# Patient Record
Sex: FEMALE | Race: WHITE | NOT HISPANIC OR LATINO | URBAN - METROPOLITAN AREA
[De-identification: names, ages, dates, MRNs, and addresses within clinical notes are randomized per-mention and may not be internally consistent; named-entity substitution may affect disease eponyms.]

---

## 2017-04-14 ENCOUNTER — EMERGENCY VISIT (OUTPATIENT)
Dept: URBAN - METROPOLITAN AREA CLINIC 32 | Facility: CLINIC | Age: 62
End: 2017-04-14

## 2017-04-14 DIAGNOSIS — H33.42: ICD-10-CM

## 2017-04-14 DIAGNOSIS — H43.811: ICD-10-CM

## 2017-04-14 DIAGNOSIS — H33.002: ICD-10-CM

## 2017-04-14 PROCEDURE — G8427 DOCREV CUR MEDS BY ELIG CLIN: HCPCS

## 2017-04-14 PROCEDURE — 1036F TOBACCO NON-USER: CPT

## 2017-04-14 PROCEDURE — 99245 OFF/OP CONSLTJ NEW/EST HI 55: CPT | Mod: 57

## 2017-04-14 PROCEDURE — 92134 CPTRZ OPH DX IMG PST SGM RTA: CPT

## 2017-04-14 PROCEDURE — 92225 OPHTHALMOSCOPY (INITIAL): CPT

## 2017-04-14 ASSESSMENT — VISUAL ACUITY
OS_SC: 20/125
OD_SC: 20/25
OS_PH: 20/50-2

## 2017-04-14 ASSESSMENT — TONOMETRY
OD_IOP_MMHG: 12
OS_IOP_MMHG: 14

## 2017-04-15 ENCOUNTER — SURGERY/PROCEDURE (OUTPATIENT)
Dept: URBAN - METROPOLITAN AREA MEDICAL CENTER 2 | Facility: MEDICAL CENTER | Age: 62
End: 2017-04-15

## 2017-04-15 DIAGNOSIS — H33.002: ICD-10-CM

## 2017-04-15 DIAGNOSIS — H33.42: ICD-10-CM

## 2017-04-15 PROCEDURE — 67113 REPAIR RETINAL DETACH CPLX: CPT

## 2017-04-15 PROCEDURE — 67108 REPAIR DETACHED RETINA: CPT

## 2017-04-16 ENCOUNTER — EMERGENCY VISIT (OUTPATIENT)
Dept: URBAN - METROPOLITAN AREA CLINIC 39 | Facility: CLINIC | Age: 62
End: 2017-04-16

## 2017-04-16 DIAGNOSIS — H43.811: ICD-10-CM

## 2017-04-16 DIAGNOSIS — H33.42: ICD-10-CM

## 2017-04-16 PROCEDURE — G8427 DOCREV CUR MEDS BY ELIG CLIN: HCPCS

## 2017-04-16 PROCEDURE — 1036F TOBACCO NON-USER: CPT

## 2017-04-16 PROCEDURE — 99024 POSTOP FOLLOW-UP VISIT: CPT

## 2017-04-16 PROCEDURE — 92226 OPHTHALMOSCOPY (SUB): CPT

## 2017-04-17 ASSESSMENT — VISUAL ACUITY: OD_CC: 20/20

## 2017-04-17 ASSESSMENT — TONOMETRY: OD_IOP_MMHG: 15

## 2017-04-20 ENCOUNTER — POST-OP CHECK (OUTPATIENT)
Dept: URBAN - METROPOLITAN AREA CLINIC 39 | Facility: CLINIC | Age: 62
End: 2017-04-20

## 2017-04-20 DIAGNOSIS — H33.42: ICD-10-CM

## 2017-04-20 PROCEDURE — 99024 POSTOP FOLLOW-UP VISIT: CPT

## 2017-04-20 PROCEDURE — 92226 OPHTHALMOSCOPY (SUB): CPT

## 2017-04-20 ASSESSMENT — VISUAL ACUITY
OS_SC: 20/400
OS_AM: 20/100
OD_SC: 20/25-1

## 2017-04-20 ASSESSMENT — TONOMETRY
OD_IOP_MMHG: 13
OS_IOP_MMHG: 11

## 2017-05-04 ENCOUNTER — POST-OP CHECK (OUTPATIENT)
Dept: URBAN - METROPOLITAN AREA CLINIC 39 | Facility: CLINIC | Age: 62
End: 2017-05-04

## 2017-05-04 DIAGNOSIS — H33.42: ICD-10-CM

## 2017-05-04 PROCEDURE — 92226 OPHTHALMOSCOPY (SUB): CPT

## 2017-05-04 PROCEDURE — 99024 POSTOP FOLLOW-UP VISIT: CPT

## 2017-05-04 ASSESSMENT — TONOMETRY
OD_IOP_MMHG: 15
OS_IOP_MMHG: 14

## 2017-05-04 ASSESSMENT — VISUAL ACUITY
OS_PH: 20/80-1
OD_SC: 20/25
OS_SC: 20/200

## 2017-05-25 ENCOUNTER — POST-OP CHECK (OUTPATIENT)
Dept: URBAN - METROPOLITAN AREA CLINIC 39 | Facility: CLINIC | Age: 62
End: 2017-05-25

## 2017-05-25 DIAGNOSIS — H35.352: ICD-10-CM

## 2017-05-25 DIAGNOSIS — H33.42: ICD-10-CM

## 2017-05-25 PROCEDURE — 92134 CPTRZ OPH DX IMG PST SGM RTA: CPT

## 2017-05-25 PROCEDURE — 92226 OPHTHALMOSCOPY (SUB): CPT

## 2017-05-25 PROCEDURE — 99024 POSTOP FOLLOW-UP VISIT: CPT

## 2017-05-25 ASSESSMENT — VISUAL ACUITY
OD_PH: 20/20-1
OS_PH: 20/100
OD_SC: 20/20-4
OS_SC: 10/200

## 2017-05-25 ASSESSMENT — TONOMETRY
OS_IOP_MMHG: 14
OD_IOP_MMHG: 16

## 2017-06-22 ENCOUNTER — POST-OP CHECK (OUTPATIENT)
Dept: URBAN - METROPOLITAN AREA CLINIC 39 | Facility: CLINIC | Age: 62
End: 2017-06-22

## 2017-06-22 DIAGNOSIS — H35.352: ICD-10-CM

## 2017-06-22 DIAGNOSIS — H33.42: ICD-10-CM

## 2017-06-22 PROCEDURE — 99024 POSTOP FOLLOW-UP VISIT: CPT

## 2017-06-22 PROCEDURE — 92134 CPTRZ OPH DX IMG PST SGM RTA: CPT

## 2017-06-22 PROCEDURE — 92226 OPHTHALMOSCOPY (SUB): CPT

## 2017-06-22 ASSESSMENT — VISUAL ACUITY
OS_SC: 10/200
OS_PH: 20/100-2
OD_SC: 20/20-2

## 2017-06-22 ASSESSMENT — TONOMETRY
OD_IOP_MMHG: 17
OS_IOP_MMHG: 17

## 2017-07-06 ENCOUNTER — POST-OP CHECK (OUTPATIENT)
Dept: URBAN - METROPOLITAN AREA CLINIC 39 | Facility: CLINIC | Age: 62
End: 2017-07-06

## 2017-07-06 DIAGNOSIS — H35.352: ICD-10-CM

## 2017-07-06 DIAGNOSIS — H33.42: ICD-10-CM

## 2017-07-06 DIAGNOSIS — H43.811: ICD-10-CM

## 2017-07-06 PROCEDURE — 92226 OPHTHALMOSCOPY (SUB): CPT

## 2017-07-06 PROCEDURE — 99024 POSTOP FOLLOW-UP VISIT: CPT

## 2017-07-06 PROCEDURE — 92134 CPTRZ OPH DX IMG PST SGM RTA: CPT

## 2017-07-06 ASSESSMENT — VISUAL ACUITY
OS_SC: 10/200
OD_SC: 20/25
OS_PH: 20/100-2

## 2017-07-06 ASSESSMENT — TONOMETRY
OD_IOP_MMHG: 17
OS_IOP_MMHG: 19

## 2017-07-10 ENCOUNTER — POST-OP CHECK (OUTPATIENT)
Dept: URBAN - METROPOLITAN AREA CLINIC 32 | Facility: CLINIC | Age: 62
End: 2017-07-10

## 2017-07-10 DIAGNOSIS — H43.811: ICD-10-CM

## 2017-07-10 DIAGNOSIS — H35.352: ICD-10-CM

## 2017-07-10 DIAGNOSIS — H33.42: ICD-10-CM

## 2017-07-10 PROCEDURE — 99024 POSTOP FOLLOW-UP VISIT: CPT

## 2017-07-10 PROCEDURE — 92134 CPTRZ OPH DX IMG PST SGM RTA: CPT

## 2017-07-10 PROCEDURE — 92226 OPHTHALMOSCOPY (SUB): CPT

## 2017-07-10 ASSESSMENT — VISUAL ACUITY
OD_SC: 20/25
OS_SC: 3/200
OD_PH: 20/20

## 2017-07-10 ASSESSMENT — TONOMETRY
OD_IOP_MMHG: 13
OS_IOP_MMHG: 22

## 2017-07-13 ENCOUNTER — POST-OP CHECK (OUTPATIENT)
Dept: URBAN - METROPOLITAN AREA CLINIC 39 | Facility: CLINIC | Age: 62
End: 2017-07-13

## 2017-07-13 DIAGNOSIS — H33.42: ICD-10-CM

## 2017-07-13 DIAGNOSIS — H35.352: ICD-10-CM

## 2017-07-13 DIAGNOSIS — H10.32: ICD-10-CM

## 2017-07-13 PROCEDURE — 99024 POSTOP FOLLOW-UP VISIT: CPT

## 2017-07-13 PROCEDURE — 92134 CPTRZ OPH DX IMG PST SGM RTA: CPT

## 2017-07-13 PROCEDURE — 92226 OPHTHALMOSCOPY (SUB): CPT

## 2017-07-13 ASSESSMENT — VISUAL ACUITY
OS_SC: 2/200
OD_SC: 20/25
OD_PH: 20/20-2

## 2017-07-13 ASSESSMENT — TONOMETRY
OS_IOP_MMHG: 28
OD_IOP_MMHG: 14

## 2017-07-17 ENCOUNTER — FOLLOW UP (OUTPATIENT)
Dept: URBAN - METROPOLITAN AREA CLINIC 32 | Facility: CLINIC | Age: 62
End: 2017-07-17

## 2017-07-17 DIAGNOSIS — H10.32: ICD-10-CM

## 2017-07-17 DIAGNOSIS — H33.42: ICD-10-CM

## 2017-07-17 PROCEDURE — 92014 COMPRE OPH EXAM EST PT 1/>: CPT

## 2017-07-17 PROCEDURE — 92226 OPHTHALMOSCOPY (SUB): CPT

## 2017-07-17 PROCEDURE — G8427 DOCREV CUR MEDS BY ELIG CLIN: HCPCS

## 2017-07-17 PROCEDURE — 1036F TOBACCO NON-USER: CPT

## 2017-07-17 ASSESSMENT — VISUAL ACUITY
OS_SC: 20/200
OD_PH: 20/20
OD_SC: 20/25

## 2017-07-17 ASSESSMENT — TONOMETRY
OS_IOP_MMHG: 14
OD_IOP_MMHG: 18

## 2017-07-27 ENCOUNTER — POST-OP CHECK (OUTPATIENT)
Dept: URBAN - METROPOLITAN AREA CLINIC 39 | Facility: CLINIC | Age: 62
End: 2017-07-27

## 2017-07-27 DIAGNOSIS — H43.811: ICD-10-CM

## 2017-07-27 DIAGNOSIS — H33.42: ICD-10-CM

## 2017-07-27 DIAGNOSIS — H35.352: ICD-10-CM

## 2017-07-27 PROCEDURE — 99024 POSTOP FOLLOW-UP VISIT: CPT

## 2017-07-27 PROCEDURE — 92226 OPHTHALMOSCOPY (SUB): CPT

## 2017-07-27 PROCEDURE — 92134 CPTRZ OPH DX IMG PST SGM RTA: CPT

## 2017-07-27 ASSESSMENT — VISUAL ACUITY
OS_PH: 20/100-1
OD_SC: 20/25
OS_SC: 20/400

## 2017-07-27 ASSESSMENT — TONOMETRY
OD_IOP_MMHG: 16
OS_IOP_MMHG: 17

## 2017-08-21 ENCOUNTER — FOLLOW UP (OUTPATIENT)
Dept: URBAN - METROPOLITAN AREA CLINIC 32 | Facility: CLINIC | Age: 62
End: 2017-08-21

## 2017-08-21 DIAGNOSIS — H43.811: ICD-10-CM

## 2017-08-21 DIAGNOSIS — H33.42: ICD-10-CM

## 2017-08-21 DIAGNOSIS — H35.352: ICD-10-CM

## 2017-08-21 DIAGNOSIS — H10.32: ICD-10-CM

## 2017-08-21 PROCEDURE — 92134 CPTRZ OPH DX IMG PST SGM RTA: CPT

## 2017-08-21 PROCEDURE — G8427 DOCREV CUR MEDS BY ELIG CLIN: HCPCS

## 2017-08-21 PROCEDURE — 92226 OPHTHALMOSCOPY (SUB): CPT

## 2017-08-21 PROCEDURE — 1036F TOBACCO NON-USER: CPT

## 2017-08-21 PROCEDURE — 92014 COMPRE OPH EXAM EST PT 1/>: CPT

## 2017-08-21 ASSESSMENT — TONOMETRY
OD_IOP_MMHG: 15
OS_IOP_MMHG: 14

## 2017-08-21 ASSESSMENT — VISUAL ACUITY
OD_CC: 20/25
OS_CC: 20/400

## 2017-08-22 ENCOUNTER — SURGERY/PROCEDURE (OUTPATIENT)
Age: 62
End: 2017-08-22

## 2017-08-22 DIAGNOSIS — H33.42: ICD-10-CM

## 2017-08-22 DIAGNOSIS — H35.352: ICD-10-CM

## 2017-08-22 PROCEDURE — 67039 LASER TREATMENT OF RETINA: CPT

## 2017-08-22 PROCEDURE — 67121 REMOVE EYE IMPLANT MATERIAL: CPT

## 2017-08-23 ENCOUNTER — 1 DAY POST-OP (OUTPATIENT)
Dept: URBAN - METROPOLITAN AREA CLINIC 19 | Facility: CLINIC | Age: 62
End: 2017-08-23

## 2017-08-23 DIAGNOSIS — H33.42: ICD-10-CM

## 2017-08-23 PROCEDURE — 92226 OPHTHALMOSCOPY (SUB): CPT

## 2017-08-23 PROCEDURE — 99024 POSTOP FOLLOW-UP VISIT: CPT

## 2017-08-23 ASSESSMENT — TONOMETRY
OD_IOP_MMHG: 14
OS_IOP_MMHG: 8

## 2017-08-23 ASSESSMENT — VISUAL ACUITY: OD_SC: 20/25-1

## 2017-08-30 ENCOUNTER — POST-OP CHECK (OUTPATIENT)
Dept: URBAN - METROPOLITAN AREA CLINIC 39 | Facility: CLINIC | Age: 62
End: 2017-08-30

## 2017-08-30 DIAGNOSIS — H33.42: ICD-10-CM

## 2017-08-30 DIAGNOSIS — H35.352: ICD-10-CM

## 2017-08-30 PROCEDURE — 99024 POSTOP FOLLOW-UP VISIT: CPT

## 2017-08-30 PROCEDURE — 92226 OPHTHALMOSCOPY (SUB): CPT

## 2017-08-30 ASSESSMENT — TONOMETRY
OS_IOP_MMHG: 7
OS_IOP_MMHG: 6
OD_IOP_MMHG: 18

## 2017-08-30 ASSESSMENT — VISUAL ACUITY
OD_SC: 20/20
OS_SC: 20/100

## 2017-09-05 ENCOUNTER — POST-OP CHECK (OUTPATIENT)
Dept: URBAN - METROPOLITAN AREA CLINIC 39 | Facility: CLINIC | Age: 62
End: 2017-09-05

## 2017-09-05 DIAGNOSIS — H33.42: ICD-10-CM

## 2017-09-05 PROCEDURE — 99024 POSTOP FOLLOW-UP VISIT: CPT

## 2017-09-05 PROCEDURE — 92226 OPHTHALMOSCOPY (SUB): CPT

## 2017-09-06 ASSESSMENT — TONOMETRY: OS_IOP_MMHG: 20

## 2017-09-06 ASSESSMENT — VISUAL ACUITY: OS_SC: 20/200

## 2017-09-20 ENCOUNTER — POST-OP CHECK (OUTPATIENT)
Dept: URBAN - METROPOLITAN AREA CLINIC 39 | Facility: CLINIC | Age: 62
End: 2017-09-20

## 2017-09-20 DIAGNOSIS — H35.352: ICD-10-CM

## 2017-09-20 DIAGNOSIS — H43.811: ICD-10-CM

## 2017-09-20 DIAGNOSIS — H33.42: ICD-10-CM

## 2017-09-20 DIAGNOSIS — H10.32: ICD-10-CM

## 2017-09-20 PROCEDURE — 99024 POSTOP FOLLOW-UP VISIT: CPT

## 2017-09-20 PROCEDURE — 92226 OPHTHALMOSCOPY (SUB): CPT

## 2017-09-20 ASSESSMENT — VISUAL ACUITY
OD_SC: 20/25+2
OS_SC: 20/150

## 2017-09-20 ASSESSMENT — TONOMETRY
OD_IOP_MMHG: 16
OS_IOP_MMHG: 13

## 2017-10-04 ENCOUNTER — POST-OP CHECK (OUTPATIENT)
Dept: URBAN - METROPOLITAN AREA CLINIC 39 | Facility: CLINIC | Age: 62
End: 2017-10-04

## 2017-10-04 DIAGNOSIS — H33.42: ICD-10-CM

## 2017-10-04 DIAGNOSIS — H35.372: ICD-10-CM

## 2017-10-04 DIAGNOSIS — H35.352: ICD-10-CM

## 2017-10-04 PROCEDURE — 92134 CPTRZ OPH DX IMG PST SGM RTA: CPT

## 2017-10-04 PROCEDURE — 92226 OPHTHALMOSCOPY (SUB): CPT

## 2017-10-04 PROCEDURE — 99024 POSTOP FOLLOW-UP VISIT: CPT

## 2017-10-04 ASSESSMENT — VISUAL ACUITY
OS_SC: 20/160
OD_PH: 20/20
OD_SC: 20/25

## 2017-10-04 ASSESSMENT — TONOMETRY
OS_IOP_MMHG: 14
OD_IOP_MMHG: 17

## 2017-11-01 ENCOUNTER — POST-OP CHECK (OUTPATIENT)
Dept: URBAN - METROPOLITAN AREA CLINIC 39 | Facility: CLINIC | Age: 62
End: 2017-11-01

## 2017-11-01 DIAGNOSIS — H33.42: ICD-10-CM

## 2017-11-01 DIAGNOSIS — H35.372: ICD-10-CM

## 2017-11-01 DIAGNOSIS — H35.352: ICD-10-CM

## 2017-11-01 PROCEDURE — 99024 POSTOP FOLLOW-UP VISIT: CPT

## 2017-11-01 PROCEDURE — 92226 OPHTHALMOSCOPY (SUB): CPT

## 2017-11-01 PROCEDURE — 92134 CPTRZ OPH DX IMG PST SGM RTA: CPT

## 2017-11-01 ASSESSMENT — VISUAL ACUITY
OD_SC: 20/25-1
OS_SC: 2/200

## 2017-11-01 ASSESSMENT — TONOMETRY
OS_IOP_MMHG: 12
OD_IOP_MMHG: 15

## 2017-12-15 ENCOUNTER — FOLLOW UP (OUTPATIENT)
Dept: URBAN - METROPOLITAN AREA CLINIC 32 | Facility: CLINIC | Age: 62
End: 2017-12-15

## 2017-12-15 DIAGNOSIS — H21.1X2: ICD-10-CM

## 2017-12-15 DIAGNOSIS — H33.42: ICD-10-CM

## 2017-12-15 DIAGNOSIS — H43.811: ICD-10-CM

## 2017-12-15 DIAGNOSIS — H43.12: ICD-10-CM

## 2017-12-15 DIAGNOSIS — H35.372: ICD-10-CM

## 2017-12-15 DIAGNOSIS — H35.352: ICD-10-CM

## 2017-12-15 DIAGNOSIS — E11.9: ICD-10-CM

## 2017-12-15 PROCEDURE — 67028 INJECTION EYE DRUG: CPT

## 2017-12-15 PROCEDURE — 92134 CPTRZ OPH DX IMG PST SGM RTA: CPT

## 2017-12-15 PROCEDURE — A AVASTIN

## 2017-12-15 PROCEDURE — C9257 BEVACIZUMAB INJECTION: HCPCS

## 2017-12-15 PROCEDURE — 1036F TOBACCO NON-USER: CPT

## 2017-12-15 PROCEDURE — 92226 OPHTHALMOSCOPY (SUB): CPT

## 2017-12-15 PROCEDURE — 76512 OPH US DX B-SCAN: CPT

## 2017-12-15 PROCEDURE — 92014 COMPRE OPH EXAM EST PT 1/>: CPT | Mod: 25

## 2017-12-15 PROCEDURE — G8427 DOCREV CUR MEDS BY ELIG CLIN: HCPCS

## 2017-12-15 PROCEDURE — 2022F DILAT RTA XM EVC RTNOPTHY: CPT

## 2017-12-15 ASSESSMENT — VISUAL ACUITY: OD_CC: 20/25

## 2017-12-15 ASSESSMENT — TONOMETRY
OS_IOP_MMHG: 5
OS_IOP_MMHG: 7
OD_IOP_MMHG: 18

## 2017-12-29 ENCOUNTER — FOLLOW UP (OUTPATIENT)
Dept: URBAN - METROPOLITAN AREA CLINIC 32 | Facility: CLINIC | Age: 62
End: 2017-12-29

## 2017-12-29 DIAGNOSIS — H33.42: ICD-10-CM

## 2017-12-29 DIAGNOSIS — H35.352: ICD-10-CM

## 2017-12-29 DIAGNOSIS — H43.12: ICD-10-CM

## 2017-12-29 DIAGNOSIS — H35.372: ICD-10-CM

## 2017-12-29 DIAGNOSIS — H43.811: ICD-10-CM

## 2017-12-29 DIAGNOSIS — E11.9: ICD-10-CM

## 2017-12-29 DIAGNOSIS — H21.1X2: ICD-10-CM

## 2017-12-29 PROCEDURE — 92134 CPTRZ OPH DX IMG PST SGM RTA: CPT

## 2017-12-29 PROCEDURE — G8427 DOCREV CUR MEDS BY ELIG CLIN: HCPCS

## 2017-12-29 PROCEDURE — 92226 OPHTHALMOSCOPY (SUB): CPT

## 2017-12-29 PROCEDURE — 92014 COMPRE OPH EXAM EST PT 1/>: CPT

## 2017-12-29 PROCEDURE — 1036F TOBACCO NON-USER: CPT

## 2017-12-29 ASSESSMENT — VISUAL ACUITY: OD_SC: 20/25

## 2017-12-29 ASSESSMENT — TONOMETRY
OD_IOP_MMHG: 20
OS_IOP_MMHG: 11
OD_IOP_MMHG: 22
OS_IOP_MMHG: 8

## 2018-01-16 ENCOUNTER — SURGERY/PROCEDURE (OUTPATIENT)
Age: 63
End: 2018-01-16

## 2018-01-16 DIAGNOSIS — H35.372: ICD-10-CM

## 2018-01-16 DIAGNOSIS — H43.12: ICD-10-CM

## 2018-01-16 PROCEDURE — 67040 LASER TREATMENT OF RETINA: CPT

## 2018-01-16 PROCEDURE — 67041 VIT FOR MACULAR PUCKER: CPT

## 2018-01-17 ENCOUNTER — 1 DAY POST-OP (OUTPATIENT)
Dept: URBAN - METROPOLITAN AREA CLINIC 39 | Facility: CLINIC | Age: 63
End: 2018-01-17

## 2018-01-17 DIAGNOSIS — H35.372: ICD-10-CM

## 2018-01-17 PROCEDURE — 92226 OPHTHALMOSCOPY (SUB): CPT

## 2018-01-17 PROCEDURE — 99024 POSTOP FOLLOW-UP VISIT: CPT

## 2018-01-17 ASSESSMENT — TONOMETRY: OD_IOP_MMHG: 15

## 2018-01-17 ASSESSMENT — VISUAL ACUITY: OD_SC: 20/25

## 2018-01-24 ENCOUNTER — POST-OP CHECK (OUTPATIENT)
Dept: URBAN - METROPOLITAN AREA CLINIC 19 | Facility: CLINIC | Age: 63
End: 2018-01-24

## 2018-01-24 DIAGNOSIS — H43.811: ICD-10-CM

## 2018-01-24 DIAGNOSIS — H35.372: ICD-10-CM

## 2018-01-24 DIAGNOSIS — H21.1X2: ICD-10-CM

## 2018-01-24 DIAGNOSIS — H43.12: ICD-10-CM

## 2018-01-24 PROCEDURE — 92226 OPHTHALMOSCOPY (SUB): CPT

## 2018-01-24 PROCEDURE — 99024 POSTOP FOLLOW-UP VISIT: CPT

## 2018-01-24 PROCEDURE — 92134 CPTRZ OPH DX IMG PST SGM RTA: CPT

## 2018-01-24 ASSESSMENT — VISUAL ACUITY: OD_SC: 20/25

## 2018-01-24 ASSESSMENT — TONOMETRY: OD_IOP_MMHG: 12

## 2018-02-01 ENCOUNTER — 1 WEEK POST-OP (OUTPATIENT)
Dept: URBAN - METROPOLITAN AREA CLINIC 39 | Facility: CLINIC | Age: 63
End: 2018-02-01

## 2018-02-01 DIAGNOSIS — H33.42: ICD-10-CM

## 2018-02-01 DIAGNOSIS — H43.12: ICD-10-CM

## 2018-02-01 DIAGNOSIS — H35.352: ICD-10-CM

## 2018-02-01 DIAGNOSIS — H44.442: ICD-10-CM

## 2018-02-01 DIAGNOSIS — H21.1X2: ICD-10-CM

## 2018-02-01 DIAGNOSIS — H35.372: ICD-10-CM

## 2018-02-01 PROCEDURE — 99024 POSTOP FOLLOW-UP VISIT: CPT

## 2018-02-01 PROCEDURE — 92134 CPTRZ OPH DX IMG PST SGM RTA: CPT

## 2018-02-01 PROCEDURE — 92226 OPHTHALMOSCOPY (SUB): CPT

## 2018-02-01 ASSESSMENT — TONOMETRY
OS_IOP_MMHG: 6
OD_IOP_MMHG: 16

## 2018-02-01 ASSESSMENT — VISUAL ACUITY: OD_SC: 20/25

## 2018-03-15 ENCOUNTER — POST-OP CHECK (OUTPATIENT)
Dept: URBAN - METROPOLITAN AREA CLINIC 39 | Facility: CLINIC | Age: 63
End: 2018-03-15

## 2018-03-15 DIAGNOSIS — H35.372: ICD-10-CM

## 2018-03-15 DIAGNOSIS — H21.1X2: ICD-10-CM

## 2018-03-15 DIAGNOSIS — H43.811: ICD-10-CM

## 2018-03-15 DIAGNOSIS — H33.42: ICD-10-CM

## 2018-03-15 DIAGNOSIS — H43.12: ICD-10-CM

## 2018-03-15 DIAGNOSIS — H44.442: ICD-10-CM

## 2018-03-15 DIAGNOSIS — E11.9: ICD-10-CM

## 2018-03-15 DIAGNOSIS — H35.352: ICD-10-CM

## 2018-03-15 PROCEDURE — 92134 CPTRZ OPH DX IMG PST SGM RTA: CPT

## 2018-03-15 PROCEDURE — 99024 POSTOP FOLLOW-UP VISIT: CPT

## 2018-03-15 PROCEDURE — 92226 OPHTHALMOSCOPY (SUB): CPT

## 2018-03-15 ASSESSMENT — TONOMETRY
OD_IOP_MMHG: 12
OS_IOP_MMHG: 4

## 2018-03-15 ASSESSMENT — VISUAL ACUITY: OD_SC: 20/25-1

## 2018-04-11 ENCOUNTER — POST-OP CHECK (OUTPATIENT)
Dept: URBAN - METROPOLITAN AREA CLINIC 19 | Facility: CLINIC | Age: 63
End: 2018-04-11

## 2018-04-11 DIAGNOSIS — H35.372: ICD-10-CM

## 2018-04-11 DIAGNOSIS — H35.352: ICD-10-CM

## 2018-04-11 DIAGNOSIS — H33.42: ICD-10-CM

## 2018-04-11 DIAGNOSIS — H43.811: ICD-10-CM

## 2018-04-11 PROCEDURE — 92226 OPHTHALMOSCOPY (SUB): CPT

## 2018-04-11 PROCEDURE — 99024 POSTOP FOLLOW-UP VISIT: CPT

## 2018-04-11 PROCEDURE — 92134 CPTRZ OPH DX IMG PST SGM RTA: CPT

## 2018-04-11 ASSESSMENT — TONOMETRY: OD_IOP_MMHG: 13

## 2018-04-11 ASSESSMENT — VISUAL ACUITY
OS_SC: 1/200
OD_SC: 20/25-2

## 2018-06-07 ENCOUNTER — FOLLOW UP (OUTPATIENT)
Dept: URBAN - METROPOLITAN AREA CLINIC 39 | Facility: CLINIC | Age: 63
End: 2018-06-07

## 2018-06-07 DIAGNOSIS — H33.42: ICD-10-CM

## 2018-06-07 DIAGNOSIS — H35.372: ICD-10-CM

## 2018-06-07 PROCEDURE — 92134 CPTRZ OPH DX IMG PST SGM RTA: CPT

## 2018-06-07 PROCEDURE — G8427 DOCREV CUR MEDS BY ELIG CLIN: HCPCS

## 2018-06-07 PROCEDURE — 92226 OPHTHALMOSCOPY (SUB): CPT

## 2018-06-07 PROCEDURE — 92014 COMPRE OPH EXAM EST PT 1/>: CPT

## 2018-06-07 PROCEDURE — 1036F TOBACCO NON-USER: CPT

## 2018-06-07 ASSESSMENT — VISUAL ACUITY
OD_PH: 20/20-3
OD_SC: 20/25-3
OS_SC: 1/200

## 2018-06-07 ASSESSMENT — TONOMETRY
OS_IOP_MMHG: 14
OD_IOP_MMHG: 17

## 2019-12-26 ENCOUNTER — PROBLEM (OUTPATIENT)
Dept: URBAN - METROPOLITAN AREA CLINIC 96 | Facility: CLINIC | Age: 64
End: 2019-12-26

## 2019-12-26 DIAGNOSIS — H43.811: ICD-10-CM

## 2019-12-26 DIAGNOSIS — H35.372: ICD-10-CM

## 2019-12-26 DIAGNOSIS — H33.42: ICD-10-CM

## 2019-12-26 PROCEDURE — 92226 OPHTHALMOSCOPY (SUB): CPT

## 2019-12-26 PROCEDURE — 92134 CPTRZ OPH DX IMG PST SGM RTA: CPT

## 2019-12-26 PROCEDURE — 92014 COMPRE OPH EXAM EST PT 1/>: CPT

## 2019-12-26 ASSESSMENT — VISUAL ACUITY
OD_SC: 20/20
OS_SC: 1/200

## 2019-12-26 ASSESSMENT — TONOMETRY
OD_IOP_MMHG: 16
OS_IOP_MMHG: 22

## 2020-02-11 ENCOUNTER — FOLLOW UP (OUTPATIENT)
Dept: URBAN - METROPOLITAN AREA CLINIC 39 | Facility: CLINIC | Age: 65
End: 2020-02-11

## 2020-02-11 DIAGNOSIS — H35.372: ICD-10-CM

## 2020-02-11 DIAGNOSIS — H35.352: ICD-10-CM

## 2020-02-11 DIAGNOSIS — H33.42: ICD-10-CM

## 2020-02-11 PROCEDURE — 92201 OPSCPY EXTND RTA DRAW UNI/BI: CPT

## 2020-02-11 PROCEDURE — 92014 COMPRE OPH EXAM EST PT 1/>: CPT

## 2020-02-11 PROCEDURE — 92134 CPTRZ OPH DX IMG PST SGM RTA: CPT

## 2020-02-11 ASSESSMENT — VISUAL ACUITY
OS_SC: 1/200
OD_SC: 20/25

## 2020-02-11 ASSESSMENT — TONOMETRY
OD_IOP_MMHG: 18
OS_IOP_MMHG: 21

## 2020-08-10 ENCOUNTER — FOLLOW UP (OUTPATIENT)
Dept: URBAN - METROPOLITAN AREA CLINIC 19 | Facility: CLINIC | Age: 65
End: 2020-08-10

## 2020-08-10 DIAGNOSIS — H43.811: ICD-10-CM

## 2020-08-10 DIAGNOSIS — H35.372: ICD-10-CM

## 2020-08-10 DIAGNOSIS — H33.42: ICD-10-CM

## 2020-08-10 PROCEDURE — 92134 CPTRZ OPH DX IMG PST SGM RTA: CPT

## 2020-08-10 PROCEDURE — 92014 COMPRE OPH EXAM EST PT 1/>: CPT

## 2020-08-10 PROCEDURE — 92201 OPSCPY EXTND RTA DRAW UNI/BI: CPT

## 2020-08-10 ASSESSMENT — TONOMETRY
OS_IOP_MMHG: 18
OD_IOP_MMHG: 12

## 2020-08-10 ASSESSMENT — VISUAL ACUITY
OS_SC: 1/200
OD_SC: 20/25-1

## 2020-10-26 ENCOUNTER — FOLLOW UP (OUTPATIENT)
Dept: URBAN - METROPOLITAN AREA CLINIC 32 | Facility: CLINIC | Age: 65
End: 2020-10-26

## 2020-10-26 DIAGNOSIS — H33.42: ICD-10-CM

## 2020-10-26 DIAGNOSIS — H35.372: ICD-10-CM

## 2020-10-26 DIAGNOSIS — H43.811: ICD-10-CM

## 2020-10-26 PROCEDURE — 92014 COMPRE OPH EXAM EST PT 1/>: CPT

## 2020-10-26 PROCEDURE — 92201 OPSCPY EXTND RTA DRAW UNI/BI: CPT

## 2020-10-26 PROCEDURE — 92134 CPTRZ OPH DX IMG PST SGM RTA: CPT

## 2020-10-26 ASSESSMENT — VISUAL ACUITY
OS_SC: 1/200
OD_SC: 20/25+2

## 2020-10-26 ASSESSMENT — TONOMETRY
OD_IOP_MMHG: 11
OS_IOP_MMHG: 17

## 2020-10-27 ENCOUNTER — PROBLEM (OUTPATIENT)
Dept: URBAN - METROPOLITAN AREA CLINIC 32 | Facility: CLINIC | Age: 65
End: 2020-10-27

## 2020-10-27 VITALS — HEIGHT: 55 IN

## 2020-10-27 DIAGNOSIS — H33.42: ICD-10-CM

## 2020-10-27 DIAGNOSIS — H35.372: ICD-10-CM

## 2020-10-27 DIAGNOSIS — H43.811: ICD-10-CM

## 2020-10-27 PROCEDURE — 92201 OPSCPY EXTND RTA DRAW UNI/BI: CPT

## 2020-10-27 PROCEDURE — 92014 COMPRE OPH EXAM EST PT 1/>: CPT

## 2020-10-27 PROCEDURE — 92134 CPTRZ OPH DX IMG PST SGM RTA: CPT

## 2020-10-27 PROCEDURE — 76512 OPH US DX B-SCAN: CPT

## 2020-10-27 ASSESSMENT — TONOMETRY
OS_IOP_MMHG: 18
OD_IOP_MMHG: 10

## 2020-10-27 ASSESSMENT — VISUAL ACUITY: OD_SC: 20/25-1

## 2020-11-03 ENCOUNTER — FOLLOW UP (OUTPATIENT)
Dept: URBAN - METROPOLITAN AREA CLINIC 39 | Facility: CLINIC | Age: 65
End: 2020-11-03

## 2020-11-03 DIAGNOSIS — H35.372: ICD-10-CM

## 2020-11-03 DIAGNOSIS — H43.811: ICD-10-CM

## 2020-11-03 DIAGNOSIS — H33.42: ICD-10-CM

## 2020-11-03 PROCEDURE — 92201 OPSCPY EXTND RTA DRAW UNI/BI: CPT

## 2020-11-03 PROCEDURE — 92134 CPTRZ OPH DX IMG PST SGM RTA: CPT

## 2020-11-03 PROCEDURE — 92012 INTRM OPH EXAM EST PATIENT: CPT

## 2020-11-03 ASSESSMENT — VISUAL ACUITY: OD_SC: 20/20

## 2020-11-03 ASSESSMENT — TONOMETRY
OS_IOP_MMHG: 25
OD_IOP_MMHG: 16

## 2022-05-06 ENCOUNTER — FOLLOW UP (OUTPATIENT)
Dept: URBAN - METROPOLITAN AREA CLINIC 32 | Facility: CLINIC | Age: 67
End: 2022-05-06

## 2022-05-06 DIAGNOSIS — E11.9: ICD-10-CM

## 2022-05-06 DIAGNOSIS — H33.42: ICD-10-CM

## 2022-05-06 DIAGNOSIS — H35.372: ICD-10-CM

## 2022-05-06 DIAGNOSIS — H43.811: ICD-10-CM

## 2022-05-06 LAB
BLOOD WORK: NORMAL

## 2022-05-06 PROCEDURE — 92134 CPTRZ OPH DX IMG PST SGM RTA: CPT

## 2022-05-06 PROCEDURE — 92014 COMPRE OPH EXAM EST PT 1/>: CPT

## 2022-05-06 PROCEDURE — 92201 OPSCPY EXTND RTA DRAW UNI/BI: CPT

## 2022-05-06 ASSESSMENT — VISUAL ACUITY
OD_SC: 20/30-1
OD_PH: 20/25

## 2022-05-06 ASSESSMENT — TONOMETRY
OD_IOP_MMHG: 18
OS_IOP_MMHG: 20

## 2022-06-03 ENCOUNTER — FOLLOW UP (OUTPATIENT)
Dept: URBAN - METROPOLITAN AREA CLINIC 32 | Facility: CLINIC | Age: 67
End: 2022-06-03

## 2022-06-03 DIAGNOSIS — H43.811: ICD-10-CM

## 2022-06-03 DIAGNOSIS — H35.372: ICD-10-CM

## 2022-06-03 DIAGNOSIS — M31.6: ICD-10-CM

## 2022-06-03 DIAGNOSIS — H33.42: ICD-10-CM

## 2022-06-03 PROCEDURE — 92201 OPSCPY EXTND RTA DRAW UNI/BI: CPT

## 2022-06-03 PROCEDURE — 92014 COMPRE OPH EXAM EST PT 1/>: CPT

## 2022-06-03 PROCEDURE — 92134 CPTRZ OPH DX IMG PST SGM RTA: CPT

## 2022-06-03 ASSESSMENT — TONOMETRY
OS_IOP_MMHG: 18
OD_IOP_MMHG: 18

## 2022-06-03 ASSESSMENT — VISUAL ACUITY: OD_SC: 20/30

## 2022-09-09 ENCOUNTER — FOLLOW UP (OUTPATIENT)
Dept: URBAN - METROPOLITAN AREA CLINIC 32 | Facility: CLINIC | Age: 67
End: 2022-09-09

## 2022-09-09 DIAGNOSIS — E11.9: ICD-10-CM

## 2022-09-09 DIAGNOSIS — H43.811: ICD-10-CM

## 2022-09-09 DIAGNOSIS — H35.372: ICD-10-CM

## 2022-09-09 DIAGNOSIS — M31.6: ICD-10-CM

## 2022-09-09 DIAGNOSIS — H33.42: ICD-10-CM

## 2022-09-09 PROCEDURE — 92202 OPSCPY EXTND ON/MAC DRAW: CPT

## 2022-09-09 PROCEDURE — 92014 COMPRE OPH EXAM EST PT 1/>: CPT

## 2022-09-09 PROCEDURE — 92134 CPTRZ OPH DX IMG PST SGM RTA: CPT

## 2022-09-09 ASSESSMENT — TONOMETRY
OS_IOP_MMHG: 20
OD_IOP_MMHG: 20

## 2022-09-09 ASSESSMENT — VISUAL ACUITY
OD_PH: 20/30-1
OD_SC: 20/40

## 2023-01-07 LAB — HBA1C MFR BLD HPLC: 6.9 %

## 2023-01-20 ENCOUNTER — FOLLOW UP (OUTPATIENT)
Dept: URBAN - METROPOLITAN AREA CLINIC 32 | Facility: CLINIC | Age: 68
End: 2023-01-20

## 2023-01-20 DIAGNOSIS — H34.12: ICD-10-CM

## 2023-01-20 DIAGNOSIS — H21.1X2: ICD-10-CM

## 2023-01-20 DIAGNOSIS — H35.372: ICD-10-CM

## 2023-01-20 DIAGNOSIS — E11.9: ICD-10-CM

## 2023-01-20 DIAGNOSIS — H33.42: ICD-10-CM

## 2023-01-20 PROCEDURE — 92014 COMPRE OPH EXAM EST PT 1/>: CPT

## 2023-01-20 PROCEDURE — 92235 FLUORESCEIN ANGRPH MLTIFRAME: CPT

## 2023-01-20 PROCEDURE — 92134 CPTRZ OPH DX IMG PST SGM RTA: CPT | Mod: NC

## 2023-01-20 PROCEDURE — 67028 INJECTION EYE DRUG: CPT

## 2023-01-20 PROCEDURE — 92250 FUNDUS PHOTOGRAPHY W/I&R: CPT

## 2023-01-20 PROCEDURE — 92201 OPSCPY EXTND RTA DRAW UNI/BI: CPT

## 2023-01-20 PROCEDURE — 92287 ANT SGM IMG IR FLRSCN ANGRPH: CPT

## 2023-01-20 ASSESSMENT — TONOMETRY
OS_IOP_MMHG: 28
OD_IOP_MMHG: 20

## 2023-01-20 ASSESSMENT — VISUAL ACUITY: OD_SC: 20/25

## 2023-01-25 ENCOUNTER — FOLLOW UP (OUTPATIENT)
Dept: URBAN - METROPOLITAN AREA CLINIC 39 | Facility: CLINIC | Age: 68
End: 2023-01-25

## 2023-01-25 DIAGNOSIS — H34.12: ICD-10-CM

## 2023-01-25 DIAGNOSIS — H35.372: ICD-10-CM

## 2023-01-25 DIAGNOSIS — H33.42: ICD-10-CM

## 2023-01-25 DIAGNOSIS — H21.1X2: ICD-10-CM

## 2023-01-25 PROCEDURE — 92134 CPTRZ OPH DX IMG PST SGM RTA: CPT

## 2023-01-25 PROCEDURE — 92202 OPSCPY EXTND ON/MAC DRAW: CPT

## 2023-01-25 PROCEDURE — 92014 COMPRE OPH EXAM EST PT 1/>: CPT

## 2023-01-25 ASSESSMENT — TONOMETRY
OD_IOP_MMHG: 16
OS_IOP_MMHG: 15

## 2023-01-25 ASSESSMENT — VISUAL ACUITY: OD_SC: 20/20

## 2023-03-21 ENCOUNTER — FOLLOW UP (OUTPATIENT)
Dept: URBAN - METROPOLITAN AREA CLINIC 39 | Facility: CLINIC | Age: 68
End: 2023-03-21

## 2023-03-21 DIAGNOSIS — H35.372: ICD-10-CM

## 2023-03-21 DIAGNOSIS — H34.12: ICD-10-CM

## 2023-03-21 DIAGNOSIS — H33.42: ICD-10-CM

## 2023-03-21 DIAGNOSIS — H21.1X2: ICD-10-CM

## 2023-03-21 PROCEDURE — 92014 COMPRE OPH EXAM EST PT 1/>: CPT | Mod: 25

## 2023-03-21 PROCEDURE — 67028 INJECTION EYE DRUG: CPT

## 2023-03-21 PROCEDURE — 92134 CPTRZ OPH DX IMG PST SGM RTA: CPT

## 2023-03-21 PROCEDURE — 92202 OPSCPY EXTND ON/MAC DRAW: CPT | Mod: NC

## 2023-03-21 ASSESSMENT — TONOMETRY
OD_IOP_MMHG: 20
OS_IOP_MMHG: 28

## 2023-03-21 ASSESSMENT — VISUAL ACUITY: OD_SC: 20/20

## 2023-04-01 ENCOUNTER — PROBLEM (OUTPATIENT)
Dept: URBAN - METROPOLITAN AREA CLINIC 32 | Facility: CLINIC | Age: 68
End: 2023-04-01

## 2023-04-01 DIAGNOSIS — H35.372: ICD-10-CM

## 2023-04-01 DIAGNOSIS — H33.42: ICD-10-CM

## 2023-04-01 DIAGNOSIS — H21.1X2: ICD-10-CM

## 2023-04-01 DIAGNOSIS — H34.12: ICD-10-CM

## 2023-04-01 DIAGNOSIS — E11.9: ICD-10-CM

## 2023-04-01 DIAGNOSIS — H01.026: ICD-10-CM

## 2023-04-01 PROCEDURE — 92014 COMPRE OPH EXAM EST PT 1/>: CPT

## 2023-04-01 PROCEDURE — 92134 CPTRZ OPH DX IMG PST SGM RTA: CPT

## 2023-04-01 PROCEDURE — 92202 OPSCPY EXTND ON/MAC DRAW: CPT

## 2023-04-01 ASSESSMENT — VISUAL ACUITY: OD_SC: 20/25-1

## 2023-04-01 ASSESSMENT — TONOMETRY
OS_IOP_MMHG: 17
OD_IOP_MMHG: 18

## 2023-05-03 ENCOUNTER — APPOINTMENT (EMERGENCY)
Dept: VASCULAR ULTRASOUND | Facility: HOSPITAL | Age: 68
End: 2023-05-03

## 2023-05-03 ENCOUNTER — HOSPITAL ENCOUNTER (EMERGENCY)
Facility: HOSPITAL | Age: 68
Discharge: HOME | End: 2023-05-03
Attending: EMERGENCY MEDICINE

## 2023-05-03 VITALS
BODY MASS INDEX: 34.91 KG/M2 | DIASTOLIC BLOOD PRESSURE: 78 MMHG | HEART RATE: 91 BPM | RESPIRATION RATE: 17 BRPM | OXYGEN SATURATION: 97 % | TEMPERATURE: 98.2 F | WEIGHT: 197 LBS | SYSTOLIC BLOOD PRESSURE: 164 MMHG | HEIGHT: 63 IN

## 2023-05-03 DIAGNOSIS — I80.9 SUPERFICIAL PHLEBITIS: ICD-10-CM

## 2023-05-03 DIAGNOSIS — I82.409 DVT (DEEP VENOUS THROMBOSIS) (HCC): Primary | ICD-10-CM

## 2023-05-03 LAB
ANION GAP SERPL CALCULATED.3IONS-SCNC: 8 MMOL/L (ref 4–13)
APTT PPP: 27 SECONDS (ref 23–37)
BASOPHILS # BLD AUTO: 0.05 THOUSANDS/ÂΜL (ref 0–0.1)
BASOPHILS NFR BLD AUTO: 1 % (ref 0–1)
BUN SERPL-MCNC: 17 MG/DL (ref 5–25)
CALCIUM SERPL-MCNC: 8.9 MG/DL (ref 8.4–10.2)
CHLORIDE SERPL-SCNC: 105 MMOL/L (ref 96–108)
CO2 SERPL-SCNC: 26 MMOL/L (ref 21–32)
CREAT SERPL-MCNC: 0.73 MG/DL (ref 0.6–1.3)
EOSINOPHIL # BLD AUTO: 0.08 THOUSAND/ÂΜL (ref 0–0.61)
EOSINOPHIL NFR BLD AUTO: 1 % (ref 0–6)
ERYTHROCYTE [DISTWIDTH] IN BLOOD BY AUTOMATED COUNT: 13.2 % (ref 11.6–15.1)
GFR SERPL CREATININE-BSD FRML MDRD: 86 ML/MIN/1.73SQ M
GLUCOSE SERPL-MCNC: 128 MG/DL (ref 65–140)
HCT VFR BLD AUTO: 42.5 % (ref 34.8–46.1)
HGB BLD-MCNC: 13.9 G/DL (ref 11.5–15.4)
IMM GRANULOCYTES # BLD AUTO: 0.02 THOUSAND/UL (ref 0–0.2)
IMM GRANULOCYTES NFR BLD AUTO: 0 % (ref 0–2)
INR PPP: 1.38 (ref 0.84–1.19)
LYMPHOCYTES # BLD AUTO: 2.11 THOUSANDS/ÂΜL (ref 0.6–4.47)
LYMPHOCYTES NFR BLD AUTO: 22 % (ref 14–44)
MCH RBC QN AUTO: 28.8 PG (ref 26.8–34.3)
MCHC RBC AUTO-ENTMCNC: 32.7 G/DL (ref 31.4–37.4)
MCV RBC AUTO: 88 FL (ref 82–98)
MONOCYTES # BLD AUTO: 0.95 THOUSAND/ÂΜL (ref 0.17–1.22)
MONOCYTES NFR BLD AUTO: 10 % (ref 4–12)
NEUTROPHILS # BLD AUTO: 6.49 THOUSANDS/ÂΜL (ref 1.85–7.62)
NEUTS SEG NFR BLD AUTO: 66 % (ref 43–75)
NRBC BLD AUTO-RTO: 0 /100 WBCS
PLATELET # BLD AUTO: 247 THOUSANDS/UL (ref 149–390)
PMV BLD AUTO: 11 FL (ref 8.9–12.7)
POTASSIUM SERPL-SCNC: 4 MMOL/L (ref 3.5–5.3)
PROTHROMBIN TIME: 17.2 SECONDS (ref 11.6–14.5)
RBC # BLD AUTO: 4.82 MILLION/UL (ref 3.81–5.12)
SODIUM SERPL-SCNC: 139 MMOL/L (ref 135–147)
WBC # BLD AUTO: 9.7 THOUSAND/UL (ref 4.31–10.16)

## 2023-05-03 NOTE — DISCHARGE INSTRUCTIONS
Continue warm compresses to the area for 20 minutes 3-4 times a day as needed  Continue with your Eliquis

## 2023-05-03 NOTE — ED PROVIDER NOTES
History  Chief Complaint   Patient presents with    Leg Pain     Pt c/o left lower leg pain and redness, dx DVT 6 weeks ago       History provided by:  Patient  Leg Pain  Location:  Leg  Time since incident:  6 weeks  Injury: no    Leg location:  L leg  Pain details:     Quality:  Burning    Radiates to:  Does not radiate    Severity:  Moderate    Onset quality:  Gradual    Duration:  1 day    Timing:  Intermittent    Progression:  Waxing and waning  Chronicity:  New  Dislocation: no    Prior injury to area:  No  Relieved by:  Rest  Worsened by: Activity and bearing weight (palpation)  Ineffective treatments: Eliquis  Associated symptoms: itching and swelling    Associated symptoms: no back pain, no decreased ROM, no fatigue, no fever, no numbness and no tingling    Itching:     Location: left medial calf  Severity:  Mild    Onset quality:  Gradual    Duration:  2 days    Timing:  Intermittent    Progression:  Unchanged  Risk factors: no concern for non-accidental trauma, no frequent fractures, no known bone disorder, no obesity and no recent illness        None       Past Medical History:   Diagnosis Date    Arthritis     Asthma     Blindness of left eye     [facility]ch[facility] disease[facility] )     Diabetes mellitus ([facility] )     Hypertension        Past Surgical History:   Procedure Laterality Date    REPLACEMENT TOTAL KNEE Right        History reviewed  No pertinent family history  I have reviewed and agree with the history as documented  E-Cigarette/Vaping    E-Cigarette Use Never User      E-Cigarette/Vaping Substances     Social History     Tobacco Use    Smoking status: Never    Smokeless tobacco: Never   Vaping Use    Vaping Use: Never used   Substance Use Topics    Alcohol use: Yes     Alcohol/week: 1 0 standard drink     Types: 1 Glasses of wine per week    Drug use: Never       Review of Systems   Constitutional: Positive for activity change   Negative for appetite change, chills, diaphoresis, fatigue and fever  Respiratory: Negative for chest tightness and shortness of breath  Cardiovascular: Positive for leg swelling  Negative for chest pain and palpitations  Musculoskeletal: Negative for back pain  Skin: Positive for color change and itching  All other systems reviewed and are negative  Physical Exam  Physical Exam  Vitals and nursing note reviewed  Constitutional:       General: She is not in acute distress  Appearance: Normal appearance  She is normal weight  She is not ill-appearing, toxic-appearing or diaphoretic  HENT:      Head: Normocephalic and atraumatic  Right Ear: External ear normal       Left Ear: External ear normal    Eyes:      General:         Right eye: No discharge  Left eye: No discharge  Conjunctiva/sclera: Conjunctivae normal    Cardiovascular:      Rate and Rhythm: Normal rate and regular rhythm  Pulmonary:      Effort: Pulmonary effort is normal    Musculoskeletal:         General: Tenderness present  Left lower leg: Edema present  Comments: Inspection of the left lower leg-there are some superficial varicosities  There is small palpable cord over the medial of the left calf  There is no mid calf tenderness present  There is mild venous blush but no warmth palpated  There are palpable pulses over the dorsalis pedis and posterior tibial arteries that are +2 and symmetrical   Capillary refills less than 2 seconds  Skin:     Capillary Refill: Capillary refill takes less than 2 seconds  Neurological:      Mental Status: She is alert and oriented to person, place, and time  Psychiatric:         Mood and Affect: Mood normal          Behavior: Behavior normal          Thought Content:  Thought content normal          Judgment: Judgment normal          Vital Signs  ED Triage Vitals [05/03/23 1226]   Temperature Pulse Respirations Blood Pressure SpO2   98 2 °F (36 8 °C) 91 17 164/78 97 %      Temp Source Heart Rate Source Patient Position - Orthostatic VS BP Location FiO2 (%)   Oral Monitor Sitting Right arm --      Pain Score       No Pain           Vitals:    05/03/23 1226   BP: 164/78   Pulse: 91   Patient Position - Orthostatic VS: Sitting         Visual Acuity      ED Medications  Medications - No data to display    Diagnostic Studies  Results Reviewed     Procedure Component Value Units Date/Time    Basic metabolic panel [[id number]] Collected: 05/03/23 1303    Lab Status: Final result Specimen: Blood from Arm, Right Updated: 05/03/23 1332     Sodium 139 mmol/L      Potassium 4 0 mmol/L      Chloride 105 mmol/L      CO2 26 mmol/L      ANION GAP 8 mmol/L      BUN 17 mg/dL      Creatinine 0 73 mg/dL      Glucose 128 mg/dL      Calcium 8 9 mg/dL      eGFR 86 ml/min/1 73sq m     Narrative:      [place] guidelines for Chronic Kidney Disease (CKD):     Stage 1 with normal or high GFR (GFR > 90 mL/min/1 73 square meters)    Stage 2 Mild CKD (GFR = 60-89 mL/min/1 73 square meters)    Stage 3A Moderate CKD (GFR = 45-59 mL/min/1 73 square meters)    Stage 3B Moderate CKD (GFR = 30-44 mL/min/1 73 square meters)    Stage 4 Severe CKD (GFR = 15-29 mL/min/1 73 square meters)    Stage 5 End Stage CKD (GFR <15 mL/min/1 73 square meters)  Note: GFR calculation is accurate only with a steady state creatinine    Protime-INR [[id number]]  (Abnormal) Collected: 05/03/23 1303    Lab Status: Final result Specimen: Blood from Arm, Right Updated: 05/03/23 1329     Protime 17 2 seconds      INR 1 38    APTT [[id number]]  (Normal) Collected: 05/03/23 1303    Lab Status: Final result Specimen: Blood from Arm, Right Updated: 05/03/23 1329     PTT 27 seconds     CBC and differential [[id number]] Collected: 05/03/23 1303    Lab Status: Final result Specimen: Blood from Arm, Right Updated: 05/03/23 1313     WBC 9 70 Thousand/uL      RBC 4 82 Million/uL      Hemoglobin 13 9 g/dL      Hematocrit 42 5 %      MCV 88 "fL      MCH 28 8 pg      MCHC 32 7 g/dL      RDW 13 2 %      MPV 11 0 fL      Platelets 954 Thousands/uL      nRBC 0 /100 WBCs      Neutrophils Relative 66 %      Immat GRANS % 0 %      Lymphocytes Relative 22 %      Monocytes Relative 10 %      Eosinophils Relative 1 %      Basophils Relative 1 %      Neutrophils Absolute 6 49 Thousands/µL      Immature Grans Absolute 0 02 Thousand/uL      Lymphocytes Absolute 2 11 Thousands/µL      Monocytes Absolute 0 95 Thousand/µL      Eosinophils Absolute 0 08 Thousand/µL      Basophils Absolute 0 05 Thousands/µL                  VAS lower limb venous duplex study, unilateral/limited   Final Result by Jacqueline Calvo MD (05/03 9453)                 Procedures  Procedures         ED Course  ED Course as of 05/03/23 2038   Wed May 03, 2023   1259 US guided vascular access   1434 Subacute DVT left calf  Medical Decision Making  53-year-old female presents to the emergency room with her   They were sent by the urgent care center to rule out worsening of DVT in her left leg  She was diagnosed with a DVT 6 weeks ago  She was started on Eliquis  She complains of an itchy sensation and a burning sensation over the medial aspect of her left calf since yesterday  She went to the [facility] urgent care center across the street  They were concerned that maybe the DVT is propagating and is unresponsive to the Eliquis  Patient denies any fever or chills  She noticed some warmth with palpation last night with some tenderness over the medial calf  She had been applying a warm compresses with some short-term relief  Pain is worse with weightbearing and with palpation  It is relieved with rest   \"They want me to have blood work to rule out failure of Eliquis\"    Physical exam-this 53-year-old female is alert and oriented x3  She is in no acute distress  Her lungs are clear to auscultation    Her heart is regular rate and rhythm " without murmur  Inspection of her left calf there are superficial varicosities present  There is some tenderness palpated over the medial aspect with a palpable cord  There is no raffi cellulitis seen  There is a mild venous blush consistent with vasculitis  Patient has a positive Homans' sign  She does have some posterior calf tenderness  Impression-  DVT left leg  Superficial thrombophlebitis    Plan-  Patient was discharged home  She was instructed to continue with her Eliquis  She was given a referral to the vascular center for management of her Eliquis and further treatment of her DVT in her left leg  As far as her superficial thrombophlebitis is concerned she is unable to take it anti-inflammatory medication secondary to the Eliquis therapy  She will apply warm compresses to the area for 20 minutes 3-4 times a day  She was given reasons to return to the emergency room should her symptoms worsen  DVT (deep venous thrombosis) ([place] [address] ): acute illness or injury  Superficial phlebitis: acute illness or injury  Amount and/or Complexity of Data Reviewed  Labs: ordered  Details: Independently interpreted by me  Radiology: ordered  Decision-making details documented in ED Course       Details: Interpreted by the vascular surgeon, subacute DVT left calf          Disposition  Final diagnoses:   DVT (deep venous thrombosis) ([place] [address] ) - subacute DVT left calf   Superficial phlebitis - left medial calf     Time reflects when diagnosis was documented in both MDM as applicable and the Disposition within this note     Time User Action Codes Description Comment    5/3/2023  2:35 PM [place] Add [I82 409] DVT (deep venous thrombosis) ([place] [address] )     5/3/2023  2:35 PM [facility] [facility] Modify [I82 409] DVT (deep venous thrombosis) ([place] [address] ) subacute DVT left calf    5/3/2023  2:36 PM [facility] Add [I80 9] Superficial phlebitis     5/3/2023  2:36 PM [place] Modify [I80 9] Superficial phlebitis left medial calf      ED Disposition     ED Disposition   Discharge    Condition   Stable    Date/Time   Wed May 3, 2023  2:34 PM    Comment   Daisy Morales discharge to home/self care  Follow-up Information     Follow up With Specialties Details Why Contact Info Additional Information    The [address] [place] [address] Vascular Surgery Schedule an appointment as soon as possible for a visit  for a follow up [phone]  [phone] The [address] [place] [address], [address] [place] [address], [id number] [phone]          There are no discharge medications for this patient  No discharge procedures on file      PDMP Review     None          ED Provider  Electronically Signed by           Shelley Henley PA-C  05/03/23 2039

## 2023-05-16 ENCOUNTER — FOLLOW UP (OUTPATIENT)
Dept: URBAN - METROPOLITAN AREA CLINIC 39 | Facility: CLINIC | Age: 68
End: 2023-05-16

## 2023-05-16 DIAGNOSIS — H33.42: ICD-10-CM

## 2023-05-16 DIAGNOSIS — H34.12: ICD-10-CM

## 2023-05-16 DIAGNOSIS — E11.9: ICD-10-CM

## 2023-05-16 DIAGNOSIS — H21.1X2: ICD-10-CM

## 2023-05-16 DIAGNOSIS — H35.372: ICD-10-CM

## 2023-05-16 DIAGNOSIS — H01.026: ICD-10-CM

## 2023-05-16 PROCEDURE — 67028 INJECTION EYE DRUG: CPT

## 2023-05-16 PROCEDURE — 92202 OPSCPY EXTND ON/MAC DRAW: CPT | Mod: NC

## 2023-05-16 PROCEDURE — 92134 CPTRZ OPH DX IMG PST SGM RTA: CPT

## 2023-05-16 PROCEDURE — 92014 COMPRE OPH EXAM EST PT 1/>: CPT | Mod: 25

## 2023-05-16 ASSESSMENT — VISUAL ACUITY: OD_SC: 20/20

## 2023-05-16 ASSESSMENT — TONOMETRY
OD_IOP_MMHG: 18
OS_IOP_MMHG: 16

## 2023-05-22 RX ORDER — DULAGLUTIDE 1.5 MG/.5ML
0.5 INJECTION, SOLUTION SUBCUTANEOUS
COMMUNITY

## 2023-05-22 RX ORDER — ALBUTEROL SULFATE 90 UG/1
2 AEROSOL, METERED RESPIRATORY (INHALATION) EVERY 6 HOURS PRN
COMMUNITY

## 2023-05-22 RX ORDER — DILTIAZEM HYDROCHLORIDE 180 MG/1
180 CAPSULE, EXTENDED RELEASE ORAL 2 TIMES DAILY
COMMUNITY

## 2023-05-22 RX ORDER — FLUTICASONE PROPIONATE AND SALMETEROL 500; 50 UG/1; UG/1
1 POWDER RESPIRATORY (INHALATION) EVERY 12 HOURS
COMMUNITY

## 2023-05-22 RX ORDER — FAMOTIDINE 20 MG/1
20 TABLET, FILM COATED ORAL
COMMUNITY

## 2023-05-22 RX ORDER — EPINEPHRINE 0.3 MG/.3ML
0.3 INJECTION SUBCUTANEOUS
COMMUNITY

## 2023-05-22 RX ORDER — FUROSEMIDE 20 MG/1
20 TABLET ORAL 2 TIMES DAILY PRN
COMMUNITY

## 2023-05-22 RX ORDER — FLUTICASONE PROPIONATE 50 MCG
1 SPRAY, SUSPENSION (ML) NASAL
COMMUNITY

## 2023-05-22 RX ORDER — LORATADINE 10 MG/1
10 TABLET ORAL DAILY
COMMUNITY

## 2023-05-22 RX ORDER — AZELASTINE 1 MG/ML
1-2 SPRAY, METERED NASAL 2 TIMES DAILY
COMMUNITY

## 2023-05-22 RX ORDER — POTASSIUM CHLORIDE 750 MG/1
20 TABLET, FILM COATED, EXTENDED RELEASE ORAL 2 TIMES DAILY
COMMUNITY

## 2023-05-22 RX ORDER — TIMOLOL 5 MG/ML
SOLUTION/ DROPS OPHTHALMIC
COMMUNITY

## 2023-05-22 RX ORDER — IPRATROPIUM BROMIDE 21 UG/1
2 SPRAY, METERED NASAL EVERY 12 HOURS
COMMUNITY

## 2023-05-22 RX ORDER — ROSUVASTATIN CALCIUM 10 MG/1
10 TABLET, COATED ORAL
COMMUNITY

## 2023-05-22 RX ORDER — OMEPRAZOLE 40 MG/1
40 CAPSULE, DELAYED RELEASE ORAL
COMMUNITY

## 2023-05-24 ENCOUNTER — CONSULT (OUTPATIENT)
Dept: VASCULAR SURGERY | Facility: CLINIC | Age: 68
End: 2023-05-24

## 2023-05-24 VITALS
HEIGHT: 63 IN | DIASTOLIC BLOOD PRESSURE: 76 MMHG | HEART RATE: 72 BPM | WEIGHT: 202 LBS | BODY MASS INDEX: 35.79 KG/M2 | SYSTOLIC BLOOD PRESSURE: 138 MMHG

## 2023-05-24 DIAGNOSIS — I82.452 ACUTE DEEP VEIN THROMBOSIS (DVT) OF LEFT PERONEAL VEIN (HCC): Primary | ICD-10-CM

## 2023-05-24 DIAGNOSIS — E66.01 MORBID OBESITY WITH BMI OF 40.0-44.9, ADULT (HCC): ICD-10-CM

## 2023-05-24 DIAGNOSIS — E11.65 TYPE 2 DIABETES MELLITUS WITH HYPERGLYCEMIA, WITHOUT LONG-TERM CURRENT USE OF INSULIN (HCC): ICD-10-CM

## 2023-05-24 PROBLEM — I82.451 ACUTE DEEP VEIN THROMBOSIS (DVT) OF RIGHT PERONEAL VEIN (HCC): Status: ACTIVE | Noted: 2023-05-24

## 2023-05-24 NOTE — TELEPHONE ENCOUNTER
Pt called  Had OV with provider today  She was advised to call back with the name of her mail order pharmacy so a 90 day script of Eliquis can be sent  Pt is requesting to have a script for Eliquis 5 mg, take 1 tab BID sent to [address] [place] [address]

## 2023-05-24 NOTE — PATIENT INSTRUCTIONS
Left calf DVT  -Gives history of chronic mild to moderate lower extremity edema  -No injury or travel; no history of malignancy; no rheumatologic disorder    Plan:  -Single, isolated calf DVT  -Currently on apixaban 5 mg twice daily which we will continue for 3 to 6 months  -Recommend medical compression stockings 20 to 30 mmHg below the knee  -Check follow-up left lower extremity venous duplex (2 months) as she can continues to have pain, swelling and redness (without heat) to the extremity  -Follow-up in 2 months      Chronic leg swelling          [facility]    RE:  DVT, venous insufficiency, lymphedema

## 2023-05-24 NOTE — PROGRESS NOTES
-:    Acute deep vein thrombosis (DVT) of left peroneal vein (HCC)  -     Compression Stocking  -     VAS lower limb venous duplex study, unilateral/limited; Future  -acute, occlusive DVT in the LEFT peroneal vein 3/2023  -Sudden redness to the calf with increased edema 3/2023  -Continued leg pain, swelling and erythema    -Gives history of chronic mild to moderate B lower extremity edema  -No injury or travel; no history of DVT, malignancy or rheumatologic disorder      -LLE Exam: 1-2+ LE edema  Superficially the skin at the posterior calf is firm  Flat erythema, but no heat  2+ DP pulses  Calf non-tender      -R LE venous duplex 5/3/23 R subacute DVT in 1/2 peroneal veins in the calf  Triphasic  -LE venous duplex 3/30/23 (report only [facility]): Occlusive thrombus in the L peroneal vein which does not extent into the popliteal vein and thrombus in the lesser saphenous vein in the calf  A/P Left calf DVT 3/30/2023  Patient has history of some lower extremity edema and mild varicose veins  This is a first DVT without obvious etiology for DVT  She denies any recent extended trauma or injury  She has no personal history of DVT, malignancy or rheumatologic disorders  She does have diabetes which she states is well controlled  A1c not available to me  She is currently on apixaban 5 mg twice daily  No concerns on anticoagulant  We did detailed discussion regarding pathophysiology and treatment of calf DVT  Recommend 3 to 6 months of anticoagulation  She is not wearing compression  We discussed the benefits of medical compression stockings in the setting of DVT particularly with likely underlying venous disease    Since she continues to have pain, swelling and redness to the leg, we will follow-up with a venous duplex and reevaluate her in 3 months     -Single, isolated calf DVT  -Currently on apixaban 5 mg twice daily which we will continue for 3 to 6 months  -Recommend medical compression stockings 20 to 30 mmHg below the knee  -Warm compresses to the legs  -Check follow-up left lower extremity venous duplex (2 months) as she can continues to have pain, swelling and redness (without heat) to the extremity  -Consider work up for venous insufficiency in the future  -If she were to develop recurrence of DVT, hematology and lifelong a/c would be needed  -She is advised to follow-up with primary care regarding updated cancer screenings  -Follow-up in 2 months       Additional diagnoses:   Type 2 diabetes mellitus with hyperglycemia, without long-term current use of insulin ([facility] )    Morbid obesity with BMI of 40 0-44 9, adult ([facility] )    Subjective:      Patient ID: Bobby Valentin is a 74 y o  female  New patient, referred by ED for further treatment of LLE DVT on apixaban 5 BID  States that per PCP, she is not to wear compression right now  HPI   Ms Bobby Valentin 05BT F R TKR, COPD, hypertension, DM developed left calf DVT 3/30/2023  Patient reports that she has chronic swelling in both legs particularly on the left side but developed redness to the calf for about 2 weeks for which she went to [facility] for evaluation  She did venous duplex study performed which showed acute, occlusive DVT in 1 of 2 peroneal veins  She was placed on full anticoagulation with apixaban 10 mg twice daily for 3 weeks and transitioned to apixaban 5 mg twice daily  She has had no problems on anticoagulation  She has been moving and carrying boxes and standing on her feet for prolonged periods of time  However, she denies any injury or extended travel  She is no prior history of DVT  No history of malignancy or rheumatologic disorders  She has no large, bulky varicose veins  She is tolerating apixaban without extensive bruising or obvious bleeding  She tells me she is overdue for colonoscopy, but otherwise her cancer screenings are up-to-date    She is advised to follow-up with primary care regarding "colonoscopy  Due to continued, ongoing left calf pain, intermittent redness and swelling, she did have a follow-up venous duplex performed at [facility] on 5/3/2023 which redemonstrates DVT in 1 of 2 peroneal veins  The DVT is now subacute and reaching the chronic phase  There is no evidence of superficial thrombophlebitis  She is a retired   She does give history of chronic leg swelling with small venous varicosities  No c/i to blood thinner    The following portions of the patient's history were reviewed and updated as appropriate: allergies, current medications, past family history, past medical history, past social history, past surgical history and problem list     Review of Systems   Constitutional: Negative  HENT: Negative  Eyes: Negative  Respiratory: Negative  Cardiovascular: Positive for leg swelling  Gastrointestinal: Negative  Endocrine: Negative  Genitourinary: Negative  Musculoskeletal:        Leg pain   Skin: Negative  Allergic/Immunologic: Negative  Neurological: Negative  Hematological: Negative  Psychiatric/Behavioral: Negative  Objective:  /76 (BP Location: Right arm, Patient Position: Sitting)   Pulse 72   Ht 5' 3\" (1 6 m)   Wt 91 6 kg (202 lb) Comment: verbal, per patient  BMI 35 78 kg/m²        Physical Exam  Vitals and nursing note reviewed  Constitutional:       Appearance: She is well-developed  She is obese  HENT:      Head: Normocephalic and atraumatic  Eyes:      Pupils: Pupils are equal, round, and reactive to light  Cardiovascular:      Rate and Rhythm: Normal rate and regular rhythm  Pulses:           Carotid pulses are 2+ on the right side and 2+ on the left side  Radial pulses are 2+ on the right side and 2+ on the left side  Dorsalis pedis pulses are 2+ on the right side and 2+ on the left side  Heart sounds: Normal heart sounds, S1 normal and S2 normal  No murmur heard       " No friction rub  No gallop  Pulmonary:      Effort: Pulmonary effort is normal  No accessory muscle usage or respiratory distress  Breath sounds: Normal breath sounds  No wheezing or rales  Abdominal:      General: Bowel sounds are normal  There is no distension  Palpations: Abdomen is soft  Tenderness: There is no abdominal tenderness  Musculoskeletal:         General: No deformity  Normal range of motion  Right lower le+ Edema present  Left lower le+ Pitting Edema present  Skin:     General: Skin is warm and dry  Findings: No lesion or rash  Nails: There is no clubbing  Neurological:      Mental Status: She is alert and oriented to person, place, and time  Comments: Grossly normal    Psychiatric:         Behavior: Behavior is cooperative  VAS LE venous duplex 5/3/23  [facility] REPORT  CLINICAL:  Indications:  Patient presents with new left lower extremity pain and redness x 1 days,  patient is currently on Eliquis for DVT recently discovered in left calf at  outside facility approximately 4-5 weeks ago  Operative History:  Patient denies cardiovascular surgeries  Risk Factors  The patient has history of HTN, Diabetes (NIDDM (Diet)) and DVT  FINDINGS:     Left      Impression            Peroneal  Subacute occlusive             CONCLUSION:  Impression:     RIGHT LOWER LIMB LIMITED:  Evaluation shows no evidence of thrombus in the common femoral vein  Doppler evaluation shows a normal response to augmentation maneuvers  LEFT LOWER LIMB:  Evidence of subacute occlusive deep vein thrombosis noted in one of the  peroneal veins in the calf  No evidence of superficial thrombophlebitis noted  No evidence of valvular incompetence noted in the deep veins  Popliteal, posterior tibial and anterior tibial arterial Doppler waveform's are  triphasic       Tech note:  A portion of the study was performed by current DMS student under "direct  supervision from a registered senior vascular technologist with approval from  patient  LE venous duplex 3/30/23 (report only [facility])    Findings:     Right leg: The right common femoral vein, femoral vein, popliteal vein are   patent, with normal grayscale and color Doppler images  These vessels are   normally compressible  Visualized portions of the right calf veins are also   patent  No evidence of DVT in the right leg  Left leg: The left common femoral vein, femoral vein, popliteal vein are patent,   with normal grayscale and color Doppler images  These vessels are normally   compressible  Occlusive thrombus within a peroneal vein in the left calf  Thrombus does not extend proximally to involve the popliteal vein  The   visualized portions of the posterior tibial veins in the left calf are patent  Occlusive thrombus is also noted within the lesser saphenous vein  IMPRESSION:   Impression:     1  Occlusive thrombus within the peroneal vein in the left calf  Thrombus is not   is approximately 2 bone the popliteal vein  Additional thrombus in the lesser   saphenous vein of the left calf is also noted  2  No evidence of DVT in the right leg  I have reviewed and made appropriate changes to the review of systems input by the medical assistant  Vitals:    05/24/23 0946   BP: 138/76   BP Location: Right arm   Patient Position: Sitting   Pulse: 72   Weight: 91 6 kg (202 lb)   Height: 5' 3\" (1 6 m)       Patient Active Problem List   Diagnosis   • Acute deep vein thrombosis (DVT) of right peroneal vein (HCC)       Past Surgical History:   Procedure Laterality Date   • REPLACEMENT TOTAL KNEE Right        History reviewed  No pertinent family history      Social History     Socioeconomic History   • Marital status: /Civil Union     Spouse name: Not on file   • Number of children: Not on file   • Years of education: Not on file   • Highest education level: Not on file " Occupational History   • Not on file   Tobacco Use   • Smoking status: Never   • Smokeless tobacco: Never   Vaping Use   • Vaping Use: Never used   Substance and Sexual Activity   • Alcohol use:  Yes     Alcohol/week: 1 0 standard drink of alcohol     Types: 1 Glasses of wine per week   • Drug use: Never   • Sexual activity: Not on file   Other Topics Concern   • Not on file   Social History Narrative   • Not on file     Social Determinants of Health     Financial Resource Strain: Not on file   Food Insecurity: Not on file   Transportation Needs: Not on file   Physical Activity: Not on file   Stress: Not on file   Social Connections: Not on file   Intimate Partner Violence: Not on file   Housing Stability: Not on file       Allergies   Allergen Reactions   • Penicillins Seizures   • Sulfa Antibiotics Rash         Current Outpatient Medications:   •  albuterol (PROVENTIL HFA,VENTOLIN HFA) 90 mcg/act inhaler, Inhale 2 puffs every 6 (six) hours as needed, Disp: , Rfl:   •  apixaban (ELIQUIS) 5 mg, Take 10 mg by mouth 2 (two) times a day, Disp: , Rfl:   •  azelastine (ASTELIN) 0 1 % nasal spray, 1-2 sprays into each nostril 2 (two) times a day, Disp: , Rfl:   •  bimatoprost (LUMIGAN) 0 01 % ophthalmic drops, , Disp: , Rfl:   •  diltiazem (TIAZAC) 180 MG 24 hr capsule, Take 180 mg by mouth 2 (two) times a day, Disp: , Rfl:   •  dulaglutide (Trulicity) 1 5 OB/6 6FF injection, Inject 0 5 mL under the skin, Disp: , Rfl:   •  EPINEPHrine (EPIPEN) 0 3 mg/0 3 mL SOAJ, Inject 0 3 mg into a muscle, Disp: , Rfl:   •  famotidine (PEPCID) 20 mg tablet, Take 20 mg by mouth, Disp: , Rfl:   •  fluticasone (FLONASE) 50 mcg/act nasal spray, 1 spray into each nostril, Disp: , Rfl:   •  Fluticasone-Salmeterol (Advair) 500-50 mcg/dose inhaler, Inhale 1 puff every 12 (twelve) hours, Disp: , Rfl:   •  furosemide (LASIX) 20 mg tablet, Take 20 mg by mouth 2 (two) times a day as needed, Disp: , Rfl:   •  ipratropium (ATROVENT) 0 03 % nasal spray, 2 sprays into each nostril every 12 (twelve) hours, Disp: , Rfl:   •  loratadine (CLARITIN) 10 mg tablet, Take 10 mg by mouth daily, Disp: , Rfl:   •  metFORMIN (GLUCOPHAGE) 500 mg tablet, Take 500 mg by mouth, Disp: , Rfl:   •  omeprazole (PriLOSEC) 40 MG capsule, Take 40 mg by mouth, Disp: , Rfl:   •  potassium chloride (Klor-Con) 10 mEq tablet, Take 20 mEq by mouth 2 (two) times a day, Disp: , Rfl:   •  rosuvastatin (CRESTOR) 10 MG tablet, Take 10 mg by mouth, Disp: , Rfl:   •  Timolol Maleate PF (Timolol Maleate Ocudose) 0 5 % SOLN, , Disp: , Rfl:

## 2023-05-31 ENCOUNTER — OFFICE VISIT (OUTPATIENT)
Dept: INTERNAL MEDICINE CLINIC | Facility: CLINIC | Age: 68
End: 2023-05-31

## 2023-05-31 VITALS
TEMPERATURE: 98.2 F | SYSTOLIC BLOOD PRESSURE: 110 MMHG | OXYGEN SATURATION: 99 % | WEIGHT: 202.5 LBS | BODY MASS INDEX: 35.88 KG/M2 | DIASTOLIC BLOOD PRESSURE: 76 MMHG | HEIGHT: 63 IN | HEART RATE: 74 BPM

## 2023-05-31 DIAGNOSIS — H35.9: ICD-10-CM

## 2023-05-31 DIAGNOSIS — I10 PRIMARY HYPERTENSION: ICD-10-CM

## 2023-05-31 DIAGNOSIS — J30.89 SEASONAL ALLERGIC RHINITIS DUE TO FUNGAL SPORES: ICD-10-CM

## 2023-05-31 DIAGNOSIS — I82.451 ACUTE DEEP VEIN THROMBOSIS (DVT) OF RIGHT PERONEAL VEIN (HCC): Primary | ICD-10-CM

## 2023-05-31 DIAGNOSIS — J45.40 MODERATE PERSISTENT ASTHMA WITHOUT COMPLICATION: ICD-10-CM

## 2023-05-31 DIAGNOSIS — I87.2 EDEMA OF BOTH LOWER EXTREMITIES DUE TO PERIPHERAL VENOUS INSUFFICIENCY: ICD-10-CM

## 2023-05-31 DIAGNOSIS — G47.33 OSA (OBSTRUCTIVE SLEEP APNEA): ICD-10-CM

## 2023-05-31 DIAGNOSIS — K21.00 GASTROESOPHAGEAL REFLUX DISEASE WITH ESOPHAGITIS WITHOUT HEMORRHAGE: ICD-10-CM

## 2023-05-31 DIAGNOSIS — E78.5 DYSLIPIDEMIA: ICD-10-CM

## 2023-05-31 DIAGNOSIS — E11.65 TYPE 2 DIABETES MELLITUS WITH HYPERGLYCEMIA, WITHOUT LONG-TERM CURRENT USE OF INSULIN (HCC): ICD-10-CM

## 2023-05-31 DIAGNOSIS — R23.3 EASY BRUISING: ICD-10-CM

## 2023-05-31 DIAGNOSIS — M17.0 PRIMARY OSTEOARTHRITIS OF BOTH KNEES: ICD-10-CM

## 2023-05-31 PROBLEM — Z90.711 HISTORY OF PARTIAL HYSTERECTOMY: Status: ACTIVE | Noted: 2023-05-31

## 2023-05-31 PROBLEM — H33.22 DETACHED RETINA, LEFT: Status: ACTIVE | Noted: 2023-05-31

## 2023-05-31 PROBLEM — Z96.651 HISTORY OF RIGHT KNEE JOINT REPLACEMENT: Status: ACTIVE | Noted: 2023-05-31

## 2023-05-31 PROBLEM — S02.31XA FRACTURE OF RIGHT ORBITAL FLOOR (HCC): Status: RESOLVED | Noted: 2022-10-31 | Resolved: 2023-05-31

## 2023-05-31 PROBLEM — Z98.890 HISTORY OF BLEPHAROPLASTY: Status: RESOLVED | Noted: 2023-05-31 | Resolved: 2023-05-31

## 2023-05-31 PROBLEM — M19.90 OSTEOARTHRITIS: Status: ACTIVE | Noted: 2023-05-31

## 2023-05-31 PROBLEM — E66.812 CLASS 2 SEVERE OBESITY WITH SERIOUS COMORBIDITY IN ADULT (HCC): Status: ACTIVE | Noted: 2023-05-24

## 2023-05-31 PROBLEM — K21.9 GERD (GASTROESOPHAGEAL REFLUX DISEASE): Status: ACTIVE | Noted: 2023-05-31

## 2023-05-31 PROBLEM — S02.31XA FRACTURE OF RIGHT ORBITAL FLOOR (HCC): Status: ACTIVE | Noted: 2022-10-31

## 2023-05-31 PROBLEM — Z96.651 HISTORY OF RIGHT KNEE JOINT REPLACEMENT: Status: RESOLVED | Noted: 2023-05-31 | Resolved: 2023-05-31

## 2023-05-31 PROBLEM — J30.2 SEASONAL ALLERGIC RHINITIS DUE TO FUNGAL SPORES: Status: ACTIVE | Noted: 2023-05-31

## 2023-05-31 PROBLEM — Z98.890 HISTORY OF BLEPHAROPLASTY: Status: ACTIVE | Noted: 2023-05-31

## 2023-05-31 PROBLEM — J45.20 MILD INTERMITTENT ASTHMA WITHOUT COMPLICATION: Status: ACTIVE | Noted: 2023-03-30

## 2023-05-31 PROBLEM — Z90.711 HISTORY OF PARTIAL HYSTERECTOMY: Status: RESOLVED | Noted: 2023-05-31 | Resolved: 2023-05-31

## 2023-05-31 PROBLEM — E66.2 OBESITY HYPOVENTILATION SYNDROME (HCC): Status: ACTIVE | Noted: 2023-03-30

## 2023-05-31 NOTE — ASSESSMENT & PLAN NOTE
Longstanding history of PPI and famotidine use with occasional flareups  Reports last EGD was several years ago (not in Epic) but cannot recall findings       Plan:  · Continue current regimen - goal to eventually wean off   · Will need to provide GI referral for possible scope

## 2023-05-31 NOTE — ASSESSMENT & PLAN NOTE
Longstanding hx of retinal disorder with elevated pressure and vision loss   Follows up with Retina specialist in [place] currently with plan to transition care to [place] recommended by current specialist      Plan:  · Continue f/u with Retina specialist as scheduled  · Continue current treatment regimen

## 2023-05-31 NOTE — ASSESSMENT & PLAN NOTE
Nonadherence to nightly CPAP use and reports needing new CPAP machine  Denies daytime fatigue       Plan:  · Encouraged CPAP use daily at bedtime  · Will need to provide referral to Sleep Medicine for sleep study and new CPAP machine

## 2023-05-31 NOTE — ASSESSMENT & PLAN NOTE
Most recent HbA1C in 1/2023 6 9       Plan:  · Follow-up HbA1C in 1 week  · Continue metformin and Trulicity  · Encouraged to continue healthy lifestyle modifications

## 2023-05-31 NOTE — PROGRESS NOTES
[facility] INTERNAL MEDICINE   VISIT NOTE - Ita Seen, 1955, 74 y o , female   DATE: 5/31/2023 MRN: [id number]        Assessment and Plan:     1  Acute deep vein thrombosis (DVT) of right peroneal vein (HCC)  Assessment & Plan:  · Provoked/Unprovoked: New, unprovoked subacute occlusive DVT in left calf peroneal vein  · Risk factors: Chronic LE edema due to venous insufficiency with L>R and left knee OA (more weight-bearing on right given s/p prior right total knee arthroplasty)  · Anticoagulation Rx: Started on Eliquis 5 mg twice daily  · Exam: LLE warm to touch with mild erythema, tenderness, and swelling > RLE although improved per patient account  Plan:  · Continue Eliquis anticoagulation therapy  · Hematology/Oncology referral for further evaluation  · F/u with Vascular Surgery and repeat LE venous doppler as scheduled     Orders:  -     CBC and differential  -     Ambulatory Referral to Hematology / Oncology; Future    2  Edema of both lower extremities due to peripheral venous insufficiency  Assessment & Plan:  Chronic bilateral lower extremity edema secondary to venous insufficiency with L > R, may have attributed to DVT in left leg  Conservative management per Vascular surgery note  Plan:  · Elevate leg and compression stockings  · Continue Lasix 20 mg twice daily as needed      3  Easy bruising  Assessment & Plan:  Noted to have b/l bruising/ecchymosis/petechiae over forearms from minor trauma  Per patient, easy bruising has been occurring in recent years prior to initiation of Eliquis  Plan:  · Hematology/Oncology referral for further evaluation     Orders:  -     Ambulatory Referral to Hematology / Oncology; Future    4  Primary osteoarthritis of both knees  Assessment & Plan:  S/p right knee replacement and follows up with orthopedic surgery in [place] for left knee OA steroid injections   Plan to eventually transition care to PA and possible left knee replacement in the future  Plan:  · Will provide Ortho referral when ready      5  Primary hypertension  Assessment & Plan:  Well-maintained with office-based measurement 110/76  Plan:  · Continue diltiazem 180 mg daily       6  Dyslipidemia  Assessment & Plan:  Most recent lipid panel from previous PCP in 01/2023 with normal values and LDL 94 controlled at goal     Plan:  · F/u lipid panel in 1 weeks  · Continue rosuvastatin 10 mg daily  · Consider carotid artery doppler given FHx of occlusion in father    Orders:  -     Lipid panel    7  Type 2 diabetes mellitus with hyperglycemia, without long-term current use of insulin ([facility] [address] )  Assessment & Plan:  Most recent HbA1C in 1/2023 6 9  Plan:  · Follow-up HbA1C in 1 week  · Continue metformin and Trulicity  · Encouraged to continue healthy lifestyle modifications    Orders:  -     Comprehensive metabolic panel  -     Hemoglobin A1C  -     Albumin / creatinine urine ratio    8  Left retinal disorder  Assessment & Plan:  Longstanding hx of retinal disorder with elevated pressure and vision loss  Follows up with Retina specialist in [place] currently with plan to transition care to PA recommended by current specialist      Plan:  · Continue f/u with Retina specialist as scheduled  · Continue current treatment regimen      9  Seasonal allergic rhinitis due to fungal spores  Assessment & Plan:  Frequent flare ups with mostly postnasal drip and rhinorrhea, controlled with Claritin and Flonase/Astelin nasal spray  Plan:  · Continue current treatment regimen PRN       10  Moderate persistent asthma without complication  Assessment & Plan:  Well-controlled without needing rescue inhaler and nebs for many months no prior hospitalizations for acute exacerbations  Admits to not using Advair maintenance inhaler daily as directed  Plan:  Encouraged adherence to daily Advair inhaler use  Continue albuterol inhaler and nebs PRN         11   DEN (obstructive sleep apnea)  Assessment & Plan:  Nonadherence to nightly CPAP use and reports needing new CPAP machine  Denies daytime fatigue  Plan:  · Encouraged CPAP use daily at bedtime  · Will need to provide referral to Sleep Medicine for sleep study and new CPAP machine     Orders:  -     CBC and differential    12  Gastroesophageal reflux disease with esophagitis without hemorrhage  Assessment & Plan:  Longstanding history of PPI and famotidine use with occasional flareups  Reports last EGD was several years ago (not in Epic) but cannot recall findings  Plan:  · Continue current regimen - goal to eventually wean off   · Will need to provide GI referral for possible scope    Orders:  -     CBC and differential         History of Present Illness:     Paavn Villar is a 74 y o  female who presents to the office today to establish care  Patient recently moved to [place] from [place] with all prior care and providers located in [place] and [place]  She has a known PMH of chronic venous insufficiency with b/l LE swelling/edema (L>R) and b/l knee OA s/p right knee arthroplasty  She developed acute pain, warmth, and increased swelling in her left lower leg and was found to have a subacute occlusive left peroneal vein DVT on 5/3 in [facility]  She was evaluated by Vascular surgery and currently on Eliquis anticoagulation therapy  Her symptoms has since improved  She denies prior normal episodes but noted easy bruising with minor trauma in recent years  She reports recent weight loss of about 30 lb on a dieting plan and still tries to keep physically active despite pain in left knee due to OA  She follows up with a retinal specialist in [facility] for her left retina disorder and Orthopedic surgery in [place] for management of her OA  She is planning to eventually transition her care to PA   She reports a family history significant for carotid artery occlusion and stroke in her father and is concerned that she may have occlusion/stenosis as well  Her chronic medical conditions including hypertension, hyperlipidemia, T2DM, and asthma appear to be well-maintained on current treatment regimen based on most recent labs obtained in 2023 by her previous PCP in [place]  Pedrito Yuan is feeling well otherwise and has no acute concerns at this time  She is scheduled to return in 1 week for preventive care visit and f/u lab results  Review of Systems:     Review of Systems   Constitutional: Negative for chills, fatigue, fever and unexpected weight change  HENT: Positive for postnasal drip and rhinorrhea  Negative for congestion, sore throat and trouble swallowing  Eyes: Positive for visual disturbance (left eye, chronic)  Respiratory: Negative for cough, shortness of breath and wheezing  Cardiovascular: Negative for chest pain and leg swelling  Gastrointestinal: Positive for constipation (occasional)  Negative for abdominal pain, diarrhea, nausea and vomiting  Endocrine: Negative for polyuria  Genitourinary: Negative for decreased urine volume, difficulty urinating, dysuria and hematuria  Musculoskeletal: Positive for myalgias (left knee)  Negative for arthralgias  Skin: Positive for color change (left lower leg)  Negative for rash and wound  Allergic/Immunologic: Positive for environmental allergies  Neurological: Negative for dizziness, syncope, weakness, light-headedness and headaches  Hematological: Bruises/bleeds easily  Psychiatric/Behavioral: Negative for confusion and dysphoric mood         Past Medical History:     Past Medical History:   Diagnosis Date   • Allergic    • Arthritis    • Asthma    • Blindness of left eye    • [facility]ch[facility] disease[facility] )    • Diabetes mellitus ([facility] )    • GERD (gastroesophageal reflux disease)    • Hypertension    • Obesity    • Visual impairment         Past Surgical History:     Past Surgical History:   Procedure Laterality Date   •  SECTION  10/16/1990    Fibroids   • EYE SURGERY      MULTIPLE   • HYSTERECTOMY  2000   • REPLACEMENT TOTAL KNEE Right         Social History:   She reports that she has never smoked  She has never used smokeless tobacco  She reports current alcohol use of about 1 0 standard drink of alcohol per week  She reports that she does not use drugs       Family History:     Family History   Problem Relation Age of Onset   • Dementia Mother    • Arthritis Mother         Late in life   • Hypertension Father         HBP   • Heart disease Father         HBP   • Diabetes Father         managed through diet   • Hearing loss Father         Industrial/construction work with no hearing protection   • Glaucoma Paternal Grandmother         Current Medications:     Current Outpatient Medications   Medication Sig Dispense Refill   • albuterol (PROVENTIL HFA,VENTOLIN HFA) 90 mcg/act inhaler Inhale 2 puffs every 6 (six) hours as needed     • apixaban (ELIQUIS) 5 mg Take 1 tablet (5 mg total) by mouth 2 (two) times a day 180 tablet 0   • azelastine (ASTELIN) 0 1 % nasal spray 1-2 sprays into each nostril 2 (two) times a day     • bimatoprost (LUMIGAN) 0 01 % ophthalmic drops      • diltiazem (TIAZAC) 180 MG 24 hr capsule Take 180 mg by mouth 2 (two) times a day     • dulaglutide (Trulicity) 1 5 YK/5 9WR injection Inject 0 5 mL under the skin     • EPINEPHrine (EPIPEN) 0 3 mg/0 3 mL SOAJ Inject 0 3 mg into a muscle     • famotidine (PEPCID) 20 mg tablet Take 20 mg by mouth     • fluticasone (FLONASE) 50 mcg/act nasal spray 1 spray into each nostril     • Fluticasone-Salmeterol (Advair) 500-50 mcg/dose inhaler Inhale 1 puff every 12 (twelve) hours     • furosemide (LASIX) 20 mg tablet Take 20 mg by mouth 2 (two) times a day as needed     • ipratropium (ATROVENT) 0 03 % nasal spray 2 sprays into each nostril every 12 (twelve) hours     • loratadine (CLARITIN) 10 mg tablet Take 10 mg by mouth daily     • metFORMIN (GLUCOPHAGE) 500 mg tablet Take 500 mg by mouth     • omeprazole "(PriLOSEC) 40 MG capsule Take 40 mg by mouth     • potassium chloride (Klor-Con) 10 mEq tablet Take 20 mEq by mouth 2 (two) times a day     • rosuvastatin (CRESTOR) 10 MG tablet Take 10 mg by mouth     • Timolol Maleate PF (Timolol Maleate Ocudose) 0 5 % SOLN        No current facility-administered medications for this visit  Allergies: Allergies   Allergen Reactions   • Penicillins Seizures   • Sulfa Antibiotics Rash       Objective:   /76   Pulse 74   Temp 98 2 °F (36 8 °C) (Tympanic)   Ht 5' 3\" (1 6 m)   Wt 91 9 kg (202 lb 8 oz)   SpO2 99%   BMI 35 87 kg/m²   Body mass index is 35 87 kg/m²  Physical Exam  Vitals reviewed  Constitutional:       General: She is not in acute distress  Appearance: She is obese  She is not ill-appearing or toxic-appearing  HENT:      Head: Normocephalic and atraumatic  Nose: Nose normal       Mouth/Throat:      Mouth: Mucous membranes are moist       Pharynx: Oropharynx is clear  Eyes:      Extraocular Movements: Extraocular movements intact  Pupils: Pupils are equal, round, and reactive to light  Cardiovascular:      Rate and Rhythm: Normal rate and regular rhythm  Pulses: Normal pulses  Heart sounds: Heart sounds are distant  No murmur heard  No gallop  Pulmonary:      Effort: Pulmonary effort is normal  No respiratory distress  Breath sounds: Decreased breath sounds present  No wheezing, rhonchi or rales  Abdominal:      General: Bowel sounds are normal  There is no distension  Palpations: Abdomen is soft  Tenderness: There is no abdominal tenderness  Musculoskeletal:         General: Normal range of motion  Cervical back: Normal range of motion and neck supple  Right lower le+ Edema present  Left lower leg: Swelling present  2+ Edema present  Skin:     General: Skin is warm and dry        Findings: Bruising (b/l forearms), erythema (left lower leg) and petechiae (b/l forearms) " present  No lesion, rash or wound  Neurological:      General: No focal deficit present  Mental Status: She is alert and oriented to person, place, and time  Mental status is at baseline  Psychiatric:         Mood and Affect: Mood normal          Behavior: Behavior normal  Behavior is cooperative  Additional Data:     Laboratory Results: I have personally reviewed all pertinent laboratory/tests results  Radiology/Other Diagnostic Testing Results: I have personally reviewed pertinent reports  Patient was seen by Fabricio Irene MD today

## 2023-05-31 NOTE — ASSESSMENT & PLAN NOTE
Most recent lipid panel from previous PCP in 01/2023 with normal values and LDL 94 controlled at goal     Plan:  · F/u lipid panel in 1 weeks  · Continue rosuvastatin 10 mg daily  · Consider carotid artery doppler given FHx of occlusion in father

## 2023-05-31 NOTE — ASSESSMENT & PLAN NOTE
Noted to have b/l bruising/ecchymosis/petechiae over forearms from minor trauma  Per patient, easy bruising has been occurring in recent years prior to initiation of Eliquis       Plan:  · Hematology/Oncology referral for further evaluation

## 2023-05-31 NOTE — ASSESSMENT & PLAN NOTE
S/p right knee replacement and follows up with orthopedic surgery in [place] for left knee OA steroid injections  Plan to eventually transition care to PA and possible left knee replacement in the future       Plan:  · Will provide Ortho referral when ready

## 2023-05-31 NOTE — ASSESSMENT & PLAN NOTE
· Provoked/Unprovoked: New, unprovoked subacute occlusive DVT in left calf peroneal vein  · Risk factors: Chronic LE edema due to venous insufficiency with L>R and left knee OA (more weight-bearing on right given s/p prior right total knee arthroplasty)  · Anticoagulation Rx: Started on Eliquis 5 mg twice daily  · Exam: LLE warm to touch with mild erythema, tenderness, and swelling > RLE although improved per patient account       Plan:  · Continue Eliquis anticoagulation therapy  · Hematology/Oncology referral for further evaluation  · F/u with Vascular Surgery and repeat LE venous doppler as scheduled

## 2023-05-31 NOTE — ASSESSMENT & PLAN NOTE
Well-controlled without needing rescue inhaler and nebs for many months no prior hospitalizations for acute exacerbations  Admits to not using Advair maintenance inhaler daily as directed       Plan:  Encouraged adherence to daily Advair inhaler use  Continue albuterol inhaler and nebs PRN

## 2023-05-31 NOTE — ASSESSMENT & PLAN NOTE
Chronic bilateral lower extremity edema secondary to venous insufficiency with L > R, may have attributed to DVT in left leg  Conservative management per Vascular surgery note      Plan:  · Elevate leg and compression stockings  · Continue Lasix 20 mg twice daily as needed

## 2023-05-31 NOTE — ASSESSMENT & PLAN NOTE
Frequent flare ups with mostly postnasal drip and rhinorrhea, controlled with Claritin and Flonase/Astelin nasal spray       Plan:  · Continue current treatment regimen PRN

## 2023-06-01 ENCOUNTER — TELEPHONE (OUTPATIENT)
Dept: HEMATOLOGY ONCOLOGY | Facility: CLINIC | Age: 68
End: 2023-06-01

## 2023-06-01 NOTE — TELEPHONE ENCOUNTER
I called Bonita Gu in response to a referral that was received for patient to establish care with Hematology  Outreach was made to schedule a consultation     A consultation was scheduled for patient during this call   Patient is scheduled on 06/06/23 at 11AM with Jamari Blackburn at the [facility]

## 2023-06-05 ENCOUNTER — TELEPHONE (OUTPATIENT)
Dept: HEMATOLOGY ONCOLOGY | Facility: CLINIC | Age: 68
End: 2023-06-05

## 2023-06-05 NOTE — TELEPHONE ENCOUNTER
I contacted the patients in regards to rescheduling her cancelled consult appointment with Hematology  A voicemail was left with the [employer] number to call back to r/s the appointment

## 2023-06-08 ENCOUNTER — TELEPHONE (OUTPATIENT)
Dept: HEMATOLOGY ONCOLOGY | Facility: CLINIC | Age: 68
End: 2023-06-08

## 2023-06-08 ENCOUNTER — RA CDI HCC (OUTPATIENT)
Dept: OTHER | Facility: HOSPITAL | Age: 68
End: 2023-06-08

## 2023-06-08 NOTE — PROGRESS NOTES
E66 01   [facility]  coding opportunities          Chart Reviewed number of suggestions sent to Provider: 1     Patients Insurance     Medicare Insurance: [employer]

## 2023-06-08 NOTE — TELEPHONE ENCOUNTER
Appointment Change  Cancel, Reschedule, Change to Virtual      Who are you speaking with? Patient   If it is not the patient, are they listed on an active communication consent form? N/A   Which provider is the appointment scheduled with? SCARLETT Bui   When is the appointment scheduled? Please list date and time  06/06/2023 @11AM    At which location is the appointment scheduled to take place? [facility]   Was the appointment rescheduled or changed from an in person visit to a virtual visit? If so, please list the details of the change  Yes, 07/26/2023 @2PM    What is the reason for the appointment change? scheduling conflict    Was [facility] transport scheduled for this visit? No   Does [facility] transport need to be scheduled for the new visit (if applicable) No   Does the patient need an infusion appointment rescheduled? No   Does the patient have an infusion appointment scheduled? If so, when? No   Is the patient undergoing chemotherapy? No   Was the no-show policy reviewed for appointments being changed with less then 24 hours of notice?  No

## 2023-06-16 ENCOUNTER — APPOINTMENT (OUTPATIENT)
Dept: LAB | Facility: CLINIC | Age: 68
End: 2023-06-16
Payer: MEDICARE

## 2023-06-16 LAB
ALBUMIN SERPL BCP-MCNC: 4.3 G/DL (ref 3.5–5)
ALP SERPL-CCNC: 105 U/L (ref 34–104)
ALT SERPL W P-5'-P-CCNC: 11 U/L (ref 7–52)
ANION GAP SERPL CALCULATED.3IONS-SCNC: 8 MMOL/L (ref 4–13)
AST SERPL W P-5'-P-CCNC: 16 U/L (ref 13–39)
BASOPHILS # BLD AUTO: 0.05 THOUSANDS/ÂΜL (ref 0–0.1)
BASOPHILS NFR BLD AUTO: 1 % (ref 0–1)
BILIRUB SERPL-MCNC: 0.85 MG/DL (ref 0.2–1)
BUN SERPL-MCNC: 11 MG/DL (ref 5–25)
CALCIUM SERPL-MCNC: 9.1 MG/DL (ref 8.4–10.2)
CHLORIDE SERPL-SCNC: 103 MMOL/L (ref 96–108)
CHOLEST SERPL-MCNC: 140 MG/DL
CO2 SERPL-SCNC: 30 MMOL/L (ref 21–32)
CREAT SERPL-MCNC: 0.68 MG/DL (ref 0.6–1.3)
CREAT UR-MCNC: 24.1 MG/DL
EOSINOPHIL # BLD AUTO: 0.11 THOUSAND/ÂΜL (ref 0–0.61)
EOSINOPHIL NFR BLD AUTO: 1 % (ref 0–6)
ERYTHROCYTE [DISTWIDTH] IN BLOOD BY AUTOMATED COUNT: 12 % (ref 11.6–15.1)
GFR SERPL CREATININE-BSD FRML MDRD: 92 ML/MIN/1.73SQ M
GLUCOSE P FAST SERPL-MCNC: 103 MG/DL (ref 65–99)
HCT VFR BLD AUTO: 44.6 % (ref 34.8–46.1)
HDLC SERPL-MCNC: 42 MG/DL
HGB BLD-MCNC: 14.1 G/DL (ref 11.5–15.4)
IMM GRANULOCYTES # BLD AUTO: 0.02 THOUSAND/UL (ref 0–0.2)
IMM GRANULOCYTES NFR BLD AUTO: 0 % (ref 0–2)
LDLC SERPL CALC-MCNC: 69 MG/DL (ref 0–100)
LYMPHOCYTES # BLD AUTO: 1.89 THOUSANDS/ÂΜL (ref 0.6–4.47)
LYMPHOCYTES NFR BLD AUTO: 23 % (ref 14–44)
MCH RBC QN AUTO: 28.1 PG (ref 26.8–34.3)
MCHC RBC AUTO-ENTMCNC: 31.6 G/DL (ref 31.4–37.4)
MCV RBC AUTO: 89 FL (ref 82–98)
MICROALBUMIN UR-MCNC: <5 MG/L (ref 0–20)
MICROALBUMIN/CREAT 24H UR: <21 MG/G CREATININE (ref 0–30)
MONOCYTES # BLD AUTO: 0.72 THOUSAND/ÂΜL (ref 0.17–1.22)
MONOCYTES NFR BLD AUTO: 9 % (ref 4–12)
NEUTROPHILS # BLD AUTO: 5.57 THOUSANDS/ÂΜL (ref 1.85–7.62)
NEUTS SEG NFR BLD AUTO: 66 % (ref 43–75)
NONHDLC SERPL-MCNC: 98 MG/DL
NRBC BLD AUTO-RTO: 0 /100 WBCS
PLATELET # BLD AUTO: 288 THOUSANDS/UL (ref 149–390)
PMV BLD AUTO: 11.2 FL (ref 8.9–12.7)
POTASSIUM SERPL-SCNC: 3.6 MMOL/L (ref 3.5–5.3)
PROT SERPL-MCNC: 7.2 G/DL (ref 6.4–8.4)
RBC # BLD AUTO: 5.02 MILLION/UL (ref 3.81–5.12)
SODIUM SERPL-SCNC: 141 MMOL/L (ref 135–147)
TRIGL SERPL-MCNC: 145 MG/DL
WBC # BLD AUTO: 8.36 THOUSAND/UL (ref 4.31–10.16)

## 2023-06-17 LAB
EST. AVERAGE GLUCOSE BLD GHB EST-MCNC: 134 MG/DL
HBA1C MFR BLD: 6.3 %

## 2023-06-19 ENCOUNTER — OFFICE VISIT (OUTPATIENT)
Dept: INTERNAL MEDICINE CLINIC | Facility: CLINIC | Age: 68
End: 2023-06-19
Payer: MEDICARE

## 2023-06-19 VITALS
SYSTOLIC BLOOD PRESSURE: 138 MMHG | WEIGHT: 203 LBS | BODY MASS INDEX: 35.97 KG/M2 | OXYGEN SATURATION: 97 % | DIASTOLIC BLOOD PRESSURE: 86 MMHG | TEMPERATURE: 97.5 F | HEART RATE: 83 BPM | HEIGHT: 63 IN

## 2023-06-19 DIAGNOSIS — S46.819A STRAIN OF TRAPEZIUS MUSCLE, UNSPECIFIED LATERALITY, INITIAL ENCOUNTER: ICD-10-CM

## 2023-06-19 DIAGNOSIS — Z12.31 ENCOUNTER FOR SCREENING MAMMOGRAM FOR MALIGNANT NEOPLASM OF BREAST: ICD-10-CM

## 2023-06-19 DIAGNOSIS — E11.65 TYPE 2 DIABETES MELLITUS WITH HYPERGLYCEMIA, WITHOUT LONG-TERM CURRENT USE OF INSULIN (HCC): Primary | ICD-10-CM

## 2023-06-19 DIAGNOSIS — E78.5 DYSLIPIDEMIA: ICD-10-CM

## 2023-06-19 DIAGNOSIS — Z12.11 SCREENING FOR COLON CANCER: ICD-10-CM

## 2023-06-19 DIAGNOSIS — J45.40 MODERATE PERSISTENT ASTHMA WITHOUT COMPLICATION: ICD-10-CM

## 2023-06-19 DIAGNOSIS — M17.0 PRIMARY OSTEOARTHRITIS OF BOTH KNEES: ICD-10-CM

## 2023-06-19 PROCEDURE — 99213 OFFICE O/P EST LOW 20 MIN: CPT | Performed by: INTERNAL MEDICINE

## 2023-06-19 RX ORDER — FLUTICASONE PROPIONATE AND SALMETEROL 500; 50 UG/1; UG/1
1 POWDER RESPIRATORY (INHALATION) EVERY 12 HOURS
Qty: 180 BLISTER | Refills: 3 | Status: SHIPPED | OUTPATIENT
Start: 2023-06-19

## 2023-06-19 NOTE — PROGRESS NOTES
INTERNAL MEDICINE OFFICE VISIT       NAME: Carol Herrera  AGE: 74 y o  SEX: female       : 1955        MRN: [id number]    DATE: 2023  TIME: 10:13 AM    Assessment and Plan   1  Type 2 diabetes mellitus with hyperglycemia, without long-term current use of insulin (HCC)  -     Hemoglobin A1C  -     Basic metabolic panel  -     metFORMIN (GLUCOPHAGE) 500 mg tablet; 1 tablet each morning and 2 tablets each evening    2  Dyslipidemia  -     Lipid panel    3  Encounter for screening mammogram for malignant neoplasm of breast    4  Screening for colon cancer    5  Moderate persistent asthma without complication  -     Fluticasone-Salmeterol (Advair) 500-50 mcg/dose inhaler; Inhale 1 puff every 12 (twelve) hours    6  Primary osteoarthritis of both knees  -     Ambulatory Referral to Orthopedic Surgery; Future    7  Strain of trapezius muscle, unspecified laterality, initial encounter         Next visit will be Medicare wellness in 3 months  Lab work ordered today will be performed just prior to next visit  Mammography is up-to-date  EGD and colonoscopy will be due for surveillance of GERD and colorectal screening  We will need to wait until completion of anticoagulant therapy  We will discuss further next visit  Type 2 diabetes is well controlled on current medication  Lifestyle is improved  Recheck labs prior to next visit and then review  Review lipids at time of next visit as well continue rosuvastatin 10 mg daily  Asthma is well controlled with good compliance with Advair which was renewed today  Use of rescue inhaler is rare  Upper respiratory allergies with postnasal drainage are reasonably controlled with antihistamines and fluticasone  Status post right knee replacement, left knee is also arthritic and George Paiz wishes to consider knee replacement  She will transfer care from her orthopedic doctor [place] to Alee Wilkerson  Consult was ordered    For gait disturbance, George Paiz continues to use a cane at all times  She did fall last fall because of poor depth perception and unstable gait from osteoarthritis and had a right orbital fracture and fractured right ribs with complete recovery  Recent stress due to selling on their home in [place] and moving to this area has caused her to have trapezius distribution myofascial pain  We discussed Tylenol and heat with the option of physical therapy if needed  Chief Complaint     Chief Complaint   Patient presents with   • Follow-up       History of Present Illness   Anthony Wren is a 74y o -year-old female who returns today with her  for a follow-up visit  This is a 1 week follow-up  Geoffrey Mohs feels well overall except for waking up with stiffness in the trapezius distribution  She states she holds her stress in her neck  There was no injury  We reviewed lab work of June 17 with stable hemoglobin A1c of 6 3, fasting glucose of 103 with GFR of 92 with normal lecture lites, normal CBC, negative urine microalbumin and optimal lipids including total cholesterol 140 and LDL 69  She reports that diabetic control is improved markedly in the last year  Trulicity and metformin are well-tolerated  Statin therapy is well-tolerated  Given no recent flares of asthma with good compliance of medication  Chronic gait instability continues to be an issue with history of syncope and fall last September when living in [place] with fracture right orbit and right ribs  During reports functional vision and only 1 eye and poor depth perception  She also has instability of gait because of advanced arthritis in the left knee and is status post right knee replacement  She is using a cane at all times and is very careful when ambulating  We discussed preventative care and see notes above  We will perform diabetic foot exam next visit      Active follow-up continues status post DVT with vascular follow-up on May 3 with upcoming hematology follow-up regarding duration of anticoagulant therapy  This visit is scheduled for July 26  Compliance with Eliquis is excellent  Review of Systems   Review of Systems as above    Active Problem List     Patient Active Problem List   Diagnosis   • Acute deep vein thrombosis (DVT) of right peroneal vein (HCC)   • Type 2 diabetes mellitus with hyperglycemia, without long-term current use of insulin (HCC)   • Class 2 severe obesity with serious comorbidity in adult [facility])   • Left retinal disorder   • Dyslipidemia   • Primary hypertension   • GERD (gastroesophageal reflux disease)   • Moderate persistent asthma without complication   • Obesity hypoventilation syndrome (HCC)   • DEN (obstructive sleep apnea)   • Osteoarthritis   • Seasonal allergic rhinitis due to fungal spores   • Edema of both lower extremities due to peripheral venous insufficiency   • Easy bruising       The following portions of the patient's history were reviewed and updated as appropriate: allergies, current medications, past family history, past medical history, past social history, past surgical history, and problem list     Objective     Vitals:    06/19/23 0931   BP: 138/86   Pulse: 83   Temp: 97 5 °F (36 4 °C)   SpO2: 97%     Wt Readings from Last 3 Encounters:   06/19/23 92 1 kg (203 lb)   05/31/23 91 9 kg (202 lb 8 oz)   05/24/23 91 6 kg (202 lb)       Physical Exam     Vital signs stable, alert and oriented no distress in good spirits  Pulse regular  Lungs clear and cardiac exam is normal   No JVD is noted  Peripheral circulation is intact with trace edema of lower legs, more so on left than right      Pertinent Laboratory/Diagnostic Studies:  I have reviewed pertinent labs:  CBC:   Lab Results   Component Value Date    WBC 8 36 06/16/2023    RBC 5 02 06/16/2023    HGB 14 1 06/16/2023    HCT 44 6 06/16/2023    MCV 89 06/16/2023     06/16/2023    MCH 28 1 06/16/2023    MCHC 31 6 06/16/2023    RDW 12 0 06/16/2023 MPV 11 2 06/16/2023    NEUTROABS 5 57 06/16/2023     CMP:   Lab Results   Component Value Date    SODIUM 141 06/16/2023    K 3 6 06/16/2023     06/16/2023    CO2 30 06/16/2023    AGAP 8 06/16/2023    BUN 11 06/16/2023    CREATININE 0 68 06/16/2023    GLUC 128 05/03/2023    GLUF 103 (H) 06/16/2023    CALCIUM 9 1 06/16/2023    AST 16 06/16/2023    ALT 11 06/16/2023    ALKPHOS 105 (H) 06/16/2023    TP 7 2 06/16/2023    ALB 4 3 06/16/2023    TBILI 0 85 06/16/2023    EGFR 92 06/16/2023     Lipid Profile:   Lab Results   Component Value Date    CHOLESTEROL 140 06/16/2023    HDL 42 (L) 06/16/2023    TRIG 145 06/16/2023    LDLCALC 69 06/16/2023    NONHDLC 98 06/16/2023     Hemoglobin A1C/EST AVG Glucose   Lab Results   Component Value Date    HGBA1C 6 3 (H) 06/16/2023     06/16/2023         Orders Placed This Encounter   Procedures   • Hemoglobin A1C   • Basic metabolic panel   • Lipid panel   • Ambulatory Referral to Orthopedic Surgery       ALLERGIES:  Allergies   Allergen Reactions   • Penicillins Seizures   • Sulfa Antibiotics Rash       Current Medications     Current Outpatient Medications   Medication Sig Dispense Refill   • albuterol (PROVENTIL HFA,VENTOLIN HFA) 90 mcg/act inhaler Inhale 2 puffs every 6 (six) hours as needed for wheezing 8 5 g 2   • apixaban (ELIQUIS) 5 mg Take 1 tablet (5 mg total) by mouth 2 (two) times a day 180 tablet 0   • azelastine (ASTELIN) 0 1 % nasal spray 1-2 sprays into each nostril 2 (two) times a day     • bimatoprost (LUMIGAN) 0 01 % ophthalmic drops      • diltiazem (TIAZAC) 180 MG 24 hr capsule Take 180 mg by mouth 2 (two) times a day     • dulaglutide (Trulicity) 1 5 OF/3 5KB injection Inject 0 5 mL under the skin     • EPINEPHrine (EPIPEN) 0 3 mg/0 3 mL SOAJ Inject 0 3 mg into a muscle     • famotidine (PEPCID) 20 mg tablet Take 20 mg by mouth     • fluticasone (FLONASE) 50 mcg/act nasal spray 1 spray into each nostril     • Fluticasone-Salmeterol (Advair) 500-50 mcg/dose inhaler Inhale 1 puff every 12 (twelve) hours 180 blister 3   • furosemide (LASIX) 20 mg tablet Take 20 mg by mouth 2 (two) times a day as needed     • ipratropium (ATROVENT) 0 03 % nasal spray 2 sprays into each nostril every 12 (twelve) hours     • loratadine (CLARITIN) 10 mg tablet Take 10 mg by mouth daily     • metFORMIN (GLUCOPHAGE) 500 mg tablet 1 tablet each morning and 2 tablets each evening 270 tablet 3   • omeprazole (PriLOSEC) 40 MG capsule Take 40 mg by mouth     • potassium chloride (Klor-Con) 10 mEq tablet Take 20 mEq by mouth 2 (two) times a day     • rosuvastatin (CRESTOR) 10 MG tablet Take 10 mg by mouth     • Timolol Maleate PF (Timolol Maleate Ocudose) 0 5 % SOLN        No current facility-administered medications for this visit           Moy Witt MD

## 2023-06-29 ENCOUNTER — PROBLEM (OUTPATIENT)
Dept: URBAN - METROPOLITAN AREA CLINIC 32 | Facility: CLINIC | Age: 68
End: 2023-06-29

## 2023-06-29 DIAGNOSIS — E11.9: ICD-10-CM

## 2023-06-29 DIAGNOSIS — H01.026: ICD-10-CM

## 2023-06-29 DIAGNOSIS — H34.12: ICD-10-CM

## 2023-06-29 DIAGNOSIS — H35.372: ICD-10-CM

## 2023-06-29 DIAGNOSIS — H33.42: ICD-10-CM

## 2023-06-29 DIAGNOSIS — H04.122: ICD-10-CM

## 2023-06-29 DIAGNOSIS — H21.1X2: ICD-10-CM

## 2023-06-29 PROCEDURE — 99213 OFFICE O/P EST LOW 20 MIN: CPT

## 2023-06-29 PROCEDURE — 92134 CPTRZ OPH DX IMG PST SGM RTA: CPT

## 2023-06-29 ASSESSMENT — VISUAL ACUITY: OD_SC: 20/25

## 2023-06-29 ASSESSMENT — TONOMETRY
OS_IOP_MMHG: 14
OS_IOP_MMHG: 13
OD_IOP_MMHG: 16

## 2023-07-12 ENCOUNTER — TELEPHONE (OUTPATIENT)
Dept: VASCULAR SURGERY | Facility: CLINIC | Age: 68
End: 2023-07-12

## 2023-07-12 ENCOUNTER — HOSPITAL ENCOUNTER (OUTPATIENT)
Dept: RADIOLOGY | Facility: HOSPITAL | Age: 68
Discharge: HOME | End: 2023-07-12
Payer: MEDICARE

## 2023-07-12 DIAGNOSIS — I82.452 ACUTE DEEP VEIN THROMBOSIS (DVT) OF LEFT PERONEAL VEIN (HCC): ICD-10-CM

## 2023-07-12 DIAGNOSIS — I82.452 ACUTE DEEP VEIN THROMBOSIS (DVT) OF LEFT PERONEAL VEIN (HCC): Primary | ICD-10-CM

## 2023-07-12 PROCEDURE — 93971 EXTREMITY STUDY: CPT

## 2023-07-12 PROCEDURE — 93971 EXTREMITY STUDY: CPT | Performed by: SURGERY

## 2023-07-12 NOTE — TELEPHONE ENCOUNTER
Pt takes Eliquis for acute occlusive DVT in the left peroneal vein 3/2023. Scheduled for LEV 7/25 and OV with provider on 8/7. Pt was calling because she has been having pain in both legs, but the left leg is worse. When she gets up in the AM, she can barely walk. Has been having a lot of cramping in the top of her left calf. Left calf appears red. Pain is worse now than when she was first diagnosed with a DVT. Has not missed any doses of Eliquis. Pt does wear compression stockings, elevates her legs, and applies warm compresses to the area where she has discomfort. Denies swelling of legs. Routed to triage to advise.

## 2023-07-12 NOTE — TELEPHONE ENCOUNTER
This can be reviewed at next visit but should be ok. I would recommend wearing compression stockings for both the drive and flight. They should making frequent stops while driving every 3-4 hours to walk around.

## 2023-07-12 NOTE — TELEPHONE ENCOUNTER
Reviewed. Lets recheck LEV now to assess for clot progression. She should continue conservative management.

## 2023-07-12 NOTE — TELEPHONE ENCOUNTER
Pt notified and verbalized understanding. She requested to ask provider if it would be ok to drive to [place] in August (a few days after she sees Nicole in the office), which would be a 9-10 hr drive. Also, her daughter is having a baby in October and she wants to fly to [place] to see her so she wants to make sure that is ok as well. Please advise.

## 2023-07-12 NOTE — TELEPHONE ENCOUNTER
Reviewed. Final report pending. Per tech there is no change. Patient has evidence of chronic DVT in one peroneal vein. She should continue conservative therapy and follow up as scheduled.

## 2023-07-26 ENCOUNTER — CONSULT (OUTPATIENT)
Dept: HEMATOLOGY ONCOLOGY | Facility: CLINIC | Age: 68
End: 2023-07-26
Payer: MEDICARE

## 2023-07-26 VITALS
DIASTOLIC BLOOD PRESSURE: 80 MMHG | RESPIRATION RATE: 16 BRPM | HEART RATE: 72 BPM | BODY MASS INDEX: 35.97 KG/M2 | HEIGHT: 63 IN | WEIGHT: 203 LBS | OXYGEN SATURATION: 100 % | SYSTOLIC BLOOD PRESSURE: 112 MMHG | TEMPERATURE: 97.6 F

## 2023-07-26 DIAGNOSIS — I87.2 EDEMA OF BOTH LOWER EXTREMITIES DUE TO PERIPHERAL VENOUS INSUFFICIENCY: Primary | ICD-10-CM

## 2023-07-26 DIAGNOSIS — I82.452 ACUTE DEEP VEIN THROMBOSIS (DVT) OF LEFT PERONEAL VEIN (HCC): ICD-10-CM

## 2023-07-26 DIAGNOSIS — R23.3 EASY BRUISING: ICD-10-CM

## 2023-07-26 PROCEDURE — 99204 OFFICE O/P NEW MOD 45 MIN: CPT

## 2023-07-26 NOTE — PROGRESS NOTES
[facility] [address] [address] [place] [id number]  Hematology Ambulatory Consult  Eliodoro Felty, 1955, [id number]  7/26/2023    Assessment/Plan:  1. Acute deep vein thrombosis (DVT) of left peroneal vein (HCC)  2. Edema of both lower extremities due to peripheral venous insufficiency  Ms. Jane Magallon is a 29-year-old female seen in consultation for anticoagulation recommendations. She was diagnosed with acute occlusive DVT within the peroneal vein of the left caths and additional thrombus in the lesser saphenous vein in the left calf. It appears that this event was unprovoked. She does have venous insufficiency which had been irritated while packing and moving on her feet all day which could have been a contributing factor to this event. She has been tolerating Eliquis 5 mg twice daily without significant bleeding or bruising. She has no prior history or family history of VTE. She has had multiple surgeries in the past without complications of VTE. Repeat imaging demonstrated chronic occlusive DVT in one of the peroneal veins of the left calf last month. She is scheduled for a trip to [place] by car in August.  Therefore she will continue on anticoagulation until she returns back from this trip. In September she will stop anticoagulation on a Monday and go for thrombosis panel on Friday. After having the thrombosis panel drawn she will resume Eliquis that evening until her follow-up with me. At her follow-up we will review the thrombosis panel and if she has a thrombophilia diagnosis. At that time we will discuss if lifelong anticoagulation is recommended versus 81 mg of aspirin daily. She knows to call the office with any new or worsening swelling/pain in her leg for evaluation. She also knows to go to the emergency room with any sudden onset chest pain, shortness of breath, or chest tightness.     - Ambulatory Referral to Hematology / Oncology  - Thrombosis Panel; Future      The patient is scheduled for follow-up in approximately 2 months. Patient voiced agreement and understanding to the above. Patient knows to call the Hematology/Oncology office with any questions and concerns regarding the above. Barrier(s) to care: None. The patient is able to self care.    -------------------------------------------------------------------------------------------------------    Chief Complaint   Patient presents with   • Consult       [facility]  [facility]  [address] [place] [address]    History of present illness:  Irene Mahan is a 70-year-old female with possible history of GERD, type 2 diabetes, asthma, obstructive sleep apnea, hypertension, osteoarthritis, dyslipidemia, and obesity. She is seen in consultation for anticoagulation recommendations. She recently retired and her and her  are moving to a new home. This home is 2 hours away and has been driving in the car multiple times per day. She has also had increased swelling of her legs due to venous insufficiency and being on her feet packing boxes during the day. She was evaluated by her PCP in [place] on 3/22/2023 and venous duplex demonstrated left superficial thrombosis in the small saphenous vein with no DVT. She then was evaluated in [facility] on 3/30/2023 with venous duplex which found occlusive thrombus within the peroneal vein of the left calf with additional thrombus in the lesser saphenous vein of the left calf as well. She was started on Eliquis 10 mg twice daily x7 days followed by 5 mg twice daily. She is tolerating anticoagulation well. She did have an arm laceration from a box that took a while to stop bleeding most recently. He was evaluated by vascular surgery on 5/24/2023 who recommended compression stockings and repeat venous duplex.   Venous duplex demonstrated chronic occlusive DVT in one of the peroneal veins of the left calf.  She is scheduled to follow-up with them on 2023. She has no personal or family history of VTE. She has had prior surgeries including hysterectomy, , knee surgery, knee replacement and multiple eye surgeries which did not have complications of VTE. She is up-to-date on mammogram.  She is due for colonoscopy in 2019. She is a non-smoker. She has no history of miscarriages. She has no history of MI or CVA. She has no known autoimmune conditions. Her father and paternal grandfather both had prostate//testicular cancer. She is scheduled to travel to [place] by car in August and then [place] in October. Review of Systems   Constitutional: Negative for activity change, appetite change, fatigue, fever and unexpected weight change. HENT: Negative for trouble swallowing and voice change. Eyes: Negative for photophobia and visual disturbance. Respiratory: Negative for cough, chest tightness and shortness of breath. Cardiovascular: Negative for chest pain, palpitations and leg swelling. Gastrointestinal: Negative for abdominal distention, anal bleeding, blood in stool, constipation, diarrhea, nausea and vomiting. Endocrine: Negative for cold intolerance and heat intolerance. Genitourinary: Negative for dysuria, hematuria and urgency. Musculoskeletal: Negative for arthralgias, back pain, gait problem and joint swelling. Skin: Negative for pallor. Neurological: Negative for dizziness, light-headedness and headaches. Hematological: Negative for adenopathy. Bruises/bleeds easily. Psychiatric/Behavioral: Negative for confusion and sleep disturbance.        Patient Active Problem List   Diagnosis   • Acute deep vein thrombosis (DVT) of right peroneal vein (HCC)   • Type 2 diabetes mellitus with hyperglycemia, without long-term current use of insulin (HCC)   • Class 2 severe obesity with serious comorbidity in adult [facility])   • Left retinal disorder   • Dyslipidemia   • Primary hypertension   • GERD (gastroesophageal reflux disease)   • Moderate persistent asthma without complication   • Obesity hypoventilation syndrome (HCC)   • DEN (obstructive sleep apnea)   • Osteoarthritis   • Seasonal allergic rhinitis due to fungal spores   • Edema of both lower extremities due to peripheral venous insufficiency   • Easy bruising       Past Medical History:   Diagnosis Date   • Allergic    • Arthritis    • Asthma    • Blindness of left eye    • COPD (chronic obstructive pulmonary disease) ([facility])    • Diabetes mellitus ([facility])    • GERD (gastroesophageal reflux disease)    • Hypertension    • Obesity    • Visual impairment        Past Surgical History:   Procedure Laterality Date   •  SECTION  10/16/1990    Fibroids   • EYE SURGERY      MULTIPLE   • HYSTERECTOMY     • REPLACEMENT TOTAL KNEE Right        Family History   Problem Relation Age of Onset   • Dementia Mother    • Arthritis Mother         Late in life   • Hypertension Father         HBP   • Heart disease Father         HBP   • Diabetes Father         managed through diet   • Hearing loss Father         Industrial/construction work with no hearing protection   • Glaucoma Paternal Grandmother        Social History     Socioeconomic History   • Marital status: /Civil Union     Spouse name: None   • Number of children: None   • Years of education: None   • Highest education level: None   Occupational History   • None   Tobacco Use   • Smoking status: Never   • Smokeless tobacco: Never   Vaping Use   • Vaping Use: Never used   Substance and Sexual Activity   • Alcohol use:  Yes     Alcohol/week: 1.0 standard drink of alcohol     Types: 1 Standard drinks or equivalent per week     Comment: on occasion, 1 drink with low sugar content   • Drug use: Never   • Sexual activity: None   Other Topics Concern   • None   Social History Narrative   • None     Social Determinants of Health     Financial Resource Strain: Not on file   Food Insecurity: Not on file   Transportation Needs: Not on file   Physical Activity: Not on file   Stress: Not on file   Social Connections: Not on file   Intimate Partner Violence: Not on file   Housing Stability: Not on file         Current Outpatient Medications:   •  albuterol (PROVENTIL HFA,VENTOLIN HFA) 90 mcg/act inhaler, Inhale 2 puffs every 6 (six) hours as needed for wheezing, Disp: 8.5 g, Rfl: 2  •  apixaban (ELIQUIS) 5 mg, Take 1 tablet (5 mg total) by mouth 2 (two) times a day, Disp: 180 tablet, Rfl: 0  •  azelastine (ASTELIN) 0.1 % nasal spray, 1-2 sprays into each nostril 2 (two) times a day, Disp: , Rfl:   •  bimatoprost (LUMIGAN) 0.01 % ophthalmic drops, , Disp: , Rfl:   •  diltiazem (TIAZAC) 180 MG 24 hr capsule, Take 180 mg by mouth 2 (two) times a day, Disp: , Rfl:   •  dulaglutide (Trulicity) 1.5 SB/3.3HZ injection, Inject 0.5 mL under the skin, Disp: , Rfl:   •  EPINEPHrine (EPIPEN) 0.3 mg/0.3 mL SOAJ, Inject 0.3 mg into a muscle, Disp: , Rfl:   •  famotidine (PEPCID) 20 mg tablet, Take 20 mg by mouth, Disp: , Rfl:   •  fluticasone (FLONASE) 50 mcg/act nasal spray, 1 spray into each nostril, Disp: , Rfl:   •  Fluticasone-Salmeterol (Advair) 500-50 mcg/dose inhaler, Inhale 1 puff every 12 (twelve) hours, Disp: 180 blister, Rfl: 3  •  furosemide (LASIX) 20 mg tablet, Take 20 mg by mouth 2 (two) times a day as needed, Disp: , Rfl:   •  ipratropium (ATROVENT) 0.03 % nasal spray, 2 sprays into each nostril every 12 (twelve) hours, Disp: , Rfl:   •  loratadine (CLARITIN) 10 mg tablet, Take 10 mg by mouth daily, Disp: , Rfl:   •  metFORMIN (GLUCOPHAGE) 500 mg tablet, 1 tablet each morning and 2 tablets each evening, Disp: 270 tablet, Rfl: 3  •  omeprazole (PriLOSEC) 40 MG capsule, Take 40 mg by mouth, Disp: , Rfl:   •  potassium chloride (Klor-Con) 10 mEq tablet, Take 20 mEq by mouth 2 (two) times a day, Disp: , Rfl:   •  rosuvastatin (CRESTOR) 10 MG tablet, Take 10 mg by mouth, Disp: , Rfl:   •  Timolol Maleate PF (Timolol Maleate Ocudose) 0.5 % SOLN, , Disp: , Rfl:     Allergies   Allergen Reactions   • Penicillins Seizures   • Sulfa Antibiotics Rash       Objective:  /80 (BP Location: Right arm, Patient Position: Sitting, Cuff Size: Large)   Pulse 72   Temp 97.6 °F (36.4 °C) (Temporal)   Resp 16   Ht 5' 3" (1.6 m)   Wt 92.1 kg (203 lb)   SpO2 100%   BMI 35.96 kg/m²   Physical Exam  Constitutional:       General: She is not in acute distress. Appearance: Normal appearance. She is not ill-appearing. HENT:      Head: Normocephalic and atraumatic. Eyes:      Extraocular Movements: Extraocular movements intact. Conjunctiva/sclera: Conjunctivae normal.   Cardiovascular:      Rate and Rhythm: Normal rate. Pulses: Normal pulses. Pulmonary:      Effort: Pulmonary effort is normal. No respiratory distress. Abdominal:      General: Abdomen is flat. Bowel sounds are normal. There is no distension. Palpations: Abdomen is soft. Tenderness: There is no abdominal tenderness. Musculoskeletal:         General: No tenderness. Normal range of motion. Cervical back: Normal range of motion. No tenderness. Lymphadenopathy:      Cervical: No cervical adenopathy. Skin:     General: Skin is warm and dry. Capillary Refill: Capillary refill takes less than 2 seconds. Coloration: Skin is not jaundiced or pale. Neurological:      General: No focal deficit present. Mental Status: She is alert and oriented to person, place, and time. Mental status is at baseline. Motor: No weakness. Gait: Gait normal.   Psychiatric:         Mood and Affect: Mood normal.         Behavior: Behavior normal.         Thought Content: Thought content normal.         Judgment: Judgment normal.         Result Review  Labs:   No visits with results within 1 Month(s) from this visit.    Latest known visit with results is:   Office Visit on 05/31/2023   Component Date Value Ref Range Status • WBC 06/16/2023 8.36  4.31 - 10.16 Thousand/uL Final   • RBC 06/16/2023 5.02  3.81 - 5.12 Million/uL Final   • Hemoglobin 06/16/2023 14.1  11.5 - 15.4 g/dL Final   • Hematocrit 06/16/2023 44.6  34.8 - 46.1 % Final   • MCV 06/16/2023 89  82 - 98 fL Final   • MCH 06/16/2023 28.1  26.8 - 34.3 pg Final   • MCHC 06/16/2023 31.6  31.4 - 37.4 g/dL Final   • RDW 06/16/2023 12.0  11.6 - 15.1 % Final   • MPV 06/16/2023 11.2  8.9 - 12.7 fL Final   • Platelets 46/48/4928 288  149 - 390 Thousands/uL Final   • nRBC 06/16/2023 0  /100 WBCs Final   • Neutrophils Relative 06/16/2023 66  43 - 75 % Final   • Immat GRANS % 06/16/2023 0  0 - 2 % Final   • Lymphocytes Relative 06/16/2023 23  14 - 44 % Final   • Monocytes Relative 06/16/2023 9  4 - 12 % Final   • Eosinophils Relative 06/16/2023 1  0 - 6 % Final   • Basophils Relative 06/16/2023 1  0 - 1 % Final   • Neutrophils Absolute 06/16/2023 5.57  1.85 - 7.62 Thousands/µL Final   • Immature Grans Absolute 06/16/2023 0.02  0.00 - 0.20 Thousand/uL Final   • Lymphocytes Absolute 06/16/2023 1.89  0.60 - 4.47 Thousands/µL Final   • Monocytes Absolute 06/16/2023 0.72  0.17 - 1.22 Thousand/µL Final   • Eosinophils Absolute 06/16/2023 0.11  0.00 - 0.61 Thousand/µL Final   • Basophils Absolute 06/16/2023 0.05  0.00 - 0.10 Thousands/µL Final   • Sodium 06/16/2023 141  135 - 147 mmol/L Final   • Potassium 06/16/2023 3.6  3.5 - 5.3 mmol/L Final   • Chloride 06/16/2023 103  96 - 108 mmol/L Final   • CO2 06/16/2023 30  21 - 32 mmol/L Final   • ANION GAP 06/16/2023 8  4 - 13 mmol/L Final   • BUN 06/16/2023 11  5 - 25 mg/dL Final   • Creatinine 06/16/2023 0.68  0.60 - 1.30 mg/dL Final    Standardized to IDMS reference method   • Glucose, Fasting 06/16/2023 103 (H)  65 - 99 mg/dL Final   • Calcium 06/16/2023 9.1  8.4 - 10.2 mg/dL Final   • AST 06/16/2023 16  13 - 39 U/L Final   • ALT 06/16/2023 11  7 - 52 U/L Final    Specimen collection should occur prior to Sulfasalazine administration due to the potential for falsely depressed results. • Alkaline Phosphatase 06/16/2023 105 (H)  34 - 104 U/L Final   • Total Protein 06/16/2023 7.2  6.4 - 8.4 g/dL Final   • Albumin 06/16/2023 4.3  3.5 - 5.0 g/dL Final   • Total Bilirubin 06/16/2023 0.85  0.20 - 1.00 mg/dL Final    Use of this assay is not recommended for patients undergoing treatment with eltrombopag due to the potential for falsely elevated results. N-acetyl-p-benzoquinone imine (metabolite of Acetaminophen) will generate erroneously low results in samples for patients that have taken an overdose of Acetaminophen. • eGFR 06/16/2023 92  ml/min/1.73sq m Final   • Cholesterol 06/16/2023 140  See Comment mg/dL Final    Cholesterol:         Pediatric <18 Years        Desirable          <170 mg/dL      Borderline High    170-199 mg/dL      High               >=200 mg/dL        Adult >=18 Years            Desirable         <200 mg/dL      Borderline High   200-239 mg/dL      High              >239 mg/dL     • Triglycerides 06/16/2023 145  See Comment mg/dL Final    Triglyceride:     0-9Y            <75mg/dL     10Y-17Y         <90 mg/dL       >=18Y     Normal          <150 mg/dL     Borderline High 150-199 mg/dL     High            200-499 mg/dL        Very High       >499 mg/dL    Specimen collection should occur prior to Metamizole administration due to the potential for falsely depressed results. • HDL, Direct 06/16/2023 42 (L)  >=50 mg/dL Final   • LDL Calculated 06/16/2023 69  0 - 100 mg/dL Final    LDL Cholesterol:     Optimal           <100 mg/dl     Near Optimal      100-129 mg/dl     Above Optimal       Borderline High 130-159 mg/dl       High            160-189 mg/dl       Very High       >189 mg/dl         This screening LDL is a calculated result. It does not have the accuracy of the Direct Measured LDL in the monitoring of patients with hyperlipidemia and/or statin therapy.    Direct Measure LDL (RML251) must be ordered separately in these patients. • Non-HDL-Chol (CHOL-HDL) 06/16/2023 98  mg/dl Final   • Hemoglobin A1C 06/16/2023 6.3 (H)  Normal 3.8-5.6%; PreDiabetic 5.7-6.4%; Diabetic >=6.5%; Glycemic control for adults with diabetes <7.0% % Final   • EAG 06/16/2023 134  mg/dl Final   • Creatinine, Ur 06/16/2023 24.1  mg/dL Final   • Albumin,U,Random 06/16/2023 <5.0  0.0 - 20.0 mg/L Final   • Albumin Creat Ratio 06/16/2023 <21  0 - 30 mg/g creatinine Final       Imaging:    No relevant imaging to review      Please note: This report has been generated by a voice recognition software system. Therefore there may be syntax, spelling, and/or grammatical errors. Please call if you have any questions.

## 2023-07-26 NOTE — PATIENT INSTRUCTIONS
Blood work 2 weeks prior to follow up.    Hold eliquis Monday morning, labs on Friday, resume eliquis that evening

## 2023-08-04 NOTE — PROGRESS NOTES
Assessment/Plan:    Chronic deep vein thrombosis (DVT) of right peroneal vein (HCC)  Edema of both lower extremities due to peripheral venous insufficiency  -     VAS reflux lower limb venous duplex study with reflux assesment, unilateral; Future  -     Compression Stocking    -acute, occlusive DVT in the LEFT peroneal vein 3/2023 - no obvious etiology  -Sudden redness to the calf with increased edema 3/2023  -No injury or travel; no history of DVT, malignancy or rheumatologic disorder  -Gives history of chronic mild to moderate B lower extremity edema    8/7/23:   -Compliant with compression stockings, but still has continued leg pain, swelling and some erythema  -Leg redness seems to worsen as the day goes on    -LLE  Exam:  -1+ LE edema with mild, flat erythema, no heat; no open wounds or chronic stasis chagnes  -calf non-tender. 2+ DP pulse. Imaging:  -R LE venous du 7/12/23: Chronic occlusive DVT 1/2 peroneal veins in the calf.     -R LE venous du 5/3/23: R subacute DVT in 1/2 peroneal veins in the calf. Triphasic. -LE venous du 3/30/23: (report only [facility]): Occlusive thrombus in the L peroneal vein which does not extent into the popliteal vein and thrombus in the lesser saphenous vein in the calf. A/P  -Overall stable 4 months after unprovoked isolated calf DVT (no prior DVT's)  -? She was moving at the time of the DVT so possible injury that she didn't appreciate at the time  -L calf DVT is now in the chronic phasic, but she still has some leg pain and edema.  -Continue with apixaban 5 BID  -Seen by hematology who is doing a thrombosis panel  -Will defer duration of a/c to hematology; apixaban 2.5 BID v aspirin 8  -Given continued pain and edema, favor apixaban a bit longer  -If she were to develop recurrence of DVT, lifelong a/c would be needed  -Continue with compression stockings 20-30 mmHg  -Check reflux study given chronic leg edema  -Patient education regarding DVT was provided  -Follow up in about 2 months         Additional diagnoses:     Class 2 severe obesity with serious comorbidity and body mass index (BMI) of 35.0 to 35.9 in adult, unspecified obesity type ([address])    Type 2 diabetes mellitus with hyperglycemia, without long-term current use of insulin (HCC)    Subjective:      Patient ID: Jonathan Lucio is a 79 y.o. female. Patient presents today to review LEV done 7/12/23. Acute, occlusive DVT of LLE discovered 3/2023. Taking Eliquis. Wearing compression. HPI   Ms. Jonathan Lucio 24BN F R TKR, COPD, hypertension, DM developed left calf DVT 3/30/2023. Patient reports that she has chronic swelling in both legs particularly on the left side but developed redness to the calf for about 2 weeks for which she went to [facility] for evaluation. She did venous duplex study performed which showed acute, occlusive DVT in 1 of 2 peroneal veins. She was placed on full anticoagulation with apixaban 10 mg twice daily for 3 weeks and transitioned to apixaban 5 mg twice daily. She has had no problems on anticoagulation. She has been moving and carrying boxes and standing on her feet for prolonged periods of time. However, she denies any injury or extended travel. She is no prior history of DVT. No history of malignancy or rheumatologic disorders. She has no large, bulky varicose veins. She is tolerating apixaban without extensive bruising or obvious bleeding. She tells me she is overdue for colonoscopy, but otherwise her cancer screenings are up-to-date. She is advised to follow-up with primary care regarding colonoscopy. Due to continued, ongoing left calf pain, intermittent redness and swelling, she did have a follow-up [facility] duplex [facility] which redemonstrates DVT in 1 of 2 peroneal veins. The DVT is now subacute and reaching the chronic phase. There is no evidence of superficial thrombophlebitis. She is a retired .   She does give history of chronic leg swelling with small venous varicosities. No c/i to blood thinner    8/7/23: Patient returns to vascular surgery for follow-up of left calf DVT. Since she was last seen, she had an episode of increased leg pain and swelling and underwent repeat DVT lower extremity venous duplex 7/12/2023 which was negative for acute DVT which showed chronic, occlusive DVT in one of the peroneal veins. Findings consistent with recent acute, occlusive left calf DVT. In general, she is still having left calf pain, cramping, edema and some erythema, but less so. She is wearing 20 to 30 mm medical compression stockings as recommended, but perhaps they are not fitted correctly. She remains on apixaban 5 twice a day. She was seen by hematology who recommended hypercoagulable workup. No chest pain or shortness of breath. She has had no serious problems tolerating blood thinner. She did develop a scratch after her dog jumped on her left arm a couple of days ago. There is a small wound with surrounding swelling. She can wash with soap and water and apply bacitracin. If it does not heal right away, she is asked to see her primary care doctor. She would like an elective colonoscopy which she can probably do next month.    -Still having L calf pain, cramping edema      The following portions of the patient's history were reviewed and updated as appropriate: allergies, current medications, past family history, past medical history, past social history, past surgical history and problem list.    Review of Systems   Constitutional: Negative. HENT: Negative. Eyes: Negative. Respiratory: Positive for shortness of breath. Cardiovascular: Positive for leg swelling. Gastrointestinal: Negative. Endocrine: Negative. Genitourinary: Negative. Musculoskeletal: Positive for gait problem. Skin: Negative. Allergic/Immunologic: Negative. Neurological: Positive for weakness. Hematological: Negative. Psychiatric/Behavioral: Negative. Objective:      /66 (BP Location: Right arm, Patient Position: Sitting)   Pulse 64   Ht 5' 3" (1.6 m)   Wt 92.5 kg (204 lb)   BMI 36.14 kg/m²     R 39 cm, L 41.5 cm    Mild to 1+ LE (calf) R LE edema. Flat erythema     Physical Exam  Vitals and nursing note reviewed. Constitutional:       Appearance: She is well-developed. HENT:      Head: Normocephalic and atraumatic. Eyes:      Pupils: Pupils are equal, round, and reactive to light. Neck:      Vascular: No JVD. Cardiovascular:      Rate and Rhythm: Normal rate and regular rhythm. Pulses:           Radial pulses are 2+ on the right side and 2+ on the left side. Dorsalis pedis pulses are 2+ on the right side and 2+ on the left side. Heart sounds: Normal heart sounds, S1 normal and S2 normal. No murmur heard. No friction rub. No gallop. Pulmonary:      Effort: Pulmonary effort is normal. No accessory muscle usage or respiratory distress. Breath sounds: Normal breath sounds. No wheezing or rales. Abdominal:      General: Bowel sounds are normal. There is no distension. Palpations: Abdomen is soft. Tenderness: There is no abdominal tenderness. Musculoskeletal:         General: No deformity. Normal range of motion. Left lower le+ Edema present. Skin:     General: Skin is warm and dry. Capillary Refill: Capillary refill takes less than 2 seconds. Findings: No lesion or rash. Nails: There is no clubbing. Neurological:      Mental Status: She is alert and oriented to person, place, and time. Comments: Grossly normal    Psychiatric:         Behavior: Behavior is cooperative. [facility] 23  CLINICAL:  Indications:  Patient presents with left lower extremity calf cramping/pain for a few days. Operative History:  Patient denies cardiovascular surgeries  Risk Factors:   The patient has history of HTN and Diabetes (NIDDM (Diet)). FINDINGS:     Left      Impression              Peroneal  Thrombosed (Chronic)             CONCLUSION:  Impression:  RIGHT LOWER LIMB LIMITED:  Evaluation shows no evidence of thrombus in the common femoral vein. Doppler evaluation shows a normal response to augmentation maneuvers. LEFT LOWER LIMB:  Chronic-occlusive deep vein thrombosis noted in one of the peroneal veins in  the calf. No evidence of superficial thrombophlebitis noted. No evidence of valvular incompetence noted in the deep veins. Popliteal, posterior tibial and anterior tibial arterial Doppler waveform's are  triphasic. I have reviewed and made appropriate changes to the review of systems input by the medical assistant.     Vitals:    23 1347   BP: 110/66   BP Location: Right arm   Patient Position: Sitting   Pulse: 64   Weight: 92.5 kg (204 lb)   Height: 5' 3" (1.6 m)       Patient Active Problem List   Diagnosis   • Chronic deep vein thrombosis (DVT) of right peroneal vein (HCC)   • Type 2 diabetes mellitus with hyperglycemia, without long-term current use of insulin (HCC)   • Class 2 severe obesity with serious comorbidity in adult [facility])   • Left retinal disorder   • Dyslipidemia   • Primary hypertension   • GERD (gastroesophageal reflux disease)   • Moderate persistent asthma without complication   • Obesity hypoventilation syndrome (HCC)   • DEN (obstructive sleep apnea)   • Osteoarthritis   • Seasonal allergic rhinitis due to fungal spores   • Edema of both lower extremities due to peripheral venous insufficiency   • Easy bruising       Past Surgical History:   Procedure Laterality Date   •  SECTION  10/16/1990    Fibroids   • EYE SURGERY      MULTIPLE   • HYSTERECTOMY     • REPLACEMENT TOTAL KNEE Right        Family History   Problem Relation Age of Onset   • Dementia Mother    • Arthritis Mother         Late in life   • Hypertension Father         HBP   • Heart disease Father         HBP   • Diabetes Father         managed through diet   • Hearing loss Father         Industrial/construction work with no hearing protection   • Glaucoma Paternal Grandmother        Social History     Socioeconomic History   • Marital status: /Civil Union     Spouse name: Not on file   • Number of children: Not on file   • Years of education: Not on file   • Highest education level: Not on file   Occupational History   • Not on file   Tobacco Use   • Smoking status: Never   • Smokeless tobacco: Never   Vaping Use   • Vaping Use: Never used   Substance and Sexual Activity   • Alcohol use:  Yes     Alcohol/week: 1.0 standard drink of alcohol     Types: 1 Standard drinks or equivalent per week     Comment: on occasion, 1 drink with low sugar content   • Drug use: Never   • Sexual activity: Not on file   Other Topics Concern   • Not on file   Social History Narrative   • Not on file     Social Determinants of Health     Financial Resource Strain: Not on file   Food Insecurity: Not on file   Transportation Needs: Not on file   Physical Activity: Not on file   Stress: Not on file   Social Connections: Not on file   Intimate Partner Violence: Not on file   Housing Stability: Not on file       Allergies   Allergen Reactions   • Penicillins Seizures   • Sulfa Antibiotics Rash         Current Outpatient Medications:   •  albuterol (PROVENTIL HFA,VENTOLIN HFA) 90 mcg/act inhaler, Inhale 2 puffs every 6 (six) hours as needed for wheezing, Disp: 8.5 g, Rfl: 2  •  apixaban (ELIQUIS) 5 mg, Take 1 tablet (5 mg total) by mouth 2 (two) times a day, Disp: 180 tablet, Rfl: 0  •  azelastine (ASTELIN) 0.1 % nasal spray, 1-2 sprays into each nostril 2 (two) times a day, Disp: , Rfl:   •  bimatoprost (LUMIGAN) 0.01 % ophthalmic drops, , Disp: , Rfl:   •  diltiazem (TIAZAC) 180 MG 24 hr capsule, Take 180 mg by mouth 2 (two) times a day, Disp: , Rfl:   •  dulaglutide (Trulicity) 1.5 QI/3.3GW injection, Inject 0.5 mL under the skin, Disp: , Rfl:   •  EPINEPHrine (EPIPEN) 0.3 mg/0.3 mL SOAJ, Inject 0.3 mg into a muscle, Disp: , Rfl:   •  famotidine (PEPCID) 20 mg tablet, Take 20 mg by mouth, Disp: , Rfl:   •  fluticasone (FLONASE) 50 mcg/act nasal spray, 1 spray into each nostril, Disp: , Rfl:   •  Fluticasone-Salmeterol (Advair) 500-50 mcg/dose inhaler, Inhale 1 puff every 12 (twelve) hours, Disp: 180 blister, Rfl: 3  •  furosemide (LASIX) 20 mg tablet, Take 20 mg by mouth 2 (two) times a day as needed, Disp: , Rfl:   •  ipratropium (ATROVENT) 0.03 % nasal spray, 2 sprays into each nostril every 12 (twelve) hours, Disp: , Rfl:   •  loratadine (CLARITIN) 10 mg tablet, Take 10 mg by mouth daily, Disp: , Rfl:   •  metFORMIN (GLUCOPHAGE) 500 mg tablet, 1 tablet each morning and 2 tablets each evening, Disp: 270 tablet, Rfl: 3  •  omeprazole (PriLOSEC) 40 MG capsule, Take 40 mg by mouth, Disp: , Rfl:   •  potassium chloride (Klor-Con) 10 mEq tablet, Take 20 mEq by mouth 2 (two) times a day, Disp: , Rfl:   •  rosuvastatin (CRESTOR) 10 MG tablet, Take 10 mg by mouth, Disp: , Rfl:   •  Timolol Maleate PF (Timolol Maleate Ocudose) 0.5 % SOLN, , Disp: , Rfl:

## 2023-08-07 ENCOUNTER — OFFICE VISIT (OUTPATIENT)
Dept: VASCULAR SURGERY | Facility: CLINIC | Age: 68
End: 2023-08-07
Payer: MEDICARE

## 2023-08-07 VITALS
BODY MASS INDEX: 36.14 KG/M2 | WEIGHT: 204 LBS | HEART RATE: 64 BPM | DIASTOLIC BLOOD PRESSURE: 66 MMHG | HEIGHT: 63 IN | SYSTOLIC BLOOD PRESSURE: 110 MMHG

## 2023-08-07 DIAGNOSIS — E66.01 CLASS 2 SEVERE OBESITY WITH SERIOUS COMORBIDITY AND BODY MASS INDEX (BMI) OF 35.0 TO 35.9 IN ADULT, UNSPECIFIED OBESITY TYPE (HCC): ICD-10-CM

## 2023-08-07 DIAGNOSIS — I87.2 EDEMA OF BOTH LOWER EXTREMITIES DUE TO PERIPHERAL VENOUS INSUFFICIENCY: ICD-10-CM

## 2023-08-07 DIAGNOSIS — E11.65 TYPE 2 DIABETES MELLITUS WITH HYPERGLYCEMIA, WITHOUT LONG-TERM CURRENT USE OF INSULIN (HCC): ICD-10-CM

## 2023-08-07 DIAGNOSIS — I82.551 CHRONIC DEEP VEIN THROMBOSIS (DVT) OF RIGHT PERONEAL VEIN (HCC): Primary | ICD-10-CM

## 2023-08-07 PROCEDURE — 99213 OFFICE O/P EST LOW 20 MIN: CPT | Performed by: PHYSICIAN ASSISTANT

## 2023-08-07 NOTE — PATIENT INSTRUCTIONS
L LE calf DVT    -4 months post L calf DVT  -Continue with apixaban 5 BID  -Will finish her current apixaban 5 BID and then transition to apixaban 2.5 BID  -Continue with compression stockings 20-30 mmHg  -Warm compresses to the legs as needed  -Follow up in about 2 months after reflux study      -Gayland Acre

## 2023-08-15 ENCOUNTER — FOLLOW UP (OUTPATIENT)
Dept: URBAN - METROPOLITAN AREA CLINIC 39 | Facility: CLINIC | Age: 68
End: 2023-08-15

## 2023-08-15 DIAGNOSIS — H34.12: ICD-10-CM

## 2023-08-15 DIAGNOSIS — H04.123: ICD-10-CM

## 2023-08-15 DIAGNOSIS — H01.026: ICD-10-CM

## 2023-08-15 DIAGNOSIS — E11.9: ICD-10-CM

## 2023-08-15 DIAGNOSIS — H35.372: ICD-10-CM

## 2023-08-15 DIAGNOSIS — H33.42: ICD-10-CM

## 2023-08-15 DIAGNOSIS — H21.1X2: ICD-10-CM

## 2023-08-15 PROCEDURE — 92201 OPSCPY EXTND RTA DRAW UNI/BI: CPT | Mod: 59

## 2023-08-15 PROCEDURE — 67028 INJECTION EYE DRUG: CPT

## 2023-08-15 PROCEDURE — 92014 COMPRE OPH EXAM EST PT 1/>: CPT | Mod: 25

## 2023-08-15 PROCEDURE — 92134 CPTRZ OPH DX IMG PST SGM RTA: CPT

## 2023-08-15 ASSESSMENT — TONOMETRY
OD_IOP_MMHG: 17
OS_IOP_MMHG: 13

## 2023-08-15 ASSESSMENT — VISUAL ACUITY: OD_SC: 20/25+2

## 2023-08-18 ENCOUNTER — TELEPHONE (OUTPATIENT)
Dept: HEMATOLOGY ONCOLOGY | Facility: CLINIC | Age: 68
End: 2023-08-18

## 2023-08-18 NOTE — TELEPHONE ENCOUNTER
Spoke with patient to make her aware that she does not need to hold the eliquis for dental work.  I advised that if they would need to do anything beyond a basic cleaning, to contact our office first

## 2023-08-18 NOTE — TELEPHONE ENCOUNTER
Patient Call    Who are you speaking with? Patient    If it is not the patient, are they listed on an active communication consent form? Yes   What is the reason for this call? Patient wants to know about her dental cleaning an her having to take her blood thinner. Wants to know if she should stop taken the meds or what    Does this require a call back? Yes   If a call back is required, please list best call back number [phone]   If a call back is required, advise that a message will be forwarded to their care team and someone will return their call as soon as possible. Did you relay this information to the patient?  Yes

## 2023-08-25 ENCOUNTER — PATIENT MESSAGE (OUTPATIENT)
Dept: VASCULAR SURGERY | Facility: CLINIC | Age: 68
End: 2023-08-25

## 2023-08-25 ENCOUNTER — TELEPHONE (OUTPATIENT)
Dept: HEMATOLOGY ONCOLOGY | Facility: CLINIC | Age: 68
End: 2023-08-25

## 2023-08-25 NOTE — TELEPHONE ENCOUNTER
Patient Call    Who are you speaking with? self   If it is not the patient, are they listed on an active communication consent form? self   What is the reason for this call? Med needs to be halted for imaging possibly   Does this require a call back? yes   If a call back is required, please list best call back number [phone]   If a call back is required, advise that a message will be forwarded to their care team and someone will return their call as soon as possible. Did you relay this information to the patient?  yes

## 2023-08-25 NOTE — TELEPHONE ENCOUNTER
Per Kristina Willingham:   Vascular responded to her and says she does not need to stop Eliquis prior to her imaging. She does need to hold Eliquis on a Monday and have thrombosis panel drawn on a Friday with resuming Eliquis that evening prior to her follow-up with me the end of September. This should be completed after this study is done with vascular surgery. It also does not look like prior Auth was obtained yet. Please reach out to their team to see what the hold-up is on that.      RG

## 2023-08-25 NOTE — PATIENT COMMUNICATION
Called pt. All pt's questions were answered regarding the reflux study. She is aware to stay on the anticoag for this study.

## 2023-08-28 ENCOUNTER — HOSPITAL ENCOUNTER (OUTPATIENT)
Dept: VASCULAR ULTRASOUND | Facility: HOSPITAL | Age: 68
Discharge: HOME | End: 2023-08-28
Payer: MEDICARE

## 2023-08-28 DIAGNOSIS — I82.551 CHRONIC DEEP VEIN THROMBOSIS (DVT) OF RIGHT PERONEAL VEIN (HCC): ICD-10-CM

## 2023-08-28 DIAGNOSIS — I87.2 EDEMA OF BOTH LOWER EXTREMITIES DUE TO PERIPHERAL VENOUS INSUFFICIENCY: ICD-10-CM

## 2023-08-28 PROCEDURE — 93971 EXTREMITY STUDY: CPT

## 2023-08-28 PROCEDURE — 93970 EXTREMITY STUDY: CPT | Performed by: SURGERY

## 2023-08-31 ENCOUNTER — TELEPHONE (OUTPATIENT)
Dept: INTERNAL MEDICINE CLINIC | Facility: CLINIC | Age: 68
End: 2023-08-31

## 2023-08-31 NOTE — TELEPHONE ENCOUNTER
Pt called requesting a fill of a medication that is not on her current medication list. Informed pt that provider is currently not available and can address medication once they return. Pt inquired about another provider filling the medication. Informed pt she will have to be seen by other provider first before medication can be filled due to lack of information about medication in pt's chart. Pt became agitated an felt as if no one could assist her. Attempted to explain to pt that office is willing to assist her and her options of getting medication. Hubertus through explanation, pt stated "screw this" and hung up the phone.

## 2023-09-05 ENCOUNTER — HOSPITAL ENCOUNTER (OUTPATIENT)
Dept: RADIOLOGY | Facility: HOSPITAL | Age: 68
Discharge: HOME | End: 2023-09-05
Attending: ORTHOPAEDIC SURGERY
Payer: MEDICARE

## 2023-09-05 ENCOUNTER — OFFICE VISIT (OUTPATIENT)
Dept: OBGYN CLINIC | Facility: CLINIC | Age: 68
End: 2023-09-05
Payer: MEDICARE

## 2023-09-05 ENCOUNTER — TELEPHONE (OUTPATIENT)
Dept: GASTROENTEROLOGY | Facility: CLINIC | Age: 68
End: 2023-09-05

## 2023-09-05 ENCOUNTER — OFFICE VISIT (OUTPATIENT)
Dept: GASTROENTEROLOGY | Facility: CLINIC | Age: 68
End: 2023-09-05
Payer: MEDICARE

## 2023-09-05 VITALS
BODY MASS INDEX: 36.86 KG/M2 | HEIGHT: 63 IN | TEMPERATURE: 98.6 F | SYSTOLIC BLOOD PRESSURE: 134 MMHG | WEIGHT: 208 LBS | DIASTOLIC BLOOD PRESSURE: 72 MMHG

## 2023-09-05 VITALS
HEART RATE: 63 BPM | HEIGHT: 63 IN | BODY MASS INDEX: 36.86 KG/M2 | DIASTOLIC BLOOD PRESSURE: 77 MMHG | SYSTOLIC BLOOD PRESSURE: 141 MMHG | WEIGHT: 208 LBS

## 2023-09-05 DIAGNOSIS — M25.562 LEFT KNEE PAIN, UNSPECIFIED CHRONICITY: ICD-10-CM

## 2023-09-05 DIAGNOSIS — K21.9 GASTROESOPHAGEAL REFLUX DISEASE WITHOUT ESOPHAGITIS: Primary | ICD-10-CM

## 2023-09-05 DIAGNOSIS — E66.01 CLASS 2 SEVERE OBESITY WITH SERIOUS COMORBIDITY AND BODY MASS INDEX (BMI) OF 35.0 TO 35.9 IN ADULT, UNSPECIFIED OBESITY TYPE (HCC): ICD-10-CM

## 2023-09-05 DIAGNOSIS — M17.0 PRIMARY OSTEOARTHRITIS OF BOTH KNEES: ICD-10-CM

## 2023-09-05 DIAGNOSIS — Z86.010 HISTORY OF COLON POLYPS: ICD-10-CM

## 2023-09-05 DIAGNOSIS — M17.12 PRIMARY OSTEOARTHRITIS OF LEFT KNEE: Primary | ICD-10-CM

## 2023-09-05 DIAGNOSIS — Z86.010 HISTORY OF COLON POLYPS: Primary | ICD-10-CM

## 2023-09-05 PROBLEM — Z86.0100 HISTORY OF COLON POLYPS: Status: ACTIVE | Noted: 2023-09-05

## 2023-09-05 PROCEDURE — 99204 OFFICE O/P NEW MOD 45 MIN: CPT | Performed by: INTERNAL MEDICINE

## 2023-09-05 PROCEDURE — 73562 X-RAY EXAM OF KNEE 3: CPT

## 2023-09-05 PROCEDURE — 99204 OFFICE O/P NEW MOD 45 MIN: CPT | Performed by: ORTHOPAEDIC SURGERY

## 2023-09-05 RX ORDER — TIMOLOL MALEATE 5 MG/ML
SOLUTION/ DROPS OPHTHALMIC
COMMUNITY
Start: 2023-08-15 | End: 2023-09-05

## 2023-09-05 NOTE — TELEPHONE ENCOUNTER
Our mutual patient is scheduled for procedure: COLONOSCOPY/EGD    On: 11.21.23     With: Dr. Nicolette Lao is taking the following blood thinner:   ELIQUIS     Can this be stopped ___2___ days prior to the procedure?       Physician Approving clearance: ________________________

## 2023-09-05 NOTE — PROGRESS NOTES
[facility] - Outpatient Consultation  [facility] 79 y.o. female MRN: [id number]  Encounter: [id number]          ASSESSMENT AND PLAN:      1. Gastroesophageal reflux disease without esophagitis  2. History of colon polyps  3. Class 2 severe obesity with serious comorbidity and body mass index (BMI) of 35.0 to 35.9 in adult, unspecified obesity type [facility])    She has longstanding reflux symptoms. Previously on PPI therapy but recently switched to Pepcid 20 mg twice a day. She has not noticed any significant worsening of her symptoms after switching to Pepcid. She can continue Pepcid 20 mg twice a day. I reviewed diet and lifestyle precautions. This includes limiting coffee, soda, tomatoes, citrus, fatty and spicy foods. I recommend waiting 3 hours after dinner to lie down. I recommend eating small frequent meals as well as sleeping with head of bed elevated at night. Because of family history of Campo's esophagus, I will schedule her for EGD to assess for Campo's esophagus, esophagitis and hiatal hernia  Recommend high-fiber diet. Drink plenty of water. Fiber supplement-Benefiber  Given her personal history of colon polyps she is due for surveillance colonoscopy as well. EGD and colonoscopy will be scheduled symptoms in November 2023. She needs to stop Eliquis for 2 days prior to her procedure. She is going to reach out to health  through [employer] for weight loss    Follow-up in 3 months              ______________________________________________________________________    HPI: 72-year-old female with type 2 diabetes, hyperlipidemia, moderate persistent asthma and morbid obesity here for evaluation of reflux symptoms and colon cancer screening. She has history of longstanding GERD. She was previously on omeprazole. Her omeprazole was stopped 2 months ago and now on Pepcid 20 mg twice a day. No significant worsening of her symptoms.   Symptoms include intermittent regurgitation and postnasal drip. she takes Tums for breakthrough symptoms. She has no nausea, vomiting or dysphagia. She reports alternating diarrhea and constipation but mostly constipation. She had EGD and colonoscopy before 2019 for surveillance colonoscopy    She had personal history of colon polyps. She has DVT for which she is taking eliquis for 5 months -she follows with hematology      Her mother had Campo's esophagus            REVIEW OF SYSTEMS:    CONSTITUTIONAL: Denies any fever, chills, rigors, and weight loss. HEENT: No earache or tinnitus. Denies hearing loss or visual disturbances. CARDIOVASCULAR: No chest pain or palpitations. RESPIRATORY: Denies any cough, hemoptysis, shortness of breath or dyspnea on exertion. GASTROINTESTINAL: As noted in the History of Present Illness. GENITOURINARY: No problems with urination. Denies any hematuria or dysuria. NEUROLOGIC: No dizziness or vertigo, denies headaches. MUSCULOSKELETAL: Denies any muscle or joint pain. SKIN: Denies skin rashes or itching. ENDOCRINE: Denies excessive thirst. Denies intolerance to heat or cold. PSYCHOSOCIAL: Denies depression or anxiety. Denies any recent memory loss.        Historical Information   Past Medical History:   Diagnosis Date   • Allergic    • Arthritis    • Asthma    • Blindness of left eye    • COPD (chronic obstructive pulmonary disease) ([address])    • Diabetes mellitus (HCC)    • GERD (gastroesophageal reflux disease)    • Hypertension    • Obesity    • Visual impairment      Past Surgical History:   Procedure Laterality Date   •  SECTION  10/16/1990    Fibroids   • COLONOSCOPY     • EYE SURGERY      MULTIPLE   • HYSTERECTOMY     • REPLACEMENT TOTAL KNEE Right      Social History   Social History     Substance and Sexual Activity   Alcohol Use Yes   • Alcohol/week: 1.0 standard drink of alcohol   • Types: 1 Standard drinks or equivalent per week    Comment: on occasion, 1 drink with low sugar content     Social History     Substance and Sexual Activity   Drug Use Never     Social History     Tobacco Use   Smoking Status Never   Smokeless Tobacco Never     Family History   Problem Relation Age of Onset   • Dementia Mother    • Arthritis Mother         Late in life   • Hypertension Father         HBP   • Heart disease Father         HBP   • Diabetes Father         managed through diet   • Hearing loss Father         Industrial/construction work with no hearing protection   • Glaucoma Paternal Grandmother        Meds/Allergies       Current Outpatient Medications:   •  albuterol (PROVENTIL HFA,VENTOLIN HFA) 90 mcg/act inhaler  •  apixaban (ELIQUIS) 5 mg  •  azelastine (ASTELIN) 0.1 % nasal spray  •  bimatoprost (LUMIGAN) 0.01 % ophthalmic drops  •  diltiazem (TIAZAC) 180 MG 24 hr capsule  •  dulaglutide (Trulicity) 1.5 ZH/6.9ER injection  •  EPINEPHrine (EPIPEN) 0.3 mg/0.3 mL SOAJ  •  famotidine (PEPCID) 20 mg tablet  •  fluticasone (FLONASE) 50 mcg/act nasal spray  •  Fluticasone-Salmeterol (Advair) 500-50 mcg/dose inhaler  •  furosemide (LASIX) 20 mg tablet  •  ipratropium (ATROVENT) 0.03 % nasal spray  •  loratadine (CLARITIN) 10 mg tablet  •  metFORMIN (GLUCOPHAGE) 500 mg tablet  •  omeprazole (PriLOSEC) 40 MG capsule  •  potassium chloride (Klor-Con) 10 mEq tablet  •  rosuvastatin (CRESTOR) 10 MG tablet  •  Timolol Maleate PF (Timolol Maleate Ocudose) 0.5 % SOLN    Allergies   Allergen Reactions   • Penicillins Seizures   • Sulfa Antibiotics Rash           Objective     Blood pressure 134/72, temperature 98.6 °F (37 °C), temperature source Tympanic, height 5' 3" (1.6 m), weight 94.3 kg (208 lb). Body mass index is 36.85 kg/m².         PHYSICAL EXAM:      General Appearance:   Alert, cooperative, no distress   HEENT:   Normocephalic, atraumatic, anicteric.     Neck:  Supple, symmetrical, trachea midline   Lungs:   Clear to auscultation bilaterally; no rales, rhonchi or wheezing; respirations unlabored    Heart[de-identified]   Regular rate and rhythm; no murmur, rub, or gallop. Abdomen:   Soft, non-tender, non-distended; normal bowel sounds; no masses, no organomegaly    Genitalia:   Deferred    Rectal:   Deferred    Extremities:  No cyanosis, clubbing or edema    Pulses:  2+ and symmetric    Skin:  No jaundice, rashes, or lesions    Lymph nodes:  No palpable cervical lymphadenopathy        Lab Results:   No visits with results within 1 Day(s) from this visit.    Latest known visit with results is:   Office Visit on 05/31/2023   Component Date Value   • WBC 06/16/2023 8.36    • RBC 06/16/2023 5.02    • Hemoglobin 06/16/2023 14.1    • Hematocrit 06/16/2023 44.6    • MCV 06/16/2023 89    • MCH 06/16/2023 28.1    • MCHC 06/16/2023 31.6    • RDW 06/16/2023 12.0    • MPV 06/16/2023 11.2    • Platelets 64/05/2657 288    • nRBC 06/16/2023 0    • Neutrophils Relative 06/16/2023 66    • Immat GRANS % 06/16/2023 0    • Lymphocytes Relative 06/16/2023 23    • Monocytes Relative 06/16/2023 9    • Eosinophils Relative 06/16/2023 1    • Basophils Relative 06/16/2023 1    • Neutrophils Absolute 06/16/2023 5.57    • Immature Grans Absolute 06/16/2023 0.02    • Lymphocytes Absolute 06/16/2023 1.89    • Monocytes Absolute 06/16/2023 0.72    • Eosinophils Absolute 06/16/2023 0.11    • Basophils Absolute 06/16/2023 0.05    • Sodium 06/16/2023 141    • Potassium 06/16/2023 3.6    • Chloride 06/16/2023 103    • CO2 06/16/2023 30    • ANION GAP 06/16/2023 8    • BUN 06/16/2023 11    • Creatinine 06/16/2023 0.68    • Glucose, Fasting 06/16/2023 103 (H)    • Calcium 06/16/2023 9.1    • AST 06/16/2023 16    • ALT 06/16/2023 11    • Alkaline Phosphatase 06/16/2023 105 (H)    • Total Protein 06/16/2023 7.2    • Albumin 06/16/2023 4.3    • Total Bilirubin 06/16/2023 0.85    • eGFR 06/16/2023 92    • Cholesterol 06/16/2023 140    • Triglycerides 06/16/2023 145    • HDL, Direct 06/16/2023 42 (L)    • LDL Calculated 06/16/2023 69 • Non-HDL-Chol (CHOL-HDL) 06/16/2023 98    • Hemoglobin A1C 06/16/2023 6.3 (H)    • EAG 06/16/2023 134    • Creatinine, Ur 06/16/2023 24.1    • Albumin,U,Random 06/16/2023 <5.0    • Albumin Creat Ratio 06/16/2023 <21          Radiology Results:   VAS Lower extremity venous insufficiency duplex, Single leg w/ measurements    Result Date: 8/28/2023  Narrative:  [facility] REPORT [facility]INICAL: Indications: Patient presents with history of bilateral lower extremity edema left > right with recent DVT. Patient reports pain, edema and erythema. Patient has been wearing therapeutic compression stockings x 4 months. Operative History: No prior cardiovascular surgeries Risk Factors The patient has history of Obesity, HTN, Diabetes, DVT and COPD. FINDINGS:  Unilateral              AP (mm)  Right AA GSV Mid Thigh      3.9   Right                  AP (mm)  Left AASV - Mid Thigh      2.8   Left                   Thrombus  AP (mm)  Reflux  Valve Closure Time  GSV Inguinal                         7.4  Reflux                2.70  GSV Prox Thigh                       4.1  Reflux                3.00  CFV                                       Reflux                2.90  GSV Mid Thigh                        2.9  Reflux                2.70  GSV Dist Thigh                       3.1  Reflux                3. 10  FV Prox                                   Reflux                2.90  GSV Knee                             2.9  Reflux                3.70  GSV Prox Calf                        2.7  Reflux                3.60  PFV                                       Reflux                2.20  GSV Mid Calf                         2.0  Reflux                2.60  GSV Dist Calf                        2.1  Reflux                2.00  GSV Ankle                            1.9  Reflux                2.00  SSV Knee                             4.6  Reflux                1.90  Peroneal               Chronic SSV Mid Calf                         2.0  Reflux                3.20  SSV Ankle                            0.8  Reflux                1.60     CONCLUSION:  Impression:  LEFT LIMB: Deep venous incompetence is noted in the common femoral, proximal femoral and deep femoral veins. The great saphenous vein is incompetent. The great saphenous vein remains within the saphenous compartment in the thigh. The small saphenous vein is incompetent and communicates with the popliteal vein. There is no evidence of incompetent perforators in the thigh or calf. There is no evidence of deep vein thrombosis in the CFV, the proximal PFV, the femoral vein and the popliteal vein. Study performed with patient in steep Reverse Trendelenburg. Technically limited/ difficult secondary to patients pain tolerance. SIGNATURE: Electronically Signed by: Brisa Bacon MD [facility] on 2023-08-28 10:42:13 AM    Answers for HPI/ROS submitted by the patient on 9/4/2023  Chronicity: chronic  Onset: more than 1 year ago  Onset quality: undetermined  Frequency: daily  Episode duration: 2 Hours  Progression since onset: waxing and waning  Pain - numeric: 3/10  arthralgias: Yes  belching: Yes  constipation: Yes  diarrhea: Yes  frequency: Yes  nausea:  Yes

## 2023-09-05 NOTE — PATIENT INSTRUCTIONS
Scheduled date of EGD/colonoscopy (as of today):11.21.23  Physician performing EGD/colonoscopy:DR GUERRERO  Location of EGD/colonoscopy:[facility]  Desired bowel prep reviewed with patient:2 DAY GOLYTELY  Instructions reviewed with patient by:MEGA  Clearances:   MAKSIM

## 2023-09-05 NOTE — PROGRESS NOTES
Assessment:  1. Primary osteoarthritis of left knee        2. Left knee pain, unspecified chronicity  XR knee 3 vw left non injury          Plan:    • Patient has chronic left knee pain due to osteoarthritis   • Weightbearing as tolerated   • Will be ordering Left knee Euflexxa 3 series injections   • Physical therapy was ordered for left knee, range of motion exercises, VMO strengthening, hamstring and quadricep stretching and strengthening exercises, use modalities seen fit  • Advised patient to discuss with her hematologist if she is okay to start physical therapy due to having a left lower extremity DVT  • We will contact patient when Euflexxa injections have been authorized        The above stated was discussed in layman's terms and the patient expressed understanding. All questions were answered to the patient's satisfaction. Subjective:   Bharat Cortez is a 79 y.o. female who presents today evaluation for left knee pain. She was diagnosed left  lower extremity DVT in March 2023 and is on Eliquis 5 mg. Patient has history of left knee steroid injections steroid injections and viscosupplementation injections. She was previously treating by physician in [address] [place] [address] at her last Euflexxa injection was on July 2022. She states she is having pain in the medial aspect of the left knee she states that sharp pain that comes and goes. She does feel catching and unstable her knee. Pain is worse with walking, sitting standing, and stairs. She has been icing elevating daily. Patient is been taking Tylenol for pain relief. Patient has gone to physical therapy in the past for gait training due to having a fall and October 2022. Patient is a diabetic and states steroid injections increase her blood glucose level up to 300 for 3 days and then returns back to normal level. She has history of a right total knee arthroplasty and RAEANN.       Review of systems negative unless otherwise specified in HPI    Past Medical History:   Diagnosis Date   • Allergic    • Arthritis    • Asthma    • Blindness of left eye    • COPD (chronic obstructive pulmonary disease) ([address])    • Diabetes mellitus ([address])    • GERD (gastroesophageal reflux disease)    • Hypertension    • Obesity    • Visual impairment        Past Surgical History:   Procedure Laterality Date   •  SECTION  10/16/1990    Fibroids   • EYE SURGERY      MULTIPLE   • HYSTERECTOMY     • REPLACEMENT TOTAL KNEE Right        Family History   Problem Relation Age of Onset   • Dementia Mother    • Arthritis Mother         Late in life   • Hypertension Father         HBP   • Heart disease Father         HBP   • Diabetes Father         managed through diet   • Hearing loss Father         Industrial/construction work with no hearing protection   • Glaucoma Paternal Grandmother        Social History     Occupational History   • Not on file   Tobacco Use   • Smoking status: Never   • Smokeless tobacco: Never   Vaping Use   • Vaping Use: Never used   Substance and Sexual Activity   • Alcohol use:  Yes     Alcohol/week: 1.0 standard drink of alcohol     Types: 1 Standard drinks or equivalent per week     Comment: on occasion, 1 drink with low sugar content   • Drug use: Never   • Sexual activity: Not on file         Current Outpatient Medications:   •  albuterol (PROVENTIL HFA,VENTOLIN HFA) 90 mcg/act inhaler, Inhale 2 puffs every 6 (six) hours as needed for wheezing, Disp: 8.5 g, Rfl: 2  •  apixaban (ELIQUIS) 5 mg, Take 1 tablet (5 mg total) by mouth 2 (two) times a day, Disp: 180 tablet, Rfl: 0  •  azelastine (ASTELIN) 0.1 % nasal spray, 1-2 sprays into each nostril 2 (two) times a day, Disp: , Rfl:   •  bimatoprost (LUMIGAN) 0.01 % ophthalmic drops, , Disp: , Rfl:   •  diltiazem (TIAZAC) 180 MG 24 hr capsule, Take 180 mg by mouth 2 (two) times a day, Disp: , Rfl:   •  dulaglutide (Trulicity) 1.5 ME/0.3PB injection, Inject 0.5 mL under the skin, Disp: , Rfl:   • EPINEPHrine (EPIPEN) 0.3 mg/0.3 mL SOAJ, Inject 0.3 mg into a muscle, Disp: , Rfl:   •  famotidine (PEPCID) 20 mg tablet, Take 20 mg by mouth, Disp: , Rfl:   •  fluticasone (FLONASE) 50 mcg/act nasal spray, 1 spray into each nostril, Disp: , Rfl:   •  Fluticasone-Salmeterol (Advair) 500-50 mcg/dose inhaler, Inhale 1 puff every 12 (twelve) hours, Disp: 180 blister, Rfl: 3  •  furosemide (LASIX) 20 mg tablet, Take 20 mg by mouth 2 (two) times a day as needed, Disp: , Rfl:   •  ipratropium (ATROVENT) 0.03 % nasal spray, 2 sprays into each nostril every 12 (twelve) hours, Disp: , Rfl:   •  loratadine (CLARITIN) 10 mg tablet, Take 10 mg by mouth daily, Disp: , Rfl:   •  metFORMIN (GLUCOPHAGE) 500 mg tablet, 1 tablet each morning and 2 tablets each evening, Disp: 270 tablet, Rfl: 3  •  omeprazole (PriLOSEC) 40 MG capsule, Take 40 mg by mouth, Disp: , Rfl:   •  potassium chloride (Klor-Con) 10 mEq tablet, Take 20 mEq by mouth 2 (two) times a day, Disp: , Rfl:   •  rosuvastatin (CRESTOR) 10 MG tablet, Take 10 mg by mouth, Disp: , Rfl:   •  Timolol Maleate PF (Timolol Maleate Ocudose) 0.5 % SOLN, , Disp: , Rfl:     Allergies   Allergen Reactions   • Penicillins Seizures   • Sulfa Antibiotics Rash            Vitals:    09/05/23 0836   BP: 141/77   Pulse: 63       Objective:            Physical Exam  Physical Exam:      General Appearance:    Alert, cooperative, no distress, appears stated age   Head:    Normocephalic, without obvious abnormality, atraumatic   Eyes:    conjunctiva/corneas clear, both eyes         Nose:   Nares normal, septum midline, no drainage    Throat:   Lips normal; teeth and gums normal   Neck:    symmetrical, trachea midline, ;     thyroid:  no enlargement/   Back:     Symmetric, no curvature, ROM normal   Lungs:   No audible wheezing or labored breathing   Chest Wall:    No tenderness or deformity    Heart:    Regular rate and rhythm                         Pulses:   2+ and symmetric all extremities Skin:   Skin color, texture, turgor normal, no rashes or lesions   Neurologic:   normal strength, sensation and reflexes     throughout                       Ortho Exam  Left knee  Skin intact  No erythema   Full extension   Pain over anterior deep passive flexion   Stable to varus and valgus stress  TTP medial and lateral joint lines   Negative Patella grind  Neurovascularly Intact Distally     Diagnostics, reviewed and taken today if performed as documented: The attending physician has personally reviewed the pertinent films in PACS and interpretation is as follows: x-ray left knee demonstrates tricompartmental joint space narrowing, worse in the medial compartment and patellofemoral joint with osteophyte formation       Procedures, if performed today:    Procedures    None performed      Scribe Attestation    I,:  Kristyn Mccormack am acting as a scribe while in the presence of the attending physician.:       I,:  Re Crow MD personally performed the services described in this documentation    as scribed in my presence.:             Portions of the record may have been created with voice recognition software. Occasional wrong word or "sound a like" substitutions may have occurred due to the inherent limitations of voice recognition software. Read the chart carefully and recognize, using context, where substitutions have occurred.

## 2023-09-08 NOTE — TELEPHONE ENCOUNTER
Dot Fernández PA-C  [facility] 1 hour ago (7:59 AM)     RD  Ok, to hold anticoagulation for colonoscopy in November. She still needs hypercoagulable work up and hematology follow up.  -RD

## 2023-09-11 DIAGNOSIS — I82.452 ACUTE DEEP VEIN THROMBOSIS (DVT) OF LEFT PERONEAL VEIN (HCC): ICD-10-CM

## 2023-09-12 DIAGNOSIS — E11.65 TYPE 2 DIABETES MELLITUS WITH HYPERGLYCEMIA, WITHOUT LONG-TERM CURRENT USE OF INSULIN (HCC): Primary | ICD-10-CM

## 2023-09-12 DIAGNOSIS — I82.452 ACUTE DEEP VEIN THROMBOSIS (DVT) OF LEFT PERONEAL VEIN (HCC): Primary | ICD-10-CM

## 2023-09-19 ENCOUNTER — OFFICE VISIT (OUTPATIENT)
Dept: INTERNAL MEDICINE CLINIC | Facility: CLINIC | Age: 68
End: 2023-09-19
Payer: MEDICARE

## 2023-09-19 VITALS
OXYGEN SATURATION: 97 % | BODY MASS INDEX: 36.5 KG/M2 | WEIGHT: 206 LBS | HEART RATE: 78 BPM | TEMPERATURE: 98.2 F | HEIGHT: 63 IN | DIASTOLIC BLOOD PRESSURE: 76 MMHG | SYSTOLIC BLOOD PRESSURE: 118 MMHG

## 2023-09-19 DIAGNOSIS — J30.2 SEASONAL ALLERGIC RHINITIS DUE TO FUNGAL SPORES: ICD-10-CM

## 2023-09-19 DIAGNOSIS — I10 PRIMARY HYPERTENSION: ICD-10-CM

## 2023-09-19 DIAGNOSIS — G47.33 OSA (OBSTRUCTIVE SLEEP APNEA): ICD-10-CM

## 2023-09-19 DIAGNOSIS — E11.65 TYPE 2 DIABETES MELLITUS WITH HYPERGLYCEMIA, WITHOUT LONG-TERM CURRENT USE OF INSULIN (HCC): ICD-10-CM

## 2023-09-19 DIAGNOSIS — E66.2 OBESITY HYPOVENTILATION SYNDROME (HCC): ICD-10-CM

## 2023-09-19 DIAGNOSIS — Z13.820 SCREENING FOR OSTEOPOROSIS: ICD-10-CM

## 2023-09-19 DIAGNOSIS — M80.08XA AGE-RELATED OSTEOPOROSIS WITH CURRENT PATHOLOGICAL FRACTURE, VERTEBRA(E), INITIAL ENCOUNTER FOR FRACTURE (HCC): ICD-10-CM

## 2023-09-19 DIAGNOSIS — E78.5 DYSLIPIDEMIA: ICD-10-CM

## 2023-09-19 DIAGNOSIS — Z00.00 MEDICARE ANNUAL WELLNESS VISIT, SUBSEQUENT: Primary | ICD-10-CM

## 2023-09-19 DIAGNOSIS — I82.551 CHRONIC DEEP VEIN THROMBOSIS (DVT) OF RIGHT PERONEAL VEIN (HCC): ICD-10-CM

## 2023-09-19 PROBLEM — R23.3 EASY BRUISING: Status: RESOLVED | Noted: 2023-05-31 | Resolved: 2023-09-19

## 2023-09-19 PROCEDURE — G0439 PPPS, SUBSEQ VISIT: HCPCS | Performed by: INTERNAL MEDICINE

## 2023-09-19 RX ORDER — IPRATROPIUM BROMIDE 21 UG/1
2 SPRAY, METERED NASAL EVERY 12 HOURS
Qty: 30 ML | Refills: 2 | Status: SHIPPED | OUTPATIENT
Start: 2023-09-19

## 2023-09-19 RX ORDER — AZELASTINE 1 MG/ML
2 SPRAY, METERED NASAL 2 TIMES DAILY
Qty: 1 ML | Refills: 2 | Status: SHIPPED | OUTPATIENT
Start: 2023-09-19

## 2023-09-19 NOTE — ASSESSMENT & PLAN NOTE
· Remains on Eliquis  · Follows with hematology oncology-has pending thrombosis panel, however, has not collected given that Medicaid does not cover and has a follow-up with hematology oncology to discuss options  · Continues to wear compression stockings, except when sleeping

## 2023-09-19 NOTE — ASSESSMENT & PLAN NOTE
Lab Results   Component Value Date    HGBA1C 6.3 (H) 06/16/2023   · Review of prior A1c's, down trended 6.9-6.3 most recently. Pending repeat  · On metformin and dulaglutide-denies any side effects  · Recognizes his diet requires improvement.   Limited physical activity given left knee pain    · Repeat A1c  · Continue metformin and Trulicity  · Counseled on diet modifications  · Follow-up in 3 months

## 2023-09-19 NOTE — ASSESSMENT & PLAN NOTE
· Lifestyle modifications discussed with the patient-diet and exercise as tolerated  · On metformin for diabetes management

## 2023-09-19 NOTE — ASSESSMENT & PLAN NOTE
· And with rosuvastatin  · Prior lipid panel appropriate  · Repeat lipid panel  · Follow-up in 3 months

## 2023-09-19 NOTE — PROGRESS NOTES
Assessment and Plan:     Problem List Items Addressed This Visit        Endocrine    Type 2 diabetes mellitus with hyperglycemia, without long-term current use of insulin ([address])       Lab Results   Component Value Date    HGBA1C 6.3 (H) 06/16/2023   Review of prior A1c's, down trended 6.9-6.3 most recently. Pending repeat  On metformin and dulaglutide-denies any side effects  Recognizes his diet requires improvement. Limited physical activity given left knee pain    Repeat A1c  Continue metformin and Trulicity  Counseled on diet modifications  Follow-up in 3 months            Respiratory    DEN (obstructive sleep apnea)     Uses CPAP.          Seasonal allergic rhinitis due to fungal spores     Reports recent acute allergic flare  Uses azelastine and ipratropium nasal sprays    Nasal sprays refilled         Relevant Medications    azelastine (ASTELIN) 0.1 % nasal spray    ipratropium (ATROVENT) 0.03 % nasal spray       Cardiovascular and Mediastinum    Chronic deep vein thrombosis (DVT) of right peroneal vein (HCC)     Remains on Eliquis  Follows with hematology oncology-has pending thrombosis panel, however, has not collected given that Medicaid does not cover and has a follow-up with hematology oncology to discuss options  Continues to wear compression stockings, except when sleeping         Primary hypertension     Blood Pressure: 118/76, controlled  Continue home regimen of diltiazem 180 mg twice daily            Other    Dyslipidemia     And with rosuvastatin  Prior lipid panel appropriate  Repeat lipid panel  Follow-up in 3 months         Obesity hypoventilation syndrome (HCC)     Lifestyle modifications discussed with the patient-diet and exercise as tolerated  On metformin for diabetes management        Other Visit Diagnoses     Medicare annual wellness visit, subsequent    -  Primary    Relevant Orders    DXA bone density spine hip and pelvis    Screening for osteoporosis        Relevant Orders    DXA bone density spine hip and pelvis    Age-related osteoporosis with current pathological fracture, vertebra(e), initial encounter for fracture ([address])        Relevant Orders    DXA bone density spine hip and pelvis        BMI Counseling: Body mass index is 36.49 kg/m². The BMI is above normal. Nutrition recommendations include decreasing portion sizes, encouraging healthy choices of fruits and vegetables, consuming healthier snacks, limiting drinks that contain sugar and moderation in carbohydrate intake. Exercise recommendations include moderate physical activity 150 minutes/week and exercising 3-5 times per week. Rationale for BMI follow-up plan is due to patient being overweight or obese. Preventive health issues were discussed with patient, and age appropriate screening tests were ordered as noted in patient's After Visit Summary. Personalized health advice and appropriate referrals for health education or preventive services given if needed, as noted in patient's After Visit Summary. History of Present Illness:     Patient presents for a Medicare Wellness Visit    Alessandro Goodwin is  79 y.o. female with history of type II DM, HTN, HLD, DEN, OHS, and seasonal allergies, that arrives to clinic for Medicare annual wellness visit. Patient reports no acute symptoms. Patient's diet is reported as poor with feet and sugary snacks and high carb meals. Drinks diet soda, coffee, and water. Patient's exercise/physical activity is very limited. Currently only walking inside the house. Does hope to be more active outside after physical therapy sessions. Patient's alcholc intake is 2 drinks per month, no tobacco use, and no make use of recreational drugs. Patient is up-to-date on her mammogram done at the beginning of 2023 and was noted to be as per patient negative, and is due for DEXA scan. Reports eye exams and dental care up-to-date. Has an upcoming EGD and colonoscopy for evaluation of GERD.   And follows with hematology/oncology for history of unprovoked DVT and management of anticoagulation Eliquis. Patient Care Team:  Sarah Crespo MD as PCP - General (Internal Medicine)     Review of Systems:     Review of Systems   Constitutional: Negative for chills, diaphoresis, fatigue and fever. HENT: Positive for postnasal drip. Negative for ear pain and sore throat. Eyes: Negative for pain and visual disturbance. Respiratory: Negative for cough, chest tightness and shortness of breath. Cardiovascular: Negative for chest pain, palpitations and leg swelling. Gastrointestinal: Negative for abdominal pain, diarrhea, nausea and vomiting. Reflux   Genitourinary: Negative for difficulty urinating, dysuria, frequency and hematuria. Musculoskeletal: Positive for gait problem. Negative for arthralgias and back pain. Skin: Negative for color change and rash. Occasional erythema at B/L legs   Neurological: Negative for seizures, syncope, light-headedness and headaches. Psychiatric/Behavioral: Negative for sleep disturbance. The patient is not nervous/anxious. All other systems reviewed and are negative.        Problem List:     Patient Active Problem List   Diagnosis   • Chronic deep vein thrombosis (DVT) of right peroneal vein (HCC)   • Type 2 diabetes mellitus with hyperglycemia, without long-term current use of insulin (HCC)   • Class 2 severe obesity with serious comorbidity in [facility])   • Left retinal disorder   • Dyslipidemia   • Primary hypertension   • GERD (gastroesophageal reflux disease)   • Moderate persistent asthma without complication   • Obesity hypoventilation syndrome (HCC)   • DEN (obstructive sleep apnea)   • Osteoarthritis   • Seasonal allergic rhinitis due to fungal spores   • Edema of both lower extremities due to peripheral venous insufficiency   • History of colon polyps   • FH: total knee replacement      Past Medical and Surgical History:     Past Medical History: Diagnosis Date   • Allergic    • Arthritis    • Asthma    • Blindness of left eye    • COPD (chronic obstructive pulmonary disease) (HCC)    • Diabetes mellitus (HCC)    • GERD (gastroesophageal reflux disease)    • Hypertension    • Obesity    • Visual impairment      Past Surgical History:   Procedure Laterality Date   •  SECTION  10/16/1990    Fibroids   • COLONOSCOPY     • EYE SURGERY      MULTIPLE   • HYSTERECTOMY     • REPLACEMENT TOTAL KNEE Right       Family History:     Family History   Problem Relation Age of Onset   • Dementia Mother    • Arthritis Mother         Late in life   • Hypertension Father         HBP   • Heart disease Father         HBP   • Diabetes Father         managed through diet   • Hearing loss Father         Industrial/construction work with no hearing protection   • Glaucoma Paternal Grandmother       Social History:     Social History     Socioeconomic History   • Marital status: /Civil Union     Spouse name: None   • Number of children: None   • Years of education: None   • Highest education level: None   Occupational History   • None   Tobacco Use   • Smoking status: Never   • Smokeless tobacco: Never   Vaping Use   • Vaping Use: Never used   Substance and Sexual Activity   • Alcohol use: Yes     Alcohol/week: 1.0 standard drink of alcohol     Types: 1 Standard drinks or equivalent per week     Comment: on occasion, 1 drink with low sugar content   • Drug use: Never   • Sexual activity: None   Other Topics Concern   • None   Social History Narrative   • None     Social Determinants of Health     Financial Resource Strain: Low Risk  (2023)    Overall Financial Resource Strain (CARDIA)    • Difficulty of Paying Living Expenses: Not hard at all   Food Insecurity: Not on file   Transportation Needs: No Transportation Needs (2023)    PRAPARE - Transportation    • Lack of Transportation (Medical): No    • Lack of Transportation (Non-Medical):  No   Physical Activity: Not on file   Stress: Not on file   Social Connections: Not on file   Intimate Partner Violence: Not on file   Housing Stability: Not on file      Medications and Allergies:     Current Outpatient Medications   Medication Sig Dispense Refill   • albuterol (PROVENTIL HFA,VENTOLIN HFA) 90 mcg/act inhaler Inhale 2 puffs every 6 (six) hours as needed for wheezing 8.5 g 2   • apixaban (Eliquis) 2.5 mg Take 1 tablet (2.5 mg total) by mouth 2 (two) times a day 60 tablet 2   • azelastine (ASTELIN) 0.1 % nasal spray 2 sprays into each nostril 2 (two) times a day Use in each nostril as directed 1 mL 2   • bimatoprost (LUMIGAN) 0.01 % ophthalmic drops      • diltiazem (TIAZAC) 180 MG 24 hr capsule Take 180 mg by mouth 2 (two) times a day     • dulaglutide (Trulicity) 3 DB/7.1QS injection Inject 0.5 mL (3 mg total) under the skin every 7 days 6 mL 1   • EPINEPHrine (EPIPEN) 0.3 mg/0.3 mL SOAJ Inject 0.3 mg into a muscle     • famotidine (PEPCID) 20 mg tablet Take 20 mg by mouth     • fluticasone (FLONASE) 50 mcg/act nasal spray 1 spray into each nostril     • Fluticasone-Salmeterol (Advair) 500-50 mcg/dose inhaler Inhale 1 puff every 12 (twelve) hours 180 blister 3   • furosemide (LASIX) 20 mg tablet Take 20 mg by mouth 2 (two) times a day as needed     • ipratropium (ATROVENT) 0.03 % nasal spray 2 sprays into each nostril every 12 (twelve) hours 30 mL 2   • loratadine (CLARITIN) 10 mg tablet Take 10 mg by mouth daily     • metFORMIN (GLUCOPHAGE) 500 mg tablet 1 tablet each morning and 2 tablets each evening 270 tablet 3   • omeprazole (PriLOSEC) 40 MG capsule Take 40 mg by mouth     • polyethylene glycol (GOLYTELY) 4000 mL solution Take as directed by the office for colonoscopy.  4000 mL 0   • potassium chloride (Klor-Con) 10 mEq tablet Take 20 mEq by mouth 2 (two) times a day     • rosuvastatin (CRESTOR) 10 MG tablet Take 10 mg by mouth     • Timolol Maleate PF (Timolol Maleate Ocudose) 0.5 % SOLN        No current facility-administered medications for this visit. Allergies   Allergen Reactions   • Penicillins Seizures   • Sulfa Antibiotics Rash      Immunizations:     Immunization History   Administered Date(s) Administered   • COVID-19 PFIZER VACCINE 0.3 ML IM 03/12/2021, 03/12/2021, 04/02/2021, 04/02/2021   • Tdap 07/05/2023      Health Maintenance:         Topic Date Due   • Hepatitis C Screening  Never done   • Breast Cancer Screening: Mammogram  Never done   • Colorectal Cancer Screening  Never done         Topic Date Due   • Pneumococcal Vaccine: 65+ Years (1 - PCV) Never done   • COVID-19 Vaccine (5 - Pfizer series) 05/28/2021   • Influenza Vaccine (1) 09/01/2023      Medicare Screening Tests and Risk Assessments:     Ervin Roblero is here for her Subsequent Wellness visit. Health Risk Assessment:   Patient rates overall health as good. Patient feels that their physical health rating is slightly better. Patient is satisfied with their life. Eyesight was rated as same. Hearing was rated as same. Patient feels that their emotional and mental health rating is slightly better. Patients states they are sometimes angry. Patient states they are often unusually tired/fatigued. Pain experienced in the last 7 days has been some. Patient's pain rating has been 3/10. Patient states that she has experienced weight loss or gain in last 6 months. Fall Risk Screening: In the past year, patient has experienced: history of falling in past year      Urinary Incontinence Screening:   Patient has leaked urine accidently in the last six months. Home Safety:  Patient has trouble with stairs inside or outside of their home. Patient has working smoke alarms and has working carbon monoxide detector. Home safety hazards include: none. Nutrition:   Current diet is Unhealthy. should be more controlled, especially regarding diabetes and HBP    Medications:   Patient is not currently taking any over-the-counter supplements.  Patient is able to manage medications. Activities of Daily Living (ADLs)/Instrumental Activities of Daily Living (IADLs):   Walk and transfer into and out of bed and chair?: Yes  Dress and groom yourself?: Yes    Bathe or shower yourself?: Yes    Feed yourself? Yes  Do your laundry/housekeeping?: Yes  Manage your money, pay your bills and track your expenses?: Yes  Make your own meals?: Yes    Do your own shopping?: Yes    ADL comments: With knees and pain in left leg from DVT, when I wake up it is hard to walk. I walk with cane. Eventually I can walk better, but not 100%. Trouble with stairs/steps. D[employer] Medical Equipment Supp[employer]  cane    Previous Hospitalizations:   Any hospitalizations or ED visits within the last 12 months?: No      Hospitalization Comments: Not sure - went to ED for scan of Left leg DVT but not hospitalized. Went to Urgent care for 2 falls I had in Sept/Oct. 2022    Advance Care Planning:   Living will: Yes    Durable POA for healthcare: Yes    Advanced directive: Yes      PREVENTIVE SCREENINGS      Cardiovascular Screening:    General: Screening Current      Diabetes Screening:     General: Screening Not Indicated and History Diabetes      Cervical Cancer Screening:    General: Screening Not Indicated      Osteoporosis Screening:    General: Screening Not Indicated and History Osteoporosis      Lung Cancer Screening:     General: Screening Not Indicated    Screening, Brief Intervention, and Referral to Treatment (SBIRT)    Screening  Typical number of drinks in a day: 0  Typical number of drinks in a week: 2  Interpretation: Low risk drinking behavior.     AUDIT-C Screenin) How often did you have a drink containing alcohol in the past year? monthly or less  2) How many drinks did you have on a typical day when you were drinking in the past year? 0  3) How often did you have 6 or more drinks on one occasion in the past year? never    AUDIT-C Score: 1  Interpretation: Score 0-2 (female): Negative screen for alcohol misuse    Single Item Drug Screening:  How often have you used an illegal drug (including marijuana) or a prescription medication for non-medical reasons in the past year? never    Single Item Drug Screen Score: 0  Interpretation: Negative screen for possible drug use disorder    No results found. Physical Exam:     /76   Pulse 78   Temp 98.2 °F (36.8 °C)   Ht 5' 3" (1.6 m)   Wt 93.4 kg (206 lb)   SpO2 97%   BMI 36.49 kg/m²     Physical Exam  Vitals reviewed. Constitutional:       Appearance: Normal appearance. She is obese. She is not ill-appearing or diaphoretic. HENT:      Head: Normocephalic and atraumatic. Mouth/Throat:      Mouth: Mucous membranes are moist.      Pharynx: Oropharynx is clear. Eyes:      General: No scleral icterus. Conjunctiva/sclera: Conjunctivae normal.      Comments: R eye - reacts to light. L eye - no reaction, chronic   Cardiovascular:      Rate and Rhythm: Normal rate and regular rhythm. Pulses: Normal pulses. Heart sounds: Normal heart sounds. No murmur heard. No gallop. Pulmonary:      Effort: Pulmonary effort is normal. No respiratory distress. Breath sounds: Normal breath sounds. No wheezing or rales. Abdominal:      General: Bowel sounds are normal. There is no distension. Palpations: Abdomen is soft. There is no mass. Tenderness: There is no abdominal tenderness. Musculoskeletal:         General: Normal range of motion. Cervical back: Normal range of motion and neck supple. Right lower leg: No edema. Left lower leg: No edema. Comments: Uses cane   Skin:     General: Skin is warm and dry. Capillary Refill: Capillary refill takes less than 2 seconds. Coloration: Skin is not jaundiced. Findings: Bruising present. Neurological:      General: No focal deficit present. Mental Status: She is alert and oriented to person, place, and time.  Mental status is at baseline. Sensory: No sensory deficit. Motor: Weakness (L leg - flexion, extention) present.    Psychiatric:         Mood and Affect: Mood normal.         Behavior: Behavior normal.          Glen Meyer MD

## 2023-09-19 NOTE — PROGRESS NOTES
Assessment and Plan:    1. Medicare annual wellness visit, subsequent  -     DXA bone density spine hip and pelvis; Future; Expected date: 09/19/2023    2. Type 2 diabetes mellitus with hyperglycemia, without long-term current use of insulin [facility])  Assessment & Plan:    Lab Results   Component Value Date    HGBA1C 6.3 (H) 06/16/2023   Review of prior A1c's, down trended 6.9-6.3 most recently. Pending repeat  On metformin and dulaglutide-denies any side effects  Recognizes his diet requires improvement. Limited physical activity given left knee pain    Repeat A1c  Continue metformin and Trulicity  Counseled on diet modifications  Follow-up in 3 months      3. Primary hypertension  Assessment & Plan:  Blood Pressure: 118/76, controlled  Continue home regimen of diltiazem 180 mg twice daily      4. Dyslipidemia  Assessment & Plan:  And with rosuvastatin  Prior lipid panel appropriate  Repeat lipid panel  Follow-up in 3 months      5. DEN (obstructive sleep apnea)  Assessment & Plan:  Uses CPAP. 6. Obesity hypoventilation syndrome (HCC)  Assessment & Plan:  Lifestyle modifications discussed with the patient-diet and exercise as tolerated  On metformin for diabetes management      7. Seasonal allergic rhinitis due to fungal spores  Assessment & Plan:  Reports recent acute allergic flare  Uses azelastine and ipratropium nasal sprays    Nasal sprays refilled    Orders:  -     azelastine (ASTELIN) 0.1 % nasal spray; 2 sprays into each nostril 2 (two) times a day Use in each nostril as directed  -     ipratropium (ATROVENT) 0.03 % nasal spray; 2 sprays into each nostril every 12 (twelve) hours    8. Screening for osteoporosis  -     DXA bone density spine hip and pelvis; Future; Expected date: 09/19/2023    9. Age-related osteoporosis with current pathological fracture, vertebra(e), initial encounter for fracture ([address])  -     DXA bone density spine hip and pelvis; Future; Expected date: 09/19/2023    10.  Chronic deep vein thrombosis (DVT) of right peroneal vein (HCC)  Assessment & Plan:  Remains on Eliquis  Follows with hematology oncology-has pending thrombosis panel, however, has not collected given that Medicaid does not cover and has a follow-up with hematology oncology to discuss options  Continues to wear compression stockings, except when sleeping           Nutrition Assessment and Intervention:     Reviewed food recall journal      Physical Activity Assessment and Intervention:    Activity journal reviewed      Tobacco and Toxic Substance Assessment and Intervention:     Alcohol and drug use screening performed      Therapeutic Lifestyle Change Visit:     One-on-one comprehensive counseling, coaching, and health behavior change visit completed         HPI:     Jamil Campbell is  79 y.o. female with history of type II DM, HTN, HLD, DEN, OHS, and seasonal allergies, that arrives to clinic for Medicare annual wellness visit. Patient reports no acute symptoms. Patient's diet is reported as poor with feet and sugary snacks and high carb meals. Drinks diet soda, coffee, and water. Patient's exercise/physical activity is very limited. Currently only walking inside the house. Does hope to be more active outside after physical therapy sessions. Patient's alcholc intake is 2 drinks per month, no tobacco use, and no make use of recreational drugs. Patient is up-to-date on her mammogram done at the beginning of 2023 and was noted to be as per patient negative, and is due for DEXA scan. Reports eye exams and dental care up-to-date. Has an upcoming EGD and colonoscopy for evaluation of GERD. And follows with hematology/oncology for history of unprovoked DVT and management of anticoagulation Eliquis. Patient has no other complaints at this time. Subjective:    Review of Systems   Constitutional: Negative for chills and fever. HENT: Negative for ear pain and sore throat.     Eyes: Negative for pain and visual disturbance. Respiratory: Negative for cough and shortness of breath. Cardiovascular: Negative for chest pain and palpitations. Gastrointestinal: Negative for abdominal pain and vomiting. Genitourinary: Negative for dysuria and hematuria. Musculoskeletal: Negative for arthralgias and back pain. Skin: Negative for color change and rash. Neurological: Negative for seizures and syncope. All other systems reviewed and are negative.        Patient Active Problem List   Diagnosis   • Chronic deep vein thrombosis (DVT) of right peroneal vein ([facility])   • Type 2 diabetes mellitus with hyperglycemia, without long-term current use of insulin ([facility])   • Class 2 severe obesity with serious comorbidity in adult [facility])   • Left retinal disorder   • Dyslipidemia   • Primary hypertension   • GERD (gastroesophageal reflux disease)   • Moderate persistent asthma without complication   • Obesity hypoventilation syndrome ([facility])   • DEN (obstructive sleep apnea)   • Osteoarthritis   • Seasonal allergic rhinitis due to fungal spores   • Edema of both lower extremities due to peripheral venous insufficiency   • History of colon polyps   • FH: total knee replacement        Current Outpatient Medications   Medication Sig Dispense Refill   • albuterol (PROVENTIL HFA,VENTOLIN HFA) 90 mcg/act inhaler Inhale 2 puffs every 6 (six) hours as needed for wheezing 8.5 g 2   • apixaban (Eliquis) 2.5 mg Take 1 tablet (2.5 mg total) by mouth 2 (two) times a day 60 tablet 2   • azelastine (ASTELIN) 0.1 % nasal spray 2 sprays into each nostril 2 (two) times a day Use in each nostril as directed 1 mL 2   • bimatoprost (LUMIGAN) 0.01 % ophthalmic drops      • diltiazem (TIAZAC) 180 MG 24 hr capsule Take 180 mg by mouth 2 (two) times a day     • dulaglutide (Trulicity) 3 JH/6.8OF injection Inject 0.5 mL (3 mg total) under the skin every 7 days 6 mL 1   • EPINEPHrine (EPIPEN) 0.3 mg/0.3 mL SOAJ Inject 0.3 mg into a muscle     • famotidine (PEPCID) 20 mg tablet Take 20 mg by mouth     • fluticasone (FLONASE) 50 mcg/act nasal spray 1 spray into each nostril     • Fluticasone-Salmeterol (Advair) 500-50 mcg/dose inhaler Inhale 1 puff every 12 (twelve) hours 180 blister 3   • furosemide (LASIX) 20 mg tablet Take 20 mg by mouth 2 (two) times a day as needed     • ipratropium (ATROVENT) 0.03 % nasal spray 2 sprays into each nostril every 12 (twelve) hours 30 mL 2   • loratadine (CLARITIN) 10 mg tablet Take 10 mg by mouth daily     • metFORMIN (GLUCOPHAGE) 500 mg tablet 1 tablet each morning and 2 tablets each evening 270 tablet 3   • omeprazole (PriLOSEC) 40 MG capsule Take 40 mg by mouth     • polyethylene glycol (GOLYTELY) 4000 mL solution Take as directed by the office for colonoscopy. 4000 mL 0   • potassium chloride (Klor-Con) 10 mEq tablet Take 20 mEq by mouth 2 (two) times a day     • rosuvastatin (CRESTOR) 10 MG tablet Take 10 mg by mouth     • Timolol Maleate PF (Timolol Maleate Ocudose) 0.5 % SOLN        No current facility-administered medications for this visit. Allergies   Allergen Reactions   • Penicillins Seizures   • Sulfa Antibiotics Rash        The following portions of the patient's history were reviewed and updated as appropriate: allergies, current medications, past family history, past medical history, past social history, past surgical history and problem list.             Objective:    /76   Pulse 78   Temp 98.2 °F (36.8 °C)   Ht 5' 3" (1.6 m)   Wt 93.4 kg (206 lb)   SpO2 97%   BMI 36.49 kg/m²      Body mass index is 36.49 kg/m². Physical Exam  Vitals and nursing note reviewed. Constitutional:       Appearance: Normal appearance. She is obese. She is not ill-appearing or diaphoretic. HENT:      Head: Normocephalic and atraumatic. Mouth/Throat:      Mouth: Mucous membranes are moist.      Pharynx: Oropharynx is clear. Eyes:      General: No scleral icterus.      Conjunctiva/sclera: Conjunctivae normal. Cardiovascular:      Rate and Rhythm: Normal rate and regular rhythm. Pulses: Normal pulses. Heart sounds: Normal heart sounds. No murmur heard. No gallop. Pulmonary:      Effort: Pulmonary effort is normal. No respiratory distress. Breath sounds: Normal breath sounds. No wheezing or rales. Abdominal:      General: Bowel sounds are normal. There is no distension. Palpations: Abdomen is soft. There is no mass. Tenderness: There is no abdominal tenderness. Musculoskeletal:         General: Normal range of motion. Cervical back: Normal range of motion and neck supple. Right lower leg: No edema. Left lower leg: No edema. Skin:     General: Skin is warm and dry. Capillary Refill: Capillary refill takes less than 2 seconds. Coloration: Skin is not jaundiced. Findings: Bruising present. Neurological:      General: No focal deficit present. Mental Status: She is alert and oriented to person, place, and time. Mental status is at baseline. Sensory: No sensory deficit.    Psychiatric:         Mood and Affect: Mood normal.         Behavior: Behavior normal.

## 2023-09-19 NOTE — ASSESSMENT & PLAN NOTE
· Reports recent acute allergic flare  · Uses azelastine and ipratropium nasal sprays    · Nasal sprays refilled

## 2023-09-20 ENCOUNTER — APPOINTMENT (OUTPATIENT)
Dept: LAB | Facility: CLINIC | Age: 68
End: 2023-09-20
Payer: MEDICARE

## 2023-09-20 DIAGNOSIS — N30.01 ACUTE CYSTITIS WITH HEMATURIA: Primary | ICD-10-CM

## 2023-09-20 LAB
BILIRUB UR QL STRIP: NEGATIVE
CLARITY UR: CLEAR
COLOR UR: YELLOW
GLUCOSE UR STRIP-MCNC: NEGATIVE MG/DL
HGB UR QL STRIP.AUTO: NEGATIVE
KETONES UR STRIP-MCNC: NEGATIVE MG/DL
LEUKOCYTE ESTERASE UR QL STRIP: NEGATIVE
NITRITE UR QL STRIP: NEGATIVE
PH UR STRIP.AUTO: 5.5 [PH]
PROT UR STRIP-MCNC: NEGATIVE MG/DL
SP GR UR STRIP.AUTO: 1.02 (ref 1–1.03)
UROBILINOGEN UR STRIP-ACNC: <2 MG/DL

## 2023-09-20 PROCEDURE — 81003 URINALYSIS AUTO W/O SCOPE: CPT | Performed by: INTERNAL MEDICINE

## 2023-09-20 RX ORDER — NITROFURANTOIN 25; 75 MG/1; MG/1
100 CAPSULE ORAL 2 TIMES DAILY
Qty: 10 CAPSULE | Refills: 0 | Status: SHIPPED | OUTPATIENT
Start: 2023-09-20 | End: 2023-09-25

## 2023-09-26 ENCOUNTER — TELEPHONE (OUTPATIENT)
Dept: HEMATOLOGY ONCOLOGY | Facility: CLINIC | Age: 68
End: 2023-09-26

## 2023-09-26 DIAGNOSIS — I82.452 ACUTE DEEP VEIN THROMBOSIS (DVT) OF LEFT PERONEAL VEIN (HCC): Primary | ICD-10-CM

## 2023-09-26 NOTE — TELEPHONE ENCOUNTER
Called to speak with patient regarding her appointment for today 9/26 with Susan Worthington. Patient is aware that she did not have labs collected prior to appointment (thrombosis panel) we spoke yesterday regarding that matter. Called to ask patient if she had anything to speak with Shaan Rodriguez about, if not we would be cancelling her appointment. Patient mentioned that she wanted to know if redness in her leg (where DVT was found) was normal especially since switching to the 2.5 mg dose of elliqus instead of the 5 mg that she was taking originally. Mentioned to patient that I would discuss with Susan Worthington and that I would call her back with a reply. Called the molecular testing prior authorization team about the out of pocket cost for the thrombosis panel as patient requested and confirmed the 306.36 price. I was also able to print out the note on the referral with the same pricing mentioned. Also the person I spoke with from the prior authorization team mentioned that the 306.36 is a standard price for the out of pocket cost on the thrombosis panel.

## 2023-09-26 NOTE — TELEPHONE ENCOUNTER
Called and LVM for patient stating that I was able to confirm the 306.36$ with the prior authorization team for the thrombosis panel. Apparently that is the standard out of pocket cost for the thrombosis panel. Mentioned I also was able to print/find documentation as proof of this being the cost and that I could e-mail it to patient if she'd like however I did upload it in her chart. Also mentioned that per the conversation I had with Kelly Gordon she wants patient to have a STAT doppler due to patient mentioning her redness at her DVT site. Mentioned that Deepak Escamilla had placed the order and she should be on the look out for a phone call from central scheduling to have that doppler scheduled. Also mentioned that central scheduling can schedule for patient to have her thrombosis panel collected at any [facility] lab. Mentioned if patient has any questions and or concerns to please feel free to reach back out to the office as I did provide patient with a lot of information at one time.

## 2023-09-26 NOTE — TELEPHONE ENCOUNTER
Patient Call    Who are you speaking with? self   If it is not the patient, are they listed on an active communication consent form? self   What is the reason for this call? Thrombosis panel not done yet, still come today? Price? Does this require a call back? yes   If a call back is required, please list best call back number [phone]   If a call back is required, advise that a message will be forwarded to their care team and someone will return their call as soon as possible. Did you relay this information to the patient?  yes

## 2023-09-27 ENCOUNTER — APPOINTMENT (OUTPATIENT)
Dept: LAB | Facility: HOSPITAL | Age: 68
End: 2023-09-27
Payer: MEDICARE

## 2023-09-27 ENCOUNTER — HOSPITAL ENCOUNTER (OUTPATIENT)
Dept: VASCULAR ULTRASOUND | Facility: HOSPITAL | Age: 68
Discharge: HOME | End: 2023-09-27
Payer: MEDICARE

## 2023-09-27 DIAGNOSIS — I82.452 ACUTE DEEP VEIN THROMBOSIS (DVT) OF LEFT PERONEAL VEIN (HCC): ICD-10-CM

## 2023-09-27 PROCEDURE — 93971 EXTREMITY STUDY: CPT

## 2023-09-27 PROCEDURE — 93971 EXTREMITY STUDY: CPT | Performed by: SURGERY

## 2023-09-28 ENCOUNTER — APPOINTMENT (OUTPATIENT)
Dept: LAB | Facility: CLINIC | Age: 68
End: 2023-09-28

## 2023-09-28 ENCOUNTER — TELEPHONE (OUTPATIENT)
Dept: HEMATOLOGY ONCOLOGY | Facility: CLINIC | Age: 68
End: 2023-09-28

## 2023-09-28 DIAGNOSIS — I82.452 ACUTE DEEP VEIN THROMBOSIS (DVT) OF LEFT PERONEAL VEIN (HCC): ICD-10-CM

## 2023-09-28 NOTE — TELEPHONE ENCOUNTER
Patient Call    Who are you speaking with? Self at lab   If it is not the patient, are they listed on an active communication consent form? yes   What is the reason for this call? Calling about price estimate for genetics test   Does this require a call back? no   If a call back is required, please list best call back number Will call pre-encounter since they quoted a price previously   If a call back is required, advise that a message will be forwarded to their care team and someone will return their call as soon as possible. Did you relay this information to the patient?  yes

## 2023-10-02 ENCOUNTER — OFFICE VISIT (OUTPATIENT)
Dept: VASCULAR SURGERY | Facility: CLINIC | Age: 68
End: 2023-10-02
Payer: MEDICARE

## 2023-10-02 VITALS
HEIGHT: 63 IN | WEIGHT: 208.6 LBS | DIASTOLIC BLOOD PRESSURE: 60 MMHG | OXYGEN SATURATION: 97 % | HEART RATE: 65 BPM | SYSTOLIC BLOOD PRESSURE: 118 MMHG | BODY MASS INDEX: 36.96 KG/M2

## 2023-10-02 DIAGNOSIS — I87.2 EDEMA OF BOTH LOWER EXTREMITIES DUE TO PERIPHERAL VENOUS INSUFFICIENCY: ICD-10-CM

## 2023-10-02 DIAGNOSIS — I82.551 CHRONIC DEEP VEIN THROMBOSIS (DVT) OF RIGHT PERONEAL VEIN (HCC): Primary | ICD-10-CM

## 2023-10-02 PROCEDURE — 99213 OFFICE O/P EST LOW 20 MIN: CPT | Performed by: PHYSICIAN ASSISTANT

## 2023-10-02 NOTE — PROGRESS NOTES
Assessment/Plan:    Chronic deep vein thrombosis (DVT) of right peroneal vein (HCC)  Venous insufficiency  -     Compression Stocking    -Acute, occlusive DVT in the LEFT peroneal vein 3/2023 - no obvious etiology  -Chronic mild to moderate B LE edema    -10/2: Continued L LE edema, but much better controlled    Imaging  -LEVDR R LE 8/28/23: Deep venous incompetence in the CFV, CFV and deep femoral veins. GSV reflux throughout the thigh at 3.1 seconds and calf 3.6 seconds    -R LE venous duplex 9/27/2023: Chronic DVT 1/2 peroneal veins   -R LE venous du 7/12/23: Chronic occlusive DVT 1/2 peroneal veins in the calf.   -R LE venous du 5/3/23: R subacute DVT in 1/2 peroneal veins in the calf. Triphasic. -LE venous du 3/30/23: (report only [facility]): Occlusive thrombus in the L peroneal vein which does not extent into the popliteal vein and thrombus in the lesser saphenous vein in the calf. -Mild flat redness; mild to 1+ LE edema. No evidence of infection. No significant chronic changes. Calves nontender. Plan:  -Overall stable 6 months after (?unprovoked) calf DVT  -No prior DVTs; at time of DVT, she was moving, ?perhaps she sustained an injury that she was not aware of  -Seen by hematology with plan for thrombosis panel which has not yet been drawn (insurance issues)  -Completed course of full anticoagulation and now on DVT prophylaxis with apixaban 2.5 twice daily  -? Apixaban 2.5 twice daily versus aspirin 81  -Recommendation regarding long-term anticoagulation pending thrombosis panel  -We discussed the importance of compression stockings to help with chronic venous insufficiency and history of DVT  -Follow-up in 3 months or sooner if needed        Venous insufficiency  -We reviewed her reflux study which does show deep and superficial incompetence  -Although she has significant GSV reflux, the vein diameter may not quite meet criteria for surgery  -Continue with standard medical compression 20 to 30 mmHg  -If she continues to have edema, she may benefit from additional therapy ? Lymphedema pumps +/-surgical intervention  -Compression, elevation, regular exercise, skin care (moisturizer) and monitoring  -Could consider lymphedema pumps to help with circulation (measured in the office today)  -Patient education regarding venous insufficiency was provided. -It was planned for her to see vascular surgeon today to further review reflux study with her but she asked to see me instead      Subjective:      Patient ID: Edwin Rodriguez is a 79 y.o. female. Patient presents for review of LEVDR done 8/28/2023. She c/o swelling of BLE, L>R. She wears compression and states it helps with symptoms. She is taking Eliquis and Rosuvastatin. HPI   Ms. Edwin Rodriguez 39TD F R TKR, COPD, hypertension, DM developed left calf DVT 3/30/2023. Patient reports that she has chronic swelling in both legs particularly on the left side but developed redness to the calf for about 2 weeks for which she went to [facility] for evaluation. Venous duplex study performed which showed acute, occlusive DVT in 1 of 2 peroneal veins. She was placed on full anticoagulation with apixaban. She has been moving and carrying boxes and standing on her feet for prolonged periods of time. However, she denies any injury or extended travel. She is no prior history of DVT. No history of malignancy or rheumatologic disorders. She has no large, bulky varicose veins. She is tolerating apixaban without extensive bruising or obvious bleeding. After DVT, she developed increased leg/calf pain, cramping edema and redness. 20 to 30 mm medical compression stockings were recommended. 10/2/2023: Patient returns to vascular surgery for follow-up after left calf DVT in March 2023. Patient reports that she is wearing compression stockings. Overall, acute/chronic leg swelling has been stable. She is quite satisfied with the appearance of the leg.   However, there is some flat erythema and concern that she may be at risk for future infection. She is advised to closely monitor her condition and strictly adhere to compressive therapy. In the past, she reports that she took furosemide which did help with leg swelling, but in the absence of cardiac etiology, would recommend we avoid diuretics in favor of compressive therapy for treatment of venous insufficiency. She completed a course of full anticoagulation with apixaban 5 twice daily. Currently on apixaban 2.5 twice daily for DVT prophylaxis. She has had some bruising and scratches on anticoagulation. No major bleeding. If she has not yet obtained hypercoagulable panel requested by hematology. In general, she is trying to increase her activity. She has baseline problems with balance and walking so she is going to start physical therapy tomorrow. I have asked her to talk to them also about her leg edema. She is working on improving her diabetic diet adherence. She is planning to travel. She will be flying to [place] next week for her stepdaughter who is having a baby. We discussed travel precautions. She should wear compression stockings and continue her anticoagulation without interruption. The following portions of the patient's history were reviewed and updated as appropriate: allergies, current medications, past family history, past medical history, past social history, past surgical history and problem list.    Review of Systems   Constitutional: Negative. HENT: Negative. Eyes: Negative. Respiratory: Negative. Cardiovascular: Negative. Gastrointestinal: Negative. Endocrine: Negative. Genitourinary: Negative. Musculoskeletal: Positive for arthralgias. Skin: Negative. Allergic/Immunologic: Positive for environmental allergies. Neurological: Negative. Hematological: Negative. Psychiatric/Behavioral: Negative.           Objective:  /60 (BP Location: Left arm, Patient Position: Sitting, Cuff Size: Standard)   Pulse 65   Ht 5' 3" (1.6 m)   Wt 94.6 kg (208 lb 9.6 oz)   SpO2 97%   BMI 36.95 kg/m²     Mild B LE edema L>R     Physical Exam  Vitals and nursing note reviewed. Constitutional:       Appearance: She is well-developed. She is obese. HENT:      Head: Normocephalic and atraumatic. Eyes:      Pupils: Pupils are equal, round, and reactive to light. Neck:      Vascular: No JVD. Cardiovascular:      Rate and Rhythm: Normal rate and regular rhythm. Pulses:           Radial pulses are 2+ on the right side and 2+ on the left side. Dorsalis pedis pulses are 2+ on the right side and 2+ on the left side. Heart sounds: Normal heart sounds, S1 normal and S2 normal. No murmur heard. No friction rub. No gallop. Pulmonary:      Effort: Pulmonary effort is normal. No accessory muscle usage or respiratory distress. Breath sounds: Normal breath sounds. No wheezing or rales. Abdominal:      General: Bowel sounds are normal. There is no distension. Palpations: Abdomen is soft. Tenderness: There is no abdominal tenderness. Musculoskeletal:         General: No deformity. Normal range of motion. Right lower leg: Edema present. Left lower le+ Edema present. Skin:     General: Skin is warm and dry. Findings: No lesion or rash. Nails: There is no clubbing. Neurological:      Mental Status: She is alert and oriented to person, place, and time. Comments: Grossly normal    Psychiatric:         Behavior: [facility]  CLINICAL:  Indications:  Patient presents with history of bilateral lower extremity edema left > right  with recent DVT. Patient reports pain, edema and erythema. Patient has been  wearing therapeutic compression stockings x 4 months.   Operative History:  No prior cardiovascular surgeries  Risk Factors  The patient has history of Obesity, HTN, Diabetes, DVT and COPD. FINDINGS:     Unilateral              AP (mm)    Right AA GSV Mid Thigh      3.9       Right                  AP (mm)    Left AASV - Mid Thigh      2.8       Left                   Thrombus  AP (mm)  Reflux  Valve Closure Time    GSV Inguinal                         7.4  Reflux                2.70    GSV Prox Thigh                       4.1  Reflux                3.00    CFV                                       Reflux                2.90    GSV Mid Thigh                        2.9  Reflux                2.70    GSV Dist Thigh                       3.1  Reflux                3. 10    FV Prox                                   Reflux                2.90    GSV Knee                             2.9  Reflux                3.70    GSV Prox Calf                        2.7  Reflux                3.60    PFV                                       Reflux                2.20    GSV Mid Calf                         2.0  Reflux                2.60    GSV Dist Calf                        2.1  Reflux                2.00    GSV Ankle                            1.9  Reflux                2.00    SSV Knee                             4.6  Reflux                1.90    Peroneal               Chronic                                          SSV Mid Calf                         2.0  Reflux                3.20    SSV Ankle                            0.8  Reflux                1.60             CONCLUSION:     Impression:     LEFT LIMB:  Deep venous incompetence is noted in the common femoral, proximal femoral and  deep femoral veins. The great saphenous vein is incompetent. The great saphenous vein remains within the saphenous compartment in the thigh. The small saphenous vein is incompetent and communicates with the popliteal  vein. There is no evidence of incompetent perforators in the thigh or calf.   There is no evidence of deep vein thrombosis in the CFV, the proximal PFV, the  femoral vein and the popliteal vein. I have reviewed and made appropriate changes to the review of systems input by the medical assistant.     Vitals:    10/02/23 1332   BP: 118/60   BP Location: Left arm   Patient Position: Sitting   Cuff Size: Standard   Pulse: 65   SpO2: 97%   Weight: 94.6 kg (208 lb 9.6 oz)   Height: 5' 3" (1.6 m)       Patient Active Problem List   Diagnosis   • Chronic deep vein thrombosis (DVT) of right peroneal vein (HCC)   • Type 2 diabetes mellitus with hyperglycemia, without long-term current use of insulin (HCC)   • Class 2 severe obesity with serious comorbidity in adult [facility])   • Left retinal disorder   • Dyslipidemia   • Primary hypertension   • GERD (gastroesophageal reflux disease)   • Moderate persistent asthma without complication   • Obesity hypoventilation syndrome (HCC)   • DEN (obstructive sleep apnea)   • Osteoarthritis   • Seasonal allergic rhinitis due to fungal spores   • Edema of both lower extremities due to peripheral venous insufficiency   • History of colon polyps   • FH: total knee replacement       Past Surgical History:   Procedure Laterality Date   •  SECTION  10/16/1990    Fibroids   • COLONOSCOPY     • EYE SURGERY      MULTIPLE   • HYSTERECTOMY     • REPLACEMENT TOTAL KNEE Right        Family History   Problem Relation Age of Onset   • Dementia Mother    • Arthritis Mother         Late in life   • Hypertension Father         HBP   • Heart disease Father         HBP   • Diabetes Father         managed through diet   • Hearing loss Father         Industrial/construction work with no hearing protection   • Glaucoma Paternal Grandmother        Social History     Socioeconomic History   • Marital status: /Civil Union     Spouse name: Not on file   • Number of children: Not on file   • Years of education: Not on file   • Highest education level: Not on file   Occupational History   • Not on file   Tobacco Use   • Smoking status: Never   • Smokeless tobacco: Never   Vaping Use   • Vaping Use: Never used   Substance and Sexual Activity   • Alcohol use: Yes     Alcohol/week: 1.0 standard drink of alcohol     Types: 1 Standard drinks or equivalent per week     Comment: on occasion, 1 drink with low sugar content   • Drug use: Never   • Sexual activity: Not on file   Other Topics Concern   • Not on file   Social History Narrative   • Not on file     Social Determinants of Health     Financial Resource Strain: Low Risk  (9/17/2023)    Overall Financial Resource Strain (CARDIA)    • Difficulty of Paying Living Expenses: Not hard at all   Food Insecurity: Not on file   Transportation Needs: No Transportation Needs (9/17/2023)    PRAPARE - Transportation    • Lack of Transportation (Medical): No    • Lack of Transportation (Non-Medical):  No   Physical Activity: Not on file   Stress: Not on file   Social Connections: Not on file   Intimate Partner Violence: Not on file   Housing Stability: Not on file       Allergies   Allergen Reactions   • Penicillins Seizures   • Sulfa Antibiotics Rash         Current Outpatient Medications:   •  albuterol (PROVENTIL HFA,VENTOLIN HFA) 90 mcg/act inhaler, Inhale 2 puffs every 6 (six) hours as needed for wheezing, Disp: 8.5 g, Rfl: 2  •  apixaban (Eliquis) 2.5 mg, Take 1 tablet (2.5 mg total) by mouth 2 (two) times a day, Disp: 60 tablet, Rfl: 2  •  azelastine (ASTELIN) 0.1 % nasal spray, 2 sprays into each nostril 2 (two) times a day Use in each nostril as directed, Disp: 1 mL, Rfl: 2  •  bimatoprost (LUMIGAN) 0.01 % ophthalmic drops, , Disp: , Rfl:   •  diltiazem (TIAZAC) 180 MG 24 hr capsule, Take 180 mg by mouth 2 (two) times a day, Disp: , Rfl:   •  dulaglutide (Trulicity) 3 QE/9.0HT injection, Inject 0.5 mL (3 mg total) under the skin every 7 days, Disp: 6 mL, Rfl: 1  •  EPINEPHrine (EPIPEN) 0.3 mg/0.3 mL SOAJ, Inject 0.3 mg into a muscle, Disp: , Rfl:   •  famotidine (PEPCID) 20 mg tablet, Take 20 mg by mouth, Disp: , Rfl:   • fluticasone (FLONASE) 50 mcg/act nasal spray, 1 spray into each nostril, Disp: , Rfl:   •  Fluticasone-Salmeterol (Advair) 500-50 mcg/dose inhaler, Inhale 1 puff every 12 (twelve) hours, Disp: 180 blister, Rfl: 3  •  furosemide (LASIX) 20 mg tablet, Take 20 mg by mouth 2 (two) times a day as needed, Disp: , Rfl:   •  ipratropium (ATROVENT) 0.03 % nasal spray, 2 sprays into each nostril every 12 (twelve) hours, Disp: 30 mL, Rfl: 2  •  loratadine (CLARITIN) 10 mg tablet, Take 10 mg by mouth daily, Disp: , Rfl:   •  metFORMIN (GLUCOPHAGE) 500 mg tablet, 1 tablet each morning and 2 tablets each evening, Disp: 270 tablet, Rfl: 3  •  omeprazole (PriLOSEC) 40 MG capsule, Take 40 mg by mouth, Disp: , Rfl:   •  polyethylene glycol (GOLYTELY) 4000 mL solution, Take as directed by the office for colonoscopy., Disp: 4000 mL, Rfl: 0  •  potassium chloride (Klor-Con) 10 mEq tablet, Take 20 mEq by mouth 2 (two) times a day, Disp: , Rfl:   •  rosuvastatin (CRESTOR) 10 MG tablet, Take 10 mg by mouth, Disp: , Rfl:   •  Timolol Maleate PF (Timolol Maleate Ocudose) 0.5 % SOLN, , Disp: , Rfl:

## 2023-10-02 NOTE — PATIENT INSTRUCTIONS
Venous insufficiency  -Standard compression  -Talk to physical therapy  -Compression, elevation, regular exercise, skin care (moisturizer) and monitoring  -Follow up in 2-3 months

## 2023-10-02 NOTE — PROGRESS NOTES
Leg measurements for documentation.     Initial Measurements      Body Part Right Left   Ankle / Forearm 20.5 cm 21.5 cm   Calf / Elbow  41 cm 41.5 cm   Knee / Bicep  38 cm 42 cm   Mid-Thigh / Axilla  51 cm 50 cm

## 2023-10-03 ENCOUNTER — EVALUATION (OUTPATIENT)
Dept: PHYSICAL THERAPY | Facility: CLINIC | Age: 68
End: 2023-10-03
Payer: MEDICARE

## 2023-10-03 DIAGNOSIS — M25.562 LEFT KNEE PAIN, UNSPECIFIED CHRONICITY: ICD-10-CM

## 2023-10-03 DIAGNOSIS — M17.12 PRIMARY OSTEOARTHRITIS OF LEFT KNEE: Primary | ICD-10-CM

## 2023-10-03 DIAGNOSIS — R26.2 DIFFICULTY WALKING: ICD-10-CM

## 2023-10-03 PROCEDURE — 97110 THERAPEUTIC EXERCISES: CPT

## 2023-10-03 PROCEDURE — 97161 PT EVAL LOW COMPLEX 20 MIN: CPT

## 2023-10-03 NOTE — PROGRESS NOTES
PT Evaluation    Today's date: 10/3/2023   Patient name: Shadia Oneill  : 1955  MRN: [id number]  Referring provider: Carmelo Sparks MD  Dx:   Encounter Diagnosis     ICD-10-CM    1. Primary osteoarthritis of left knee  M17.12 Ambulatory Referral to Physical Therapy      2. Left knee pain, unspecified chronicity  M25.562 Ambulatory Referral to Physical Therapy      3. Difficulty walking  R26.2               Subjective Evaluation     History of Present Illness    Patient presents with c/o L knee pain. Pt reported that her sx started years ago. Pt reported having imaging showing L knee OA. Pt reported that her sx increase with walking and going up/down the steps. Pt also reported that her sx decrease with injections. Pt has a PMH of L retina surgeries, L knee arthroscopic surgery, and a R TKA. Pt also mention that she had a fall in , which led her to fx 2 ribs and her R orbital bone. Pt also mentioned that she was dx'd with a DVT on 2023. Pt has been taking medication and was cleared to perform skilled PT. Occupation: retired     Pain  At best pain ratin/10  At worst pain ratin/10  Location: L knee    Social Support  Lives with her  in a 1 story with 1 FF with railings and a walk-in shower with seat. Patient Goals  Patient goals for therapy: decrease pain with ADLs    STGs  1. Decrease pain by 20% in 2-4 weeks to improve standing tolerance. 2. Improve L knee ROM by 10 degrees in 2-4 weeks. 3. (I) with HEP within 2 weeks in order to improve PT outcomes. LTGs  1. Decrease pain by 60% in 6-8 weeks. 2. Improve walking tolerance to >30 minutes in 6-8 weeks to perform community ambulation. 3. Increase L knee AROM WNL and LLE MMT 5/5 within 6-8 weeks. 4. Improve stairs tolerance to 1 flight  in 8 weeks. 5. Improve FOTO to greater than or equal to 48.        Objective Measurements:    Lumbar ROM R L Strength knee R L   Flex    Flex 4+ 4   Ext   Ext 4+ 4-   SB Rot                Hip A/PROM        Flex  /  / Flex 4- 4-   ER at 90   ER     IR at 90   IR     Abd   Abd     Ext   Ext             Knee A/PROM   Ankle     Flex 115 deg 106 deg DF     Ext - 4 deg -4 deg PF       TUG: NT  Romberg Balance: NT  Tandem Balance: NT  SLS: NT    BP: 130/76, SPO2: 99%, and HR: 68bpm  Assessment:    Melani  is a pleasant 79 y.o. female who is attending skilled PT for L knee AROM. Pt presented with probable L knee OA. Pt demonstrated deficits in L knee ROM and LLE strength. Pt scored a 31 on FOTO. Pt ambulated with a SPC, decreased step size, and antalgic gait pattern. Pt was educated and demonstrated understanding of HEP, POC, and benefit of skilled PT. MHP was utilized post-treatment. Pt would benefit from further skilled PT in order to decrease pain, address deficits (ROM/MMT), improve gait mechanics, help pt achieve goals, and progress program as tolerated. Thank you for the referral!    Plan  Patient would benefit from:Skilled physical therapy  Planned therapy interventions: manual therapy, neuromuscular re-education, stretching, strengthening, therapeutic activities, therapeutic exercise, patient education, home exercise program, modalities to decrease pain, and activity modification.     Frequency: 2x week  Duration in weeks: 8  Treatment plan discussed with: patient       Goals: Patient's goal is to decrease pain with ADLs    Precautions: Previous LLE Blood Clot, L eye blindness  Dx: L Knee OA    Daily Treatment Diary     Manuals 10/3/2023         L Knee PROM         L gastroc/HS str         L patella mobs                  Ther Ex         Quad Set 5"x10        Gastroc Str 15"x4        SLR 10x        Heel Slide 5"x10                                            Pt Edu HEP/POC        Neuro Re-ed                                                      Ther Activity                                    Gait Training                           Modalities

## 2023-10-04 ENCOUNTER — PROBLEM (OUTPATIENT)
Dept: URBAN - METROPOLITAN AREA CLINIC 32 | Facility: CLINIC | Age: 68
End: 2023-10-04

## 2023-10-04 DIAGNOSIS — H21.1X2: ICD-10-CM

## 2023-10-04 DIAGNOSIS — H43.811: ICD-10-CM

## 2023-10-04 DIAGNOSIS — H35.372: ICD-10-CM

## 2023-10-04 DIAGNOSIS — H04.123: ICD-10-CM

## 2023-10-04 DIAGNOSIS — H33.42: ICD-10-CM

## 2023-10-04 DIAGNOSIS — H34.12: ICD-10-CM

## 2023-10-04 DIAGNOSIS — H01.026: ICD-10-CM

## 2023-10-04 PROCEDURE — 92134 CPTRZ OPH DX IMG PST SGM RTA: CPT

## 2023-10-04 PROCEDURE — 92201 OPSCPY EXTND RTA DRAW UNI/BI: CPT

## 2023-10-04 PROCEDURE — 92014 COMPRE OPH EXAM EST PT 1/>: CPT

## 2023-10-04 ASSESSMENT — TONOMETRY
OS_IOP_MMHG: 18
OD_IOP_MMHG: 21

## 2023-10-04 ASSESSMENT — VISUAL ACUITY: OD_SC: 20/25

## 2023-10-05 ENCOUNTER — OFFICE VISIT (OUTPATIENT)
Dept: PHYSICAL THERAPY | Facility: CLINIC | Age: 68
End: 2023-10-05
Payer: MEDICARE

## 2023-10-05 DIAGNOSIS — R26.2 DIFFICULTY WALKING: ICD-10-CM

## 2023-10-05 DIAGNOSIS — M25.562 LEFT KNEE PAIN, UNSPECIFIED CHRONICITY: Primary | ICD-10-CM

## 2023-10-05 DIAGNOSIS — M17.12 PRIMARY OSTEOARTHRITIS OF LEFT KNEE: ICD-10-CM

## 2023-10-05 PROCEDURE — 97110 THERAPEUTIC EXERCISES: CPT

## 2023-10-05 NOTE — PROGRESS NOTES
Daily Note     Today's date: 10/5/2023  Patient name: Rashmi Valente  : 1955  MRN: [id number]  Referring provider: Meredith Olguin MD  Dx:   Encounter Diagnosis     ICD-10-CM    1. Left knee pain, unspecified chronicity  M25.562       2. Primary osteoarthritis of left knee  M17.12       3. Difficulty walking  R26.2           Start Time: 1400  Stop Time: 1425  Total time in clinic (min): 25 minutes    Subjective: Pt reports that she was sore and fatigued following the IE. Prefers not to lay supine today, was accommodated. Objective: See treatment diary below      Assessment: Tolerated treatment well. Proceeded with standing interventions with no increase in symptoms. Patient demonstrated fatigue post treatment      Plan: Continue per plan of care.         Goals: Patient's goal is to decrease pain with ADLs    Precautions: Previous LLE Blood Clot, L eye blindness  Dx: L Knee OA    Daily Treatment Diary     Manuals 10/3/2023 1005        L Knee PROM  Seated LNK       L gastroc/HS str  Seated LNK       L patella mobs                  Ther Ex         Quad Set 5"x10        Gastroc Str 15"x4 20"x4 wedge        SLR 10x        Heel Slide 5"x10                 Standing hip abd/ext  2x10       Standing knee flexion   2x10                 Pt Edu HEP/POC        Neuro Re-ed                                                      Ther Activity         minisquat  2x10                          Gait Training                           Modalities

## 2023-10-09 ENCOUNTER — APPOINTMENT (OUTPATIENT)
Dept: LAB | Facility: CLINIC | Age: 68
End: 2023-10-09
Payer: MEDICARE

## 2023-10-09 ENCOUNTER — PATIENT MESSAGE (OUTPATIENT)
Dept: VASCULAR SURGERY | Facility: CLINIC | Age: 68
End: 2023-10-09

## 2023-10-09 LAB — DEPRECATED AT III PPP: 109 % OF NORMAL (ref 92–136)

## 2023-10-09 PROCEDURE — 85306 CLOT INHIBIT PROT S FREE: CPT

## 2023-10-09 PROCEDURE — 85303 CLOT INHIBIT PROT C ACTIVITY: CPT

## 2023-10-09 PROCEDURE — 85732 THROMBOPLASTIN TIME PARTIAL: CPT

## 2023-10-09 PROCEDURE — 85305 CLOT INHIBIT PROT S TOTAL: CPT

## 2023-10-09 PROCEDURE — 86146 BETA-2 GLYCOPROTEIN ANTIBODY: CPT

## 2023-10-09 PROCEDURE — 85705 THROMBOPLASTIN INHIBITION: CPT

## 2023-10-09 PROCEDURE — 85300 ANTITHROMBIN III ACTIVITY: CPT

## 2023-10-09 PROCEDURE — 85613 RUSSELL VIPER VENOM DILUTED: CPT

## 2023-10-09 PROCEDURE — 85670 THROMBIN TIME PLASMA: CPT

## 2023-10-09 PROCEDURE — 36415 COLL VENOUS BLD VENIPUNCTURE: CPT

## 2023-10-09 PROCEDURE — 86147 CARDIOLIPIN ANTIBODY EA IG: CPT

## 2023-10-09 NOTE — PATIENT COMMUNICATION
Spoke w/ pt. She obtained new compression stockings from a different company but has not tried them on yet. Pt states that this company thought she was improperly measured at the other company. Advised her that if she is properly measured she should not have any issues. Advised to try the new pair of stockings tomorrow to make sure they feel ok. Pt verbalized understanding.

## 2023-10-10 ENCOUNTER — OFFICE VISIT (OUTPATIENT)
Dept: PHYSICAL THERAPY | Facility: CLINIC | Age: 68
End: 2023-10-10
Payer: MEDICARE

## 2023-10-10 DIAGNOSIS — M17.12 PRIMARY OSTEOARTHRITIS OF LEFT KNEE: ICD-10-CM

## 2023-10-10 DIAGNOSIS — M25.562 LEFT KNEE PAIN, UNSPECIFIED CHRONICITY: Primary | ICD-10-CM

## 2023-10-10 DIAGNOSIS — R26.2 DIFFICULTY WALKING: ICD-10-CM

## 2023-10-10 LAB
B2 GLYCOPROT1 IGA SERPL IA-ACNC: 1
B2 GLYCOPROT1 IGG SERPL IA-ACNC: <0.8
B2 GLYCOPROT1 IGM SERPL IA-ACNC: <2.4
PROT S ACT/NOR PPP: 107 % (ref 68–108)

## 2023-10-10 PROCEDURE — 97530 THERAPEUTIC ACTIVITIES: CPT

## 2023-10-10 PROCEDURE — 97110 THERAPEUTIC EXERCISES: CPT

## 2023-10-10 NOTE — PROGRESS NOTES
Daily Note     Today's date: 10/10/2023  Patient name: Melany Lau  : 1955  MRN: [id number]  Referring provider: Ghazala Kendall MD  Dx:   Encounter Diagnosis     ICD-10-CM    1. Left knee pain, unspecified chronicity  M25.562       2. Primary osteoarthritis of left knee  M17.12       3. Difficulty walking  R26.2                      Subjective: Pt reports that she feels L knee weakness to start the session. Felt good following last visit. Objective: See treatment diary below      Assessment: Tolerated treatment well. Progressed standing interventions with no complaints. Patient demonstrated fatigue post treatment      Plan: Continue per plan of care.         Goals: Patient's goal is to decrease pain with ADLs    Precautions: Previous LLE Blood Clot, L eye blindness  Dx: L Knee OA    Daily Treatment Diary     Manuals 10/3/2023 10/05 10/10       L Knee PROM  Seated LNK LNK seated      L gastroc/HS str  Seated LNK LNK seated      L patella mobs                  Ther Ex         Quad Set 5"x10        Gastroc Str 15"x4 20"x4 wedge  20"x4 wedge       SLR 10x        Heel Slide 5"x10                 Standing hip abd/ext  2x10 2x10       Standing knee flexion   2x10  2x10                Pt Edu HEP/POC        Neuro Re-ed                                                      Ther Activity         minisquat  2x10  2x10      Step up    4" 15x               Gait Training                           Modalities

## 2023-10-11 LAB
APTT SCREEN TO CONFIRM RATIO: 1.1 RATIO (ref 0–1.34)
CONFIRM APTT/NORMAL: 31.1 SEC (ref 0–47.6)
LA PPP-IMP: NORMAL
PROT S ACT/NOR PPP: 100 % (ref 61–136)
PROT S PPP-ACNC: 86 % (ref 60–150)
SCREEN APTT: 33.8 SEC (ref 0–43.5)
SCREEN DRVVT: 39.8 SEC (ref 0–47)
THROMBIN TIME: 17.1 SEC (ref 0–23)

## 2023-10-12 DIAGNOSIS — J30.2 SEASONAL ALLERGIC RHINITIS DUE TO FUNGAL SPORES: ICD-10-CM

## 2023-10-12 LAB — PROT C AG ACT/NOR PPP IA: 142 % OF NORMAL (ref 60–150)

## 2023-10-12 RX ORDER — IPRATROPIUM BROMIDE 21 UG/1
2 SPRAY, METERED NASAL EVERY 12 HOURS
Qty: 30 ML | Refills: 1 | Status: SHIPPED | OUTPATIENT
Start: 2023-10-12

## 2023-10-13 LAB
CARDIOLIPIN IGA SER IA-ACNC: 1.4
CARDIOLIPIN IGG SER IA-ACNC: 1.3
CARDIOLIPIN IGM SER IA-ACNC: 1.7

## 2023-10-24 ENCOUNTER — OFFICE VISIT (OUTPATIENT)
Dept: PHYSICAL THERAPY | Facility: CLINIC | Age: 68
End: 2023-10-24
Payer: MEDICARE

## 2023-10-24 DIAGNOSIS — M25.562 LEFT KNEE PAIN, UNSPECIFIED CHRONICITY: Primary | ICD-10-CM

## 2023-10-24 DIAGNOSIS — M17.12 PRIMARY OSTEOARTHRITIS OF LEFT KNEE: ICD-10-CM

## 2023-10-24 DIAGNOSIS — R26.2 DIFFICULTY WALKING: ICD-10-CM

## 2023-10-24 PROCEDURE — 97110 THERAPEUTIC EXERCISES: CPT

## 2023-10-24 PROCEDURE — 97140 MANUAL THERAPY 1/> REGIONS: CPT

## 2023-10-24 NOTE — PROGRESS NOTES
Daily Note     Today's date: 10/24/2023  Patient name: Jamil Campbell  : 1955  MRN: [id number]  Referring provider: Jignesh Coulter MD  Dx:   Encounter Diagnosis     ICD-10-CM    1. Left knee pain, unspecified chronicity  M25.562       2. Primary osteoarthritis of left knee  M17.12       3. Difficulty walking  R26.2           Start Time: 1100  Stop Time: 1140  Total time in clinic (min): 40 minutes    Subjective: Pt reported increased soreness with prolonged walking. Pt also reported that she is getting injections in her knees on Friday. Objective: See treatment diary below      Assessment: Tolerated treatment well. Program focused on standing exercises due to pt's report of increased LBP with laying down. Manual therapy focused on increasing knee PROM (flexion with distraction) and increasing HS/gastroc flexibility. HR were added in order to increase LE strength. Ice was utilized post-treatment. Patient would benefit from continued PT      Plan: Continue per plan of care.       Goals: Patient's goal is to decrease pain with ADLs    Precautions: Previous LLE Blood Clot, L eye blindness  Dx: L Knee OA    Daily Treatment Diary     Manuals 10/3/2023 10/05 10/10 10/24      L Knee PROM  Seated LNK LNK seated MS     L gastroc/HS str  Seated LNK LNK seated MS     L patella mobs                  Ther Ex         Quad Set 5"x10        Gastroc Str 15"x4 20"x4 wedge  20"x4 wedge  30"x3     SLR 10x        Heel Slide 5"x10                 Standing hip abd/ext  2x10 2x10  2x10 each     Standing knee flexion   2x10  2x10  10x2     Standing HS curl    10x2     Pt Edu HEP/POC        Neuro Re-ed                                                      Ther Activity         minisquat  2x10  2x10 10x2     Step up    4" 15x L1, 20x     HR on step    L1, 10x     Gait Training                           Modalities         Ice    10'

## 2023-10-26 ENCOUNTER — OFFICE VISIT (OUTPATIENT)
Dept: PHYSICAL THERAPY | Facility: CLINIC | Age: 68
End: 2023-10-26
Payer: MEDICARE

## 2023-10-26 DIAGNOSIS — M25.562 LEFT KNEE PAIN, UNSPECIFIED CHRONICITY: Primary | ICD-10-CM

## 2023-10-26 DIAGNOSIS — M17.12 PRIMARY OSTEOARTHRITIS OF LEFT KNEE: ICD-10-CM

## 2023-10-26 DIAGNOSIS — R26.2 DIFFICULTY WALKING: ICD-10-CM

## 2023-10-26 PROCEDURE — 97530 THERAPEUTIC ACTIVITIES: CPT

## 2023-10-26 PROCEDURE — 97110 THERAPEUTIC EXERCISES: CPT

## 2023-10-26 PROCEDURE — 97140 MANUAL THERAPY 1/> REGIONS: CPT

## 2023-10-26 NOTE — PROGRESS NOTES
Daily Note     Today's date: 10/26/2023  Patient name: Gregory Munroe  : 1955  MRN: [id number]  Referring provider: Marilyn Marinelli MD  Dx:   Encounter Diagnosis     ICD-10-CM    1. Left knee pain, unspecified chronicity  M25.562       2. Primary osteoarthritis of left knee  M17.12       3. Difficulty walking  R26.2           Start Time: 1200  Stop Time: 1250  Total time in clinic (min): 50 minutes    Subjective: Pt reported that her knee pain was 2-3/10 and also noted that her knee felt a little unstable today. Objective: See treatment diary below      Assessment: Tolerated treatment well. Program started with the Jorge Alberto Yoo. Manual therapy focused on increasing knee PROM (flexion with distraction) and increasing gastroc/HS flexibility. Program focused on improving standing tolerance and increasing LE strength to improve knee stability. MHP was utilized. Pt would benefit from continued PT    Billing reflects increased one-on-one time. Plan: Continue per plan of care. Goals: Patient's goal is to decrease pain with ADLs    Precautions: Previous LLE Blood Clot, L eye blindness  Dx: L Knee OA    Daily Treatment Diary     Manuals 10/3/2023 10/05 10/10 10/24 10/26     L Knee PROM  Seated LNK LNK seated MS MS    L gastroc/HS str  Seated LNK LNK seated MS MS    L patella mobs                  Ther Ex         Quad Set 5"x10        Gastroc Str 15"x4 20"x4 wedge  20"x4 wedge  30"x3 30"x3    SLR 10x        Heel Slide 5"x10                 Standing hip abd/ext  2x10 2x10  2x10 each 10x2 each    Standing knee flexion   2x10  2x10  10x2 10x2    Standing HS curl         R.  Bike (rocking)     5'    Pt Edu HEP/POC        Neuro Re-ed                                                      Ther Activity         minisquat  2x10  2x10 10x2 10x2    Step up    4" 15x L1, 20x L1, 20x    HR on step    L1, 10x L1, 10x2    Gait Training                           Modalities         Ice    10' 10' MHP I sent a 30 day script for the Cymbalta with 5 refills to cover her until her OV as scheduled in September

## 2023-10-27 ENCOUNTER — PROCEDURE VISIT (OUTPATIENT)
Dept: OBGYN CLINIC | Facility: CLINIC | Age: 68
End: 2023-10-27
Payer: MEDICARE

## 2023-10-27 VITALS
WEIGHT: 210 LBS | SYSTOLIC BLOOD PRESSURE: 125 MMHG | HEART RATE: 70 BPM | HEIGHT: 63 IN | DIASTOLIC BLOOD PRESSURE: 74 MMHG | BODY MASS INDEX: 37.21 KG/M2

## 2023-10-27 DIAGNOSIS — M17.12 PRIMARY OSTEOARTHRITIS OF LEFT KNEE: Primary | ICD-10-CM

## 2023-10-27 DIAGNOSIS — M25.562 LEFT KNEE PAIN, UNSPECIFIED CHRONICITY: ICD-10-CM

## 2023-10-27 PROCEDURE — 20610 DRAIN/INJ JOINT/BURSA W/O US: CPT | Performed by: ORTHOPAEDIC SURGERY

## 2023-10-27 RX ORDER — HYALURONATE SODIUM 10 MG/ML
20 SYRINGE (ML) INTRAARTICULAR
Status: COMPLETED | OUTPATIENT
Start: 2023-10-27 | End: 2023-10-27

## 2023-10-27 RX ADMIN — Medication 20 MG: at 09:15

## 2023-10-27 NOTE — PROGRESS NOTES
Assessment:  1. Primary osteoarthritis of left knee        2. Left knee pain, unspecified chronicity            Plan:    Patient has left knee pain due to osteoarthritis  Weight-bear as tolerated  Patient received left knee Euflexxa 1 out of 3 series injections in the office today  She is follow-up in 1 week for Euflexxa No. 2 injection         The above stated was discussed in layman's terms and the patient expressed understanding. All questions were answered to the patient's satisfaction. Subjective:   Alessandro Goodwin is a 79 y.o. female who presents today follow up for left knee pain due to osteoarthritis. Patient is here today for left knee Euflexxa #1 injection. She states she is having generalized pain. She does feel her knee is unstable. She just started physical therapy. She has been dx with L LE DVT and is on eliquis. Her pain level is a 5/10.       Review of systems negative unless otherwise specified in HPI    Past Medical History:   Diagnosis Date    Allergic     Arthritis     Asthma     Blindness of left eye     COPD (chronic obstructive pulmonary disease) (HCC)     Diabetes mellitus (HCC)     GERD (gastroesophageal reflux disease)     Hypertension     Obesity     Visual impairment        Past Surgical History:   Procedure Laterality Date     SECTION  10/16/1990    Fibroids    COLONOSCOPY      EYE SURGERY      MULTIPLE    HYSTERECTOMY  2000    REPLACEMENT TOTAL KNEE Right        Family History   Problem Relation Age of Onset    Dementia Mother     Arthritis Mother         Late in life    Hypertension Father         HBP    Heart disease Father         HBP    Diabetes Father         managed through diet    Hearing loss Father         Industrial/construction work with no hearing protection    Glaucoma Paternal Grandmother        Social History     Occupational History    Not on file   Tobacco Use    Smoking status: Never    Smokeless tobacco: Never   Vaping Use    Vaping Use: Never used Substance and Sexual Activity    Alcohol use: Yes     Alcohol/week: 1.0 standard drink of alcohol     Types: 1 Standard drinks or equivalent per week     Comment: on occasion, 1 drink with low sugar content    Drug use: Never    Sexual activity: Not on file         Current Outpatient Medications:     albuterol (PROVENTIL HFA,VENTOLIN HFA) 90 mcg/act inhaler, Inhale 2 puffs every 6 (six) hours as needed for wheezing, Disp: 8.5 g, Rfl: 2    apixaban (Eliquis) 2.5 mg, Take 1 tablet (2.5 mg total) by mouth 2 (two) times a day, Disp: 60 tablet, Rfl: 2    azelastine (ASTELIN) 0.1 % nasal spray, 2 sprays into each nostril 2 (two) times a day Use in each nostril as directed, Disp: 1 mL, Rfl: 2    bimatoprost (LUMIGAN) 0.01 % ophthalmic drops, , Disp: , Rfl:     diltiazem (TIAZAC) 180 MG 24 hr capsule, Take 180 mg by mouth 2 (two) times a day, Disp: , Rfl:     dulaglutide (Trulicity) 3 NT/3.1TG injection, Inject 0.5 mL (3 mg total) under the skin every 7 days, Disp: 6 mL, Rfl: 1    EPINEPHrine (EPIPEN) 0.3 mg/0.3 mL SOAJ, Inject 0.3 mg into a muscle, Disp: , Rfl:     famotidine (PEPCID) 20 mg tablet, Take 20 mg by mouth, Disp: , Rfl:     fluticasone (FLONASE) 50 mcg/act nasal spray, 1 spray into each nostril, Disp: , Rfl:     Fluticasone-Salmeterol (Advair) 500-50 mcg/dose inhaler, Inhale 1 puff every 12 (twelve) hours, Disp: 180 blister, Rfl: 3    furosemide (LASIX) 20 mg tablet, Take 20 mg by mouth 2 (two) times a day as needed, Disp: , Rfl:     ipratropium (ATROVENT) 0.03 % nasal spray, SPRAY 2 SPRAYS INTO EACH NOSTRIL EVERY 12 HOURS., Disp: 30 mL, Rfl: 1    loratadine (CLARITIN) 10 mg tablet, Take 10 mg by mouth daily, Disp: , Rfl:     metFORMIN (GLUCOPHAGE) 500 mg tablet, 1 tablet each morning and 2 tablets each evening, Disp: 270 tablet, Rfl: 3    omeprazole (PriLOSEC) 40 MG capsule, Take 40 mg by mouth, Disp: , Rfl:     polyethylene glycol (GOLYTELY) 4000 mL solution, Take as directed by the office for colonoscopy. , Disp: 4000 mL, Rfl: 0    potassium chloride (Klor-Con) 10 mEq tablet, Take 20 mEq by mouth 2 (two) times a day, Disp: , Rfl:     rosuvastatin (CRESTOR) 10 MG tablet, Take 10 mg by mouth, Disp: , Rfl:     Timolol Maleate PF (Timolol Maleate Ocudose) 0.5 % SOLN, , Disp: , Rfl:     Allergies   Allergen Reactions    Penicillins Seizures    Sulfa Antibiotics Rash            Vitals:    10/27/23 0926   BP: 125/74   Pulse: 70       Objective:            Physical Exam  Physical Exam:      General Appearance:    Alert, cooperative, no distress, appears stated age   Head:    Normocephalic, without obvious abnormality, atraumatic   Eyes:    conjunctiva/corneas clear, both eyes         Nose:   Nares normal, septum midline, no drainage    Throat:   Lips normal; teeth and gums normal   Neck:    symmetrical, trachea midline, ;     thyroid:  no enlargement/   Back:     Symmetric, no curvature, ROM normal   Lungs:   No audible wheezing or labored breathing   Chest Wall:    No tenderness or deformity    Heart:    Regular rate and rhythm                         Pulses:   2+ and symmetric all extremities   Skin:   Skin color, texture, turgor normal, no rashes or lesions   Neurologic:   normal strength, sensation and reflexes     throughout                       Ortho Exam  Left knee  Skin intact  No erythema  Full extension  Pain over the anterior deep passive flexion  Stable to varus and valgus stress  Tenderness to palpation over the medial lateral joint lines  Negative Patella  grind test  Neurovascularly Intact Distally     Diagnostics, reviewed and taken today if performed as documented:    None performed    Procedures, if performed today:    Large joint arthrocentesis: L knee  Universal Protocol:  Consent: Verbal consent obtained.   Risks and benefits: risks, benefits and alternatives were discussed  Consent given by: patient  Time out: Immediately prior to procedure a "time out" was called to verify the correct patient, procedure, equipment, support staff and site/side marked as required. Timeout called at: 10/27/2023 9:41 AM.  Patient understanding: patient states understanding of the procedure being performed  Site marked: the operative site was marked  Supporting Documentation  Indications: pain   Procedure Details  Location: knee - L knee  Preparation: Patient was prepped and draped in the usual sterile fashion  Needle size: 22 G  Approach: lateral  Medications administered: 20 mg Sodium Hyaluronate (Viscosup) 20 MG/2ML    Patient tolerance: patient tolerated the procedure well with no immediate complications  Dressing:  Sterile dressing applied          Scribe Attestation      I,:  Jazlyn Rodriguez am acting as a scribe while in the presence of the attending physician.:       I,:  Mehul Sunshine MD personally performed the services described in this documentation    as scribed in my presence.:               Portions of the record may have been created with voice recognition software. Occasional wrong word or "sound a like" substitutions may have occurred due to the inherent limitations of voice recognition software. Read the chart carefully and recognize, using context, where substitutions have occurred.

## 2023-10-31 ENCOUNTER — OFFICE VISIT (OUTPATIENT)
Dept: PHYSICAL THERAPY | Facility: CLINIC | Age: 68
End: 2023-10-31
Payer: MEDICARE

## 2023-10-31 DIAGNOSIS — M17.12 PRIMARY OSTEOARTHRITIS OF LEFT KNEE: ICD-10-CM

## 2023-10-31 DIAGNOSIS — M25.562 LEFT KNEE PAIN, UNSPECIFIED CHRONICITY: Primary | ICD-10-CM

## 2023-10-31 PROCEDURE — 97110 THERAPEUTIC EXERCISES: CPT

## 2023-10-31 PROCEDURE — 97112 NEUROMUSCULAR REEDUCATION: CPT

## 2023-10-31 NOTE — PROGRESS NOTES
Daily Note     Today's date: 10/31/2023  Patient name: Mary Woo  : 1955  MRN: [id number]  Referring provider: Jose Vega MD  Dx: No diagnosis found. Subjective: Patient states that she is feeling well to begin treatment. Objective: See treatment diary below      Assessment: Tolerated treatment well. Patient exhibited good technique with therapeutic exercises      Plan: Continue per plan of care. Goals: Patient's goal is to decrease pain with ADLs    Precautions: Previous LLE Blood Clot, L eye blindness  Dx: L Knee OA    Daily Treatment Diary     Manuals 10/3/2023 10/05 10/10 10/24 10/26 10/31    L Knee PROM  Seated LNK LNK seated MS MS Does not wish to lie supine   L gastroc/HS str  Seated LNK LNK seated MS MS Wedge/stool. Does not wish to lie supine    L patella mobs                  Ther Ex         Quad Set 5"x10     5" x 10    Gastroc Str 15"x4 20"x4 wedge  20"x4 wedge  30"x3 30"x3 30" x 3   SLR 10x     Np-    Heel Slide 5"x10     seated            Standing hip abd/ext  2x10 2x10  2x10 each 10x2 each 2 x 1    Standing knee flexion   2x10  2x10  10x2 10x2 2 x 1   Standing HS curl         R.  Bike (rocking)     5' 5   Pt Edu HEP/POC        Neuro Re-ed                                                      Ther Activity         minisquat  2x10  2x10 10x2 10x2 10x2   Step up    4" 15x L1, 20x L1, 20x L1 20x   HR on step    L1, 10x L1, 10x2 L1 x 20    Gait Training                           Modalities         Ice    10' 10' MHP declined

## 2023-11-02 ENCOUNTER — OFFICE VISIT (OUTPATIENT)
Dept: PHYSICAL THERAPY | Facility: CLINIC | Age: 68
End: 2023-11-02
Payer: MEDICARE

## 2023-11-02 DIAGNOSIS — M25.562 LEFT KNEE PAIN, UNSPECIFIED CHRONICITY: Primary | ICD-10-CM

## 2023-11-02 DIAGNOSIS — M17.12 PRIMARY OSTEOARTHRITIS OF LEFT KNEE: ICD-10-CM

## 2023-11-02 DIAGNOSIS — R26.2 DIFFICULTY WALKING: ICD-10-CM

## 2023-11-02 PROCEDURE — 97112 NEUROMUSCULAR REEDUCATION: CPT

## 2023-11-02 PROCEDURE — 97110 THERAPEUTIC EXERCISES: CPT

## 2023-11-02 NOTE — PROGRESS NOTES
Daily Note     Today's date: 2023  Patient name: Lucille Pugh  : 1955  MRN: [id number]  Referring provider: Hoang Rowland MD  Dx:   Encounter Diagnosis     ICD-10-CM    1. Left knee pain, unspecified chronicity  M25.562       2. Primary osteoarthritis of left knee  M17.12       3. Difficulty walking  R26.2           Start Time: 1130  Stop Time: 1205  Total time in clinic (min): 35 minutes    Subjective: Pt reported that her knee pain was about 2-3/10. Objective: See treatment diary below      Assessment: Tolerated treatment well. Pt's program started with the Jane Kamar. Manual therapy focused on increasing knee ROM (flexion with distraction) and increasing gastroc/HS flexibility. Progressions were held due to pt tolerance. Patient would benefit from continued PT      Plan: Continue per plan of care. Goals: Patient's goal is to decrease pain with ADLs    Precautions: Previous LLE Blood Clot, L eye blindness  Dx: L Knee OA    Daily Treatment Diary     Manuals 11/2  10/10 10/24 10/26 10/31    L Knee PROM MS  LNK seated MS MS Does not wish to lie supine   L gastroc/HS str MS  LNK seated MS MS Wedge/stool. Does not wish to lie supine    L patella mobs                  Ther Ex         Quad Set      5" x 10    Gastroc Str 30"x3  20"x4 wedge  30"x3 30"x3 30" x 3   SLR      Np-    Heel Slide      seated            Standing hip abd/ext 10x2 each  2x10  2x10 each 10x2 each 2 x 1    Standing knee flexion  10x2  2x10  10x2 10x2 2 x 1   Leg press         R.  Bike (rocking) '       Pt Edu         Neuro Re-ed                                                      Ther Activity         minisquat 10x2  2x10 10x2 10x2 10x2   Step up  L1, 20x  4" 15x L1, 20x L1, 20x L1 20x   HR on step L1, 20   L1, 10x L1, 10x2 L1 x 20    Gait Training                           Modalities         Ice    10' 10' MHP declined

## 2023-11-03 ENCOUNTER — PROCEDURE VISIT (OUTPATIENT)
Dept: OBGYN CLINIC | Facility: CLINIC | Age: 68
End: 2023-11-03
Payer: MEDICARE

## 2023-11-03 VITALS — WEIGHT: 210 LBS | HEIGHT: 63 IN | BODY MASS INDEX: 37.21 KG/M2

## 2023-11-03 DIAGNOSIS — M17.12 PRIMARY OSTEOARTHRITIS OF LEFT KNEE: Primary | ICD-10-CM

## 2023-11-03 PROCEDURE — 20610 DRAIN/INJ JOINT/BURSA W/O US: CPT | Performed by: ORTHOPAEDIC SURGERY

## 2023-11-03 RX ORDER — HYALURONATE SODIUM 10 MG/ML
20 SYRINGE (ML) INTRAARTICULAR
Status: COMPLETED | OUTPATIENT
Start: 2023-11-03 | End: 2023-11-03

## 2023-11-03 RX ADMIN — Medication 20 MG: at 09:15

## 2023-11-03 NOTE — PROGRESS NOTES
Orthopedics          [facility] 79 y.o. female MRN: [id number]      Chief Complaint:   left knee pain    HPI:   79 y. o.female complaining of left knee pain. Patient presents office today regarding left knee pain and a second series of 3 Euflexxa injections states the pain is anywhere from 2-5 out of 10.   She did have some mild pain relief following her last injection for 1 to 2 days however the pain since returned pain is localized left knee aching nature worse weightbearing mildly relieved with rest.                Review Of Systems:   Skin: Normal  Neuro: See HPI  Musculoskeletal: See HPI  All other systems reviewed and are negative    Past Medical History:   Past Medical History:   Diagnosis Date    Allergic     Arthritis     Asthma     Blindness of left eye     COPD (chronic obstructive pulmonary disease) ([address])     Diabetes mellitus ([address])     GERD (gastroesophageal reflux disease)     Hypertension     Obesity     Visual impairment        Past Surgical History:   Past Surgical History:   Procedure Laterality Date     SECTION  10/16/1990    Fibroids    COLONOSCOPY      EYE SURGERY      MULTIPLE    HYSTERECTOMY  2000    REPLACEMENT TOTAL KNEE Right        Family History:  Family history reviewed and non-contributory  Family History   Problem Relation Age of Onset    Dementia Mother     Arthritis Mother         Late in life    Hypertension Father         HBP    Heart disease Father         HBP    Diabetes Father         managed through diet    Hearing loss Father         Industrial/construction work with no hearing protection    Glaucoma Paternal Grandmother          Social History:  Social History     Socioeconomic History    Marital status: /Civil Union     Spouse name: None    Number of children: None    Years of education: None    Highest education level: None   Occupational History    None   Tobacco Use    Smoking status: Never    Smokeless tobacco: Never   Vaping Use    Vaping Use: Never used   Substance and Sexual Activity    Alcohol use: Yes     Alcohol/week: 1.0 standard drink of alcohol     Types: 1 Standard drinks or equivalent per week     Comment: on occasion, 1 drink with low sugar content    Drug use: Never    Sexual activity: None   Other Topics Concern    None   Social History Narrative    None     Social Determinants of Health     Financial Resource Strain: Low Risk  (9/17/2023)    Overall Financial Resource Strain (CARDIA)     Difficulty of Paying Living Expenses: Not hard at all   Food Insecurity: Not on file   Transportation Needs: No Transportation Needs (9/17/2023)    PRAPARE - Transportation     Lack of Transportation (Medical): No     Lack of Transportation (Non-Medical): No   Physical Activity: Not on file   Stress: Not on file   Social Connections: Not on file   Intimate Partner Violence: Not on file   Housing Stability: Not on file       Allergies:    Allergies   Allergen Reactions    Penicillins Seizures    Sulfa Antibiotics Rash       Labs:  0   Lab Value Date/Time    HCT 44.6 06/16/2023 1103    HCT 42.5 05/03/2023 1303    HGB 14.1 06/16/2023 1103    HGB 13.9 05/03/2023 1303    INR 1.38 (H) 05/03/2023 1303    WBC 8.36 06/16/2023 1103    WBC 9.70 05/03/2023 1303       Meds:    Current Outpatient Medications:     albuterol (PROVENTIL HFA,VENTOLIN HFA) 90 mcg/act inhaler, Inhale 2 puffs every 6 (six) hours as needed for wheezing, Disp: 8.5 g, Rfl: 2    apixaban (Eliquis) 2.5 mg, Take 1 tablet (2.5 mg total) by mouth 2 (two) times a day, Disp: 60 tablet, Rfl: 2    azelastine (ASTELIN) 0.1 % nasal spray, 2 sprays into each nostril 2 (two) times a day Use in each nostril as directed, Disp: 1 mL, Rfl: 2    bimatoprost (LUMIGAN) 0.01 % ophthalmic drops, , Disp: , Rfl:     diltiazem (TIAZAC) 180 MG 24 hr capsule, Take 180 mg by mouth 2 (two) times a day, Disp: , Rfl:     dulaglutide (Trulicity) 3 SH/4.4BL injection, Inject 0.5 mL (3 mg total) under the skin every 7 days, Disp: 6 mL, Rfl: 1    EPINEPHrine (EPIPEN) 0.3 mg/0.3 mL SOAJ, Inject 0.3 mg into a muscle, Disp: , Rfl:     famotidine (PEPCID) 20 mg tablet, Take 20 mg by mouth, Disp: , Rfl:     fluticasone (FLONASE) 50 mcg/act nasal spray, 1 spray into each nostril, Disp: , Rfl:     Fluticasone-Salmeterol (Advair) 500-50 mcg/dose inhaler, Inhale 1 puff every 12 (twelve) hours, Disp: 180 blister, Rfl: 3    furosemide (LASIX) 20 mg tablet, Take 20 mg by mouth 2 (two) times a day as needed, Disp: , Rfl:     ipratropium (ATROVENT) 0.03 % nasal spray, SPRAY 2 SPRAYS INTO EACH NOSTRIL EVERY 12 HOURS., Disp: 30 mL, Rfl: 1    loratadine (CLARITIN) 10 mg tablet, Take 10 mg by mouth daily, Disp: , Rfl:     metFORMIN (GLUCOPHAGE) 500 mg tablet, 1 tablet each morning and 2 tablets each evening, Disp: 270 tablet, Rfl: 3    omeprazole (PriLOSEC) 40 MG capsule, Take 40 mg by mouth, Disp: , Rfl:     polyethylene glycol (GOLYTELY) 4000 mL solution, Take as directed by the office for colonoscopy., Disp: 4000 mL, Rfl: 0    potassium chloride (Klor-Con) 10 mEq tablet, Take 20 mEq by mouth 2 (two) times a day, Disp: , Rfl:     rosuvastatin (CRESTOR) 10 MG tablet, Take 10 mg by mouth, Disp: , Rfl:     Timolol Maleate PF (Timolol Maleate Ocudose) 0.5 % SOLN, , Disp: , Rfl:     Body mass index is 37.2 kg/m².   Wt Readings from Last 3 Encounters:   11/03/23 95.3 kg (210 lb)   10/27/23 95.3 kg (210 lb)   10/02/23 94.6 kg (208 lb 9.6 oz)     Physical Exam:   Vitals:       General Appearance:    Alert, cooperative, no distress, appears stated age   Head:    Normocephalic, without obvious abnormality, atraumatic   Eyes:    conjunctiva/corneas clear, both eyes        Nose:   Nares normal, septum midline, no drainage    Throat:   Lips normal; teeth and gums normal   Neck:    symmetrical, trachea midline, ;     thyroid:  no enlargement/   Back:     Symmetric, no curvature, ROM normal   Lungs:   No audible wheezing or labored breathing   Chest Wall:    No tenderness or deformity    Heart:    Regular rate and rhythm               Pulses:   2+ and symmetric all extremities   Skin:   Skin color, texture, turgor normal, no rashes or lesions   Neurologic:   normal strength, sensation and reflexes     throughout       Musculoskeletal: left lower extremity  On examination of the left knee there is no effusion, no erythema. Range of motion to full active extension and flexion to greater than 120°. Pain on palpation medial and lateral joint lines. There is crepitus with range of motion, no warmth to palpation, bony enlargement noted. No pain on palpation pes anserine bursa region or distal iliotibial band. Stable to varus and valgus stress without pain or gapping. Negative anterior and posterior drawer testing. Sensation intact distal pulses present. Large joint arthrocentesis: L knee  Universal Protocol:  Consent: Verbal consent obtained. Consent given by: patient  Patient understanding: patient states understanding of the procedure being performed  Site marked: the operative site was marked  Patient identity confirmed: verbally with patient  Supporting Documentation  Indications: pain   Procedure Details  Location: knee - L knee  Needle size: 22 G  Approach: superior  Medications administered: 20 mg Sodium Hyaluronate (Viscosup) 20 MG/2ML    Patient tolerance: patient tolerated the procedure well with no immediate complications         _*_*_*_*_*_*_*_*_*_*_*_*_*_*_*_*_*_*_*_*_*_*_*_*_*_*_*_*_*_*_*_*_*_*_*_*_*_*_*_*_*    Assessment:  79 y. o.female with left knee pain due to arthritis    Plan:   Weight bearing as tolerated  left lower extremity  Left knee intra-articular Euflexxa 2 and a series of 3 injections administered as noted above  Advised no significant activity or increased ambulation over the next 24-48 hours and warm compresses to the knee no greater 20 minutes 3-4 times 24 hours following the injection.   Patient advised should they develop any increasing pain, redness, drainage, numbness, tingling or swelling surrounding the injection sight, they are to contact our office or present to the emergency department.   Follow up in 1 week's time for final 3 in the series of 3 left knee intra-articular Euflexxa injection      Mack Cardenas PA-C                    .

## 2023-11-07 ENCOUNTER — EVALUATION (OUTPATIENT)
Dept: PHYSICAL THERAPY | Facility: CLINIC | Age: 68
End: 2023-11-07
Payer: MEDICARE

## 2023-11-07 DIAGNOSIS — M25.562 LEFT KNEE PAIN, UNSPECIFIED CHRONICITY: Primary | ICD-10-CM

## 2023-11-07 DIAGNOSIS — M17.12 PRIMARY OSTEOARTHRITIS OF LEFT KNEE: ICD-10-CM

## 2023-11-07 DIAGNOSIS — R26.2 DIFFICULTY WALKING: ICD-10-CM

## 2023-11-07 PROCEDURE — 97110 THERAPEUTIC EXERCISES: CPT

## 2023-11-07 PROCEDURE — 97530 THERAPEUTIC ACTIVITIES: CPT

## 2023-11-07 PROCEDURE — 97140 MANUAL THERAPY 1/> REGIONS: CPT

## 2023-11-07 NOTE — PROGRESS NOTES
Re-Evaluation     Today's date: 2023  Patient name: Alyson Pardo  : 1955  MRN: [id number]  Referring provider: Lona Enriquez MD  Dx:   Encounter Diagnosis     ICD-10-CM    1. Left knee pain, unspecified chronicity  M25.562       2. Primary osteoarthritis of left knee  M17.12       3. Difficulty walking  R26.2           Start Time: 1205  Stop Time: 4227  Total time in clinic (min): 47 minutes    Subjective Evaluation     History of Present Illness  : Pt reported that her pain was 3-4/10. Pt mentioned that her sx range between 2-8/10 and also reported a subjective improvement percentage of 50%. Pt has her second injection last Friday and her last injection this coming Friday. IE: Patient presents with c/o L knee pain. Pt reported that her sx started years ago. Pt reported having imaging showing L knee OA. Pt reported that her sx increase with walking and going up/down the steps. Pt also reported that her sx decrease with injections. Pt has a PMH of L retina surgeries, L knee arthroscopic surgery, and a R TKA. Pt also mention that she had a fall in , which led her to fx 2 ribs and her R orbital bone. Pt also mentioned that she was dx'd with a DVT on 2023. Pt has been taking medication and was cleared to perform skilled PT. Occupation: retired     Pain  At best pain ratin/10  At worst pain ratin/10  Location: L knee    Social Support  Lives with her  in a 1 story with 1 FF with railings and a walk-in shower with seat. Patient Goals  Patient goals for therapy: decrease pain with ADLs    STGs  1. Decrease pain by 20% in 2-4 weeks to improve standing tolerance.-Not Met  2. Improve L knee ROM by 10 degrees in 2-4 weeks. -Met  3. (I) with HEP within 2 weeks in order to improve PT outcomes. -Met     LTGs  1. Decrease pain by 60% in 6-8 weeks.-Not Met  2. Improve walking tolerance to >30 minutes in 6-8 weeks to perform community ambulation.  (Partially Met, 10-15 min)  3. Increase L knee AROM WNL and LLE MMT 5/5 within 6-8 weeks. -Not Met  4. Improve stairs tolerance to 1 flight  in 8 weeks. -Not Met  5. Improve FOTO to greater than or equal to 48. -Met      Objective Measurements:    Lumbar ROM R L Strength knee R L   Flex    Flex 4+ 4+   Ext   Ext 4+ 4   SB        Rot                Hip A/PROM        Flex  /  / Flex 4 4-   ER at 90   ER     IR at 90   IR     Abd   Abd     Ext   Ext             Knee A/PROM   Ankle     Flex 115 deg 110 deg DF     Ext - 4 deg -4 deg PF       TU sec  Romberg Balance: NT  Tandem Balance: NT  SLS: NT    Assessment:    Cara Bateman is a pleasant 79 y.o. female who has attended 8 visits of skilled PT for L knee AROM. Pt demonstrated fair progress toward goals and reported a subjective improvement percentage of 50%. Pt demonstrated improvements in L knee ROM and LE strength, but still demonstrates deficits in these categories. Pt scored a 46 on FOTO, demonstrated subjective improvement. Pt was able to perform her full program and was challenged with all exercises. Manual therapy focused on increasing knee PROM and increasing gastroc/HS flexibility. MHP was utilized post-treatment. Pt would benefit from further skilled PT in order to decrease pain, address deficits (ROM/MMT), improve gait mechanics, help pt achieve goals, and progress program as tolerated. Thank you for the referral!    Plan  Patient would benefit from:Skilled physical therapy  Planned therapy interventions: manual therapy, neuromuscular re-education, stretching, strengthening, therapeutic activities, therapeutic exercise, patient education, home exercise program, modalities to decrease pain, and activity modification.     Frequency: 2x week  Duration in weeks: 8  Treatment plan discussed with: patient       Goals: Patient's goal is to decrease pain with ADLs    Precautions: Previous LLE Blood Clot, L eye blindness  Dx: L Knee OA    Daily Treatment Diary     Manuals 11/2 11/7 10/10 10/24 10/26 10/31    L Knee PROM MS MS LNK seated MS MS Does not wish to lie supine   L gastroc/HS str MS MS LNK seated MS MS Wedge/stool. Does not wish to lie supine    L patella mobs                  Ther Ex         Quad Set      5" x 10    Gastroc Str 30"x3 30"x3 20"x4 wedge  30"x3 30"x3 30" x 3   SLR      Np-    Heel Slide      seated            Standing hip abd/ext 10x2 each 20 x each 2x10  2x10 each 10x2 each 2 x 1    Standing knee flexion  10x2 10x2 2x10  10x2 10x2 2 x 1   Leg press         R.  Bike (rocking) 5' 5'   5' 5   Pt Edu  POC       Neuro Re-ed                                                      Ther Activity         minisquat 10x2 10x2 2x10 10x2 10x2 10x2   Step up  L1, 20x L2, 15x 4" 15x L1, 20x L1, 20x L1 20x   HR on step L1, 20 L1, 10x2  L1, 10x L1, 10x2 L1 x 20    Gait Training                           Modalities         Ice  10'  10' 10' [facility] declined

## 2023-11-09 ENCOUNTER — APPOINTMENT (OUTPATIENT)
Dept: PHYSICAL THERAPY | Facility: CLINIC | Age: 68
End: 2023-11-09
Payer: MEDICARE

## 2023-11-09 ENCOUNTER — PROBLEM (OUTPATIENT)
Dept: URBAN - METROPOLITAN AREA CLINIC 32 | Facility: CLINIC | Age: 68
End: 2023-11-09

## 2023-11-09 DIAGNOSIS — H01.026: ICD-10-CM

## 2023-11-09 DIAGNOSIS — H34.12: ICD-10-CM

## 2023-11-09 DIAGNOSIS — H33.42: ICD-10-CM

## 2023-11-09 DIAGNOSIS — H21.1X2: ICD-10-CM

## 2023-11-09 DIAGNOSIS — H35.372: ICD-10-CM

## 2023-11-09 DIAGNOSIS — H43.811: ICD-10-CM

## 2023-11-09 DIAGNOSIS — H04.123: ICD-10-CM

## 2023-11-09 PROCEDURE — 67028 INJECTION EYE DRUG: CPT

## 2023-11-09 PROCEDURE — 92014 COMPRE OPH EXAM EST PT 1/>: CPT | Mod: 25

## 2023-11-09 PROCEDURE — 92202 OPSCPY EXTND ON/MAC DRAW: CPT | Mod: NC

## 2023-11-09 PROCEDURE — 92134 CPTRZ OPH DX IMG PST SGM RTA: CPT

## 2023-11-09 ASSESSMENT — VISUAL ACUITY: OD_SC: 20/25-1

## 2023-11-09 ASSESSMENT — TONOMETRY
OD_IOP_MMHG: 21
OD_IOP_MMHG: 22
OS_IOP_MMHG: 14

## 2023-11-10 ENCOUNTER — PROCEDURE VISIT (OUTPATIENT)
Dept: OBGYN CLINIC | Facility: CLINIC | Age: 68
End: 2023-11-10
Payer: MEDICARE

## 2023-11-10 VITALS
BODY MASS INDEX: 37.21 KG/M2 | HEIGHT: 63 IN | SYSTOLIC BLOOD PRESSURE: 134 MMHG | DIASTOLIC BLOOD PRESSURE: 77 MMHG | WEIGHT: 210 LBS | HEART RATE: 85 BPM

## 2023-11-10 DIAGNOSIS — M17.12 PRIMARY OSTEOARTHRITIS OF LEFT KNEE: Primary | ICD-10-CM

## 2023-11-10 PROCEDURE — 20610 DRAIN/INJ JOINT/BURSA W/O US: CPT | Performed by: ORTHOPAEDIC SURGERY

## 2023-11-10 RX ORDER — HYALURONATE SODIUM 10 MG/ML
20 SYRINGE (ML) INTRAARTICULAR
Status: COMPLETED | OUTPATIENT
Start: 2023-11-10 | End: 2023-11-10

## 2023-11-10 RX ADMIN — Medication 20 MG: at 08:15

## 2023-11-10 NOTE — PROGRESS NOTES
Orthopedics          Cara Bateman 79 y.o. female MRN: [id number]      Chief Complaint:   left knee pain    HPI:   79 y. o.female complaining of left knee pain. She presents to the office today regarding left knee pain. States the pain is aching nature worse with mildly relieved with rest.  She has no changes in her left knee. States the pain is anywhere from a 2-5 out of 10 pain is localized to the left knee aching nature worse with. Review Of Systems:   Skin: Normal  Neuro: See HPI  Musculoskeletal: See HPI  All other systems reviewed and are negative    Past Medical History:   Past Medical History:   Diagnosis Date    Allergic     Arthritis     Asthma     Blindness of left eye     COPD (chronic obstructive pulmonary disease) (HCC)     Diabetes mellitus (HCC)     GERD (gastroesophageal reflux disease)     Hypertension     Obesity     Visual impairment        Past Surgical History:   Past Surgical History:   Procedure Laterality Date     SECTION  10/16/1990    Fibroids    COLONOSCOPY      EYE SURGERY      MULTIPLE    HYSTERECTOMY  2000    REPLACEMENT TOTAL KNEE Right        Family History:  Family history reviewed and non-contributory  Family History   Problem Relation Age of Onset    Dementia Mother     Arthritis Mother         Late in life    Hypertension Father         HBP    Heart disease Father         HBP    Diabetes Father         managed through diet    Hearing loss Father         Industrial/construction work with no hearing protection    Glaucoma Paternal Grandmother          Social History:  Social History     Socioeconomic History    Marital status: /Civil Union     Spouse name: None    Number of children: None    Years of education: None    Highest education level: None   Occupational History    None   Tobacco Use    Smoking status: Never    Smokeless tobacco: Never   Vaping Use    Vaping Use: Never used   Substance and Sexual Activity    Alcohol use:  Yes Alcohol/week: 1.0 standard drink of alcohol     Types: 1 Standard drinks or equivalent per week     Comment: on occasion, 1 drink with low sugar content    Drug use: Never    Sexual activity: None   Other Topics Concern    None   Social History Narrative    None     Social Determinants of Health     Financial Resource Strain: Low Risk  (9/17/2023)    Overall Financial Resource Strain (CARDIA)     Difficulty of Paying Living Expenses: Not hard at all   Food Insecurity: Not on file   Transportation Needs: No Transportation Needs (9/17/2023)    PRAPARE - Transportation     Lack of Transportation (Medical): No     Lack of Transportation (Non-Medical): No   Physical Activity: Not on file   Stress: Not on file   Social Connections: Not on file   Intimate Partner Violence: Not on file   Housing Stability: Not on file       Allergies:    Allergies   Allergen Reactions    Penicillins Seizures    Sulfa Antibiotics Rash       Labs:  0   Lab Value Date/Time    HCT 44.6 06/16/2023 1103    HCT 42.5 05/03/2023 1303    HGB 14.1 06/16/2023 1103    HGB 13.9 05/03/2023 1303    INR 1.38 (H) 05/03/2023 1303    WBC 8.36 06/16/2023 1103    WBC 9.70 05/03/2023 1303       Meds:    Current Outpatient Medications:     albuterol (PROVENTIL HFA,VENTOLIN HFA) 90 mcg/act inhaler, Inhale 2 puffs every 6 (six) hours as needed for wheezing, Disp: 8.5 g, Rfl: 2    apixaban (Eliquis) 2.5 mg, Take 1 tablet (2.5 mg total) by mouth 2 (two) times a day, Disp: 60 tablet, Rfl: 2    azelastine (ASTELIN) 0.1 % nasal spray, 2 sprays into each nostril 2 (two) times a day Use in each nostril as directed, Disp: 1 mL, Rfl: 2    bimatoprost (LUMIGAN) 0.01 % ophthalmic drops, , Disp: , Rfl:     diltiazem (TIAZAC) 180 MG 24 hr capsule, Take 180 mg by mouth 2 (two) times a day, Disp: , Rfl:     dulaglutide (Trulicity) 3 JV/4.4ZG injection, Inject 0.5 mL (3 mg total) under the skin every 7 days, Disp: 6 mL, Rfl: 1    EPINEPHrine (EPIPEN) 0.3 mg/0.3 mL SOAJ, Inject 0.3 mg into a muscle, Disp: , Rfl:     famotidine (PEPCID) 20 mg tablet, Take 20 mg by mouth, Disp: , Rfl:     fluticasone (FLONASE) 50 mcg/act nasal spray, 1 spray into each nostril, Disp: , Rfl:     Fluticasone-Salmeterol (Advair) 500-50 mcg/dose inhaler, Inhale 1 puff every 12 (twelve) hours, Disp: 180 blister, Rfl: 3    furosemide (LASIX) 20 mg tablet, Take 20 mg by mouth 2 (two) times a day as needed, Disp: , Rfl:     ipratropium (ATROVENT) 0.03 % nasal spray, SPRAY 2 SPRAYS INTO EACH NOSTRIL EVERY 12 HOURS., Disp: 30 mL, Rfl: 1    loratadine (CLARITIN) 10 mg tablet, Take 10 mg by mouth daily, Disp: , Rfl:     metFORMIN (GLUCOPHAGE) 500 mg tablet, 1 tablet each morning and 2 tablets each evening, Disp: 270 tablet, Rfl: 3    omeprazole (PriLOSEC) 40 MG capsule, Take 40 mg by mouth, Disp: , Rfl:     polyethylene glycol (GOLYTELY) 4000 mL solution, Take as directed by the office for colonoscopy., Disp: 4000 mL, Rfl: 0    potassium chloride (Klor-Con) 10 mEq tablet, Take 20 mEq by mouth 2 (two) times a day, Disp: , Rfl:     rosuvastatin (CRESTOR) 10 MG tablet, Take 10 mg by mouth, Disp: , Rfl:     Timolol Maleate PF (Timolol Maleate Ocudose) 0.5 % SOLN, , Disp: , Rfl:     Body mass index is 37.2 kg/m².   Wt Readings from Last 3 Encounters:   11/10/23 95.3 kg (210 lb)   11/03/23 95.3 kg (210 lb)   10/27/23 95.3 kg (210 lb)     Physical Exam:   Vitals:    11/10/23 0806   BP: 134/77   Pulse: 85       General Appearance:    Alert, cooperative, no distress, appears stated age   Head:    Normocephalic, without obvious abnormality, atraumatic   Eyes:    conjunctiva/corneas clear, both eyes        Nose:   Nares normal, septum midline, no drainage    Throat:   Lips normal; teeth and gums normal   Neck:    symmetrical, trachea midline, ;     thyroid:  no enlargement/   Back:     Symmetric, no curvature, ROM normal   Lungs:   No audible wheezing or labored breathing   Chest Wall:    No tenderness or deformity    Heart:    Regular rate and rhythm               Pulses:   2+ and symmetric all extremities   Skin:   Skin color, texture, turgor normal, no rashes or lesions   Neurologic:   normal strength, sensation and reflexes     throughout       Musculoskeletal: left lower extremity  On examination of the left knee there is no effusion, no erythema. Range of motion to full active extension and flexion to greater than 120°. Pain on palpation medial and lateral joint lines. There is crepitus with range of motion, no warmth to palpation, bony enlargement noted. No pain on palpation pes anserine bursa region or distal iliotibial band. Stable to varus and valgus stress without pain or gapping. Negative anterior and posterior drawer testing. Sensation intact distal pulses present. Large joint arthrocentesis: L knee  Universal Protocol:  Consent: Verbal consent obtained. Consent given by: patient  Patient understanding: patient states understanding of the procedure being performed  Site marked: the operative site was marked  Patient identity confirmed: verbally with patient  Supporting Documentation  Indications: pain   Procedure Details  Location: knee - L knee  Needle size: 22 G  Approach: superior  Medications administered: 20 mg Sodium Hyaluronate (Viscosup) 20 MG/2ML    Patient tolerance: patient tolerated the procedure well with no immediate complications         _*_*_*_*_*_*_*_*_*_*_*_*_*_*_*_*_*_*_*_*_*_*_*_*_*_*_*_*_*_*_*_*_*_*_*_*_*_*_*_*_*    Assessment:  79 y. o.female with left knee pain due to arthritis    Plan:   Weight bearing as tolerated  left lower extremity  Left knee 3 and a series of 3 Euflexxa injection administered as noted above  Advised no significant activity or increased ambulation over the next 24-48 hours and warm compresses to the knee no greater 20 minutes 3-4 times 24 hours following the injection.   Patient advised should they develop any increasing pain, redness, drainage, numbness, tingling or swelling surrounding the injection sight, they are to contact our office or present to the emergency department.   Follow up in 3 months or sooner if needed      Rashida Guerrero PA-C

## 2023-11-14 ENCOUNTER — TELEPHONE (OUTPATIENT)
Age: 68
End: 2023-11-14

## 2023-11-14 ENCOUNTER — OFFICE VISIT (OUTPATIENT)
Dept: PHYSICAL THERAPY | Facility: CLINIC | Age: 68
End: 2023-11-14
Payer: MEDICARE

## 2023-11-14 DIAGNOSIS — M17.12 PRIMARY OSTEOARTHRITIS OF LEFT KNEE: ICD-10-CM

## 2023-11-14 DIAGNOSIS — M25.562 LEFT KNEE PAIN, UNSPECIFIED CHRONICITY: Primary | ICD-10-CM

## 2023-11-14 DIAGNOSIS — R26.2 DIFFICULTY WALKING: ICD-10-CM

## 2023-11-14 PROCEDURE — 97530 THERAPEUTIC ACTIVITIES: CPT

## 2023-11-14 PROCEDURE — 97110 THERAPEUTIC EXERCISES: CPT

## 2023-11-14 NOTE — PROGRESS NOTES
Daily Note     Today's date: 2023  Patient name: Amado Medeiros  : 1955  MRN: [id number]  Referring provider: Javon Contreras MD  Dx:   Encounter Diagnosis     ICD-10-CM    1. Left knee pain, unspecified chronicity  M25.562       2. Primary osteoarthritis of left knee  M17.12       3. Difficulty walking  R26.2           Start Time: 1200  Stop Time: 8131  Total time in clinic (min): 43 minutes    Subjective: Pt reported that she was feeling tired today and also reported that her knee pain was 2/10 today. .       Objective: See treatment diary below      Assessment: Tolerated treatment well. Pt's program started with the [place] Level. L2 was attempted with the step ups but unable to be performed due to pt being scared of increased Wbing and due to decreased leg strength today. Manual therapy focused on increasing gastroc/HS flexibility and increasing knee PROM. MHP was utilized post-treatment. Patient would benefit from continued PT      Plan: Continue per plan of care. Goals: Patient's goal is to decrease pain with ADLs    Precautions: Previous LLE Blood Clot, L eye blindness  Dx: L Knee OA    Daily Treatment Diary   Goals: Patient's goal is to decrease pain with ADLs    Precautions: Previous LLE Blood Clot, L eye blindness  Dx: L Knee OA    Daily Treatment Diary     Manuals 11/2 11/7 11/14  10/26 10/31    L Knee PROM MS MS MS  MS Does not wish to lie supine   L gastroc/HS str MS MS MS  MS Wedge/stool. Does not wish to lie supine    L patella mobs                  Ther Ex         Quad Set      5" x 10    Gastroc Str 30"x3 30"x3 30"x3  30"x3 30" x 3   SLR      Np-    Heel Slide      seated            Standing hip abd/ext 10x2 each 20 x each 20x each  10x2 each 2 x 1    Standing knee flexion  10x2 10x2 10x2  10x2 2 x 1   Leg press         R.  Bike (rocking) 5' 5' 5'  5' 5   Pt Edu  POC       Neuro Re-ed                                                      Ther Activity         minisquat 10x2 10x2 10x2 10x2 10x2   Step up  L1, 20x L2, 15x L1, 15x  L1, 20x L1 20x   HR on step L1, 20 L1, 10x2 L1, 10x2  L1, 10x2 L1 x 20    Gait Training                           Modalities         Ice  10' MHP 10'  10' [facility] declined

## 2023-11-14 NOTE — TELEPHONE ENCOUNTER
Pt rescheduled colonoscopy and EGD due to traveling and unable to do prep. Rescheduled on 3/19/24 with Dr Wanda Ohara in [facility]. Pt added to wait list incase something comes up sooner.

## 2023-11-16 ENCOUNTER — APPOINTMENT (OUTPATIENT)
Dept: PHYSICAL THERAPY | Facility: CLINIC | Age: 68
End: 2023-11-16
Payer: MEDICARE

## 2023-11-21 ENCOUNTER — OFFICE VISIT (OUTPATIENT)
Dept: PHYSICAL THERAPY | Facility: CLINIC | Age: 68
End: 2023-11-21
Payer: MEDICARE

## 2023-11-21 DIAGNOSIS — M25.562 LEFT KNEE PAIN, UNSPECIFIED CHRONICITY: Primary | ICD-10-CM

## 2023-11-21 DIAGNOSIS — R26.2 DIFFICULTY WALKING: ICD-10-CM

## 2023-11-21 DIAGNOSIS — M17.12 PRIMARY OSTEOARTHRITIS OF LEFT KNEE: ICD-10-CM

## 2023-11-21 PROCEDURE — 97110 THERAPEUTIC EXERCISES: CPT

## 2023-11-21 PROCEDURE — 97530 THERAPEUTIC ACTIVITIES: CPT

## 2023-11-21 NOTE — PROGRESS NOTES
Daily Note     Today's date: 2023  Patient name: Chang Epstein  : 1955  MRN: [id number]  Referring provider: Joe Marcelino MD  Dx:   Encounter Diagnosis     ICD-10-CM    1. Left knee pain, unspecified chronicity  M25.562       2. Primary osteoarthritis of left knee  M17.12       3. Difficulty walking  R26.2           Start Time: 1130  Stop Time: 3460  Total time in clinic (min): 44 minutes    Subjective: Pt reported that she her  accidentally tripped her last week causing pt to fall. Pt reported no major injuries just soreness. Pt did not hit her head. Objective: See treatment diary below      Assessment: Tolerated treatment well. Pt's program started with the Lexi Agustin. Manual therapy focused on increasing knee PROM and increasing gastroc/HS flexibility. Progressions were held due to pt's increased soreness. MHP was utilized. Patient would benefit from continued PT      Plan: Continue per plan of care. Goals: Patient's goal is to decrease pain with ADLs    Precautions: Previous LLE Blood Clot, L eye blindness  Dx: L Knee OA    Daily Treatment Diary   Goals: Patient's goal is to decrease pain with ADLs    Precautions: Previous LLE Blood Clot, L eye blindness  Dx: L Knee OA    Daily Treatment Diary     Manuals 11/2 11/7 11/14 11/21 10/26 10/31    L Knee PROM MS MS MS MS MS Does not wish to lie supine   L gastroc/HS str MS MS MS MS MS Wedge/stool. Does not wish to lie supine    L patella mobs                  Ther Ex         Quad Set      5" x 10    Gastroc Str 30"x3 30"x3 30"x3 30"x3 30"x3 30" x 3   SLR      Np-    Heel Slide      seated            Standing hip abd/ext 10x2 each 20 x each 20x each 20x each 10x2 each 2 x 1    Standing knee flexion  10x2 10x2 10x2 10x2 10x2 2 x 1   Leg press         R.  Bike (rocking) 5' 5' 5' 5' 5' 5   Pt Edu  POC       Neuro Re-ed                                                      Ther Activity         minisquat 10x2 10x2 10x2 10x2 10x2 10x2 Step up  L1, 20x L2, 15x L1, 15x 15x, L1 L1, 20x L1 20x   HR on step L1, 20 L1, 10x2 L1, 10x2 20x no step L1, 10x2 L1 x 20    Gait Training                           Modalities         Ice  10' MHP 10' MHP 10' 10' MHP declined

## 2023-11-24 ENCOUNTER — OFFICE VISIT (OUTPATIENT)
Dept: PHYSICAL THERAPY | Facility: CLINIC | Age: 68
End: 2023-11-24
Payer: MEDICARE

## 2023-11-24 DIAGNOSIS — R26.2 DIFFICULTY WALKING: ICD-10-CM

## 2023-11-24 DIAGNOSIS — M25.562 LEFT KNEE PAIN, UNSPECIFIED CHRONICITY: Primary | ICD-10-CM

## 2023-11-24 DIAGNOSIS — M17.12 PRIMARY OSTEOARTHRITIS OF LEFT KNEE: ICD-10-CM

## 2023-11-24 PROCEDURE — 97110 THERAPEUTIC EXERCISES: CPT

## 2023-11-24 NOTE — PROGRESS NOTES
Daily Note     Today's date: 2023  Patient name: Mirian Turk  : 1955  MRN: [id number]  Referring provider: Grace Currie MD  Dx:   Encounter Diagnosis     ICD-10-CM    1. Left knee pain, unspecified chronicity  M25.562       2. Primary osteoarthritis of left knee  M17.12       3. Difficulty walking  R26.2           Start Time: 1100  Stop Time: 1150  Total time in clinic (min): 50 minutes    Subjective: Pt reported increased knee pain and soreness from increased activity. Objective: See treatment diary below      Assessment: Tolerated treatment well. Pt's program started with the [place]. Manual therapy focused on increasing knee PROM and increasing L gastroc/HS flexibility. Pt reported VC/TC and encouragement in order to perform step ups at an increased height. MHP was utilized. Patient would benefit from continued PT    Billing reflects decreased one-on-one time. Plan: Continue per plan of care. Goals: Patient's goal is to decrease pain with ADLs    Precautions: Previous LLE Blood Clot, L eye blindness  Dx: L Knee OA    Daily Treatment Diary   Goals: Patient's goal is to decrease pain with ADLs    Precautions: Previous LLE Blood Clot, L eye blindness  Dx: L Knee OA    Daily Treatment Diary     Manuals      L Knee PROM MS MS MS MS MS    L gastroc/HS str MS MS MS MS MS    L patella mobs                  Ther Ex         Quad Set         Gastroc Str 30"x3 30"x3 30"x3 30"x3 30"x3    SLR         Heel Slide                  Standing hip abd/ext 10x2 each 20 x each 20x each 20x each 20x each    Standing knee flexion  10x2 10x2 10x2 10x2 10x2    Leg press         R.  Bike (rocking) 5' 5' 5' 5' 5'    Pt Edu  POC       Neuro Re-ed         TKE with TB     Grn 5"x10                                        Ther Activity         minisquat 10x2 10x2 10x2 10x2 10x2    Step up  L1, 20x L2, 15x L1, 15x 15x, L1 10x, L2 with cane    HR on step L1, 20 L1, 10x2 L1, 10x2 20x no step 20x no step    Gait Training                           Modalities         Ice  10' MHP 10' MHP 10' MHP 10'

## 2023-11-26 ENCOUNTER — APPOINTMENT (EMERGENCY)
Dept: VASCULAR ULTRASOUND | Facility: HOSPITAL | Age: 68
End: 2023-11-26
Payer: MEDICARE

## 2023-11-26 ENCOUNTER — HOSPITAL ENCOUNTER (EMERGENCY)
Facility: HOSPITAL | Age: 68
Discharge: HOME | End: 2023-11-26
Attending: EMERGENCY MEDICINE
Payer: MEDICARE

## 2023-11-26 VITALS
HEART RATE: 75 BPM | TEMPERATURE: 97.8 F | DIASTOLIC BLOOD PRESSURE: 72 MMHG | RESPIRATION RATE: 18 BRPM | OXYGEN SATURATION: 98 % | SYSTOLIC BLOOD PRESSURE: 166 MMHG

## 2023-11-26 DIAGNOSIS — M79.605 LEFT LEG PAIN: Primary | ICD-10-CM

## 2023-11-26 PROCEDURE — 99284 EMERGENCY DEPT VISIT MOD MDM: CPT

## 2023-11-26 PROCEDURE — 99284 EMERGENCY DEPT VISIT MOD MDM: CPT | Performed by: EMERGENCY MEDICINE

## 2023-11-26 PROCEDURE — 93971 EXTREMITY STUDY: CPT

## 2023-11-26 NOTE — ED PROVIDER NOTES
History  Chief Complaint   Patient presents with    Leg Pain     Pt reports left lower DVT in leg chronic since march, had a cramp in leg yesterday overnight, ambulatory in triage       Leg Pain  Associated symptoms: no fever      45-year-old female presenting to emergency department due to left calf pain. Cramping in the leg yesterday. Concerned she might have a blood clot. Has chronic clot and on blood thinner. No chest pain. Breathing normally. No lightness or dizziness. No trauma. Prior to Admission Medications   Prescriptions Last Dose Informant Patient Reported? Taking?    EPINEPHrine (EPIPEN) 0.3 mg/0.3 mL SOAJ  Self Yes No   Sig: Inject 0.3 mg into a muscle   Fluticasone-Salmeterol (Advair) 500-50 mcg/dose inhaler  Self No No   Sig: Inhale 1 puff every 12 (twelve) hours   Timolol Maleate PF (Timolol Maleate Ocudose) 0.5 % SOLN  Self Yes No   albuterol (PROVENTIL HFA,VENTOLIN HFA) 90 mcg/act inhaler  Self No No   Sig: Inhale 2 puffs every 6 (six) hours as needed for wheezing   apixaban (Eliquis) 2.5 mg  Self No No   Sig: Take 1 tablet (2.5 mg total) by mouth 2 (two) times a day   azelastine (ASTELIN) 0.1 % nasal spray  Self No No   Si sprays into each nostril 2 (two) times a day Use in each nostril as directed   bimatoprost (LUMIGAN) 0.01 % ophthalmic drops  Self Yes No   diltiazem (TIAZAC) 180 MG 24 hr capsule  Self Yes No   Sig: Take 180 mg by mouth 2 (two) times a day   dulaglutide (Trulicity) 3 QJ/5.7WV injection  Self No No   Sig: Inject 0.5 mL (3 mg total) under the skin every 7 days   famotidine (PEPCID) 20 mg tablet  Self Yes No   Sig: Take 20 mg by mouth   fluticasone (FLONASE) 50 mcg/act nasal spray  Self Yes No   Si spray into each nostril   furosemide (LASIX) 20 mg tablet  Self Yes No   Sig: Take 20 mg by mouth 2 (two) times a day as needed   ipratropium (ATROVENT) 0.03 % nasal spray   No No   Sig: SPRAY 2 SPRAYS INTO EACH NOSTRIL EVERY 12 HOURS.   loratadine (CLARITIN) 10 mg tablet  Self Yes No   Sig: Take 10 mg by mouth daily   metFORMIN (GLUCOPHAGE) 500 mg tablet  Self No No   Si tablet each morning and 2 tablets each evening   omeprazole (PriLOSEC) 40 MG capsule  Self Yes No   Sig: Take 40 mg by mouth   polyethylene glycol (GOLYTELY) 4000 mL solution  Self No No   Sig: Take as directed by the office for colonoscopy. potassium chloride (Klor-Con) 10 mEq tablet  Self Yes No   Sig: Take 20 mEq by mouth 2 (two) times a day   rosuvastatin (CRESTOR) 10 MG tablet  Self Yes No   Sig: Take 10 mg by mouth      Facility-Administered Medications: None       Past Medical History:   Diagnosis Date    Allergic     Arthritis     Asthma     Blindness of left eye     COPD (chronic obstructive pulmonary disease) (HCC)     Diabetes mellitus (HCC)     GERD (gastroesophageal reflux disease)     Hypertension     Obesity     Visual impairment        Past Surgical History:   Procedure Laterality Date     SECTION  10/16/1990    Fibroids    COLONOSCOPY      EYE SURGERY      MULTIPLE    HYSTERECTOMY      REPLACEMENT TOTAL KNEE Right        Family History   Problem Relation Age of Onset    Dementia Mother     Arthritis Mother         Late in life    Hypertension Father         HBP    Heart disease Father         HBP    Diabetes Father         managed through diet    Hearing loss Father         Industrial/construction work with no hearing protection    Glaucoma Paternal Grandmother      I have reviewed and agree with the history as documented. E-Cigarette/Vaping    E-Cigarette Use Never User      E-Cigarette/Vaping Substances    Nicotine No     THC No     CBD No     Flavoring No     Other No     Unknown No      Social History     Tobacco Use    Smoking status: Never    Smokeless tobacco: Never   Vaping Use    Vaping Use: Never used   Substance Use Topics    Alcohol use:  Yes     Alcohol/week: 1.0 standard drink of alcohol     Types: 1 Standard drinks or equivalent per week     Comment: on occasion, 1 drink with low sugar content    Drug use: Never       Review of Systems   Constitutional:  Negative for chills, diaphoresis and fever. HENT:  Negative for congestion and sore throat. Respiratory:  Negative for cough, shortness of breath, wheezing and stridor. Cardiovascular:  Negative for chest pain, palpitations and leg swelling. Gastrointestinal:  Negative for abdominal pain, blood in stool, diarrhea, nausea and vomiting. Genitourinary:  Negative for dysuria, frequency and urgency. Musculoskeletal:  Negative for neck stiffness. Skin:  Negative for pallor and rash. Neurological:  Negative for dizziness, syncope, weakness, light-headedness and headaches. All other systems reviewed and are negative. Physical Exam  Physical Exam  Vitals reviewed. Constitutional:       Appearance: Normal appearance. She is well-developed. HENT:      Head: Normocephalic and atraumatic. Eyes:      Extraocular Movements: Extraocular movements intact. Pupils: Pupils are equal, round, and reactive to light. Cardiovascular:      Rate and Rhythm: Normal rate and regular rhythm. Heart sounds: Normal heart sounds. Pulmonary:      Effort: Pulmonary effort is normal. No respiratory distress. Breath sounds: Normal breath sounds. Musculoskeletal:         General: No swelling or tenderness. Normal range of motion. Cervical back: Normal range of motion and neck supple. Skin:     General: Skin is warm and dry. Capillary Refill: Capillary refill takes less than 2 seconds. Neurological:      General: No focal deficit present. Mental Status: She is alert and oriented to person, place, and time.          Vital Signs  ED Triage Vitals   Temperature Pulse Respirations Blood Pressure SpO2   11/26/23 1116 11/26/23 1114 11/26/23 1114 11/26/23 1114 11/26/23 1114   97.8 °F (36.6 °C) 75 18 166/72 98 %      Temp Source Heart Rate Source Patient Position - Orthostatic VS BP Location FiO2 (%)   11/26/23 1116 11/26/23 1114 11/26/23 1114 11/26/23 1114 --   Oral Monitor Sitting Right arm       Pain Score       11/26/23 1114       2           Vitals:    11/26/23 1114   BP: 166/72   Pulse: 75   Patient Position - Orthostatic VS: Sitting         Visual Acuity      ED Medications  Medications - No data to display    Diagnostic Studies  Results Reviewed       None                   VAS lower limb venous duplex study, unilateral/limited    (Results Pending)              Procedures  Procedures         ED Course  ED Course as of 11/26/23 1738   Sun Nov 26, 2023   1243 negative for acute DVT/SVT in the left LE. Chronic thrombus noted in 1 peroneal v consistent w history. Medical Decision Making  28-year-old with leg pain, likely musculoskeletal in nature/cramps, will get ultrasound to eval for DVT. Disposition  Final diagnoses:   Left leg pain     Time reflects when diagnosis was documented in both MDM as applicable and the Disposition within this note       Time User Action Codes Description Comment    11/26/2023 12:41 PM Celio Rose Add [C89.496] Left leg pain           ED Disposition       ED Disposition   Discharge    Condition   Stable    Date/Time   Sun Nov 26, 2023 12:41 PM    Comment   Kate Farris discharge to home/self care.                    Follow-up Information       Follow up With Specialties Details Why Contact Info Additional Information    Nery Barcenas MD Internal Medicine Schedule an appointment as soon as possible for a visit   [address] [place] [address] [phone]       [address] [place] [address] Emergency Department Emergency Medicine  As needed, If symptoms worsen [address] [facility], [place], [place], [address]            Discharge Medication List as of 11/26/2023 12:41 PM CONTINUE these medications which have NOT CHANGED    Details   albuterol (PROVENTIL HFA,VENTOLIN HFA) 90 mcg/act inhaler Inhale 2 puffs every 6 (six) hours as needed for wheezing, Starting Thu 6/8/2023, Normal      apixaban (Eliquis) 2.5 mg Take 1 tablet (2.5 mg total) by mouth 2 (two) times a day, Starting Tue 9/12/2023, Normal      azelastine (ASTELIN) 0.1 % nasal spray 2 sprays into each nostril 2 (two) times a day Use in each nostril as directed, Starting Tue 9/19/2023, Normal      bimatoprost (LUMIGAN) 0.01 % ophthalmic drops Historical Med      diltiazem (TIAZAC) 180 MG 24 hr capsule Take 180 mg by mouth 2 (two) times a day, Historical Med      dulaglutide (Trulicity) 3 BALAJI/4.0QE injection Inject 0.5 mL (3 mg total) under the skin every 7 days, Starting Tue 9/12/2023, Normal      EPINEPHrine (EPIPEN) 0.3 mg/0.3 mL SOAJ Inject 0.3 mg into a muscle, Historical Med      famotidine (PEPCID) 20 mg tablet Take 20 mg by mouth, Historical Med      fluticasone (FLONASE) 50 mcg/act nasal spray 1 spray into each nostril, Historical Med      Fluticasone-Salmeterol (Advair) 500-50 mcg/dose inhaler Inhale 1 puff every 12 (twelve) hours, Starting Mon 6/19/2023, Normal      furosemide (LASIX) 20 mg tablet Take 20 mg by mouth 2 (two) times a day as needed, Historical Med      ipratropium (ATROVENT) 0.03 % nasal spray SPRAY 2 SPRAYS INTO EACH NOSTRIL EVERY 12 HOURS., Starting Thu 10/12/2023, Normal      loratadine (CLARITIN) 10 mg tablet Take 10 mg by mouth daily, Historical Med      metFORMIN (GLUCOPHAGE) 500 mg tablet 1 tablet each morning and 2 tablets each evening, Normal      omeprazole (PriLOSEC) 40 MG capsule Take 40 mg by mouth, Historical Med      polyethylene glycol (GOLYTELY) 4000 mL solution Take as directed by the office for colonoscopy., Normal      potassium chloride (Klor-Con) 10 mEq tablet Take 20 mEq by mouth 2 (two) times a day, Historical Med      rosuvastatin (CRESTOR) 10 MG tablet Take 10 mg by mouth, Historical Med      Timolol Maleate PF (Timolol Maleate Ocudose) 0.5 % SOLN Historical Med             No discharge procedures on file.     PDMP Review       None            ED Provider  Electronically Signed by             Kandy Morales MD  11/26/23 7684

## 2023-11-27 PROCEDURE — 93971 EXTREMITY STUDY: CPT | Performed by: SURGERY

## 2023-11-28 ENCOUNTER — OFFICE VISIT (OUTPATIENT)
Dept: PHYSICAL THERAPY | Facility: CLINIC | Age: 68
End: 2023-11-28
Payer: MEDICARE

## 2023-11-28 DIAGNOSIS — R26.2 DIFFICULTY WALKING: ICD-10-CM

## 2023-11-28 DIAGNOSIS — M17.12 PRIMARY OSTEOARTHRITIS OF LEFT KNEE: ICD-10-CM

## 2023-11-28 DIAGNOSIS — M25.562 LEFT KNEE PAIN, UNSPECIFIED CHRONICITY: Primary | ICD-10-CM

## 2023-11-28 PROCEDURE — 97110 THERAPEUTIC EXERCISES: CPT

## 2023-11-28 PROCEDURE — 97140 MANUAL THERAPY 1/> REGIONS: CPT

## 2023-11-28 NOTE — PROGRESS NOTES
Daily Note     Today's date: 2023  Patient name: Mable Mcintosh  : 1955  MRN: [id number]  Referring provider: Saima Reich MD  Dx:   Encounter Diagnosis     ICD-10-CM    1. Left knee pain, unspecified chronicity  M25.562       2. Primary osteoarthritis of left knee  M17.12       3. Difficulty walking  R26.2           Start Time: 1100  Stop Time: 1142  Total time in clinic (min): 42 minutes    Subjective: Pt reported that she went to the ER over the weekend for a potential blood clot but everything was negative. Pt said that she calf was still sore. Objective: See treatment diary below      Assessment: Tolerated treatment well. Pt's program started with the [facility]. Pt demonstrated decreased difficulty with step ups. Manual therapy focused on increasing knee PROM and increasing gastroc/HS flexibility. MHP was utilized post-treatment. Patient would benefit from continued PT      Plan: Continue per plan of care. Goals: Patient's goal is to decrease pain with ADLs    Precautions: Previous LLE Blood Clot, L eye blindness  Dx: L Knee OA    Daily Treatment Diary   Goals: Patient's goal is to decrease pain with ADLs    Precautions: Previous LLE Blood Clot, L eye blindness  Dx: L Knee OA    Daily Treatment Diary     Manuals     L Knee PROM MS MS MS MS MS MS   L gastroc/HS str MS MS MS MS MS MS   L patella mobs                  Ther Ex         Quad Set         Gastroc Str 30"x3 30"x3 30"x3 30"x3 30"x3 30"x3   SLR         Heel Slide                  Standing hip abd/ext 10x2 each 20 x each 20x each 20x each 20x each 20x each   Standing knee flexion  10x2 10x2 10x2 10x2 10x2 10x2   Leg press         R.  Bike (rocking) 5' 5' 5' 5' 5' 5'   Pt Edu  POC       Neuro Re-ed         TKE with TB     Grn 5"x10 Grn, 5"x10                                       Ther Activity         minisquat 10x2 10x2 10x2 10x2 10x2 10x2   Step up  L1, 20x L2, 15x L1, 15x 15x, L1 10x, L2 with cane 10x2, L2 with cane   HR on step L1, 20 L1, 10x2 L1, 10x2 20x no step 20x no step 20x no step   Gait Training                           Modalities         Ice  10' MHP 10' MHP 10' MHP 10' MHP 10'

## 2023-11-30 ENCOUNTER — OFFICE VISIT (OUTPATIENT)
Dept: PHYSICAL THERAPY | Facility: CLINIC | Age: 68
End: 2023-11-30
Payer: MEDICARE

## 2023-11-30 DIAGNOSIS — R26.2 DIFFICULTY WALKING: ICD-10-CM

## 2023-11-30 DIAGNOSIS — M17.12 PRIMARY OSTEOARTHRITIS OF LEFT KNEE: ICD-10-CM

## 2023-11-30 DIAGNOSIS — M25.562 LEFT KNEE PAIN, UNSPECIFIED CHRONICITY: Primary | ICD-10-CM

## 2023-11-30 PROCEDURE — 97140 MANUAL THERAPY 1/> REGIONS: CPT

## 2023-11-30 PROCEDURE — 97110 THERAPEUTIC EXERCISES: CPT

## 2023-11-30 NOTE — PROGRESS NOTES
Daily Note     Today's date: 2023  Patient name: Edwin Rodriguez  : 1955  MRN: [id number]  Referring provider: Oriana Hutchinson MD  Dx:   Encounter Diagnosis     ICD-10-CM    1. Left knee pain, unspecified chronicity  M25.562       2. Primary osteoarthritis of left knee  M17.12       3. Difficulty walking  R26.2           Start Time: 1100  Stop Time: 1140  Total time in clinic (min): 40 minutes    Subjective: Pt reported that she has not fallen but has been having difficulty with her balance. Pt also noted that her knee pain was 2/10. Objective: See treatment diary below      Assessment: Tolerated treatment well. Pt's program started with the [facility] [place]. Pt tolerated increased reps with TKE well. MHP was utilized post-treatment. Patient would benefit from continued PT      Plan: Continue per plan of care. Goals: Patient's goal is to decrease pain with ADLs    Precautions: Previous LLE Blood Clot, L eye blindness  Dx: L Knee OA    Daily Treatment Diary   Goals: Patient's goal is to decrease pain with ADLs    Precautions: Previous LLE Blood Clot, L eye blindness  Dx: L Knee OA    Daily Treatment Diary     Manuals     L Knee PROM MS  MS MS MS MS   L gastroc/HS str MS  MS MS MS MS   L patella mobs                  Ther Ex         Quad Set         Gastroc Str 30"x3  30"x3 30"x3 30"x3 30"x3   SLR         Heel Slide                  Standing hip abd/ext 20x each  20x each 20x each 20x each 20x each   Standing knee flexion  10x2  10x2 10x2 10x2 10x2   Leg press         R.  Bike (rocking) 5'  5' 5' 5' 5'   Pt Edu         Neuro Re-ed         TKE with TB Grn, 5"x20    Grn 5"x10 Grn, 5"x10                                       Ther Activity         minisquat 10x2  10x2 10x2 10x2 10x2   Step up  L2, 10x2   With cane  L1, 15x 15x, L1 10x, L2 with cane 10x2, L2 with cane   HR on step 20x  L1, 10x2 20x no step 20x no step 20x no step   Ball squats         Gait Training Modalities         Ice 10x2  MHP 10' MHP 10' MHP 10' MHP 10'

## 2023-12-05 ENCOUNTER — OFFICE VISIT (OUTPATIENT)
Dept: PHYSICAL THERAPY | Facility: CLINIC | Age: 68
End: 2023-12-05
Payer: MEDICARE

## 2023-12-05 DIAGNOSIS — M25.562 LEFT KNEE PAIN, UNSPECIFIED CHRONICITY: Primary | ICD-10-CM

## 2023-12-05 DIAGNOSIS — R26.2 DIFFICULTY WALKING: ICD-10-CM

## 2023-12-05 DIAGNOSIS — M17.12 PRIMARY OSTEOARTHRITIS OF LEFT KNEE: ICD-10-CM

## 2023-12-05 PROCEDURE — 97110 THERAPEUTIC EXERCISES: CPT

## 2023-12-05 PROCEDURE — 97530 THERAPEUTIC ACTIVITIES: CPT

## 2023-12-05 NOTE — PROGRESS NOTES
Daily Note     Today's date: 2023  Patient name: Amado Medeiros  : 1955  MRN: [id number]  Referring provider: Javon Contreras MD  Dx:   Encounter Diagnosis     ICD-10-CM    1. Left knee pain, unspecified chronicity  M25.562       2. Primary osteoarthritis of left knee  M17.12       3. Difficulty walking  R26.2           Start Time: 1405  Stop Time: 0616  Total time in clinic (min): 37 minutes    Subjective: Pt reported that her knee pain was 2/10 today. Objective: See treatment diary below      Assessment: Tolerated treatment well. Pt's program started with the [place]. HS stretch was added due to pt's c/o increased HS pain/tightness. Pt required supervision assist during standing exercises. MHP was utilized. Patient would benefit from continued PT      Plan: Continue per plan of care. Goals: Patient's goal is to decrease pain with ADLs    Precautions: Previous LLE Blood Clot, L eye blindness  Dx: L Knee OA    Daily Treatment Diary   Goals: Patient's goal is to decrease pain with ADLs    Precautions: Previous LLE Blood Clot, L eye blindness  Dx: L Knee OA    Daily Treatment Diary     Manuals     L Knee PROM MS NT MS MS MS MS   L gastroc/HS str MS NT MS MS MS MS   L patella mobs                  Ther Ex         Quad Set         Gastroc Str 30"x3 30"x3 30"x3 30"x3 30"x3 30"x3   SLR         Heel Slide         Seated HS str  20"x3       Standing hip abd/ext 20x each 20x each 20x each 20x each 20x each 20x each   Standing knee flexion  10x2 10x2 10x2 10x2 10x2 10x2   Leg press         R.  Bike (rocking) 5' 5' 5' 5' 5' 5'   Pt Edu         Neuro Re-ed         TKE with TB Grn, 5"x20 Grn, 5"x10   Grn 5"x10 Grn, 5"x10                                       Ther Activity         minisquat 10x2 10x2 10x2 10x2 10x2 10x2   Step up  L2, 10x2   With cane L2 10x2 L1, 15x 15x, L1 10x, L2 with cane 10x2, L2 with cane   HR on step 20x L1, 10x2 L1, 10x2 20x no step 20x no step 20x no step   Ball squats         Gait Training                           Modalities         Ice 10x2 10' [place] 10' MHP 10' MHP 10' MHP 10'

## 2023-12-07 ENCOUNTER — APPOINTMENT (OUTPATIENT)
Dept: PHYSICAL THERAPY | Facility: CLINIC | Age: 68
End: 2023-12-07
Payer: MEDICARE

## 2023-12-07 DIAGNOSIS — I82.452 ACUTE DEEP VEIN THROMBOSIS (DVT) OF LEFT PERONEAL VEIN (HCC): ICD-10-CM

## 2023-12-08 ENCOUNTER — EVALUATION (OUTPATIENT)
Dept: PHYSICAL THERAPY | Facility: CLINIC | Age: 68
End: 2023-12-08
Payer: MEDICARE

## 2023-12-08 DIAGNOSIS — M25.562 LEFT KNEE PAIN, UNSPECIFIED CHRONICITY: Primary | ICD-10-CM

## 2023-12-08 DIAGNOSIS — R26.2 DIFFICULTY WALKING: ICD-10-CM

## 2023-12-08 DIAGNOSIS — M17.12 PRIMARY OSTEOARTHRITIS OF LEFT KNEE: ICD-10-CM

## 2023-12-08 PROCEDURE — 97110 THERAPEUTIC EXERCISES: CPT

## 2023-12-08 NOTE — PROGRESS NOTES
Re-Evaluation     Today's date: 2023  Patient name: Edwin Rodriguez  : 1955  MRN: [id number]  Referring provider: Oriana Hutchinson MD  Dx:   Encounter Diagnosis     ICD-10-CM    1. Left knee pain, unspecified chronicity  M25.562       2. Primary osteoarthritis of left knee  M17.12       3. Difficulty walking  R26.2           Start Time: 1030  Stop Time: 1110  Total time in clinic (min): 40 minutes    History of Present Illness  : Pt reported that her pain was 2-3/10. Pt mentioned that her sx range between 2-7/10 and also reported a subjective improvement percentage of 60%. IE: Patient presents with c/o L knee pain. Pt reported that her sx started years ago. Pt reported having imaging showing L knee OA. Pt reported that her sx increase with walking and going up/down the steps. Pt also reported that her sx decrease with injections. Pt has a PMH of L retina surgeries, L knee arthroscopic surgery, and a R TKA. Pt also mention that she had a fall in , which led her to fx 2 ribs and her R orbital bone. Pt also mentioned that she was dx'd with a DVT on 2023. Pt has been taking medication and was cleared to perform skilled PT. Occupation: retired     Pain  At best pain ratin/10  At worst pain ratin/10  Location: L knee    Social Support  Lives with her  in a 1 story with 1 FF with railings and a walk-in shower with seat. Patient Goals  Patient goals for therapy: decrease pain with ADLs    STGs  1. Decrease pain by 20% in 2-4 weeks to improve standing tolerance. -Met  2. Improve L knee ROM by 10 degrees in 2-4 weeks. -Met  3. (I) with HEP within 2 weeks in order to improve PT outcomes. -Met     LTGs  1. Decrease pain by 60% in 6-8 weeks.-Partially Met  2. Improve walking tolerance to >30 minutes in 6-8 weeks to perform community ambulation. (Partially Met, 20 min)  3. Increase L knee AROM WNL and LLE MMT 5/5 within 6-8 weeks. -Not Met  4.  Improve stairs tolerance to 1 flight  in 8 weeks. -Not Met  5. Improve FOTO to greater than or equal to 48. -Met      Objective Measurements:    Lumbar ROM R L Strength knee R L   Flex    Flex 4+ 4+   Ext   Ext 4+ 4   SB        Rot                Hip A/PROM        Flex  /  / Flex 4+ 4   ER at 90   ER     IR at 90   IR     Abd   Abd     Ext   Ext             Knee A/PROM   Ankle     Flex 115 deg 114 deg DF     Ext - 4 deg -2 deg PF       TU sec  Romberg Balance: NT  Tandem Balance: NT  SLS: NT    Assessment:    Kate Farris is a pleasant 79 y.o. female who has attended 15 visits of skilled PT for L knee AROM. Pt demonstrated fair progress toward goals and reported a subjective improvement percentage of 60%. Pt demonstrated improvements in L knee ROM and LE strength, but still demonstrates deficits in these categories. Pt scored a 48 on FOTO. Pt's program was modified due to re-evaluation being performed. MHP was utilized post-treatment. Pt would benefit from further skilled PT in order to decrease pain, address deficits (ROM/MMT), improve gait mechanics, help pt achieve goals, decrease fall risk, and progress program as tolerated. Thank you for the referral!    Plan  Patient would benefit from:Skilled physical therapy  Planned therapy interventions: manual therapy, neuromuscular re-education, stretching, strengthening, therapeutic activities, therapeutic exercise, patient education, home exercise program, modalities to decrease pain, and activity modification.     Frequency: 2x week  Duration in weeks: 8  Treatment plan discussed with: patient        Goals: Patient's goal is to decrease pain with ADLs    Precautions: Previous LLE Blood Clot, L eye blindness  Dx: L Knee OA    Daily Treatment Diary   Goals: Patient's goal is to decrease pain with ADLs    Precautions: Previous LLE Blood Clot, L eye blindness  Dx: L Knee OA    Daily Treatment Diary     Manuals     L Knee PROM MS NT NT  MS MS   L gastroc/HS str MS NT NT  MS MS   L patella mobs                  Ther Ex         Quad Set         Gastroc Str 30"x3 30"x3 30"x3  30"x3 30"x3   SLR         Heel Slide   5"x10 (seated)      Seated HS str  20"x3       Standing hip abd/ext 20x each 20x each 20 each  20x each 20x each   Standing knee flexion  10x2 10x2 10x2  10x2 10x2   Leg press         R.  Bike (rocking) 5' 5' 5'  5' 5'   Pt Edu         Neuro Re-ed         TKE with TB Grn, 5"x20 Grn, 5"x10   Grn 5"x10 Grn, 5"x10                                       Ther Activity         minisquat 10x2 10x2 10x2  10x2 10x2   Step up  L2, 10x2   With cane L2 10x2   10x, L2 with cane 10x2, L2 with cane   HR on step 20x L1, 10x2   20x no step 20x no step   Ball squats         Gait Training                           Modalities         Ice 10x2 10' 10' P  MHP 10' P 10'

## 2023-12-11 RX ORDER — APIXABAN 2.5 MG/1
2.5 TABLET, FILM COATED ORAL 2 TIMES DAILY
Qty: 60 TABLET | Refills: 0 | Status: SHIPPED | OUTPATIENT
Start: 2023-12-11

## 2023-12-12 ENCOUNTER — APPOINTMENT (OUTPATIENT)
Dept: LAB | Facility: CLINIC | Age: 68
End: 2023-12-12
Payer: MEDICARE

## 2023-12-12 ENCOUNTER — OFFICE VISIT (OUTPATIENT)
Dept: PHYSICAL THERAPY | Facility: CLINIC | Age: 68
End: 2023-12-12
Payer: MEDICARE

## 2023-12-12 DIAGNOSIS — M25.562 LEFT KNEE PAIN, UNSPECIFIED CHRONICITY: Primary | ICD-10-CM

## 2023-12-12 DIAGNOSIS — R26.2 DIFFICULTY WALKING: ICD-10-CM

## 2023-12-12 DIAGNOSIS — E78.5 DYSLIPIDEMIA: Primary | ICD-10-CM

## 2023-12-12 DIAGNOSIS — M17.12 PRIMARY OSTEOARTHRITIS OF LEFT KNEE: ICD-10-CM

## 2023-12-12 LAB
ANION GAP SERPL CALCULATED.3IONS-SCNC: 5 MMOL/L
BUN SERPL-MCNC: 18 MG/DL (ref 5–25)
CALCIUM SERPL-MCNC: 9.1 MG/DL (ref 8.4–10.2)
CHLORIDE SERPL-SCNC: 104 MMOL/L (ref 96–108)
CHOLEST SERPL-MCNC: 219 MG/DL
CO2 SERPL-SCNC: 30 MMOL/L (ref 21–32)
CREAT SERPL-MCNC: 0.65 MG/DL (ref 0.6–1.3)
EST. AVERAGE GLUCOSE BLD GHB EST-MCNC: 151 MG/DL
GFR SERPL CREATININE-BSD FRML MDRD: 92 ML/MIN/1.73SQ M
GLUCOSE P FAST SERPL-MCNC: 111 MG/DL (ref 65–99)
HBA1C MFR BLD: 6.9 %
HDLC SERPL-MCNC: 45 MG/DL
LDLC SERPL CALC-MCNC: 130 MG/DL (ref 0–100)
NONHDLC SERPL-MCNC: 174 MG/DL
POTASSIUM SERPL-SCNC: 3.8 MMOL/L (ref 3.5–5.3)
SODIUM SERPL-SCNC: 139 MMOL/L (ref 135–147)
TRIGL SERPL-MCNC: 220 MG/DL

## 2023-12-12 PROCEDURE — 97530 THERAPEUTIC ACTIVITIES: CPT

## 2023-12-12 PROCEDURE — 97110 THERAPEUTIC EXERCISES: CPT

## 2023-12-12 NOTE — PROGRESS NOTES
Daily Note     Today's date: 2023  Patient name: Kareen Adams  : 1955  MRN: [id number]  Referring provider: Jame Gale MD  Dx:   Encounter Diagnosis     ICD-10-CM    1. Left knee pain, unspecified chronicity  M25.562       2. Primary osteoarthritis of left knee  M17.12       3. Difficulty walking  R26.2           Start Time: 1130  Stop Time: 1206  Total time in clinic (min): 36 minutes    Subjective: Pt reported that her knee was a "little cricity" this morning. Pt got blood taken today. Objective: See treatment diary below      Assessment: Tolerated treatment well. Pt's program started with the [employer] [place]. Program was modified due to fatigue level. MHP was utilized post-treatment. Patient would benefit from continued PT      Plan: Continue per plan of care. Goals: Patient's goal is to decrease pain with ADLs    Precautions: Previous LLE Blood Clot, L eye blindness  Dx: L Knee OA    Daily Treatment Diary   Goals: Patient's goal is to decrease pain with ADLs    Precautions: Previous LLE Blood Clot, L eye blindness  Dx: L Knee OA    Daily Treatment Diary     Manuals     L Knee PROM MS NT NT NT MS MS   L gastroc/HS str MS NT NT NT MS MS   L patella mobs                  Ther Ex         Quad Set         Gastroc Str 30"x3 30"x3 30"x3 30"x3 30"x3 30"x3   SLR         Heel Slide   5"x10 (seated)      Seated HS str  20"x3       Standing hip abd/ext 20x each 20x each 20 each 20each 20x each 20x each   Standing knee flexion  10x2 10x2 10x2 10x2 10x2 10x2   Leg press         R.  Bike (rocking) 5' 5' 5' 5' 5' 5'   Pt Edu         Neuro Re-ed         TKE with TB Grn, 5"x20 Grn, 5"x10  Grn, 5"x10 Grn 5"x10 Grn, 5"x10                                       Ther Activity         minisquat 10x2 10x2 10x2 10x2 10x2 10x2   Step up  L2, 10x2   With cane L2 10x2  10x L2 10x, L2 with cane 10x2, L2 with cane   HR on step 20x L1, 10x2  L1, 10x2 20x no step 20x no step   [place] squats         Gait Training                           Modalities         Ice 10x2 10' 10' MHP 10'MHP MHP 8' MHP 10'

## 2023-12-13 RX ORDER — ROSUVASTATIN CALCIUM 10 MG/1
10 TABLET, COATED ORAL DAILY
Qty: 90 TABLET | Refills: 3 | Status: SHIPPED | OUTPATIENT
Start: 2023-12-13

## 2023-12-14 ENCOUNTER — OFFICE VISIT (OUTPATIENT)
Dept: PHYSICAL THERAPY | Facility: CLINIC | Age: 68
End: 2023-12-14
Payer: MEDICARE

## 2023-12-14 DIAGNOSIS — M25.562 LEFT KNEE PAIN, UNSPECIFIED CHRONICITY: Primary | ICD-10-CM

## 2023-12-14 DIAGNOSIS — M17.12 PRIMARY OSTEOARTHRITIS OF LEFT KNEE: ICD-10-CM

## 2023-12-14 DIAGNOSIS — R26.2 DIFFICULTY WALKING: ICD-10-CM

## 2023-12-14 PROCEDURE — 97140 MANUAL THERAPY 1/> REGIONS: CPT | Performed by: PHYSICAL THERAPIST

## 2023-12-14 PROCEDURE — 97110 THERAPEUTIC EXERCISES: CPT | Performed by: PHYSICAL THERAPIST

## 2023-12-14 PROCEDURE — 97112 NEUROMUSCULAR REEDUCATION: CPT | Performed by: PHYSICAL THERAPIST

## 2023-12-14 NOTE — PROGRESS NOTES
Daily Note     Today's date: 2023  Patient name: Jory Kam  : 1955  MRN: [id number]  Referring provider: Elizabeth Klein MD  Dx:   Encounter Diagnosis     ICD-10-CM    1. Left knee pain, unspecified chronicity  M25.562       2. Primary osteoarthritis of left knee  M17.12       3. Difficulty walking  R26.2                      Subjective: Pt reports she walked quite a bit today and is feeling a bit tired due to this. Objective: See treatment diary below      Assessment: Tolerated treatment well. She had no pain t/o session but did have some limitation in calf stretching. Patient would benefit from continued PT      Plan: Continue per plan of care. Goals: Patient's goal is to decrease pain with ADLs    Precautions: Previous LLE Blood Clot, L eye blindness  Dx: L Knee OA    Daily Treatment Diary   Goals: Patient's goal is to decrease pain with ADLs    Precautions: Previous LLE Blood Clot, L eye blindness  Dx: L Knee OA    Daily Treatment Diary     Manuals     L Knee PROM MS NT NT NT WA MS   L gastroc/HS str MS NT NT NT WA MS   L patella mobs                  Ther Ex         Quad Set         Gastroc Str 30"x3 30"x3 30"x3 30"x3 30"x3 30"x3   SLR         Heel Slide   5"x10 (seated)      Seated HS str  20"x3       Standing hip abd/ext 20x each 20x each 20 each 20each 20x each 20x each   Standing knee flexion  10x2 10x2 10x2 10x2 10x2 10x2   Leg press         R.  Bike (rocking) 5' 5' 5' 5' 5' 5'   Pt Edu         Neuro Re-ed         TKE with TB Grn, 5"x20 Grn, 5"x10  Grn, 5"x10 Grn 5"x10 Grn, 5"x10                                       Ther Activity         minisquat 10x2 10x2 10x2 10x2 10x2 10x2   Step up  L2, 10x2   With cane L2 10x2  10x L2 10x, L2 with cane 10x2, L2 with cane   HR on step 20x L1, 10x2  L1, 10x2 20x no step 20x no step   Ball squats         Gait Training                           Modalities         Ice 10x2 10' 10' MHP 10'MHP MHP 10' MHP 10'

## 2023-12-19 ENCOUNTER — OFFICE VISIT (OUTPATIENT)
Dept: INTERNAL MEDICINE CLINIC | Facility: CLINIC | Age: 68
End: 2023-12-19
Payer: MEDICARE

## 2023-12-19 ENCOUNTER — OFFICE VISIT (OUTPATIENT)
Dept: PHYSICAL THERAPY | Facility: CLINIC | Age: 68
End: 2023-12-19
Payer: MEDICARE

## 2023-12-19 VITALS
DIASTOLIC BLOOD PRESSURE: 78 MMHG | RESPIRATION RATE: 16 BRPM | SYSTOLIC BLOOD PRESSURE: 130 MMHG | HEART RATE: 68 BPM | OXYGEN SATURATION: 98 % | WEIGHT: 250.6 LBS | HEIGHT: 63 IN | BODY MASS INDEX: 44.4 KG/M2

## 2023-12-19 DIAGNOSIS — I87.2 EDEMA OF BOTH LOWER EXTREMITIES DUE TO PERIPHERAL VENOUS INSUFFICIENCY: ICD-10-CM

## 2023-12-19 DIAGNOSIS — J45.40 MODERATE PERSISTENT ASTHMA WITHOUT COMPLICATION: ICD-10-CM

## 2023-12-19 DIAGNOSIS — M81.8 OTHER OSTEOPOROSIS WITHOUT CURRENT PATHOLOGICAL FRACTURE: ICD-10-CM

## 2023-12-19 DIAGNOSIS — E66.2 OBESITY HYPOVENTILATION SYNDROME (HCC): ICD-10-CM

## 2023-12-19 DIAGNOSIS — Z13.820 SCREENING FOR OSTEOPOROSIS: ICD-10-CM

## 2023-12-19 DIAGNOSIS — E78.5 DYSLIPIDEMIA: ICD-10-CM

## 2023-12-19 DIAGNOSIS — M17.12 PRIMARY OSTEOARTHRITIS OF LEFT KNEE: ICD-10-CM

## 2023-12-19 DIAGNOSIS — G47.33 OSA (OBSTRUCTIVE SLEEP APNEA): ICD-10-CM

## 2023-12-19 DIAGNOSIS — M17.0 PRIMARY OSTEOARTHRITIS OF BOTH KNEES: ICD-10-CM

## 2023-12-19 DIAGNOSIS — E11.65 TYPE 2 DIABETES MELLITUS WITH HYPERGLYCEMIA, WITHOUT LONG-TERM CURRENT USE OF INSULIN (HCC): Primary | ICD-10-CM

## 2023-12-19 DIAGNOSIS — E66.01 CLASS 2 SEVERE OBESITY WITH SERIOUS COMORBIDITY AND BODY MASS INDEX (BMI) OF 35.0 TO 35.9 IN ADULT, UNSPECIFIED OBESITY TYPE: ICD-10-CM

## 2023-12-19 DIAGNOSIS — M25.562 LEFT KNEE PAIN, UNSPECIFIED CHRONICITY: Primary | ICD-10-CM

## 2023-12-19 DIAGNOSIS — I82.551 CHRONIC DEEP VEIN THROMBOSIS (DVT) OF RIGHT PERONEAL VEIN (HCC): ICD-10-CM

## 2023-12-19 DIAGNOSIS — I10 PRIMARY HYPERTENSION: ICD-10-CM

## 2023-12-19 PROCEDURE — 97110 THERAPEUTIC EXERCISES: CPT

## 2023-12-19 PROCEDURE — 99214 OFFICE O/P EST MOD 30 MIN: CPT

## 2023-12-19 PROCEDURE — 97112 NEUROMUSCULAR REEDUCATION: CPT

## 2023-12-19 NOTE — PROGRESS NOTES
"Daily Note     Today's date: 2023  Patient name: Coretta Hines  : 1955  MRN: [id number]  Referring provider: Nafisa Duckworth MD  Dx: No diagnosis found.               Subjective: Patient states that she has noticed an improvement since starting therapy.       Objective: See treatment diary below      Assessment: Tolerated treatment well. Patient exhibited good technique with therapeutic exercises      Plan: Continue per plan of care.      Goals: Patient's goal is to decrease pain with ADLs    Precautions: Previous LLE Blood Clot, L eye blindness  Dx: L Knee OA    Daily Treatment Diary   Goals: Patient's goal is to decrease pain with ADLs    Precautions: Previous LLE Blood Clot, L eye blindness  Dx: L Knee OA    Daily Treatment Diary     Manuals     L Knee PROM MS NT NT NT WA KD   L gastroc/HS str MS NT NT NT WA KD   L patella mobs                  Ther Ex         Quad Set         Gastroc Str 30\"x3 30\"x3 30\"x3 30\"x3 30\"x3 30\"x 3   SLR         Heel Slide   5\"x10 (seated)      Seated HS str  20\"x3       Standing hip abd/ext 20x each 20x each 20 each 20each 20x each 20x   Standing knee flexion  10x2 10x2 10x2 10x2 10x2 2x10    Leg press         R. Bike (rocking) 5' 5' 5' 5' 5' 6   Pt Edu         Neuro Re-ed         TKE with TB Grn, 5\"x20 Grn, 5\"x10  Grn, 5\"x10 Grn 5\"x10 GTB 5\" x 10                                        Ther Activity         minisquat 10x2 10x2 10x2 10x2 10x2 10x2   Step up  L2, 10x2   With cane L2 10x2  10x L2 10x, L2 with cane L2 20x    HR on step 20x L1, 10x2  L1, 10x2 20x no step 20x   Ball squats         Gait Training                           Modalities         Ice 10x2 10' 10' MHP 10'MHP MHP 10' MHP 10'                                     "

## 2023-12-19 NOTE — ASSESSMENT & PLAN NOTE
Patient has history of chronic DVT currently on Eliquis 2.5 mg twice daily.  Was supposed to follow-up with vascular surgery however vascular surgeon was sick at that time.  Currently rescheduled for January 8  Possibly will switch to aspirin and discontinue Eliquis 2.5.  Will defer this to the vascular surgeon

## 2023-12-19 NOTE — PROGRESS NOTES
INTERNAL MEDICINE OFFICE VISIT  [facility] INTERNAL MEDICINE       Assessment/Plan:    1. Type 2 diabetes mellitus with hyperglycemia, without long-term current use of insulin ([facility])  CBC and differential    Comprehensive metabolic panel    Hemoglobin A1C      2. Primary hypertension  Ambulatory Referral to Cardiology      3. Dyslipidemia  Lipid panel      4. Class 2 severe obesity with serious comorbidity and body mass index (BMI) of 35.0 to 35.9 in adult, unspecified obesity type         5. Moderate persistent asthma without complication        6. Obesity hypoventilation syndrome (HCC)        7. DEN (obstructive sleep apnea)  Ambulatory Referral to Sleep Medicine      8. Chronic deep vein thrombosis (DVT) of right peroneal vein ([facility])        9. Edema of both lower extremities due to peripheral venous insufficiency  Ambulatory Referral to Cardiology      10. Primary osteoarthritis of both knees  DXA bone density spine hip and pelvis      11. Screening for osteoporosis        12. Other osteoporosis without current pathological fracture  DXA bone density spine hip and pelvis           DEN (obstructive sleep apnea)  Patient has history of obstructive sleep apnea  She has not been compliant with her CPAP machine.  She has 1 that is around 20 years old  According to her this was not comfortable for her  She was reassured that there are plenty of newer models that are different than the ones made 20 years ago  She agreed to revisit with sleep medicine      Type 2 diabetes mellitus with hyperglycemia, without long-term current use of insulin ([facility])    Lab Results   Component Value Date    HGBA1C 6.9 (H) 12/12/2023   Previous A1c is creeping up to 6.9  Patient admits that she has not been compliant with dulaglutide, occasionally takes every 1 and half months.  She says that she will start taking strictly every week.  She occasionally misses some doses of metformin.  She is currently on 500 and morning, 1000 mg at  night  Repeat HbA1c after 3 months  May need to go up on dulaglutide to 4.5 mg once a week in the future    Moderate persistent asthma without complication  Denies well-controlled  No recent exacerbations    Chronic deep vein thrombosis (DVT) of right peroneal vein (HCC)  Patient has history of chronic DVT currently on Eliquis 2.5 mg twice daily.  Was supposed to follow-up with vascular surgery however vascular surgeon was sick at that time.  Currently rescheduled for January 8  Possibly will switch to aspirin and discontinue Eliquis 2.5.  Will defer this to the vascular surgeon    Edema of both lower extremities due to peripheral venous insufficiency  Patient wants to follow-up with cardiology  Referral was sent.  She previously was following with a cardiologist    Primary hypertension  Blood pressure in the clinic is 130/79.  Well-controlled    Osteoarthritis  Patient is status post right knee replacement  Goal for her is to replace the left knee  She still needs to improve her HbA1c and lipid panel    Dyslipidemia  Lab Results   Component Value Date    CHOLESTEROL 219 (H) 12/12/2023    CHOLESTEROL 140 06/16/2023    LDLCALC 130 (H) 12/12/2023    LDLCALC 69 06/16/2023    HDL 45 (L) 12/12/2023    HDL 42 (L) 06/16/2023   Lipid panel was not controlled.  Her last LDL was 130  She claims that she was not compliant with her rosuvastatin for the past few months.  She was recently able to refill it and plans to take it regularly once daily  We also advised the patient to cut down on sweets and fatty foods       Subjective:      Patient ID: Coretta Hines is a 68 y.o. female.  Coming in for follow-up.  Patient is subjectively doing well and does not have any chest pain, abdominal pain, shortness of breath, cough, lower leg edema.  She does complain of some postnasal drip however this is well-controlled with her current ipratropium nasal spray.  We did go over her labs.  Unfortunately her HbA1c and LDL cholesterol has been  trending up.  She admits that she has not been very compliant with her medications.  She has also been uncontrolled with her diet eating a lot of sweets and hamburgers.  She is also living a very sedentary life and would sit around most of the day due to her joint pain.  Advised her to resume her medications particularly her rosuvastatin and her dulaglutide.  Continue taking her meds Zuly.  We will do repeat laboratory in the next 3 months.  She also previously saw sleep medicine and was prescribed a CPAP 20 years prior however has not been very compliant with this.  She would like to revisit this.  Referral was sent to her sleep medicine doctor.  She is also following up with vascular surgery for her DVT.  She is reminded that she needs to do osteoporosis screening with a DEXA scan.  This has been ordered previously however she was not able to do it.  Patient was reminded today.    Past Medical History:   Diagnosis Date    Allergic     Arthritis     Asthma     Blindness of left eye     COPD (chronic obstructive pulmonary disease) (HCC)     Diabetes mellitus (HCC)     GERD (gastroesophageal reflux disease)     Hypertension     Obesity     Visual impairment         Family History   Problem Relation Age of Onset    Dementia Mother     Arthritis Mother         Late in life    Hypertension Father         HBP    Heart disease Father         HBP    Diabetes Father         managed through diet    Hearing loss Father         Industrial/construction work with no hearing protection    Glaucoma Paternal Grandmother         Social History     Socioeconomic History    Marital status: /Civil Union     Spouse name: Not on file    Number of children: Not on file    Years of education: Not on file    Highest education level: Not on file   Occupational History    Not on file   Tobacco Use    Smoking status: Never    Smokeless tobacco: Never   Vaping Use    Vaping status: Never Used   Substance and Sexual Activity    Alcohol  use: Yes     Alcohol/week: 1.0 standard drink of alcohol     Types: 1 Standard drinks or equivalent per week     Comment: on occasion, 1 drink with low sugar content    Drug use: Never    Sexual activity: Not on file   Other Topics Concern    Not on file   Social History Narrative    Not on file     Social Determinants of Health     Financial Resource Strain: Low Risk  (9/17/2023)    Overall Financial Resource Strain (CARDIA)     Difficulty of Paying Living Expenses: Not hard at all   Food Insecurity: No Food Insecurity (9/13/2022)    Received from [facility]    Food Insecurity     Within the past 12 months, you worried that your food would run out before you got the money to buy more.: Never true     Within the past 12 months, the food you bought just didn't last and you didn't have money to get more.: Never true     Within the past 12 months, you worried that your food would run out before you got the money to buy more.: Never true   Transportation Needs: No Transportation Needs (9/17/2023)    PRAPARE - Transportation     Lack of Transportation (Medical): No     Lack of Transportation (Non-Medical): No   Physical Activity: Not on file   Stress: Not on file   Social Connections: Not on file   Intimate Partner Violence: Not on file   Housing Stability: Not on file        Allergies   Allergen Reactions    Other Nausea Only, Vomiting and Cough     LOBSTER    Penicillins Seizures    Sulfa Antibiotics Rash        Current Outpatient Medications   Medication Sig Dispense Refill    albuterol (PROVENTIL HFA,VENTOLIN HFA) 90 mcg/act inhaler Inhale 2 puffs every 6 (six) hours as needed for wheezing 8.5 g 2    apixaban (Eliquis) 2.5 mg TAKE 1 TABLET BY MOUTH TWICE A DAY 60 tablet 0    azelastine (ASTELIN) 0.1 % nasal spray 2 sprays into each nostril 2 (two) times a day Use in each nostril as directed 1 mL 2    bimatoprost (LUMIGAN) 0.01 % ophthalmic drops       diltiazem (TIAZAC) 180 MG 24 hr capsule Take 180 mg by mouth 2  (two) times a day      dulaglutide (Trulicity) 3 MG/0.5ML injection Inject 0.5 mL (3 mg total) under the skin every 7 days 6 mL 1    EPINEPHrine (EPIPEN) 0.3 mg/0.3 mL SOAJ Inject 0.3 mg into a muscle      famotidine (PEPCID) 20 mg tablet Take 20 mg by mouth      fluticasone (FLONASE) 50 mcg/act nasal spray 1 spray into each nostril      Fluticasone-Salmeterol (Advair) 500-50 mcg/dose inhaler Inhale 1 puff every 12 (twelve) hours 180 blister 3    furosemide (LASIX) 20 mg tablet Take 20 mg by mouth 2 (two) times a day as needed      ipratropium (ATROVENT) 0.03 % nasal spray SPRAY 2 SPRAYS INTO EACH NOSTRIL EVERY 12 HOURS. 30 mL 1    loratadine (CLARITIN) 10 mg tablet Take 10 mg by mouth daily      metFORMIN (GLUCOPHAGE) 500 mg tablet 1 tablet each morning and 2 tablets each evening 270 tablet 3    omeprazole (PriLOSEC) 40 MG capsule Take 40 mg by mouth      polyethylene glycol (GOLYTELY) 4000 mL solution Take as directed by the office for colonoscopy. 4000 mL 0    potassium chloride (Klor-Con) 10 mEq tablet Take 20 mEq by mouth 2 (two) times a day      rosuvastatin (CRESTOR) 10 MG tablet Take 1 tablet (10 mg total) by mouth daily 90 tablet 3    Timolol Maleate PF (Timolol Maleate Ocudose) 0.5 % SOLN        No current facility-administered medications for this visit.          Review of Systems   Constitutional:  Negative for chills and fever.   HENT:  Negative for ear pain and sore throat.    Eyes:  Negative for pain and visual disturbance.   Respiratory:  Negative for cough and shortness of breath.    Cardiovascular:  Negative for chest pain and palpitations.   Gastrointestinal:  Negative for abdominal pain and vomiting.   Genitourinary:  Negative for dysuria and hematuria.   Musculoskeletal:  Negative for arthralgias and back pain.   Skin:  Negative for color change and rash.   Neurological:  Negative for seizures and syncope.   All other systems reviewed and are negative.        Objective:      /78 (BP  "Location: Left arm, Patient Position: Sitting, Cuff Size: Standard)   Pulse 68   Resp 16   Ht 5' 3\" (1.6 m)   Wt 114 kg (250 lb 9.6 oz)   SpO2 98%   BMI 44.39 kg/m²      Wt Readings from Last 3 Encounters:   12/19/23 114 kg (250 lb 9.6 oz)   11/10/23 95.3 kg (210 lb)   11/03/23 95.3 kg (210 lb)     Temp Readings from Last 3 Encounters:   11/26/23 97.8 °F (36.6 °C) (Oral)   09/19/23 98.2 °F (36.8 °C)   09/05/23 98.6 °F (37 °C) (Tympanic)     BP Readings from Last 3 Encounters:   12/19/23 130/78   11/26/23 166/72   11/10/23 134/77     Pulse Readings from Last 3 Encounters:   12/19/23 68   11/26/23 75   11/10/23 85         Physical Exam  Vitals and nursing note reviewed.   Constitutional:       General: She is not in acute distress.     Appearance: She is well-developed.   HENT:      Head: Normocephalic and atraumatic.      Nose: Nose normal.      Mouth/Throat:      Mouth: Mucous membranes are moist.   Eyes:      Conjunctiva/sclera: Conjunctivae normal.   Cardiovascular:      Rate and Rhythm: Normal rate and regular rhythm.      Heart sounds: No murmur heard.  Pulmonary:      Effort: Pulmonary effort is normal. No respiratory distress.      Breath sounds: Normal breath sounds.   Abdominal:      Palpations: Abdomen is soft.      Tenderness: There is no abdominal tenderness.   Musculoskeletal:      Cervical back: Neck supple.      Right lower leg: No edema.      Left lower leg: No edema.   Skin:     General: Skin is warm and dry.      Capillary Refill: Capillary refill takes less than 2 seconds.   Neurological:      General: No focal deficit present.      Mental Status: She is alert and oriented to person, place, and time.   Psychiatric:         Mood and Affect: Mood normal.          I have reviewed pertinent labs:                  "

## 2023-12-19 NOTE — ASSESSMENT & PLAN NOTE
Lab Results   Component Value Date    HGBA1C 6.9 (H) 12/12/2023   Previous A1c is creeping up to 6.9  Patient admits that she has not been compliant with dulaglutide, occasionally takes every 1 and half months.  She says that she will start taking strictly every week.  She occasionally misses some doses of metformin.  She is currently on 500 and morning, 1000 mg at night  Repeat HbA1c after 3 months  May need to go up on dulaglutide to 4.5 mg once a week in the future

## 2023-12-19 NOTE — ASSESSMENT & PLAN NOTE
Patient is status post right knee replacement  Goal for her is to replace the left knee  She still needs to improve her HbA1c and lipid panel

## 2023-12-19 NOTE — ASSESSMENT & PLAN NOTE
Lab Results   Component Value Date    CHOLESTEROL 219 (H) 12/12/2023    CHOLESTEROL 140 06/16/2023    LDLCALC 130 (H) 12/12/2023    LDLCALC 69 06/16/2023    HDL 45 (L) 12/12/2023    HDL 42 (L) 06/16/2023   Lipid panel was not controlled.  Her last LDL was 130  She claims that she was not compliant with her rosuvastatin for the past few months.  She was recently able to refill it and plans to take it regularly once daily  We also advised the patient to cut down on sweets and fatty foods

## 2023-12-19 NOTE — ASSESSMENT & PLAN NOTE
Patient has history of obstructive sleep apnea  She has not been compliant with her CPAP machine.  She has 1 that is around 20 years old  According to her this was not comfortable for her  She was reassured that there are plenty of newer models that are different than the ones made 20 years ago  She agreed to revisit with sleep medicine

## 2023-12-19 NOTE — ASSESSMENT & PLAN NOTE
Patient wants to follow-up with cardiology  Referral was sent.  She previously was following with a cardiologist

## 2023-12-20 DIAGNOSIS — J30.2 SEASONAL ALLERGIC RHINITIS DUE TO FUNGAL SPORES: ICD-10-CM

## 2023-12-21 ENCOUNTER — OFFICE VISIT (OUTPATIENT)
Dept: PHYSICAL THERAPY | Facility: CLINIC | Age: 68
End: 2023-12-21
Payer: MEDICARE

## 2023-12-21 DIAGNOSIS — R26.2 DIFFICULTY WALKING: ICD-10-CM

## 2023-12-21 DIAGNOSIS — M17.12 PRIMARY OSTEOARTHRITIS OF LEFT KNEE: ICD-10-CM

## 2023-12-21 DIAGNOSIS — M25.562 LEFT KNEE PAIN, UNSPECIFIED CHRONICITY: Primary | ICD-10-CM

## 2023-12-21 PROCEDURE — 97110 THERAPEUTIC EXERCISES: CPT

## 2023-12-21 PROCEDURE — 97140 MANUAL THERAPY 1/> REGIONS: CPT

## 2023-12-21 RX ORDER — AZELASTINE 1 MG/ML
2 SPRAY, METERED NASAL 2 TIMES DAILY
Qty: 1 ML | Refills: 0 | Status: SHIPPED | OUTPATIENT
Start: 2023-12-21

## 2023-12-21 NOTE — PROGRESS NOTES
"Daily Note     Today's date: 2023  Patient name: Coretta Hines  : 1955  MRN: [id number]  Referring provider: Nafisa Duckworth MD  Dx:   Encounter Diagnosis     ICD-10-CM    1. Left knee pain, unspecified chronicity  M25.562       2. Primary osteoarthritis of left knee  M17.12       3. Difficulty walking  R26.2                      Subjective:  Patient states she feels like she is bone on bone and having pain.       Objective: See treatment diary below      Assessment: Continued with POC as prescribed below. She did complain of pain into PROM knee flexion, diminished with completion. Continue to progress as able.       Plan: Continue per plan of care.      Goals: Patient's goal is to decrease pain with ADLs    Precautions: Previous LLE Blood Clot, L eye blindness  Dx: L Knee OA    Daily Treatment Diary   Goals: Patient's goal is to decrease pain with ADLs    Precautions: Previous LLE Blood Clot, L eye blindness  Dx: L Knee OA    Daily Treatment Diary     Manuals     L Knee PROM RA NT NT NT WA KD   L gastroc/HS str RA NT NT NT WA KD   L patella mobs                  Ther Ex         Quad Set         Gastroc Str 30\"x 3  30\"x3 30\"x3 30\"x3 30\"x3 30\"x 3   SLR         Heel Slide   5\"x10 (seated)      Seated HS str  20\"x3       Standing hip abd/ext 20x  20x each 20 each 20each 20x each 20x   Standing knee flexion  2x10  10x2 10x2 10x2 10x2 2x10    Leg press         R. Bike (rocking) 6 5' 5' 5' 5' 6   Pt Edu         Neuro Re-ed         TKE with TB GTB 10 Grn, 5\"x10  Grn, 5\"x10 Grn 5\"x10 GTB 5\" x 10                                        Ther Activity         minisquat 10x 2  10x2 10x2 10x2 10x2 10x2   Step up   L2 10x2  10x L2 10x, L2 with cane L2 20x    HR on step 20x  L1, 10x2  L1, 10x2 20x no step 20x   Ball squats         Gait Training                           Modalities         Ice 10 MPH 10' 10' MHP 10'MHP MHP 10' MHP 10'                                       "

## 2023-12-26 ENCOUNTER — OFFICE VISIT (OUTPATIENT)
Dept: SLEEP CENTER | Facility: CLINIC | Age: 68
End: 2023-12-26
Payer: MEDICARE

## 2023-12-26 ENCOUNTER — OFFICE VISIT (OUTPATIENT)
Dept: PHYSICAL THERAPY | Facility: CLINIC | Age: 68
End: 2023-12-26
Payer: MEDICARE

## 2023-12-26 VITALS
WEIGHT: 215.6 LBS | HEIGHT: 63 IN | DIASTOLIC BLOOD PRESSURE: 78 MMHG | BODY MASS INDEX: 38.2 KG/M2 | HEART RATE: 84 BPM | SYSTOLIC BLOOD PRESSURE: 122 MMHG

## 2023-12-26 DIAGNOSIS — G47.19 EXCESSIVE DAYTIME SLEEPINESS: ICD-10-CM

## 2023-12-26 DIAGNOSIS — M17.12 PRIMARY OSTEOARTHRITIS OF LEFT KNEE: ICD-10-CM

## 2023-12-26 DIAGNOSIS — G25.81 RESTLESS LEGS SYNDROME: ICD-10-CM

## 2023-12-26 DIAGNOSIS — G47.33 OSA (OBSTRUCTIVE SLEEP APNEA): ICD-10-CM

## 2023-12-26 DIAGNOSIS — E61.1 IRON DEFICIENCY: ICD-10-CM

## 2023-12-26 DIAGNOSIS — R26.2 DIFFICULTY WALKING: ICD-10-CM

## 2023-12-26 DIAGNOSIS — E66.09 CLASS 2 OBESITY DUE TO EXCESS CALORIES WITHOUT SERIOUS COMORBIDITY WITH BODY MASS INDEX (BMI) OF 38.0 TO 38.9 IN ADULT: ICD-10-CM

## 2023-12-26 DIAGNOSIS — M25.562 LEFT KNEE PAIN, UNSPECIFIED CHRONICITY: Primary | ICD-10-CM

## 2023-12-26 DIAGNOSIS — R06.83 SNORING: Primary | ICD-10-CM

## 2023-12-26 PROCEDURE — 97140 MANUAL THERAPY 1/> REGIONS: CPT

## 2023-12-26 PROCEDURE — 99204 OFFICE O/P NEW MOD 45 MIN: CPT | Performed by: INTERNAL MEDICINE

## 2023-12-26 PROCEDURE — 97110 THERAPEUTIC EXERCISES: CPT

## 2023-12-26 RX ORDER — KETOROLAC TROMETHAMINE 4 MG/ML
SOLUTION/ DROPS OPHTHALMIC
COMMUNITY
Start: 2023-10-10

## 2023-12-26 NOTE — PROGRESS NOTES
"Daily Note     Today's date: 2023  Patient name: Coretta Hines  : 1955  MRN: [id number]  Referring provider: Nafisa Duckworth MD  Dx:   Encounter Diagnosis     ICD-10-CM    1. Left knee pain, unspecified chronicity  M25.562       2. Primary osteoarthritis of left knee  M17.12       3. Difficulty walking  R26.2           Start Time: 330  Stop Time: 415  Total time in clinic (min): 45 minutes    Subjective: Pt reported that getting out of a chair is getting easier.       Objective: See treatment diary below      Assessment: Tolerated treatment well. Pt's program started with the R. Bike. Manual therapy focused on increasing knee PROM (flexion with distraction). MHP was utilized post-treatment. Patient would benefit from continued PT      Plan: Continue per plan of care.      Goals: Patient's goal is to decrease pain with ADLs    Precautions: Previous LLE Blood Clot, L eye blindness  Dx: L Knee OA    Daily Treatment Diary   Goals: Patient's goal is to decrease pain with ADLs    Precautions: Previous LLE Blood Clot, L eye blindness  Dx: L Knee OA    Daily Treatment Diary     Manuals     L Knee PROM RA MS  NT WA KD   L gastroc/HS str RA MS  NT WA KD   L patella mobs                  Ther Ex         Quad Set         Gastroc Str 30\"x 3  30\"x3  30\"x3 30\"x3 30\"x 3   SLR         Heel Slide         Seated HS str         Standing hip abd/ext 20x  20x each  20each 20x each 20x   Standing knee flexion  2x10  10x2  10x2 10x2 2x10    Leg press         R. Bike (rocking) 6 6'  5' 5' 6   Pt Edu         Neuro Re-ed         TKE with TB GTB 10 BTB, 10x2  Grn, 5\"x10 Grn 5\"x10 GTB 5\" x 10                                        Ther Activity         minisquat 10x 2  10x2  10x2 10x2 10x2   Step up   L2, 20x  10x L2 10x, L2 with cane L2 20x    HR on step 20x  L2, 10x2  L1, 10x2 20x no step 20x   Ball squats         Gait Training                           Modalities         Ice 10 MPH   10'MHP " P 10' [facility] 10'

## 2023-12-26 NOTE — PROGRESS NOTES
Sleep Consultation   Coretta Hines 68 y.o. female MRN: [id number]      Reason for consultation: Obstructive sleep apnea    Requesting physician: Je Medley     Assessment/Plan    1.  Obstructive sleep apnea  Previously diagnosed in early 2000's  She uses CPAP for a few years  She has not been using it since then  Reports snoring, witnessed apneas, excessive daytime sleepiness  [place] score of 12  Previous sleep study on June 18, 2004 showed an AHI of 14.5    Plan  Ordered split study to reestablish diagnosis and start PAP therapy      2.  Restless leg syndrome/periodic limb movement disorder  Previous sleep study showed PLM index of 48  Reports frequently tossing and turning in her sleep  She has frequent bothersome symptoms of restless legs    Plan  Ordered iron panel    3.  Snoring  As above    4.  Excessive daytime sleepiness  As above    5.  Obesity  Counseled patient on lifestyle modifications including diet and exercise      History of Present Illness   HPI:  Coretta Hines is a 68 y.o. female with PMHx of type 2 diabetes, obesity, hypertension, GERD, asthma, DVT who presents for evaluation of obstructive sleep apnea.  She was previously diagnosed in the early 2000's.  She used CPAP initially for a few years but subsequently stopped.  She is here to reestablish the diagnosis.  She reports being tired and sleepy throughout the day with an [place] score of 12.  She is snoring with witnessed apneas.  She reports frequent bothersome restless leg symptoms.  Previous sleep studies have shown increased PLM index.  She tosses and turns in her sleep frequently.          Review of Systems      Genitourinary none   Cardiology none   Gastrointestinal none   Neurology none   Constitutional none   Integumentary none   Psychiatry none   Musculoskeletal none   Pulmonary none   ENT none   Endocrine none   Hematological none         Historical Information   Past Medical History:   Diagnosis Date    Allergic      Arthritis     Asthma     Blindness of left eye     COPD (chronic obstructive pulmonary disease) (HCC)     Diabetes mellitus (HCC)     GERD (gastroesophageal reflux disease)     Hypertension     Obesity     Visual impairment      Past Surgical History:   Procedure Laterality Date     SECTION  10/16/1990    Fibroids    COLONOSCOPY      EYE SURGERY      MULTIPLE    HYSTERECTOMY  2000    REPLACEMENT TOTAL KNEE Right      Family History   Problem Relation Age of Onset    Dementia Mother     Arthritis Mother         Late in life    Hypertension Father         HBP    Heart disease Father         HBP    Diabetes Father         managed through diet    Hearing loss Father         Industrial/construction work with no hearing protection    Glaucoma Paternal Grandmother      Social History     Socioeconomic History    Marital status: /Civil Union     Spouse name: Not on file    Number of children: Not on file    Years of education: Not on file    Highest education level: Not on file   Occupational History    Not on file   Tobacco Use    Smoking status: Never    Smokeless tobacco: Never   Vaping Use    Vaping status: Never Used   Substance and Sexual Activity    Alcohol use: Yes     Alcohol/week: 1.0 standard drink of alcohol     Types: 1 Standard drinks or equivalent per week     Comment: on occasion, 1 drink with low sugar content    Drug use: Never    Sexual activity: Not on file   Other Topics Concern    Not on file   Social History Narrative    Not on file     Social Determinants of Health     Financial Resource Strain: Low Risk  (2023)    Overall Financial Resource Strain (CARDIA)     Difficulty of Paying Living Expenses: Not hard at all   Food Insecurity: No Food Insecurity (2022)    Received from [email or web address]    Food Insecurity     Within the past 12 months, you worried that your food would run out before you got the money to buy more.: Never true     Within the past 12 months, the food you  bought just didn't last and you didn't have money to get more.: Never true     Within the past 12 months, you worried that your food would run out before you got the money to buy more.: Never true   Transportation Needs: No Transportation Needs (9/17/2023)    PRAPARE - Transportation     Lack of Transportation (Medical): No     Lack of Transportation (Non-Medical): No   Physical Activity: Not on file   Stress: Not on file   Social Connections: Not on file   Intimate Partner Violence: Not on file   Housing Stability: Not on file       Occupational History: NA    Meds/Allergies   Allergies   Allergen Reactions    Other Nausea Only, Vomiting and Cough     LOBSTER    Penicillins Seizures    Sulfa Antibiotics Rash       Home medications:  Prior to Admission medications    Medication Sig Start Date End Date Taking? Authorizing Provider   albuterol (PROVENTIL HFA,VENTOLIN HFA) 90 mcg/act inhaler Inhale 2 puffs every 6 (six) hours as needed for wheezing 6/8/23  Yes Sunday Robbins MD   apixaban (Eliquis) 2.5 mg TAKE 1 TABLET BY MOUTH TWICE A DAY 12/11/23  Yes Shay Serrato MD   azelastine (ASTELIN) 0.1 % nasal spray 2 sprays into each nostril 2 (two) times a day Use in each nostril as directed 12/21/23  Yes Sunday Robbins MD   bimatoprost (LUMIGAN) 0.01 % ophthalmic drops    Yes Historical Provider, MD   diltiazem (TIAZAC) 180 MG 24 hr capsule Take 180 mg by mouth 2 (two) times a day   Yes Historical Provider, MD   dulaglutide (Trulicity) 3 MG/0.5ML injection Inject 0.5 mL (3 mg total) under the skin every 7 days 9/12/23  Yes Sunday Robbins MD   famotidine (PEPCID) 20 mg tablet Take 20 mg by mouth   Yes Historical Provider, MD   fluticasone (FLONASE) 50 mcg/act nasal spray 1 spray into each nostril   Yes Historical Provider, MD   Fluticasone-Salmeterol (Advair) 500-50 mcg/dose inhaler Inhale 1 puff every 12 (twelve) hours 6/19/23  Yes Sunday Robbins MD   furosemide (LASIX) 20 mg tablet Take 20 mg by mouth 2 (two) times a day as  "needed   Yes Historical Provider, MD   ipratropium (ATROVENT) 0.03 % nasal spray SPRAY 2 SPRAYS INTO EACH NOSTRIL EVERY 12 HOURS. 10/12/23  Yes Sunday Robbins MD   ketorolac (ACULAR) 0.4 % SOLN INSTILL 1 DROP INTO LEFT EYE TWICE A DAY AS DIRECTED 10/10/23  Yes Historical Provider, MD   loratadine (CLARITIN) 10 mg tablet Take 10 mg by mouth daily   Yes Historical Provider, MD   metFORMIN (GLUCOPHAGE) 500 mg tablet 1 tablet each morning and 2 tablets each evening 6/19/23  Yes Sunday Robbins MD   omeprazole (PriLOSEC) 40 MG capsule Take 40 mg by mouth   Yes Historical Provider, MD   polyethylene glycol (GOLYTELY) 4000 mL solution Take as directed by the office for colonoscopy. 9/5/23  Yes Cristina Julien PA-C   potassium chloride (Klor-Con) 10 mEq tablet Take 20 mEq by mouth 2 (two) times a day   Yes Historical Provider, MD   rosuvastatin (CRESTOR) 10 MG tablet Take 1 tablet (10 mg total) by mouth daily 12/13/23  Yes Sunday Robbins MD   Timolol Maleate PF (Timolol Maleate Ocudose) 0.5 % SOLN    Yes Historical Provider, MD   apixaban (Eliquis) 2.5 mg  9/15/23 12/26/23 Yes Historical Provider, MD   apixaban (Eliquis) 5 mg  3/30/23 12/26/23 Yes Historical Provider, MD   EPINEPHrine (EPIPEN) 0.3 mg/0.3 mL SOAJ Inject 0.3 mg into a muscle  Patient not taking: Reported on 12/26/2023    Historical Provider, MD       Vitals:   Blood pressure 122/78, pulse 84, height 5' 3\" (1.6 m), weight 97.8 kg (215 lb 9.6 oz)., Body mass index is 38.19 kg/m².  Neck Circumference: 13.5    Physical Exam  General: Awake alert and oriented x 3, conversant without conversational dyspnea, NAD, normal affect  HEENT:  PERRL, Sclera noninjected, nonicteric OU, Nares patent,  no craniofacial abnormalities, Mucous membranes, moist, no oral lesions, normal dentition  NECK:  Trachea midline, no accessory muscle use, no stridor, no cervical or supraclavicular adenopathy, JVP not elevated  CARDIAC: Reg, single s1/S2, no m/r/g  PULM: CTA bilaterally no wheezing, " "rhonchi or rales  EXT: No cyanosis, no clubbing, no edema, normal capillary refill  NEURO: no focal neurologic deficits, AAOx3, moving all extremities appropriately    Labs: I have personally reviewed pertinent lab results.  Lab Results   Component Value Date    WBC 8.36 06/16/2023    HGB 14.1 06/16/2023    HCT 44.6 06/16/2023    MCV 89 06/16/2023     06/16/2023      Lab Results   Component Value Date    CALCIUM 9.1 12/12/2023    K 3.8 12/12/2023    CO2 30 12/12/2023     12/12/2023    BUN 18 12/12/2023    CREATININE 0.65 12/12/2023     No results found for: \"IRON\", \"TIBC\", \"FERRITIN\"  No results found for: \"SOYFKBHG14\"  No results found for: \"FOLATE\"      Arterial Blood Gas result:  ARCENIO Guadarrama MD  [facility] Sleep Medicine   "

## 2023-12-28 ENCOUNTER — APPOINTMENT (OUTPATIENT)
Dept: PHYSICAL THERAPY | Facility: CLINIC | Age: 68
End: 2023-12-28
Payer: MEDICARE

## 2024-01-02 ENCOUNTER — OFFICE VISIT (OUTPATIENT)
Dept: PHYSICAL THERAPY | Facility: CLINIC | Age: 69
End: 2024-01-02
Payer: MEDICARE

## 2024-01-02 DIAGNOSIS — M25.562 LEFT KNEE PAIN, UNSPECIFIED CHRONICITY: Primary | ICD-10-CM

## 2024-01-02 DIAGNOSIS — M17.12 PRIMARY OSTEOARTHRITIS OF LEFT KNEE: ICD-10-CM

## 2024-01-02 DIAGNOSIS — R26.2 DIFFICULTY WALKING: ICD-10-CM

## 2024-01-02 PROCEDURE — 97110 THERAPEUTIC EXERCISES: CPT

## 2024-01-02 PROCEDURE — 97530 THERAPEUTIC ACTIVITIES: CPT

## 2024-01-02 NOTE — PROGRESS NOTES
"Daily Note     Today's date: 2024  Patient name: Coretta Hines  : 1955  MRN: [id number]  Referring provider: Nafisa Duckworth MD  Dx:   Encounter Diagnosis     ICD-10-CM    1. Left knee pain, unspecified chronicity  M25.562       2. Primary osteoarthritis of left knee  M17.12       3. Difficulty walking  R26.2           Start Time: 1400  Stop Time: 1442  Total time in clinic (min): 42 minutes    Subjective: Pt reported that her knee pain was 2/10 today. Pt also noted that stepping on a stair, walking and getting out of a chairs are improving.       Objective: See treatment diary below      Assessment: Tolerated treatment well. Pt's program started with the R. Bike. Manual therapy focused on increasing knee ROM (flexion with distraction), improving patella mobility, and increasing gastroc flexibility. Pt was also to perform step ups at an increased height. Step up and overs were also added in order to improve quality of stair negotiation. MHP was utilized post-treatment.       Patient would benefit from continued PT      Plan: Continue per plan of care.      Goals: Patient's goal is to decrease pain with ADLs    Precautions: Previous LLE Blood Clot, L eye blindness  Dx: L Knee OA    Daily Treatment Diary   Goals: Patient's goal is to decrease pain with ADLs    Precautions: Previous LLE Blood Clot, L eye blindness  Dx: L Knee OA    Daily Treatment Diary     Manuals     L Knee PROM RA MS MS NT WA KD   L gastroc/HS str RA MS MS NT WA KD   L patella mobs   MS               Ther Ex         Quad Set         Gastroc Str 30\"x 3  30\"x3 30\"x3 30\"x3 30\"x3 30\"x 3   SLR         Heel Slide         Seated HS str         Standing hip abd/ext 20x  20x each 10x each 20each 20x each 20x   Standing knee flexion  2x10  10x2 10x2 10x2 10x2 2x10    Leg press         R. Bike (rocking) 6 6' 5' 5' 5' 6   Pt Edu         Neuro Re-ed         TKE with TB GTB 10 BTB, 10x2 BTB, 10x2 Grn, 5\"x10 Grn " "5\"x10 GTB 5\" x 10                                        Ther Activity         minisquat 10x 2  10x2 10x2 10x2 10x2 10x2   Step up   L2, 20x L3, 10x 10x L2 10x, L2 with cane L2 20x    Step up and overs    L2, 6x.       HR on step 20x  L2, 10x2 L1, 10x2 L1, 10x2 20x no step 20x   Ball squats         Gait Training                           Modalities         Ice 10 MPH  10' MHP 10'MHP MHP 10' MHP 10'                                           "

## 2024-01-04 ENCOUNTER — OFFICE VISIT (OUTPATIENT)
Dept: PHYSICAL THERAPY | Facility: CLINIC | Age: 69
End: 2024-01-04
Payer: MEDICARE

## 2024-01-04 DIAGNOSIS — M25.562 LEFT KNEE PAIN, UNSPECIFIED CHRONICITY: Primary | ICD-10-CM

## 2024-01-04 DIAGNOSIS — M17.12 PRIMARY OSTEOARTHRITIS OF LEFT KNEE: ICD-10-CM

## 2024-01-04 DIAGNOSIS — R26.2 DIFFICULTY WALKING: ICD-10-CM

## 2024-01-04 PROCEDURE — 97112 NEUROMUSCULAR REEDUCATION: CPT

## 2024-01-04 PROCEDURE — 97110 THERAPEUTIC EXERCISES: CPT

## 2024-01-04 NOTE — PROGRESS NOTES
"Daily Note     Today's date: 2024  Patient name: Croetta Hines  : 1955  MRN: [id number]  Referring provider: Nafisa Duckworth MD  Dx:   Encounter Diagnosis     ICD-10-CM    1. Left knee pain, unspecified chronicity  M25.562       2. Primary osteoarthritis of left knee  M17.12       3. Difficulty walking  R26.2           Start Time: 1405  Stop Time: 1448  Total time in clinic (min): 43 minutes    Subjective: Pt reported that her knee pain ranges between 1-4/10.       Objective: See treatment diary below      Assessment: Tolerated treatment well. Pt's program started with the R. Bike. Manual therapy focused on increasing knee PROM and increasing L gastroc flexibility. Pt still demonstrates increased difficulty with stair negotiation. MHP was utilized post-treatment. Patient would benefit from continued PT      Plan: Continue per plan of care.      Goals: Patient's goal is to decrease pain with ADLs    Precautions: Previous LLE Blood Clot, L eye blindness  Dx: L Knee OA    Daily Treatment Diary   Goals: Patient's goal is to decrease pain with ADLs    Precautions: Previous LLE Blood Clot, L eye blindness  Dx: L Knee OA    Daily Treatment Diary     Manuals     L Knee PROM RA MS MS MS  KD   L gastroc/HS str RA MS MS MS  KD   L patella mobs   MS               Ther Ex         Quad Set         Gastroc Str 30\"x 3  30\"x3 30\"x3 30\"x3  30\"x 3   SLR         Heel Slide         Seated HS str         Standing hip abd/ext 20x  20x each 10x each 20x each  20x   Standing knee flexion  2x10  10x2 10x2 10x2  2x10    Leg press         R. Bike (rocking) 6 6' 5' 5'  6   Pt Edu         Neuro Re-ed         TKE with TB GTB 10 BTB, 10x2 BTB, 10x2 BTB 10x2  GTB 5\" x 10    Leg Press                                    Ther Activity         minisquat 10x 2  10x2 10x2 10x2  10x2   Step up   L2, 20x L3, 10x L3, 10x  L2 20x    Step up and overs    L2, 6x.  L2, 6x     HR on step 20x  L2, 10x2 L1, 10x2 L1, 20x  " 20x            Gait Training                           Modalities         Ice 10 MPH  10' MHP 10' MHP  MHP 10'

## 2024-01-08 ENCOUNTER — OFFICE VISIT (OUTPATIENT)
Dept: VASCULAR SURGERY | Facility: CLINIC | Age: 69
End: 2024-01-08
Payer: MEDICARE

## 2024-01-08 VITALS
BODY MASS INDEX: 38.09 KG/M2 | SYSTOLIC BLOOD PRESSURE: 130 MMHG | WEIGHT: 215 LBS | HEIGHT: 63 IN | HEART RATE: 74 BPM | DIASTOLIC BLOOD PRESSURE: 80 MMHG

## 2024-01-08 DIAGNOSIS — G47.33 OSA (OBSTRUCTIVE SLEEP APNEA): ICD-10-CM

## 2024-01-08 DIAGNOSIS — I87.2 VENOUS INSUFFICIENCY OF LEFT LOWER EXTREMITY: ICD-10-CM

## 2024-01-08 DIAGNOSIS — E11.65 TYPE 2 DIABETES MELLITUS WITH HYPERGLYCEMIA, WITHOUT LONG-TERM CURRENT USE OF INSULIN (HCC): ICD-10-CM

## 2024-01-08 DIAGNOSIS — E66.01 CLASS 2 SEVERE OBESITY WITH SERIOUS COMORBIDITY AND BODY MASS INDEX (BMI) OF 35.0 TO 35.9 IN ADULT, UNSPECIFIED OBESITY TYPE: ICD-10-CM

## 2024-01-08 DIAGNOSIS — I87.2 EDEMA OF BOTH LOWER EXTREMITIES DUE TO PERIPHERAL VENOUS INSUFFICIENCY: ICD-10-CM

## 2024-01-08 DIAGNOSIS — E66.2 OBESITY HYPOVENTILATION SYNDROME (HCC): ICD-10-CM

## 2024-01-08 DIAGNOSIS — I82.552 CHRONIC DEEP VEIN THROMBOSIS (DVT) OF LEFT PERONEAL VEIN (HCC): Primary | ICD-10-CM

## 2024-01-08 PROCEDURE — 99213 OFFICE O/P EST LOW 20 MIN: CPT | Performed by: PHYSICIAN ASSISTANT

## 2024-01-08 NOTE — PROGRESS NOTES
"[facility]   Roberto Head   Phone: [phone]  Fax: [phone]   E-mail: [email or web address]    Ordering Provider:  Nicole Lawson (NPI: [id number])  [facility]  [address] [place] [address]   [address]  [place] [address]  Phone: [phone]  Fax: [phone]      Patient Information  MRN: [id number]   Coretta Hines  1955  [address] [place] [address] [phone]     Insurance Information  Payor: MEDICARE / Plan: MEDICARE A AND B / Product Type: Medicare A & B Fee for Service /      [id number] - (Medicare )     Patient Height and Weight 5' 3\" (1.6 m)    Wt Readings from Last 1 Encounters:   01/08/24 97.5 kg (215 lb)         Post 4-week Measurements      Body Part Right Left   Ankle / Forearm 20 cm 20 cm   Calf / Elbow  38.5 cm 38.5 cm   Knee / Bicep  38 cm 39 cm   Mid-Thigh / Axilla  50 cm 51 cm     Patient outcome after 4-weeks of conservative therapy:   [x] Patient's condition has NOT improved         "

## 2024-01-08 NOTE — PROGRESS NOTES
Assessment/Plan:    Venous insufficiency of lower extremities  -     Compression Stocking  -     Pneumatic compression pumps    -Chronic B leg edema  -Chronic L peroneal DVT  -Doing better with partial improvement of leg pain and edema with compression    Imaging:  -LEV 11/26/23: Left, chronic thrombus in one peroneal DVT in the mid calf  -LEV  9/27/23: Left, DVT in one peroneal in the calf     -Venous reflux, LLE 8/28/23: Deep and superficial incompetence in both SGV and SSV is noted    CFV, prox FV and deep femoral veins. Reflux to 3 seconds. FV 2.9 - 4.1 mm.    Plan:  -Improved lower extremity edema due to venous insufficiency with medical compression but still has some leg swelling and intermittent erythema with chronic stasis changes L>R  -Continue with good medical compression 20 to 30 mmHg  -Compression, elevation, low Na, regular exercise and weight loss  -Venous reflux study shows deep and superficial incompetence with significant reflux in the GSV, but again unlikely at surgical diameter  -Will add lymph pump therapy for 60 minutes daily  -Thrombosis panel appears to be negative  -Due for follow-up with hematology regarding recommendations on DVT prophylaxis v aspirin  -Currently on DVT prophylaxis with apixaban 2.5 twice daily  -Maintain good BP, cholesterol and diabetes control  -Patient education regarding venous insufficiency  -Follow up in 3 months or soon, if needed      Additional diagnoses:  Chronic deep vein thrombosis (DVT) of left peroneal vein (HCC)  -     Compression Stocking  -     Pneumatic compression pumps    Class 2 severe obesity with serious comorbidity and body mass index (BMI) of 35.0 to 35.9 in adult, unspecified obesity type   Edema of both lower extremities due to peripheral venous insufficiency  DEN (obstructive sleep apnea)  Obesity hypoventilation syndrome (HCC)  Type 2 diabetes mellitus with hyperglycemia, without long-term current use of insulin (HCC)      Subjective:       Patient ID: Coretta Hines is a 68 y.o. female.    Patient presents for f/u on BLE swelling. She states swelling has reduced significantly. She has been wearing compression daily, elevating her legs, walking more frequently and applying warm compresses nightly to alleviate symptoms. She reports occasional redness in LLE towards the end of the day. Pt is taking Eliquis and Simvastatin. She is not a smoker.     HPI   Ms. Coretta Hines 69yo F R TKR, COPD, hypertension, DM who developed left calf DVT 3/30/2023.  Patient reports that she has chronic swelling in both legs particularly on the left side but developed redness to the calf for about 2 weeks for which she went to [facility] for evaluation.  Venous duplex study performed which showed acute, occlusive DVT in 1 of 2 peroneal veins in the left leg.  She was placed on full anticoagulation with apixaban. She had been moving and carrying boxes and standing on her feet for prolonged periods of time.  However, she denies any injury or extended travel.  She is no prior history of DVT.  No history of malignancy or rheumatologic disorders.  She has no large, bulky varicose veins.  She is tolerating apixaban without extensive bruising or obvious bleeding.       After DVT, she developed increased leg/calf pain, cramping edema and redness.  20 to 30 mm medical compression stockings were recommended. Venous reflux study was performed 8/2023.     10/2/2023: Patient returns to vascular surgery for follow-up after left calf DVT in March 2023.  Patient reports that she is wearing compression stockings.  Overall, acute/chronic leg swelling has been stable.  She is quite satisfied with the appearance of the leg.  However, there is some flat erythema and concern that she may be at risk for future infection.  She is advised to closely monitor her condition and strictly adhere to compressive therapy.  In the past, she reports that she took furosemide which did help with leg swelling, but in  the absence of cardiac etiology, would recommend we avoid diuretics in favor of compressive therapy for treatment of venous insufficiency.      She completed a course of full anticoagulation with apixaban 5 twice daily. Currently on apixaban 2.5 twice daily for DVT prophylaxis.  She has had some bruising and scratches on anticoagulation.  No major bleeding.  If she has not yet obtained hypercoagulable panel requested by hematology.     In general, she is trying to increase her activity.  She has baseline problems with balance and walking so she is going to start physical therapy tomorrow.  I have asked her to talk to them also about her leg edema.  She is working on improving her diabetic diet adherence.  She is planning to travel.  She will be flying to [place] next week for her stepdaughter who is having a baby.  We discussed travel precautions.  She should wear compression stockings and continue her anticoagulation without interruption.       1/8/24: Vascular follow-up for venous insufficiency.  Patient reports that she has had good response to medical compression stockings.  She is fully compliant.  She finds the stockings that better controlled her swelling.  She does continue to have some swelling as evidenced by continued, intermittent redness to the legs.  She has had no wounds, breakdown or infection.  She shows me the stockings which look of good quality.  However, the top of the stocking does cut into the upper calf and has caused some injury.  Recommended trying a silicone top border.  She is going to physical therapy to help with osteoarthritis and balance problems.  She is on DVT prophylaxis with apixaban 2.5 twice daily with plans to follow-up with hematology.  We reviewed her reflux study from last year which does show deep and superficial incompetence with reflux to about 3 seconds, but the vein diameter is just short of surgical intervention.  Will continue with conservative measures.  She would  benefit from the addition of lymphedema pump therapy.            Procedure Abnormality Status   Antithrombin III Activity Normal Final result   Cardiolipin antibody  Final result   Lupus anticoagulant  Final result   Protein C activity Normal Final result   Protein S activity Normal Final result   Protein S Antigen, Total & Free  Final result   Beta-2 glycoprotein antibodies  Final result       LEV 11/26/23  CLINICAL:  Indications: Patient presents with Left lower extremity pain and swelling x 2  days.  Operative History:  No prior cardiovascular surgeries  Risk Factors  The patient has history of Obesity, HTN, Diabetes (NIDDM (Diet)) and DVT.     FINDINGS:     Left      Impression              Peroneal  Thrombosed (Chronic)      CONCLUSION:  Impression:  RIGHT LOWER LIMB LIMITED:  Evaluation shows no evidence of thrombus in the common femoral vein.  Doppler evaluation shows a normal response to augmentation maneuvers.     LEFT LOWER LIMB:  No evidence of acute deep vein thrombosis. Chronic thrombus noted in (one)  peroneal vein mid calf consistent with prior history of DVT.  No evidence of superficial thrombophlebitis noted.  Doppler evaluation shows a normal response to augmentation maneuvers.  Popliteal, posterior tibial and anterior tibial arterial Doppler waveform's are  triphasic.      Venous reflux 8/28/23  Unilateral              AP (mm)    Right AA GSV Mid Thigh      3.9       Right                  AP (mm)    Left AASV - Mid Thigh      2.8       Left                   Thrombus  AP (mm)  Reflux  Valve Closure Time    GSV Inguinal                         7.4  Reflux                2.70    GSV Prox Thigh                       4.1  Reflux                3.00    CFV                                       Reflux                2.90    GSV Mid Thigh                        2.9  Reflux                2.70    GSV Dist Thigh                       3.1  Reflux                3.10    FV Prox                                "    Reflux                2.90    GSV Knee                             2.9  Reflux                3.70    GSV Prox Calf                        2.7  Reflux                3.60    PFV                                       Reflux                2.20    GSV Mid Calf                         2.0  Reflux                2.60    GSV Dist Calf                        2.1  Reflux                2.00    GSV Ankle                            1.9  Reflux                2.00    SSV Knee                             4.6  Reflux                1.90    Peroneal               Chronic                                          SSV Mid Calf                         2.0  Reflux                3.20    SSV Ankle                            0.8  Reflux                1.60             CONCLUSION:     Impression:     LEFT LIMB:  Deep venous incompetence is noted in the common femoral, proximal femoral and  deep femoral veins.  The great saphenous vein is incompetent.  The great saphenous vein remains within the saphenous compartment in the thigh.  The small saphenous vein is incompetent and communicates with the popliteal  vein.  There is no evidence of incompetent perforators in the thigh or calf.  There is no evidence of deep vein thrombosis in the CFV, the proximal PFV, the  femoral vein and the popliteal vein.       The following portions of the patient's history were reviewed and updated as appropriate: allergies, current medications, past family history, past medical history, past social history, past surgical history, and problem list.    Review of Systems   Skin:  Positive for color change.   All other systems reviewed and are negative.        Objective:      /80 (BP Location: Left arm, Patient Position: Sitting, Cuff Size: Standard)   Pulse 74   Ht 5' 3\" (1.6 m)   Wt 97.5 kg (215 lb)   BMI 38.09 kg/m²     Mild LE edema  Mild chronic stasis changes  Flat erythema to the LLE  Skin intact. No breakdown.     Physical Exam  Vitals and " "nursing note reviewed.   Constitutional:       Appearance: She is well-developed. She is obese.   HENT:      Head: Normocephalic and atraumatic.   Eyes:      Pupils: Pupils are equal, round, and reactive to light.   Neck:      Vascular: No JVD.   Cardiovascular:      Rate and Rhythm: Normal rate and regular rhythm.      Pulses:           Radial pulses are 2+ on the right side and 2+ on the left side.        Dorsalis pedis pulses are 2+ on the right side and 2+ on the left side.      Heart sounds: Normal heart sounds, S1 normal and S2 normal. No murmur heard.     No friction rub. No gallop.   Pulmonary:      Effort: Pulmonary effort is normal. No accessory muscle usage or respiratory distress.      Breath sounds: Normal breath sounds. No wheezing or rales.   Abdominal:      General: Bowel sounds are normal. There is no distension.      Palpations: Abdomen is soft.      Tenderness: There is no abdominal tenderness.   Musculoskeletal:         General: No deformity. Normal range of motion.      Cervical back: Neck supple.   Skin:     General: Skin is warm and dry.      Findings: No lesion or rash.      Nails: There is no clubbing.   Neurological:      Mental Status: She is alert and oriented to person, place, and time.      Comments: Grossly normal    Psychiatric:         Behavior: Behavior is cooperative.                 I have reviewed and made appropriate changes to the review of systems input by the medical assistant.    Vitals:    01/08/24 1554   BP: 130/80   BP Location: Left arm   Patient Position: Sitting   Cuff Size: Standard   Pulse: 74   Weight: 97.5 kg (215 lb)   Height: 5' 3\" (1.6 m)       Patient Active Problem List   Diagnosis    Chronic deep vein thrombosis (DVT) of right peroneal vein (HCC)    Type 2 diabetes mellitus with hyperglycemia, without long-term current use of insulin (HCC)    Class 2 severe obesity with serious comorbidity in adult (HCC)    Left retinal disorder    Dyslipidemia    Primary " hypertension    GERD (gastroesophageal reflux disease)    Moderate persistent asthma without complication    Obesity hypoventilation syndrome (HCC)    DEN (obstructive sleep apnea)    Osteoarthritis    Seasonal allergic rhinitis due to fungal spores    Edema of both lower extremities due to peripheral venous insufficiency    History of colon polyps    FH: total knee replacement    Venous insufficiency of left lower extremity    Chronic deep vein thrombosis (DVT) of left peroneal vein (HCC)       Past Surgical History:   Procedure Laterality Date     SECTION  10/16/1990    Fibroids    COLONOSCOPY      EYE SURGERY      MULTIPLE    HYSTERECTOMY  2000    REPLACEMENT TOTAL KNEE Right        Family History   Problem Relation Age of Onset    Dementia Mother     Arthritis Mother         Late in life    Hypertension Father         HBP    Heart disease Father         HBP    Diabetes Father         managed through diet    Hearing loss Father         Industrial/construction work with no hearing protection    Glaucoma Paternal Grandmother        Social History     Socioeconomic History    Marital status: /Civil Union     Spouse name: Not on file    Number of children: Not on file    Years of education: Not on file    Highest education level: Not on file   Occupational History    Not on file   Tobacco Use    Smoking status: Never    Smokeless tobacco: Never   Vaping Use    Vaping status: Never Used   Substance and Sexual Activity    Alcohol use: Yes     Alcohol/week: 1.0 standard drink of alcohol     Types: 1 Standard drinks or equivalent per week     Comment: on occasion, 1 drink with low sugar content    Drug use: Never    Sexual activity: Not on file   Other Topics Concern    Not on file   Social History Narrative    Not on file     Social Determinants of Health     Financial Resource Strain: Low Risk  (2023)    Overall Financial Resource Strain (CARDIA)     Difficulty of Paying Living Expenses: Not hard at  all   Food Insecurity: No Food Insecurity (9/13/2022)    Received from [facility]    Food Insecurity     Within the past 12 months, you worried that your food would run out before you got the money to buy more.: Never true     Within the past 12 months, the food you bought just didn't last and you didn't have money to get more.: Never true     Within the past 12 months, you worried that your food would run out before you got the money to buy more.: Never true   Transportation Needs: No Transportation Needs (9/17/2023)    PRAPARE - Transportation     Lack of Transportation (Medical): No     Lack of Transportation (Non-Medical): No   Physical Activity: Not on file   Stress: Not on file   Social Connections: Not on file   Intimate Partner Violence: Not on file   Housing Stability: Not on file       Allergies   Allergen Reactions    Other Nausea Only, Vomiting and Cough     LOBSTER    Penicillins Seizures    Sulfa Antibiotics Rash         Current Outpatient Medications:     apixaban (Eliquis) 2.5 mg, TAKE 1 TABLET BY MOUTH TWICE A DAY, Disp: 60 tablet, Rfl: 0    rosuvastatin (CRESTOR) 10 MG tablet, Take 1 tablet (10 mg total) by mouth daily, Disp: 90 tablet, Rfl: 3    albuterol (PROVENTIL HFA,VENTOLIN HFA) 90 mcg/act inhaler, Inhale 2 puffs every 6 (six) hours as needed for wheezing, Disp: 8.5 g, Rfl: 2    azelastine (ASTELIN) 0.1 % nasal spray, 2 sprays into each nostril 2 (two) times a day Use in each nostril as directed, Disp: 1 mL, Rfl: 0    bimatoprost (LUMIGAN) 0.01 % ophthalmic drops, , Disp: , Rfl:     diltiazem (TIAZAC) 180 MG 24 hr capsule, Take 180 mg by mouth 2 (two) times a day (Patient not taking: Reported on 1/8/2024), Disp: , Rfl:     dulaglutide (Trulicity) 3 MG/0.5ML injection, Inject 0.5 mL (3 mg total) under the skin every 7 days, Disp: 6 mL, Rfl: 1    EPINEPHrine (EPIPEN) 0.3 mg/0.3 mL SOAJ, Inject 0.3 mg into a muscle (Patient not taking: Reported on 12/26/2023), Disp: , Rfl:     famotidine  (PEPCID) 20 mg tablet, Take 20 mg by mouth, Disp: , Rfl:     fluticasone (FLONASE) 50 mcg/act nasal spray, 1 spray into each nostril, Disp: , Rfl:     Fluticasone-Salmeterol (Advair) 500-50 mcg/dose inhaler, Inhale 1 puff every 12 (twelve) hours, Disp: 180 blister, Rfl: 3    furosemide (LASIX) 20 mg tablet, Take 20 mg by mouth 2 (two) times a day as needed (Patient not taking: Reported on 1/8/2024), Disp: , Rfl:     ipratropium (ATROVENT) 0.03 % nasal spray, SPRAY 2 SPRAYS INTO EACH NOSTRIL EVERY 12 HOURS., Disp: 30 mL, Rfl: 1    ketorolac (ACULAR) 0.4 % SOLN, INSTILL 1 DROP INTO LEFT EYE TWICE A DAY AS DIRECTED, Disp: , Rfl:     loratadine (CLARITIN) 10 mg tablet, Take 10 mg by mouth daily, Disp: , Rfl:     metFORMIN (GLUCOPHAGE) 500 mg tablet, 1 tablet each morning and 2 tablets each evening, Disp: 270 tablet, Rfl: 3    omeprazole (PriLOSEC) 40 MG capsule, Take 40 mg by mouth, Disp: , Rfl:     polyethylene glycol (GOLYTELY) 4000 mL solution, Take as directed by the office for colonoscopy., Disp: 4000 mL, Rfl: 0    potassium chloride (Klor-Con) 10 mEq tablet, Take 20 mEq by mouth 2 (two) times a day (Patient not taking: Reported on 1/8/2024), Disp: , Rfl:     Timolol Maleate PF (Timolol Maleate Ocudose) 0.5 % SOLN, , Disp: , Rfl:

## 2024-01-09 ENCOUNTER — EVALUATION (OUTPATIENT)
Dept: PHYSICAL THERAPY | Facility: CLINIC | Age: 69
End: 2024-01-09
Payer: MEDICARE

## 2024-01-09 DIAGNOSIS — I82.452 ACUTE DEEP VEIN THROMBOSIS (DVT) OF LEFT PERONEAL VEIN (HCC): ICD-10-CM

## 2024-01-09 DIAGNOSIS — M25.562 LEFT KNEE PAIN, UNSPECIFIED CHRONICITY: Primary | ICD-10-CM

## 2024-01-09 DIAGNOSIS — M17.12 PRIMARY OSTEOARTHRITIS OF LEFT KNEE: ICD-10-CM

## 2024-01-09 DIAGNOSIS — R26.2 DIFFICULTY WALKING: ICD-10-CM

## 2024-01-09 PROCEDURE — 97110 THERAPEUTIC EXERCISES: CPT

## 2024-01-09 PROCEDURE — 97530 THERAPEUTIC ACTIVITIES: CPT

## 2024-01-09 NOTE — PROGRESS NOTES
Re-Evaluation     Today's date: 2024  Patient name: Coretta Hines  : 1955  MRN: [id number]  Referring provider: Nafisa Duckworth MD  Dx:   Encounter Diagnosis     ICD-10-CM    1. Left knee pain, unspecified chronicity  M25.562       2. Primary osteoarthritis of left knee  M17.12       3. Difficulty walking  R26.2           Start Time: 1355  Stop Time: 1440  Total time in clinic (min): 45 minutes    History of Present Illness  : Pt reported that her pain was 3/10. Pt mentioned that her sx range between 1-4/10 and also reported a subjective improvement percentage of 70%. Pt mentioned that she feels more confident walking but noted still having difficulty with stairs. Pt also noted that she had a vascular exam yesterday and noted having increased superior knee muscle pain.    IE: Patient presents with c/o L knee pain. Pt reported that her sx started years ago. Pt reported having imaging showing L knee OA. Pt reported that her sx increase with walking and going up/down the steps. Pt also reported that her sx decrease with injections. Pt has a PMH of L retina surgeries, L knee arthroscopic surgery, and a R TKA. Pt also mention that she had a fall in , which led her to fx 2 ribs and her R orbital bone. Pt also mentioned that she was dx'd with a DVT on 2023. Pt has been taking medication and was cleared to perform skilled PT.      Occupation: retired     Pain  At best pain ratin/10  At worst pain ratin/10  Location: L knee    Social Support  Lives with her  in a 1 story with 1 FF with railings and a walk-in shower with seat.       Patient Goals  Patient goals for therapy: decrease pain with ADLs    STGs  1. Decrease pain by 20% in 2-4 weeks to improve standing tolerance.-Met  2. Improve L knee ROM by 10 degrees in 2-4 weeks.-Met  3. (I) with HEP within 2 weeks in order to improve PT outcomes.-Met     LTGs  1. Decrease pain by 60% in 6-8 weeks.-Partially Met  2. Improve  walking tolerance to >30 minutes in 6-8 weeks to perform community ambulation. (Partially Met, 20 min)  3. Increase L knee AROM WNL and LLE MMT 5/5 within 6-8 weeks. -Not Met  4. Improve stairs tolerance to 1 flight  in 8 weeks. -Not Met  5. Improve FOTO to greater than or equal to 48. -Not Met      Objective Measurements:    Lumbar ROM R L Strength knee R L   Flex    Flex 4+ 4+   Ext   Ext 4+ 4   SB        Rot                Hip A/PROM        Flex  /  / Flex 4+ 4   ER at 90   ER     IR at 90   IR     Abd   Abd     Ext   Ext             Knee A/PROM   Ankle     Flex 115 deg 120 deg DF     Ext - 4 deg 0 deg PF       TU sec  Romberg Balance: NT  Tandem Balance: NT  SLS: NT    Assessment:    Coretta Hines is a pleasant 68 y.o. female who has attended 23 visits of skilled PT for L knee AROM. Pt demonstrated fair progress toward goals and reported a subjective improvement percentage of 70%. Pt demonstrated improvements in L knee ROM and decrease in TUG test, but still demonstrates deficits in LE strength. Pt scored a 46 on FOTO. Pt tolerated added weight with the hip abd/ext and knee flexion well. STS was also added to program at [facility] in order to improve STS transfer. MHP was utilized post-treatment. Pt would benefit from further skilled PT in order to decrease pain, address deficits (ROM/MMT), improve gait mechanics, help pt achieve goals, decrease fall risk, and progress program as tolerated.     Thank you for the referral!    Plan  Patient would benefit from:Skilled physical therapy  Planned therapy interventions: manual therapy, neuromuscular re-education, stretching, strengthening, therapeutic activities, therapeutic exercise, patient education, home exercise program, modalities to decrease pain, and activity modification.    Frequency: 2x week  Duration in weeks: 8  Treatment plan discussed with: patient     Goals: Patient's goal is to decrease pain with ADLs    Precautions: Previous LLE Blood Clot, L  "eye blindness  Dx: L Knee OA    Daily Treatment Diary   Goals: Patient's goal is to decrease pain with ADLs    Precautions: Previous LLE Blood Clot, L eye blindness  Dx: L Knee OA    Daily Treatment Diary     Manuals 12/21 12/26 1/2 1/4 1/9 12/19    L Knee PROM RA MS MS MS  KD   L gastroc/HS str RA MS MS MS  KD   L patella mobs   MS               Ther Ex         Quad Set         Gastroc Str 30\"x 3  30\"x3 30\"x3 30\"x3 30\"x3 30\"x 3   SLR         Heel Slide         Seated HS str         Standing hip abd/ext 20x  20x each 10x each 20x each 20x each,   2# 20x   Standing knee flexion  2x10  10x2 10x2 10x2 10x2, 2# 2x10    Leg press         R. Bike (rocking) 6 6' 5' 5' 5' 6   Pt Edu     POC    Neuro Re-ed         TKE with TB GTB 10 BTB, 10x2 BTB, 10x2 BTB 10x2  GTB 5\" x 10    Leg Press                                    Ther Activity         minisquat 10x 2  10x2 10x2 10x2 10x2 10x2   Step up   L2, 20x L3, 10x L3, 10x L3, 10x L2 20x    Step up and overs    L2, 6x.  L2, 6x     HR on step 20x  L2, 10x2 L1, 10x2 L1, 20x L1, 20x 20x   STS     Hi-lo 10x2    Gait Training                           Modalities         Ice 10 MPH  10' MHP 10' MHP 10' MHP MHP 10'                                               "

## 2024-01-10 ENCOUNTER — HOSPITAL ENCOUNTER (OUTPATIENT)
Dept: SLEEP CENTER | Facility: CLINIC | Age: 69
Discharge: HOME | End: 2024-01-10
Payer: MEDICARE

## 2024-01-10 DIAGNOSIS — G47.33 OSA (OBSTRUCTIVE SLEEP APNEA): ICD-10-CM

## 2024-01-10 PROCEDURE — 95810 POLYSOM 6/> YRS 4/> PARAM: CPT

## 2024-01-10 PROCEDURE — 95810 POLYSOM 6/> YRS 4/> PARAM: CPT | Performed by: INTERNAL MEDICINE

## 2024-01-11 ENCOUNTER — OFFICE VISIT (OUTPATIENT)
Dept: PHYSICAL THERAPY | Facility: CLINIC | Age: 69
End: 2024-01-11
Payer: MEDICARE

## 2024-01-11 DIAGNOSIS — M25.562 LEFT KNEE PAIN, UNSPECIFIED CHRONICITY: Primary | ICD-10-CM

## 2024-01-11 DIAGNOSIS — M17.12 PRIMARY OSTEOARTHRITIS OF LEFT KNEE: ICD-10-CM

## 2024-01-11 DIAGNOSIS — R26.2 DIFFICULTY WALKING: ICD-10-CM

## 2024-01-11 PROBLEM — G47.9 SLEEP DISTURBANCE: Status: ACTIVE | Noted: 2024-01-11

## 2024-01-11 PROCEDURE — 97140 MANUAL THERAPY 1/> REGIONS: CPT

## 2024-01-11 NOTE — PROGRESS NOTES
"Daily Note     Today's date: 2024  Patient name: Coretta Hines  : 1955  MRN: [id number]  Referring provider: Nafisa Duckworth MD  Dx:   Encounter Diagnosis     ICD-10-CM    1. Left knee pain, unspecified chronicity  M25.562       2. Primary osteoarthritis of left knee  M17.12       3. Difficulty walking  R26.2           Start Time: 1400  Stop Time: 1442  Total time in clinic (min): 42 minutes    Subjective: Pt reported that her knee was feeling pretty good today but noted increased fatigue from having a sleep study done yesterday.       Objective: See treatment diary below      Assessment: Tolerated treatment well. Program started with the R. Bike. Manual therapy focused on increasing knee PROM (flexion with distraction) and increasing gastroc flexibility. MHP was utilized. Patient would benefit from continued PT    Billing reflects decreased one-on-one time.     Plan: Continue per plan of care.      Goals: Patient's goal is to decrease pain with ADLs    Precautions: Previous LLE Blood Clot, L eye blindness  Dx: L Knee OA    Daily Treatment Diary   Goals: Patient's goal is to decrease pain with ADLs    Precautions: Previous LLE Blood Clot, L eye blindness  Dx: L Knee OA    Daily Treatment Diary     Manuals     L Knee PROM RA MS MS MS  MS   L gastroc/HS str RA MS MS MS  MS (no HS)   L patella mobs   MS               Ther Ex         Quad Set         Gastroc Str 30\"x 3  30\"x3 30\"x3 30\"x3 30\"x3 30\"x3   SLR         Heel Slide         Seated HS str         Standing hip abd/ext 20x  20x each 10x each 20x each 20x each,   2# 20x each  2#   Standing knee flexion  2x10  10x2 10x2 10x2 10x2, 2# 10x2, 2#   Leg press         R. Bike (rocking) 6 6' 5' 5' 5' 5'   Pt Edu     POC    Neuro Re-ed         TKE with TB GTB 10 BTB, 10x2 BTB, 10x2 BTB 10x2  BTB 10x2   Leg Press                                    Ther Activity         minisquat 10x 2  10x2 10x2 10x2 10x2 10x2   Step up   L2, 20x " L3, 10x L3, 10x L3, 10x L2, 10x2   Step up and overs    L2, 6x.  L2, 6x     HR on step 20x  L2, 10x2 L1, 10x2 L1, 20x L1, 20x L1, 20x   STS     Hi-lo 10x2 10x2, Hi lo   Gait Training                           Modalities         Ice 10 MPH  10' MHP 10' MHP 10' MHP 10' MHP

## 2024-01-11 NOTE — PROGRESS NOTES
Sleep Study Documentation    Pre-Sleep Study       Sleep testing procedure explained to patient:YES    Patient napped prior to study:YES- more than 30 minutes. Napped after 2PM: no    Caffeine:Dayshift worker after 12PM.  Caffeine use:NO    Alcohol:Dayshift workers after 5PM: Alcohol use:NO    Typical day for patient:YES       Study Documentation    Sleep Study Indications:     Sleep Study: Diagnostic   Snore:None  Supplemental O2: no    O2 flow rate (L/min) range   O2 flow rate (L/min) final   Minimum SaO2 89%  Baseline SaO2 95.1%        EKG abnormalities: no     EEG abnormalities: no    Were abnormal behaviors in sleep observed:NO    Is Total Sleep Study Recording Time < 2 hours: N/A    Is Total Sleep Study Recording Time > 2 hours but study is incomplete: N/A    Is Total Sleep Study Recording Time 6 hours or more but sleep was not obtained: NO    Patient classification: employed       Post-Sleep Study    Medication used at bedtime or during sleep study:YES other prescription medications    Patient reports time it took to fall asleep:greater than 60 minutes    Patient reports waking up during study:3 or more times.  Patient reports returning to sleep in greater than 30 minutes.    Patient reports sleeping 4 to 6 hours with dreaming.    Does the Patient feel this is a typical night of sleep:worse than usual    Patient rated sleepiness: Somewhat sleepy or tired    PAP treatment:no.

## 2024-01-13 DIAGNOSIS — J30.2 SEASONAL ALLERGIC RHINITIS DUE TO FUNGAL SPORES: ICD-10-CM

## 2024-01-15 RX ORDER — IPRATROPIUM BROMIDE 21 UG/1
2 SPRAY, METERED NASAL EVERY 12 HOURS
Qty: 30 ML | Refills: 1 | Status: SHIPPED | OUTPATIENT
Start: 2024-01-15

## 2024-01-15 NOTE — TELEPHONE ENCOUNTER
Requested medication(s) are due for refill today: Yes  Patient has already received a courtesy refill: No  Other reason request has been forwarded to provider:

## 2024-01-16 ENCOUNTER — APPOINTMENT (OUTPATIENT)
Dept: PHYSICAL THERAPY | Facility: CLINIC | Age: 69
End: 2024-01-16
Payer: MEDICARE

## 2024-01-18 ENCOUNTER — OFFICE VISIT (OUTPATIENT)
Dept: PHYSICAL THERAPY | Facility: CLINIC | Age: 69
End: 2024-01-18
Payer: MEDICARE

## 2024-01-18 DIAGNOSIS — M17.12 PRIMARY OSTEOARTHRITIS OF LEFT KNEE: ICD-10-CM

## 2024-01-18 DIAGNOSIS — M25.562 LEFT KNEE PAIN, UNSPECIFIED CHRONICITY: Primary | ICD-10-CM

## 2024-01-18 DIAGNOSIS — R26.2 DIFFICULTY WALKING: ICD-10-CM

## 2024-01-18 PROCEDURE — 97110 THERAPEUTIC EXERCISES: CPT

## 2024-01-18 PROCEDURE — 97530 THERAPEUTIC ACTIVITIES: CPT

## 2024-01-18 NOTE — PROGRESS NOTES
"Daily Note     Today's date: 2024  Patient name: Coretta Hines  : 1955  MRN: [id number]  Referring provider: Nafisa Duckworth MD  Dx:   Encounter Diagnosis     ICD-10-CM    1. Left knee pain, unspecified chronicity  M25.562       2. Primary osteoarthritis of left knee  M17.12       3. Difficulty walking  R26.2           Start Time: 1130  Stop Time: 1210  Total time in clinic (min): 40 minutes    Subjective: Pt reported that her knee pain was worse yesterday.       Objective: See treatment diary below      Assessment: Tolerated treatment well. Program started with the R. Bike. Leg press was added in order to increase LE strength. Pt required CGA during step exercises due to pt's unsteadiness.  MHP was utilized post-treatment. Patient would benefit from continued PT      Plan: Continue per plan of care.      Goals: Patient's goal is to decrease pain with ADLs    Precautions: Previous LLE Blood Clot, L eye blindness  Dx: L Knee OA    Daily Treatment Diary   Goals: Patient's goal is to decrease pain with ADLs    Precautions: Previous LLE Blood Clot, L eye blindness  Dx: L Knee OA    Daily Treatment Diary     Manuals     L Knee PROM   MS MS  MS   L gastroc/HS str   MS MS  MS (no HS)   L patella mobs   MS               Ther Ex         Quad Set         Gastroc Str 30\"x3  30\"x3 30\"x3 30\"x3 30\"x3   SLR         Heel Slide         Seated HS str         Standing hip abd/ext 10x2 each  10x each 20x each 20x each,   2# 20x each  2#   Standing knee flexion  10x2  10x2 10x2 10x2, 2# 10x2, 2#   Leg press         R. Bike (rocking) 5'  5' 5' 5' 5'   Pt Edu     POC    Neuro Re-ed         TKE with TB BTB, 10x2  BTB, 10x2 BTB 10x2  BTB 10x2   Leg Press 45#, 10x2                                   Ther Activity         minisquat 10x2  10x2 10x2 10x2 10x2   Step up  L3, 10x  L3, 10x L3, 10x L3, 10x L2, 10x2   Step up and overs  L1, 10x  L2, 6x.  L2, 6x     HR on step L1, 20x  L1, 10x2 L1, 20x L1, 20x " L1, 20x   STS     Hi-lo 10x2 10x2, Hi lo   Gait Training                           Modalities         Ice 10' MHP  10' MHP 10' MHP 10' MHP 10' MHP

## 2024-01-23 ENCOUNTER — APPOINTMENT (OUTPATIENT)
Dept: PHYSICAL THERAPY | Facility: CLINIC | Age: 69
End: 2024-01-23
Payer: MEDICARE

## 2024-01-25 ENCOUNTER — OFFICE VISIT (OUTPATIENT)
Dept: PHYSICAL THERAPY | Facility: CLINIC | Age: 69
End: 2024-01-25
Payer: MEDICARE

## 2024-01-25 DIAGNOSIS — M25.562 LEFT KNEE PAIN, UNSPECIFIED CHRONICITY: Primary | ICD-10-CM

## 2024-01-25 DIAGNOSIS — M17.12 PRIMARY OSTEOARTHRITIS OF LEFT KNEE: ICD-10-CM

## 2024-01-25 DIAGNOSIS — R26.2 DIFFICULTY WALKING: ICD-10-CM

## 2024-01-25 PROCEDURE — 97112 NEUROMUSCULAR REEDUCATION: CPT

## 2024-01-25 PROCEDURE — 97110 THERAPEUTIC EXERCISES: CPT

## 2024-01-25 NOTE — PROGRESS NOTES
"Daily Note     Today's date: 2024  Patient name: Coretta Hines  : 1955  MRN: [id number]  Referring provider: Nafisa Duckworth MD  Dx:   Encounter Diagnosis     ICD-10-CM    1. Left knee pain, unspecified chronicity  M25.562       2. Primary osteoarthritis of left knee  M17.12       3. Difficulty walking  R26.2           Start Time: 1130  Stop Time: 1212  Total time in clinic (min): 42 minutes    Subjective: Pt reported that she was able to perform steps one at a time since her last visit.       Objective: See treatment diary below      Assessment: Tolerated treatment well. Pt's program started with the R. Bike. HS str was added in order to decrease pain and increase HS flexibility. Pt performed exercises at an increased pace but required CGA/supervision assist during step exercises. MHP was utilized post-treatment. Patient would benefit from continued PT      Plan: Continue per plan of care.      Goals: Patient's goal is to decrease pain with ADLs    Precautions: Previous LLE Blood Clot, L eye blindness  Dx: L Knee OA    Daily Treatment Diary   Goals: Patient's goal is to decrease pain with ADLs    Precautions: Previous LLE Blood Clot, L eye blindness  Dx: L Knee OA    Daily Treatment Diary     Manuals     L Knee PROM   MS MS  MS   L gastroc/HS str   MS MS  MS (no HS)   L patella mobs   MS               Ther Ex         Quad Set         Gastroc Str 30\"x3 30\"x3 30\"x3 30\"x3 30\"x3 30\"x3   SLR         Heel Slide         Seated HS str         Standing hip abd/ext 10x2 each 10x2 each 10x each 20x each 20x each,   2# 20x each  2#   Standing knee flexion  10x2 10x2 10x2 10x2 10x2, 2# 10x2, 2#   Seated HS str  15\"x4       R. Bike (rocking) 5' 5' 5' 5' 5' 5'   Pt Edu     POC    Neuro Re-ed         TKE with TB BTB, 10x2 BTB, 10x2 BTB, 10x2 BTB 10x2  BTB 10x2   Leg Press 45#, 10x2 45#,10x2       Sidestepping with TB  Grn 3 laps                         Ther Activity         minisquat " 10x2 10x2 10x2 10x2 10x2 10x2   Step up  L3, 10x L3, 10x L3, 10x L3, 10x L3, 10x L2, 10x2   Step up and overs  L1, 10x L1, 10x L2, 6x.  L2, 6x     HR on step L1, 20x L1, 20x L1, 10x2 L1, 20x L1, 20x L1, 20x   STS     Hi-lo 10x2 10x2, Hi lo   Gait Training                           Modalities         Ice 10' MHP 10' MHP 10' MHP 10' MHP 10' MHP 10' P

## 2024-01-30 ENCOUNTER — OFFICE VISIT (OUTPATIENT)
Dept: PHYSICAL THERAPY | Facility: CLINIC | Age: 69
End: 2024-01-30
Payer: MEDICARE

## 2024-01-30 DIAGNOSIS — M25.562 LEFT KNEE PAIN, UNSPECIFIED CHRONICITY: Primary | ICD-10-CM

## 2024-01-30 PROCEDURE — 97112 NEUROMUSCULAR REEDUCATION: CPT

## 2024-01-30 PROCEDURE — 97110 THERAPEUTIC EXERCISES: CPT

## 2024-01-30 NOTE — PROGRESS NOTES
"Daily Note     Today's date: 2024  Patient name: Coretta Hines  : 1955  MRN: [id number]  Referring provider: Nafisa Duckworth MD  Dx: No diagnosis found.               Subjective: Increased levels of L knee pain and hamstring pain. States that she is using ice for pain management.       Objective: See treatment diary below      Assessment: Tolerated treatment well. Patient exhibited good technique with therapeutic exercises      Plan: Continue per plan of care.      Goals: Patient's goal is to decrease pain with ADLs    Precautions: Previous LLE Blood Clot, L eye blindness  Dx: L Knee OA    Daily Treatment Diary   Goals: Patient's goal is to decrease pain with ADLs    Precautions: Previous LLE Blood Clot, L eye blindness  Dx: L Knee OA    Daily Treatment Diary     Manuals        L Knee PROM         L gastroc/HS str         L patella mobs                  Ther Ex         Quad Set         Gastroc Str 30\"x3 30\"x3 30\" x 3       SLR         Heel Slide         Seated HS str         Standing hip abd/ext 10x2 each 10x2 each 10x2 each 2# this visit.      Standing knee flexion  10x2 10x2 10x2  2# this visit     Seated HS str  15\"x4 15\" x 4       R. Bike (rocking) 5' 5' 5       Pt Edu         Neuro Re-ed         TKE with TB BTB, 10x2 BTB, 10x2 BTB 10x 2       Leg Press 45#, 10x2 45#,10x2 45# 1 x 10      Sidestepping with TB  Grn 3 laps GTB 3 laps                        Ther Activity         minisquat 10x2 10x2 10x2      Step up  L3, 10x L3, 10x L3 x 10       Step up and overs  L1, 10x L1, 10x L1 10x       HR on step L1, 20x L1, 20x L1 10      STS         Gait Training                           Modalities         Ice 10' MHP 10' MHP declined                                                          "

## 2024-02-01 ENCOUNTER — TELEPHONE (OUTPATIENT)
Dept: VASCULAR SURGERY | Facility: CLINIC | Age: 69
End: 2024-02-01

## 2024-02-01 ENCOUNTER — OFFICE VISIT (OUTPATIENT)
Dept: PHYSICAL THERAPY | Facility: CLINIC | Age: 69
End: 2024-02-01
Payer: MEDICARE

## 2024-02-01 DIAGNOSIS — M25.562 LEFT KNEE PAIN, UNSPECIFIED CHRONICITY: Primary | ICD-10-CM

## 2024-02-01 DIAGNOSIS — R26.2 DIFFICULTY WALKING: ICD-10-CM

## 2024-02-01 DIAGNOSIS — M17.12 PRIMARY OSTEOARTHRITIS OF LEFT KNEE: ICD-10-CM

## 2024-02-01 PROCEDURE — 97110 THERAPEUTIC EXERCISES: CPT

## 2024-02-01 NOTE — TELEPHONE ENCOUNTER
Pt called the office to inquire about her compression stockings and lymphedema pump script. Informed her I would reach out to [facility] regarding lymphedema pump.    Called [facility] and spoke w/ Don. He states that he has the note from 1/8 but she will need another visit for measurements because the 10/2 note is too old now. He will also be faxing us paperwork over to complete.    Pt was notified and call was transferred to scheduling.

## 2024-02-01 NOTE — PROGRESS NOTES
"Daily Note     Today's date: 2024  Patient name: Coretta Hines  : 1955  MRN: [id number]  Referring provider: Nafisa Duckworth MD  Dx:   Encounter Diagnosis     ICD-10-CM    1. Left knee pain, unspecified chronicity  M25.562       2. Primary osteoarthritis of left knee  M17.12       3. Difficulty walking  R26.2           Start Time: 1400  Stop Time: 1440  Total time in clinic (min): 40 minutes    Subjective: Pt reported that her knee was bothering her more last visit.       Objective: See treatment diary below      Assessment: Tolerated treatment well. Pt's program started with the R. Bike. Pt tolerated increased weight with hip abd, hip ext, and knee flexion well. Pt is still challenged with most exercises. MHP was utilized post-treatment. Patient would benefit from continued PT      Plan: Continue per plan of care.      Goals: Patient's goal is to decrease pain with ADLs    Precautions: Previous LLE Blood Clot, L eye blindness  Dx: L Knee OA    Daily Treatment Diary     Manuals       L Knee PROM         L gastroc/HS str         L patella mobs                  Ther Ex         Quad Set         Gastroc Str 30\"x3 30\"x3 30\" x 3  30\"x3     SLR         Heel Slide         Seated HS str         Standing hip abd/ext 10x2 each 10x2 each 10x2 each 2#, 10x2 each     Standing knee flexion  10x2 10x2 10x2  2#, 10x2     Seated HS str  15\"x4 15\" x 4  15\"x4     R. Bike (rocking) 5' 5' 5  5'     Pt Edu         Neuro Re-ed         TKE with TB BTB, 10x2 BTB, 10x2 BTB 10x 2  BTB, 10x2     Leg Press 45#, 10x2 45#,10x2 45# 1 x 10 45#, 10x2     Sidestepping with TB  Grn 3 laps GTB 3 laps GTB 3 laps                       Ther Activity         minisquat 10x2 10x2 10x2 10x2     Step up  L3, 10x L3, 10x L3 x 10  L3, 10x     Step up and overs  L1, 10x L1, 10x L1 10x  L1, 10x     HR on step L1, 20x L1, 20x L1 10 L1, 20x     STS         Gait Training                           Modalities         Ice 10' MHP 10' " MHP declined 10' MHP

## 2024-02-02 DIAGNOSIS — M65.30 TRIGGER FINGER, UNSPECIFIED FINGER, UNSPECIFIED LATERALITY: Primary | ICD-10-CM

## 2024-02-05 ENCOUNTER — OFFICE VISIT (OUTPATIENT)
Dept: VASCULAR SURGERY | Facility: CLINIC | Age: 69
End: 2024-02-05
Payer: MEDICARE

## 2024-02-05 VITALS
SYSTOLIC BLOOD PRESSURE: 156 MMHG | HEIGHT: 63 IN | WEIGHT: 215 LBS | BODY MASS INDEX: 38.09 KG/M2 | DIASTOLIC BLOOD PRESSURE: 94 MMHG | OXYGEN SATURATION: 97 % | HEART RATE: 90 BPM | RESPIRATION RATE: 18 BRPM

## 2024-02-05 DIAGNOSIS — I87.2 EDEMA OF BOTH LOWER EXTREMITIES DUE TO PERIPHERAL VENOUS INSUFFICIENCY: ICD-10-CM

## 2024-02-05 DIAGNOSIS — I82.552 CHRONIC DEEP VEIN THROMBOSIS (DVT) OF LEFT PERONEAL VEIN (HCC): ICD-10-CM

## 2024-02-05 DIAGNOSIS — E66.01 CLASS 2 SEVERE OBESITY WITH SERIOUS COMORBIDITY AND BODY MASS INDEX (BMI) OF 35.0 TO 35.9 IN ADULT, UNSPECIFIED OBESITY TYPE: ICD-10-CM

## 2024-02-05 DIAGNOSIS — I82.551 CHRONIC DEEP VEIN THROMBOSIS (DVT) OF RIGHT PERONEAL VEIN (HCC): ICD-10-CM

## 2024-02-05 DIAGNOSIS — I87.2 VENOUS INSUFFICIENCY OF LEFT LOWER EXTREMITY: Primary | ICD-10-CM

## 2024-02-05 DIAGNOSIS — E11.65 TYPE 2 DIABETES MELLITUS WITH HYPERGLYCEMIA, WITHOUT LONG-TERM CURRENT USE OF INSULIN (HCC): ICD-10-CM

## 2024-02-05 PROCEDURE — 99213 OFFICE O/P EST LOW 20 MIN: CPT | Performed by: PHYSICIAN ASSISTANT

## 2024-02-05 NOTE — LETTER
February 5, 2024     [facility]  [address]  [address]  [place] [address]    Patient: Coretta Hines   YOB: 1955   Date of Visit: 2/5/2024       Dear Dr. Barrios:    Thank you for referring Coretta Hines to me for evaluation. Below are my notes for this consultation.    If you have questions, please do not hesitate to call me. I look forward to following your patient along with you.         Sincerely,        Nicole Burleson PA-C        CC: No Recipients    Nicole Burleson PA-C  2/5/2024  4:57 PM  Incomplete  Assessment/Plan:    Lymphedema  -     Pneumatic compression pumps    Chronic B leg edema  Chronic L peroneal DVT  -Fully compliant with compressive measures which have partially improved her symptoms  -Still has leg pain/cramping, edema edema, heaviness and intermittent R LE erythema    Plan:  -Continue with regular compression stockings to be worn daily and removed at night  -Recommend addition of lymphedema therapy  -Lymphedema pump 60 minutes once daily  -Maintain good skin care  -Regular exercise, low-sodium diet, weight loss  -Maintain good diabetes control  -Continue with apixaban 2.5 twice daily  -Patient education regarding lymphedema provided  -Follow-up 3 months or sooner if needed      Diagnoses:  Venous insufficiency of left lower extremity  Type 2 diabetes mellitus with hyperglycemia, without long-term current use of insulin (HCC)  Edema of both lower extremities due to peripheral venous insufficiency  Class 2 severe obesity with serious comorbidity and body mass index (BMI) of 35.0 to 35.9 in adult, unspecified obesity type   Chronic deep vein thrombosis (DVT) of right peroneal vein (HCC)  Chronic deep vein thrombosis (DVT) of left peroneal vein (HCC)     Subjective:      Patient ID: Coretta Hines is a 68 y.o. female.  Patient presents to the office for lymphedema. Pt is wearing compression stockings daily and elevates her legs.    [employer]  [place]   Phone: [phone]  Fax: [phone]   E-mail: [email or web address]    Ordering Provider:  Nicole Lawson (NPI: [id number])  [facility]  [address]   [address]  [place] [address]  Phone: [phone]  Fax: [phone]      Patient Information  MRN: [id number]   Coretta Hines  1955  [address] [place] [address] [phone]     Insurance Information  Payor: MEDICARE / Plan: MEDICARE A AND B / Product Type: Medicare A & B Fee for Service /      [id number] - (Medicare )     Patient Height and Weight      Wt Readings from Last 1 Encounters:   01/08/24 97.5 kg (215 lb)         Post 4-week Measurements      Body Part Right Left   Ankle / Forearm 20 cm 20 cm   Calf / Elbow  37 cm 39 cm   Knee / Bicep  Not Applicable (N/A) Not Applicable (N/A)   Mid-Thigh / Axilla  60 cm 59 cm     Patient outcome after 4-weeks of conservative therapy:   [x] Patient's condition has NOT improved         HPI   Ms. Coretta Hines 68yoF R TKR, COPD, hypertension, DM who developed left calf DVT 3/30/2023.  Patient reports that she has chronic swelling in both legs particularly on the left side but developed redness to the calf for about 2 weeks for which she went to [facility] for evaluation.  Venous duplex study performed which showed acute, occlusive DVT in 1 of 2 peroneal veins in the left leg.  She was placed on full anticoagulation with apixaban. She had been moving and carrying boxes and standing on her feet for prolonged periods of time.  However, she denies any injury or extended travel.  She is no prior history of DVT.  No history of malignancy or rheumatologic disorders.  She has no large, bulky varicose veins.  She is tolerating apixaban without extensive bruising or obvious bleeding.       After DVT, she developed increased leg/calf pain, cramping, edema and redness.  20 to 30 mm medical compression stockings were recommended. Venous reflux  study was performed 8/2023.      1/8/24: Vascular follow-up for venous insufficiency.  Patient reports that she has had good response to medical compression stockings.  She is fully compliant.  She finds the stockings that better controlled her swelling.  She does continue to have some swelling as evidenced by continued, intermittent redness to the legs.  She has had no wounds, breakdown or infection.  She shows me the stockings which look of good quality.  However, the top of the stocking does cut into the upper calf and has caused some injury.  Recommended trying a silicone top border.  She is going to physical therapy to help with osteoarthritis and balance problems.  She is on DVT prophylaxis with apixaban 2.5 twice daily with plans to follow-up with hematology.  We reviewed her reflux study from last year which does show deep and superficial incompetence with reflux to about 3 seconds, but the vein diameter is just short of surgical intervention.  Will continue with conservative measures.  She would benefit from the addition of lymphedema pump therapy.        2/5/24: Ms. Hines comes in for follow-up of venous disease and leg measurements. She is doing well. She is wearing medical knee-high compression stockings with good effect.  Her leg swelling is much improved.  She still has leg heaviness and edema at the end of the day, but much less so.  She tries not to sit or stand for too long since that worsens edema and the way her legs feel.  Also, as the day goes no, she develops flat redness to the left shin even with the stockings which fortunately resolves by morning. She has no wounds or drainage. She working with physical therapy on her left knee and hoping to have a knee replacement at some point.  She is motivated to exercise and lose weight.  She is working at conservative measures over taking Lasix to help control swelling, but still feels additional treatment is necessary.  We again reviewed her left leg  "venous reflux study from last year which shows significant deep and superficial reflux but does not appear to meet criteria for surgical intervention.  Alternatively, she may benefit from lymphedema pumps. We discussed that going forward with daily compression stockings, lymphedema pumps, low-sodium diet and weight loss, lateral leg edema should be much improved.  If after all of these measures she continues to have edema and leg cramping, at some point, we can reassess her superficial reflux though she also has deep reflux.       The following portions of the patient's history were reviewed and updated as appropriate: allergies, current medications, past family history, past medical history, past social history, past surgical history, and problem list.    Review of Systems   Cardiovascular:  Positive for leg swelling.   Musculoskeletal:  Positive for arthralgias and gait problem.       Objective:    /94 (BP Location: Left arm, Patient Position: Sitting) Comment: pt did not take HTN medication for the past 2 days  Pulse 90   Resp 18   Ht 5' 3\" (1.6 m)   Wt 97.5 kg (215 lb)   SpO2 97%   BMI 38.09 kg/m²        Physical Exam  Vitals and nursing note reviewed.   Constitutional:       Appearance: She is well-developed.   HENT:      Head: Normocephalic and atraumatic.   Eyes:      Pupils: Pupils are equal, round, and reactive to light.   Cardiovascular:      Rate and Rhythm: Normal rate and regular rhythm.      Pulses:           Radial pulses are 2+ on the right side and 2+ on the left side.        Dorsalis pedis pulses are 2+ on the right side and 2+ on the left side.      Heart sounds: Normal heart sounds, S1 normal and S2 normal. No murmur heard.     No friction rub. No gallop.   Pulmonary:      Effort: Pulmonary effort is normal. No accessory muscle usage or respiratory distress.      Breath sounds: Normal breath sounds. No wheezing or rales.   Abdominal:      General: Bowel sounds are normal. There is no " distension.      Palpations: Abdomen is soft.      Tenderness: There is no abdominal tenderness.   Musculoskeletal:         General: No deformity. Normal range of motion.      Right lower leg: Edema present.      Left lower leg: Edema present.   Skin:     General: Skin is warm and dry.      Capillary Refill: Capillary refill takes less than 2 seconds.      Findings: No lesion or rash.      Nails: There is no clubbing.   Neurological:      Mental Status: She is alert and oriented to person, place, and time.      Comments: Grossly normal    Psychiatric:         Behavior: Behavior is cooperative.             I have reviewed and made appropriate changes to the review of systems input by the medical assistant.    There were no vitals filed for this visit.    Patient Active Problem List   Diagnosis   • Chronic deep vein thrombosis (DVT) of right peroneal vein (HCC)   • Type 2 diabetes mellitus with hyperglycemia, without long-term current use of insulin ([facility])   • Class 2 severe obesity with serious comorbidity in adult ([facility])   • Left retinal disorder   • Dyslipidemia   • Primary hypertension   • GERD (gastroesophageal reflux disease)   • Moderate persistent asthma without complication   • Obesity hypoventilation syndrome ([facility])   • DEN (obstructive sleep apnea)   • Osteoarthritis   • Seasonal allergic rhinitis due to fungal spores   • Edema of both lower extremities due to peripheral venous insufficiency   • History of colon polyps   • FH: total knee replacement   • Venous insufficiency of left lower extremity   • Chronic deep vein thrombosis (DVT) of left peroneal vein (HCC)   • Sleep disturbance       Past Surgical History:   Procedure Laterality Date   •  SECTION  10/16/1990    Fibroids   • COLONOSCOPY     • EYE SURGERY      MULTIPLE   • HYSTERECTOMY     • REPLACEMENT TOTAL KNEE Right        Family History   Problem Relation Age of Onset   • Dementia Mother    • Arthritis Mother         Late in life   •  Hypertension Father         HBP   • Heart disease Father         HBP   • Diabetes Father         managed through diet   • Hearing loss Father         Industrial/construction work with no hearing protection   • Glaucoma Paternal Grandmother        Social History     Socioeconomic History   • Marital status: /Civil Union     Spouse name: Not on file   • Number of children: Not on file   • Years of education: Not on file   • Highest education level: Not on file   Occupational History   • Not on file   Tobacco Use   • Smoking status: Never   • Smokeless tobacco: Never   Vaping Use   • Vaping status: Never Used   Substance and Sexual Activity   • Alcohol use: Yes     Alcohol/week: 1.0 standard drink of alcohol     Types: 1 Standard drinks or equivalent per week     Comment: on occasion, 1 drink with low sugar content   • Drug use: Never   • Sexual activity: Not on file   Other Topics Concern   • Not on file   Social History Narrative   • Not on file     Social Determinants of Health     Financial Resource Strain: Low Risk  (9/17/2023)    Overall Financial Resource Strain (CARDIA)    • Difficulty of Paying Living Expenses: Not hard at all   Food Insecurity: No Food Insecurity (9/13/2022)    Received from [facility]    Food Insecurity    • Within the past 12 months, you worried that your food would run out before you got the money to buy more.: Never true    • Within the past 12 months, the food you bought just didn't last and you didn't have money to get more.: Never true    • Within the past 12 months, you worried that your food would run out before you got the money to buy more.: Never true   Transportation Needs: No Transportation Needs (9/17/2023)    PRAPARE - Transportation    • Lack of Transportation (Medical): No    • Lack of Transportation (Non-Medical): No   Physical Activity: Not on file   Stress: Not on file   Social Connections: Not on file   Intimate Partner Violence: Not on file   Housing  Stability: Not on file       Allergies   Allergen Reactions   • Other Nausea Only, Vomiting and Cough     LOBSTER   • Penicillins Seizures   • Sulfa Antibiotics Rash         Current Outpatient Medications:   •  albuterol (PROVENTIL HFA,VENTOLIN HFA) 90 mcg/act inhaler, Inhale 2 puffs every 6 (six) hours as needed for wheezing, Disp: 8.5 g, Rfl: 2  •  apixaban (Eliquis) 2.5 mg, Take 1 tablet (2.5 mg total) by mouth 2 (two) times a day, Disp: 60 tablet, Rfl: 0  •  azelastine (ASTELIN) 0.1 % nasal spray, 2 sprays into each nostril 2 (two) times a day Use in each nostril as directed, Disp: 1 mL, Rfl: 0  •  bimatoprost (LUMIGAN) 0.01 % ophthalmic drops, , Disp: , Rfl:   •  diltiazem (TIAZAC) 180 MG 24 hr capsule, Take 180 mg by mouth 2 (two) times a day (Patient not taking: Reported on 1/8/2024), Disp: , Rfl:   •  dulaglutide (Trulicity) 3 MG/0.5ML injection, Inject 0.5 mL (3 mg total) under the skin every 7 days, Disp: 6 mL, Rfl: 1  •  EPINEPHrine (EPIPEN) 0.3 mg/0.3 mL SOAJ, Inject 0.3 mg into a muscle (Patient not taking: Reported on 12/26/2023), Disp: , Rfl:   •  famotidine (PEPCID) 20 mg tablet, Take 20 mg by mouth, Disp: , Rfl:   •  fluticasone (FLONASE) 50 mcg/act nasal spray, 1 spray into each nostril, Disp: , Rfl:   •  Fluticasone-Salmeterol (Advair) 500-50 mcg/dose inhaler, Inhale 1 puff every 12 (twelve) hours, Disp: 180 blister, Rfl: 3  •  furosemide (LASIX) 20 mg tablet, Take 20 mg by mouth 2 (two) times a day as needed (Patient not taking: Reported on 1/8/2024), Disp: , Rfl:   •  ipratropium (ATROVENT) 0.03 % nasal spray, SPRAY 2 SPRAYS INTO EACH NOSTRIL EVERY 12 HOURS, Disp: 30 mL, Rfl: 1  •  ketorolac (ACULAR) 0.4 % SOLN, INSTILL 1 DROP INTO LEFT EYE TWICE A DAY AS DIRECTED, Disp: , Rfl:   •  loratadine (CLARITIN) 10 mg tablet, Take 10 mg by mouth daily, Disp: , Rfl:   •  metFORMIN (GLUCOPHAGE) 500 mg tablet, 1 tablet each morning and 2 tablets each evening, Disp: 270 tablet, Rfl: 3  •  omeprazole  (PriLOSEC) 40 MG capsule, Take 40 mg by mouth, Disp: , Rfl:   •  polyethylene glycol (GOLYTELY) 4000 mL solution, Take as directed by the office for colonoscopy., Disp: 4000 mL, Rfl: 0  •  potassium chloride (Klor-Con) 10 mEq tablet, Take 20 mEq by mouth 2 (two) times a day (Patient not taking: Reported on 1/8/2024), Disp: , Rfl:   •  rosuvastatin (CRESTOR) 10 MG tablet, Take 1 tablet (10 mg total) by mouth daily, Disp: 90 tablet, Rfl: 3  •  Timolol Maleate PF (Timolol Maleate Ocudose) 0.5 % SOLN, , Disp: , Rfl:         Nicole Burleson PA-C  2/5/2024  4:20 PM  Sign when Signing Visit  Assessment/Plan:    Lymphedema    -Continue with regular compression stockings to be worn daily and removed at night  -Lymphedema pump 60 minutes once daily  -Maintain good skin care  -Regular exercise, low-sodium diet, weight loss  -Maintain good diabetes control  -Continue with apixaban 2.5 twice daily  -Patient education regarding lymphedema provided  -Follow-up 3 months or sooner if needed       Diagnoses and all orders for this visit:    Venous insufficiency of left lower extremity    Type 2 diabetes mellitus with hyperglycemia, without long-term current use of insulin (HCC)    Edema of both lower extremities due to peripheral venous insufficiency    Class 2 severe obesity with serious comorbidity and body mass index (BMI) of 35.0 to 35.9 in adult, unspecified obesity type     Chronic deep vein thrombosis (DVT) of right peroneal vein (HCC)    Chronic deep vein thrombosis (DVT) of left peroneal vein (HCC)      Subjective:      Patient ID: Coretta Hines is a 68 y.o. female.    Patient presents to the office for lymphedema. Pt is wearing compression stockings daily and elevates her legs.    [employer]   [place]   Phone: [phone]  Fax: [phone]   E-mail: [email or web address]    Ordering Provider:  Nicole Lawson (NPI: [id number])  [facility] [address]    [address]  [place] [address]  Phone: [phone]  Fax: [phone]      Patient Information  MRN: [id number]   Coretta Hines  1955  [address] [place] [address] [phone]     Insurance Information  Payor: MEDICARE / Plan: MEDICARE A AND B / Product Type: Medicare A & B Fee for Service /      [id number] - (Medicare )     Patient Height and Weight      Wt Readings from Last 1 Encounters:   01/08/24 97.5 kg (215 lb)         Post 4-week Measurements      Body Part Right Left   Ankle / Forearm 20 cm 20 cm   Calf / Elbow  37 cm 39 cm   Knee / Bicep  Not Applicable (N/A) Not Applicable (N/A)   Mid-Thigh / Axilla  60 cm 59 cm     Patient outcome after 4-weeks of conservative therapy:   [x] Patient's condition has NOT improved         HPI   Ms. Coretta Hines 68yoF R TKR, COPD, hypertension, DM who developed left calf DVT 3/30/2023.  Patient reports that she has chronic swelling in both legs particularly on the left side but developed redness to the calf for about 2 weeks for which she went to [facility] for evaluation.  Venous duplex study performed which showed acute, occlusive DVT in 1 of 2 peroneal veins in the left leg.  She was placed on full anticoagulation with apixaban. She had been moving and carrying boxes and standing on her feet for prolonged periods of time.  However, she denies any injury or extended travel.  She is no prior history of DVT.  No history of malignancy or rheumatologic disorders.  She has no large, bulky varicose veins.  She is tolerating apixaban without extensive bruising or obvious bleeding.       After DVT, she developed increased leg/calf pain, cramping, edema and redness.  20 to 30 mm medical compression stockings were recommended. Venous reflux study was performed 8/2023.     10/2/2023: Patient returns to vascular surgery for follow-up after left calf DVT in March 2023.  Patient reports that she is wearing compression stockings.  Overall, acute/chronic leg swelling  has been stable.  She is quite satisfied with the appearance of the leg.  However, there is some flat erythema and concern that she may be at risk for future infection.  She is advised to closely monitor her condition and strictly adhere to compressive therapy.  In the past, she reports that she took furosemide which did help with leg swelling, but in the absence of cardiac etiology, would recommend we avoid diuretics in favor of compressive therapy for treatment of venous insufficiency.      She completed a course of full anticoagulation with apixaban 5 twice daily. Currently on apixaban 2.5 twice daily for DVT prophylaxis.  She has had some bruising and scratches on anticoagulation.  No major bleeding.  If she has not yet obtained hypercoagulable panel requested by hematology.     In general, she is trying to increase her activity.  She has baseline problems with balance and walking so she is going to start physical therapy tomorrow.  I have asked her to talk to them also about her leg edema.  She is working on improving her diabetic diet adherence.  She is planning to travel.  She will be flying to [place] next week for her stepdaughter who is having a baby.  We discussed travel precautions.  She should wear compression stockings and continue her anticoagulation without interruption.        1/8/24: Vascular follow-up for venous insufficiency.  Patient reports that she has had good response to medical compression stockings.  She is fully compliant.  She finds the stockings that better controlled her swelling.  She does continue to have some swelling as evidenced by continued, intermittent redness to the legs.  She has had no wounds, breakdown or infection.  She shows me the stockings which look of good quality.  However, the top of the stocking does cut into the upper calf and has caused some injury.  Recommended trying a silicone top border.  She is going to physical therapy to help with osteoarthritis and balance  problems.  She is on DVT prophylaxis with apixaban 2.5 twice daily with plans to follow-up with hematology.  We reviewed her reflux study from last year which does show deep and superficial incompetence with reflux to about 3 seconds, but the vein diameter is just short of surgical intervention.  Will continue with conservative measures.  She would benefit from the addition of lymphedema pump therapy.        2/5/24:    Wearing compression  LE edema L>R   Still heaviness and edema; trying not to take Lasix.  Wants to use the lymphedema pumps    Alternate moves  Sit and stand    Also has L knee    PT for the knee plan for replacement, weight loss and compressive therapy      The following portions of the patient's history were reviewed and updated as appropriate: allergies, current medications, past family history, past medical history, past social history, past surgical history, and problem list.    Review of Systems   Cardiovascular:  Positive for leg swelling.   Musculoskeletal:  Positive for arthralgias and gait problem.       Objective:    There were no vitals taken for this visit.         Physical Exam        I have reviewed and made appropriate changes to the review of systems input by the medical assistant.    There were no vitals filed for this visit.    Patient Active Problem List   Diagnosis   • Chronic deep vein thrombosis (DVT) of right peroneal vein (HCC)   • Type 2 diabetes mellitus with hyperglycemia, without long-term current use of insulin (HCC)   • Class 2 severe obesity with serious comorbidity in adult (HCC)   • Left retinal disorder   • Dyslipidemia   • Primary hypertension   • GERD (gastroesophageal reflux disease)   • Moderate persistent asthma without complication   • Obesity hypoventilation syndrome (HCC)   • DEN (obstructive sleep apnea)   • Osteoarthritis   • Seasonal allergic rhinitis due to fungal spores   • Edema of both lower extremities due to peripheral venous insufficiency   •  History of colon polyps   • FH: total knee replacement   • Venous insufficiency of left lower extremity   • Chronic deep vein thrombosis (DVT) of left peroneal vein (HCC)   • Sleep disturbance       Past Surgical History:   Procedure Laterality Date   •  SECTION  10/16/1990    Fibroids   • COLONOSCOPY     • EYE SURGERY      MULTIPLE   • HYSTERECTOMY     • REPLACEMENT TOTAL KNEE Right        Family History   Problem Relation Age of Onset   • Dementia Mother    • Arthritis Mother         Late in life   • Hypertension Father         HBP   • Heart disease Father         HBP   • Diabetes Father         managed through diet   • Hearing loss Father         Industrial/construction work with no hearing protection   • Glaucoma Paternal Grandmother        Social History     Socioeconomic History   • Marital status: /Civil Union     Spouse name: Not on file   • Number of children: Not on file   • Years of education: Not on file   • Highest education level: Not on file   Occupational History   • Not on file   Tobacco Use   • Smoking status: Never   • Smokeless tobacco: Never   Vaping Use   • Vaping status: Never Used   Substance and Sexual Activity   • Alcohol use: Yes     Alcohol/week: 1.0 standard drink of alcohol     Types: 1 Standard drinks or equivalent per week     Comment: on occasion, 1 drink with low sugar content   • Drug use: Never   • Sexual activity: Not on file   Other Topics Concern   • Not on file   Social History Narrative   • Not on file     Social Determinants of Health     Financial Resource Strain: Low Risk  (2023)    Overall Financial Resource Strain (CARDIA)    • Difficulty of Paying Living Expenses: Not hard at all   Food Insecurity: No Food Insecurity (2022)    Received from [facility]    Food Insecurity    • Within the past 12 months, you worried that your food would run out before you got the money to buy more.: Never true    • Within the past 12 months, the food you  bought just didn't last and you didn't have money to get more.: Never true    • Within the past 12 months, you worried that your food would run out before you got the money to buy more.: Never true   Transportation Needs: No Transportation Needs (9/17/2023)    PRAPARE - Transportation    • Lack of Transportation (Medical): No    • Lack of Transportation (Non-Medical): No   Physical Activity: Not on file   Stress: Not on file   Social Connections: Not on file   Intimate Partner Violence: Not on file   Housing Stability: Not on file       Allergies   Allergen Reactions   • Other Nausea Only, Vomiting and Cough     LOBSTER   • Penicillins Seizures   • Sulfa Antibiotics Rash         Current Outpatient Medications:   •  albuterol (PROVENTIL HFA,VENTOLIN HFA) 90 mcg/act inhaler, Inhale 2 puffs every 6 (six) hours as needed for wheezing, Disp: 8.5 g, Rfl: 2  •  apixaban (Eliquis) 2.5 mg, Take 1 tablet (2.5 mg total) by mouth 2 (two) times a day, Disp: 60 tablet, Rfl: 0  •  azelastine (ASTELIN) 0.1 % nasal spray, 2 sprays into each nostril 2 (two) times a day Use in each nostril as directed, Disp: 1 mL, Rfl: 0  •  bimatoprost (LUMIGAN) 0.01 % ophthalmic drops, , Disp: , Rfl:   •  diltiazem (TIAZAC) 180 MG 24 hr capsule, Take 180 mg by mouth 2 (two) times a day (Patient not taking: Reported on 1/8/2024), Disp: , Rfl:   •  dulaglutide (Trulicity) 3 MG/0.5ML injection, Inject 0.5 mL (3 mg total) under the skin every 7 days, Disp: 6 mL, Rfl: 1  •  EPINEPHrine (EPIPEN) 0.3 mg/0.3 mL SOAJ, Inject 0.3 mg into a muscle (Patient not taking: Reported on 12/26/2023), Disp: , Rfl:   •  famotidine (PEPCID) 20 mg tablet, Take 20 mg by mouth, Disp: , Rfl:   •  fluticasone (FLONASE) 50 mcg/act nasal spray, 1 spray into each nostril, Disp: , Rfl:   •  Fluticasone-Salmeterol (Advair) 500-50 mcg/dose inhaler, Inhale 1 puff every 12 (twelve) hours, Disp: 180 blister, Rfl: 3  •  furosemide (LASIX) 20 mg tablet, Take 20 mg by mouth 2 (two) times  a day as needed (Patient not taking: Reported on 1/8/2024), Disp: , Rfl:   •  ipratropium (ATROVENT) 0.03 % nasal spray, SPRAY 2 SPRAYS INTO EACH NOSTRIL EVERY 12 HOURS, Disp: 30 mL, Rfl: 1  •  ketorolac (ACULAR) 0.4 % SOLN, INSTILL 1 DROP INTO LEFT EYE TWICE A DAY AS DIRECTED, Disp: , Rfl:   •  loratadine (CLARITIN) 10 mg tablet, Take 10 mg by mouth daily, Disp: , Rfl:   •  metFORMIN (GLUCOPHAGE) 500 mg tablet, 1 tablet each morning and 2 tablets each evening, Disp: 270 tablet, Rfl: 3  •  omeprazole (PriLOSEC) 40 MG capsule, Take 40 mg by mouth, Disp: , Rfl:   •  polyethylene glycol (GOLYTELY) 4000 mL solution, Take as directed by the office for colonoscopy., Disp: 4000 mL, Rfl: 0  •  potassium chloride (Klor-Con) 10 mEq tablet, Take 20 mEq by mouth 2 (two) times a day (Patient not taking: Reported on 1/8/2024), Disp: , Rfl:   •  rosuvastatin (CRESTOR) 10 MG tablet, Take 1 tablet (10 mg total) by mouth daily, Disp: 90 tablet, Rfl: 3  •  Timolol Maleate PF (Timolol Maleate Ocudose) 0.5 % SOLN, , Disp: , Rfl:

## 2024-02-05 NOTE — PATIENT INSTRUCTIONS
Lymphedema    -Continue with regular compression stockings to be worn daily and removed at night  -Lymphedema pump 60 minutes once daily  -Maintain good skin care  -Regular exercise, low-sodium diet, weight loss  -Maintain good diabetes control  -Continue with apixaban 2.5 twice daily  -Patient education regarding lymphedema provided  -Follow-up 3 months or sooner if needed

## 2024-02-05 NOTE — LETTER
February 5, 2024     Extole Lymphodema  [id number]  [place] [id number]    Patient: Coretta Hines   YOB: 1955   Date of Visit: 2/5/2024       Dear Dr. Barrios:    Thank you for referring Coretta Hines to me for evaluation. Below are my notes for this consultation.    If you have questions, please do not hesitate to call me. I look forward to following your patient along with you.         Sincerely,        Nicole Burleson PA-C        CC: No Recipients

## 2024-02-05 NOTE — PROGRESS NOTES
Assessment/Plan:    Lymphedema  -     Pneumatic compression pumps    Chronic B leg edema  Chronic L peroneal DVT  -Fully compliant with compressive measures which have partially improved her symptoms  -Still has leg pain/cramping, edema edema, heaviness and intermittent R LE erythema    Plan:  -Continue with regular compression stockings to be worn daily and removed at night  -Recommend addition of lymphedema therapy  -Lymphedema pump 60 minutes once daily  -Maintain good skin care  -Regular exercise, low-sodium diet, weight loss  -Maintain good diabetes control  -Continue with apixaban 2.5 twice daily  -Patient education regarding lymphedema provided  -Follow-up 3 months or sooner if needed      Diagnoses:  Venous insufficiency of left lower extremity  Type 2 diabetes mellitus with hyperglycemia, without long-term current use of insulin (HCC)  Edema of both lower extremities due to peripheral venous insufficiency  Class 2 severe obesity with serious comorbidity and body mass index (BMI) of 35.0 to 35.9 in adult, unspecified obesity type   Chronic deep vein thrombosis (DVT) of right peroneal vein (HCC)  Chronic deep vein thrombosis (DVT) of left peroneal vein (HCC)      Subjective:      Patient ID: Coretta Hines is a 68 y.o. female.  Patient presents to the office for lymphedema. Pt is wearing compression stockings daily and elevates her legs.    [place]   Roberto Head   Phone: [phone]  Fax: [phone]   E-mail: [email or web address]    Ordering Provider:  Nicole Lawson (NPI: [id number])  [facility]  [address] [place] [address]   [address] [place] [address]  Phone: [phone]  Fax: [phone]      Patient Information  MRN: [id number]   Coretta Hines  1955  [address] [place] [address] [phone]     Insurance Information  Payor: MEDICARE / Plan: MEDICARE A AND B / Product Type: Medicare A & B Fee for Service /      [id number] -  "(Medicare )     Patient Height and Weight 5' 3\" (1.6 m)    Wt Readings from Last 1 Encounters:   02/05/24 97.5 kg (215 lb)         Post 4-week Measurements      Body Part Right Left   Ankle / Forearm 20 cm 20 cm   Calf / Elbow  37 cm 39 cm   Knee / Bicep  Not Applicable (N/A) Not Applicable (N/A)   Mid-Thigh / Axilla  60 cm 59 cm     Patient outcome after 4-weeks of conservative therapy:   [x] Patient's condition has NOT improved         HPI   Ms. Coretta Hines 68yoF R TKR, COPD, hypertension, DM who developed left calf DVT 3/30/2023.  Patient reports that she has chronic swelling in both legs particularly on the left side but developed redness to the calf for about 2 weeks for which she went to [facility] for evaluation.  Venous duplex study performed which showed acute, occlusive DVT in 1 of 2 peroneal veins in the left leg.  She was placed on full anticoagulation with apixaban. She had been moving and carrying boxes and standing on her feet for prolonged periods of time.  However, she denies any injury or extended travel.  She is no prior history of DVT.  No history of malignancy or rheumatologic disorders.  She has no large, bulky varicose veins.  She is tolerating apixaban without extensive bruising or obvious bleeding.       After DVT, she developed increased leg/calf pain, cramping, edema and redness.  20 to 30 mm medical compression stockings were recommended. Venous reflux study was performed 8/2023.      1/8/24: Vascular follow-up for venous insufficiency.  Patient reports that she has had good response to medical compression stockings.  She is fully compliant.  She finds the stockings that better controlled her swelling.  She does continue to have some swelling as evidenced by continued, intermittent redness to the legs.  She has had no wounds, breakdown or infection.  She shows me the stockings which look of good quality.  However, the top of the stocking does cut into the upper calf and has caused some " injury.  Recommended trying a silicone top border.  She is going to physical therapy to help with osteoarthritis and balance problems.  She is on DVT prophylaxis with apixaban 2.5 twice daily with plans to follow-up with hematology.  We reviewed her reflux study from last year which does show deep and superficial incompetence with reflux to about 3 seconds, but the vein diameter is just short of surgical intervention.  Will continue with conservative measures.  She would benefit from the addition of lymphedema pump therapy.        2/5/24: Ms. Hines comes in for follow-up of venous disease and leg measurements. She is doing well. She is wearing medical knee-high compression stockings with good effect.  Her leg swelling is much improved.  She still has leg heaviness and edema at the end of the day, but much less so.  She tries not to sit or stand for too long since that worsens edema and the way her legs feel.  Also, as the day goes no, she develops flat redness to the left shin even with the stockings which fortunately resolves by morning. She has no wounds or drainage. She working with physical therapy on her left knee and hoping to have a knee replacement at some point.  She is motivated to exercise and lose weight.  She is working at conservative measures over taking Lasix to help control swelling, but still feels additional treatment is necessary.  We again reviewed her left leg venous reflux study from last year which shows significant deep and superficial reflux but does not appear to meet criteria for surgical intervention.  Alternatively, she may benefit from lymphedema pumps. We discussed that going forward with daily compression stockings, lymphedema pumps, low-sodium diet and weight loss, lateral leg edema should be much improved.  If after all of these measures she continues to have edema and leg cramping, at some point, we can reassess her superficial reflux though she also has deep reflux.       The  "following portions of the patient's history were reviewed and updated as appropriate: allergies, current medications, past family history, past medical history, past social history, past surgical history, and problem list.    Review of Systems   Cardiovascular:  Positive for leg swelling.   Musculoskeletal:  Positive for arthralgias and gait problem.       Objective:    /94 (BP Location: Left arm, Patient Position: Sitting) Comment: pt did not take HTN medication for the past 2 days  Pulse 90   Resp 18   Ht 5' 3\" (1.6 m)   Wt 97.5 kg (215 lb)   SpO2 97%   BMI 38.09 kg/m²        Physical Exam  Vitals and nursing note reviewed.   Constitutional:       Appearance: She is well-developed.   HENT:      Head: Normocephalic and atraumatic.   Eyes:      Pupils: Pupils are equal, round, and reactive to light.   Cardiovascular:      Rate and Rhythm: Normal rate and regular rhythm.      Pulses:           Radial pulses are 2+ on the right side and 2+ on the left side.        Dorsalis pedis pulses are 2+ on the right side and 2+ on the left side.      Heart sounds: Normal heart sounds, S1 normal and S2 normal. No murmur heard.     No friction rub. No gallop.   Pulmonary:      Effort: Pulmonary effort is normal. No accessory muscle usage or respiratory distress.      Breath sounds: Normal breath sounds. No wheezing or rales.   Abdominal:      General: Bowel sounds are normal. There is no distension.      Palpations: Abdomen is soft.      Tenderness: There is no abdominal tenderness.   Musculoskeletal:         General: No deformity. Normal range of motion.      Right lower leg: Edema present.      Left lower leg: Edema present.   Skin:     General: Skin is warm and dry.      Capillary Refill: Capillary refill takes less than 2 seconds.      Findings: No lesion or rash.      Nails: There is no clubbing.   Neurological:      Mental Status: She is alert and oriented to person, place, and time.      Comments: Grossly normal " "   Psychiatric:         Behavior: Behavior is cooperative.           I have reviewed and made appropriate changes to the review of systems input by the medical assistant.    Vitals:    24 1622   BP: 156/94   BP Location: Left arm   Patient Position: Sitting   Pulse: 90   Resp: 18   SpO2: 97%   Weight: 97.5 kg (215 lb)   Height: 5' 3\" (1.6 m)       Patient Active Problem List   Diagnosis    Chronic deep vein thrombosis (DVT) of right peroneal vein (HCC)    Type 2 diabetes mellitus with hyperglycemia, without long-term current use of insulin (HCC)    Class 2 severe obesity with serious comorbidity in adult (HCC)    Left retinal disorder    Dyslipidemia    Primary hypertension    GERD (gastroesophageal reflux disease)    Moderate persistent asthma without complication    Obesity hypoventilation syndrome (HCC)    DEN (obstructive sleep apnea)    Osteoarthritis    Seasonal allergic rhinitis due to fungal spores    Edema of both lower extremities due to peripheral venous insufficiency    History of colon polyps    FH: total knee replacement    Venous insufficiency of left lower extremity    Chronic deep vein thrombosis (DVT) of left peroneal vein (HCC)    Sleep disturbance       Past Surgical History:   Procedure Laterality Date     SECTION  10/16/1990    Fibroids    COLONOSCOPY      EYE SURGERY      MULTIPLE    HYSTERECTOMY  2000    REPLACEMENT TOTAL KNEE Right        Family History   Problem Relation Age of Onset    Dementia Mother     Arthritis Mother         Late in life    Hypertension Father         HBP    Heart disease Father         HBP    Diabetes Father         managed through diet    Hearing loss Father         Industrial/construction work with no hearing protection    Glaucoma Paternal Grandmother        Social History     Socioeconomic History    Marital status: /Civil Union     Spouse name: Not on file    Number of children: Not on file    Years of education: Not on file    Highest " education level: Not on file   Occupational History    Not on file   Tobacco Use    Smoking status: Never    Smokeless tobacco: Never   Vaping Use    Vaping status: Never Used   Substance and Sexual Activity    Alcohol use: Yes     Alcohol/week: 1.0 standard drink of alcohol     Types: 1 Standard drinks or equivalent per week     Comment: on occasion, 1 drink with low sugar content    Drug use: Never    Sexual activity: Not on file   Other Topics Concern    Not on file   Social History Narrative    Not on file     Social Determinants of Health     Financial Resource Strain: Low Risk  (9/17/2023)    Overall Financial Resource Strain (CARDIA)     Difficulty of Paying Living Expenses: Not hard at all   Food Insecurity: No Food Insecurity (9/13/2022)    Received from [email or web address]    Food Insecurity     Within the past 12 months, you worried that your food would run out before you got the money to buy more.: Never true     Within the past 12 months, the food you bought just didn't last and you didn't have money to get more.: Never true     Within the past 12 months, you worried that your food would run out before you got the money to buy more.: Never true   Transportation Needs: No Transportation Needs (9/17/2023)    PRAPARE - Transportation     Lack of Transportation (Medical): No     Lack of Transportation (Non-Medical): No   Physical Activity: Not on file   Stress: Not on file   Social Connections: Not on file   Intimate Partner Violence: Not on file   Housing Stability: Not on file       Allergies   Allergen Reactions    Other Nausea Only, Vomiting and Cough     LOBSTER    Penicillins Seizures    Sulfa Antibiotics Rash         Current Outpatient Medications:     albuterol (PROVENTIL HFA,VENTOLIN HFA) 90 mcg/act inhaler, Inhale 2 puffs every 6 (six) hours as needed for wheezing, Disp: 8.5 g, Rfl: 2    apixaban (Eliquis) 2.5 mg, Take 1 tablet (2.5 mg total) by mouth 2 (two) times a day, Disp: 60 tablet, Rfl: 0     azelastine (ASTELIN) 0.1 % nasal spray, 2 sprays into each nostril 2 (two) times a day Use in each nostril as directed, Disp: 1 mL, Rfl: 0    bimatoprost (LUMIGAN) 0.01 % ophthalmic drops, , Disp: , Rfl:     diltiazem (TIAZAC) 180 MG 24 hr capsule, Take 180 mg by mouth 2 (two) times a day (Patient not taking: Reported on 1/8/2024), Disp: , Rfl:     dulaglutide (Trulicity) 3 MG/0.5ML injection, Inject 0.5 mL (3 mg total) under the skin every 7 days, Disp: 6 mL, Rfl: 1    EPINEPHrine (EPIPEN) 0.3 mg/0.3 mL SOAJ, Inject 0.3 mg into a muscle (Patient not taking: Reported on 12/26/2023), Disp: , Rfl:     famotidine (PEPCID) 20 mg tablet, Take 20 mg by mouth, Disp: , Rfl:     fluticasone (FLONASE) 50 mcg/act nasal spray, 1 spray into each nostril, Disp: , Rfl:     Fluticasone-Salmeterol (Advair) 500-50 mcg/dose inhaler, Inhale 1 puff every 12 (twelve) hours, Disp: 180 blister, Rfl: 3    furosemide (LASIX) 20 mg tablet, Take 20 mg by mouth 2 (two) times a day as needed (Patient not taking: Reported on 1/8/2024), Disp: , Rfl:     ipratropium (ATROVENT) 0.03 % nasal spray, SPRAY 2 SPRAYS INTO EACH NOSTRIL EVERY 12 HOURS, Disp: 30 mL, Rfl: 1    ketorolac (ACULAR) 0.4 % SOLN, INSTILL 1 DROP INTO LEFT EYE TWICE A DAY AS DIRECTED, Disp: , Rfl:     loratadine (CLARITIN) 10 mg tablet, Take 10 mg by mouth daily, Disp: , Rfl:     metFORMIN (GLUCOPHAGE) 500 mg tablet, 1 tablet each morning and 2 tablets each evening, Disp: 270 tablet, Rfl: 3    omeprazole (PriLOSEC) 40 MG capsule, Take 40 mg by mouth, Disp: , Rfl:     polyethylene glycol (GOLYTELY) 4000 mL solution, Take as directed by the office for colonoscopy., Disp: 4000 mL, Rfl: 0    potassium chloride (Klor-Con) 10 mEq tablet, Take 20 mEq by mouth 2 (two) times a day (Patient not taking: Reported on 1/8/2024), Disp: , Rfl:     rosuvastatin (CRESTOR) 10 MG tablet, Take 1 tablet (10 mg total) by mouth daily, Disp: 90 tablet, Rfl: 3    Timolol Maleate PF (Timolol Maleate Ocudose)  0.5 % SOLN, , Disp: , Rfl:

## 2024-02-06 ENCOUNTER — APPOINTMENT (OUTPATIENT)
Dept: PHYSICAL THERAPY | Facility: CLINIC | Age: 69
End: 2024-02-06
Payer: MEDICARE

## 2024-02-06 DIAGNOSIS — J30.2 SEASONAL ALLERGIC RHINITIS DUE TO FUNGAL SPORES: ICD-10-CM

## 2024-02-06 DIAGNOSIS — E11.65 TYPE 2 DIABETES MELLITUS WITH HYPERGLYCEMIA, WITHOUT LONG-TERM CURRENT USE OF INSULIN (HCC): ICD-10-CM

## 2024-02-06 RX ORDER — AZELASTINE HYDROCHLORIDE 137 UG/1
SPRAY, METERED NASAL
Qty: 30 ML | Refills: 3 | Status: SHIPPED | OUTPATIENT
Start: 2024-02-06

## 2024-02-06 RX ORDER — AZELASTINE HYDROCHLORIDE 137 UG/1
SPRAY, METERED NASAL
Qty: 30 ML | Refills: 3 | Status: SHIPPED | OUTPATIENT
Start: 2024-02-06 | End: 2024-02-06

## 2024-02-07 ENCOUNTER — OFFICE VISIT (OUTPATIENT)
Dept: PHYSICAL THERAPY | Facility: CLINIC | Age: 69
End: 2024-02-07
Payer: MEDICARE

## 2024-02-07 DIAGNOSIS — M25.562 LEFT KNEE PAIN, UNSPECIFIED CHRONICITY: Primary | ICD-10-CM

## 2024-02-07 DIAGNOSIS — M17.12 PRIMARY OSTEOARTHRITIS OF LEFT KNEE: ICD-10-CM

## 2024-02-07 DIAGNOSIS — R26.2 DIFFICULTY WALKING: ICD-10-CM

## 2024-02-07 PROCEDURE — 97110 THERAPEUTIC EXERCISES: CPT

## 2024-02-07 PROCEDURE — 97530 THERAPEUTIC ACTIVITIES: CPT

## 2024-02-07 NOTE — PROGRESS NOTES
"Daily Note     Today's date: 2024  Patient name: Coretta Hines  : 1955  MRN: [id number]  Referring provider: Nafisa Duckworth MD  Dx:   Encounter Diagnosis     ICD-10-CM    1. Left knee pain, unspecified chronicity  M25.562       2. Primary osteoarthritis of left knee  M17.12       3. Difficulty walking  R26.2           Start Time: 0830  Stop Time: 0914  Total time in clinic (min): 44 minutes    Subjective: Pt noted increased stiffness this morning because it's early in the morning.       Objective: See treatment diary below      Assessment: Tolerated treatment well. Pt's program started with the R. Bike. Pt tolerated increased weight with the leg press and increased resistance with the sidestepping. Although pt is still challenged with her step exercises, she demonstrated a minor decrease in difficulty level. MHP was utilized post-treatment. Patient would benefit from continued PT      Plan: Continue per plan of care.      Goals: Patient's goal is to decrease pain with ADLs    Precautions: Previous LLE Blood Clot, L eye blindness  Dx: L Knee OA    Daily Treatment Diary     Manuals      L Knee PROM         L gastroc/HS str         L patella mobs                  Ther Ex         Quad Set         Gastroc Str 30\"x3 30\"x3 30\" x 3  30\"x3 30\"x3    SLR         Heel Slide         Seated HS str         Standing hip abd/ext 10x2 each 10x2 each 10x2 each 2#, 10x2 each 2#, 20x each    Standing knee flexion  10x2 10x2 10x2  2#, 10x2 2#, 20x    Seated HS str  15\"x4 15\" x 4  15\"x4 15\"x4    R. Bike (rocking) 5' 5' 5  5' 5'    Pt Edu         Neuro Re-ed         TKE with TB BTB, 10x2 BTB, 10x2 BTB 10x 2  BTB, 10x2 BTB, 10x2    Leg Press 45#, 10x2 45#,10x2 45# 1 x 10 45#, 10x2 50# 10x2    Sidestepping with TB  Grn 3 laps GTB 3 laps GTB 3 laps BTB, 3 laps                      Ther Activity         minisquat 10x2 10x2 10x2 10x2 10x2    Step up  L3, 10x L3, 10x L3 x 10  L3, 10x L3, 10x    Step up and " overs  L1, 10x L1, 10x L1 10x  L1, 10x L1, 10x    HR on step L1, 20x L1, 20x L1 10 L1, 20x L1, 10x2    STS         Gait Training                           Modalities         Ice 10' MHP 10' MHP declined 10' MHP 10' MHP

## 2024-02-08 ENCOUNTER — APPOINTMENT (OUTPATIENT)
Dept: PHYSICAL THERAPY | Facility: CLINIC | Age: 69
End: 2024-02-08
Payer: MEDICARE

## 2024-02-09 ENCOUNTER — OFFICE VISIT (OUTPATIENT)
Dept: PHYSICAL THERAPY | Facility: CLINIC | Age: 69
End: 2024-02-09
Payer: MEDICARE

## 2024-02-09 DIAGNOSIS — R26.2 DIFFICULTY WALKING: ICD-10-CM

## 2024-02-09 DIAGNOSIS — M25.562 LEFT KNEE PAIN, UNSPECIFIED CHRONICITY: Primary | ICD-10-CM

## 2024-02-09 DIAGNOSIS — M17.12 PRIMARY OSTEOARTHRITIS OF LEFT KNEE: ICD-10-CM

## 2024-02-09 PROCEDURE — 97112 NEUROMUSCULAR REEDUCATION: CPT

## 2024-02-09 PROCEDURE — 97110 THERAPEUTIC EXERCISES: CPT

## 2024-02-09 NOTE — PROGRESS NOTES
"Daily Note     Today's date: 2024  Patient name: Coretta Hines  : 1955  MRN: [id number]  Referring provider: Nafisa Duckworth MD  Dx:   Encounter Diagnosis     ICD-10-CM    1. Left knee pain, unspecified chronicity  M25.562       2. Primary osteoarthritis of left knee  M17.12       3. Difficulty walking  R26.2                      Subjective: \" Good sore \" following progressions last session.       Objective: See treatment diary below      Assessment: Tolerated treatment well. Continues with decreased strength and stabilization especially with steps. Demonstrates good understanding of current treatment plan requiring min cues. Patient would benefit from continued PT      Plan: Continue per plan of care.      Goals: Patient's goal is to decrease pain with ADLs    Precautions: Previous LLE Blood Clot, L eye blindness  Dx: L Knee OA    Daily Treatment Diary     Manuals     L Knee PROM         L gastroc/HS str         L patella mobs                  Ther Ex         Quad Set         Gastroc Str 30\"x3 30\"x3 30\" x 3  30\"x3 30\"x3 30\"x3   SLR         Heel Slide         Seated HS str         Standing hip abd/ext 10x2 each 10x2 each 10x2 each 2#, 10x2 each 2#, 20x each 2# 20x each   Standing knee flexion  10x2 10x2 10x2  2#, 10x2 2#, 20x 2#, 20x   Seated HS str  15\"x4 15\" x 4  15\"x4 15\"x4 15\" x 4   R. Bike (rocking) 5' 5' 5  5' 5' 5'   Pt Edu         Neuro Re-ed         TKE with TB BTB, 10x2 BTB, 10x2 BTB 10x 2  BTB, 10x2 BTB, 10x2 BTB, 10x2   Leg Press 45#, 10x2 45#,10x2 45# 1 x 10 45#, 10x2 50# 10x2 50# 10x2   Sidestepping with TB  Grn 3 laps GTB 3 laps GTB 3 laps BTB, 3 laps BTB, 3 laps                     Ther Activity         minisquat 10x2 10x2 10x2 10x2 10x2 10x2   Step up  L3, 10x L3, 10x L3 x 10  L3, 10x L3, 10x 2#   L3, 10x    Step up and overs  L1, 10x L1, 10x L1 10x  L1, 10x L1, 10x L1, 10x   HR on step L1, 20x L1, 20x L1 10 L1, 20x L1, 10x2 L1, 10x2   STS       "   Gait Training                           Modalities      2/9    Ice 10' MHP 10' MHP declined 10' MHP 10' MHP 10' MHP

## 2024-02-12 ENCOUNTER — OFFICE VISIT (OUTPATIENT)
Dept: PHYSICAL THERAPY | Facility: CLINIC | Age: 69
End: 2024-02-12
Payer: MEDICARE

## 2024-02-12 DIAGNOSIS — M25.562 LEFT KNEE PAIN, UNSPECIFIED CHRONICITY: Primary | ICD-10-CM

## 2024-02-12 DIAGNOSIS — R26.2 DIFFICULTY WALKING: ICD-10-CM

## 2024-02-12 DIAGNOSIS — M17.12 PRIMARY OSTEOARTHRITIS OF LEFT KNEE: ICD-10-CM

## 2024-02-12 PROCEDURE — 97530 THERAPEUTIC ACTIVITIES: CPT

## 2024-02-12 PROCEDURE — 97110 THERAPEUTIC EXERCISES: CPT

## 2024-02-12 NOTE — PROGRESS NOTES
"Daily Note     Today's date: 2024  Patient name: Coretta Hines  : 1955  MRN: [id number]  Referring provider: Nafisa Duckworth MD  Dx:   Encounter Diagnosis     ICD-10-CM    1. Left knee pain, unspecified chronicity  M25.562       2. Primary osteoarthritis of left knee  M17.12       3. Difficulty walking  R26.2           Start Time: 09  Stop Time: 1020  Total time in clinic (min): 42 minutes    Subjective: Pt reported that her knee was stiff this morning.       Objective: See treatment diary below      Assessment: Tolerated treatment well. Program started with the R. Bike. Pt is still challenged with all exercises, especially step exercises. Pt tolerated increased weight with hip abd, ext, and knee flexion. MHP was utilized post-treatment. Patient would benefit from continued PT      Plan: Continue per plan of care.      Goals: Patient's goal is to decrease pain with ADLs    Precautions: Previous LLE Blood Clot, L eye blindness  Dx: L Knee OA    Daily Treatment Diary     Manuals     L Knee PROM         L gastroc/HS str         L patella mobs                  Ther Ex         Quad Set         Gastroc Str 30\"x3  30\" x 3  30\"x3 30\"x3 30\"x3   SLR         Heel Slide         Seated HS str         Standing hip abd/ext 3#, 20x each  10x2 each 2#, 10x2 each 2#, 20x each 2# 20x each   Standing knee flexion  3#, 20x  10x2  2#, 10x2 2#, 20x 2#, 20x   Seated HS str 15\"x4  15\" x 4  15\"x4 15\"x4 15\" x 4   R. Bike (rocking) 5'  5  5' 5' 5'   Pt Edu         Neuro Re-ed         TKE with TB BTB 10x2  BTB 10x 2  BTB, 10x2 BTB, 10x2 BTB, 10x2   Leg Press 55#, 10x2  45# 1 x 10 45#, 10x2 50# 10x2 50# 10x2   Sidestepping with TB BTB 3 laps  GTB 3 laps GTB 3 laps BTB, 3 laps BTB, 3 laps                     Ther Activity         minisquat 10x2  10x2 10x2 10x2 10x2   Step up  L3, 10x  L3 x 10  L3, 10x L3, 10x 2#   L3, 10x    Step up and overs  L1, 10x  L1 10x  L1, 10x L1, 10x L1, 10x   HR on step " L1, 20x  L1 10 L1, 20x L1, 10x2 L1, 10x2   STS         Gait Training                           Modalities      2/9    Ice 10' MHP  declined 10' MHP 10' MHP 10' MHP

## 2024-02-13 ENCOUNTER — APPOINTMENT (OUTPATIENT)
Dept: PHYSICAL THERAPY | Facility: CLINIC | Age: 69
End: 2024-02-13
Payer: MEDICARE

## 2024-02-13 DIAGNOSIS — K21.9 GASTROESOPHAGEAL REFLUX DISEASE WITHOUT ESOPHAGITIS: Primary | ICD-10-CM

## 2024-02-13 DIAGNOSIS — K21.9 GASTROESOPHAGEAL REFLUX DISEASE WITHOUT ESOPHAGITIS: ICD-10-CM

## 2024-02-13 RX ORDER — OMEPRAZOLE 40 MG/1
40 CAPSULE, DELAYED RELEASE ORAL DAILY
Qty: 30 CAPSULE | Refills: 1 | Status: SHIPPED | OUTPATIENT
Start: 2024-02-13 | End: 2024-02-13 | Stop reason: SDUPTHER

## 2024-02-13 RX ORDER — OMEPRAZOLE 40 MG/1
40 CAPSULE, DELAYED RELEASE ORAL DAILY
Qty: 90 CAPSULE | Refills: 2 | Status: SHIPPED | OUTPATIENT
Start: 2024-02-13

## 2024-02-15 ENCOUNTER — TELEPHONE (OUTPATIENT)
Dept: SLEEP CENTER | Facility: CLINIC | Age: 69
End: 2024-02-15

## 2024-02-15 ENCOUNTER — OFFICE VISIT (OUTPATIENT)
Dept: PHYSICAL THERAPY | Facility: CLINIC | Age: 69
End: 2024-02-15
Payer: MEDICARE

## 2024-02-15 DIAGNOSIS — M25.562 LEFT KNEE PAIN, UNSPECIFIED CHRONICITY: Primary | ICD-10-CM

## 2024-02-15 DIAGNOSIS — M17.12 PRIMARY OSTEOARTHRITIS OF LEFT KNEE: ICD-10-CM

## 2024-02-15 DIAGNOSIS — R26.2 DIFFICULTY WALKING: ICD-10-CM

## 2024-02-15 PROCEDURE — 97110 THERAPEUTIC EXERCISES: CPT

## 2024-02-15 PROCEDURE — 97112 NEUROMUSCULAR REEDUCATION: CPT

## 2024-02-15 PROCEDURE — 97530 THERAPEUTIC ACTIVITIES: CPT

## 2024-02-15 NOTE — PROGRESS NOTES
"Daily Note     Today's date: 2/15/2024  Patient name: Coretta Hines  : 1955  MRN: [id number]  Referring provider: Nafisa Duckworth MD  Dx:   Encounter Diagnosis     ICD-10-CM    1. Left knee pain, unspecified chronicity  M25.562       2. Primary osteoarthritis of left knee  M17.12       3. Difficulty walking  R26.2           Start Time: 0930  Stop Time: 1018  Total time in clinic (min): 48 minutes    Subjective: Pt reports that she is doing better overall, still having some difficulty getting out of a chair.       Objective: See treatment diary below      Assessment: Tolerated treatment well. Patient would benefit from continued PT      Plan: Continue per plan of care.      Goals: Patient's goal is to decrease pain with ADLs    Precautions: Previous LLE Blood Clot, L eye blindness  Dx: L Knee OA    Daily Treatment Diary     Manuals 2/12 02/15  2/1 2/7 2/9    L Knee PROM         L gastroc/HS str         L patella mobs                  Ther Ex         Quad Set         Gastroc Str 30\"x3 30\"x3  30\"x3 30\"x3 30\"x3   SLR         Heel Slide         Seated HS str         Standing hip abd/ext 3#, 20x each  3# 20 ea  2#, 10x2 each 2#, 20x each 2# 20x each   Standing knee flexion  3#, 20x 3# 20 ea   2#, 10x2 2#, 20x 2#, 20x   Seated HS str 15\"x4 15\"x4  15\"x4 15\"x4 15\" x 4   R. Bike (rocking) 5' 6 min  5' 5' 5'   Pt Edu         Neuro Re-ed         TKE with TB BTB 10x2  Btb 20  BTB, 10x2 BTB, 10x2 BTB, 10x2   Leg Press 55#, 10x2   55# 2x10   45#, 10x2 50# 10x2 50# 10x2   Sidestepping with TB BTB 3 laps BTB 4 laps   GTB 3 laps BTB, 3 laps BTB, 3 laps                     Ther Activity         minisquat 10x2 2x10  10x2 10x2 10x2   Step up  L3, 10x L3 10x   L3, 10x L3, 10x 2#   L3, 10x    Step up and overs  L1, 10x L 1 10x  L1, 10x L1, 10x L1, 10x   HR on step L1, 20x L 1 20x  L1, 20x L1, 10x2 L1, 10x2   STS         Gait Training                           Modalities          Ice 10' MHP 10' mhp  10' MHP 10' " MHP 10' P

## 2024-02-15 NOTE — TELEPHONE ENCOUNTER
Diagnostic sleep study resulted and does not show DEN or PLMs.    Call placed to patient, advised of above.  Patient will call to schedule follow up at her convenience.

## 2024-02-16 ENCOUNTER — TELEPHONE (OUTPATIENT)
Dept: HEMATOLOGY ONCOLOGY | Facility: CLINIC | Age: 69
End: 2024-02-16

## 2024-02-16 DIAGNOSIS — I82.452 ACUTE DEEP VEIN THROMBOSIS (DVT) OF LEFT PERONEAL VEIN (HCC): ICD-10-CM

## 2024-02-16 NOTE — TELEPHONE ENCOUNTER
Medication Refill Request   Who are you speaking with? Patient   If it is not the patient, are they listed on an active communication consent form?     N/A   Which medication is being requested for refill?  Please list medication name and dosage  Eliquis 2.5mg    How many pills does the patient have left?  a few days    Preferred Pharmacy / Address  [id number], [place] - [address] [facility] [address]    Who is the prescribing provider? Dr. Serrato   Call back number  [phone]   Relevant Information  No refills left

## 2024-02-20 ENCOUNTER — EVALUATION (OUTPATIENT)
Dept: PHYSICAL THERAPY | Facility: CLINIC | Age: 69
End: 2024-02-20
Payer: MEDICARE

## 2024-02-20 DIAGNOSIS — M25.562 LEFT KNEE PAIN, UNSPECIFIED CHRONICITY: Primary | ICD-10-CM

## 2024-02-20 DIAGNOSIS — M17.12 PRIMARY OSTEOARTHRITIS OF LEFT KNEE: ICD-10-CM

## 2024-02-20 DIAGNOSIS — R26.2 DIFFICULTY WALKING: ICD-10-CM

## 2024-02-20 PROCEDURE — 97110 THERAPEUTIC EXERCISES: CPT

## 2024-02-20 PROCEDURE — 97530 THERAPEUTIC ACTIVITIES: CPT

## 2024-02-20 NOTE — PROGRESS NOTES
Re-Evaluation     Today's date: 2024  Patient name: Coretta Hines  : 1955  MRN: [id number]  Referring provider: Nafisa Duckworth MD  Dx:   Encounter Diagnosis     ICD-10-CM    1. Left knee pain, unspecified chronicity  M25.562       2. Primary osteoarthritis of left knee  M17.12       3. Difficulty walking  R26.2           Start Time: 1430  Stop Time: 1512  Total time in clinic (min): 42 minutes  History of Present Illness  : Pt reported that her pain was 3/10. Pt mentioned that her sx range between 2-3/10 and also reported a subjective improvement percentage of 70%. Pt mentioned that she feels that skilled PT is still helping. Pt is getting an injection next Friday. Pt also noted that performing STS transfer is improving.     IE: Patient presents with c/o L knee pain. Pt reported that her sx started years ago. Pt reported having imaging showing L knee OA. Pt reported that her sx increase with walking and going up/down the steps. Pt also reported that her sx decrease with injections. Pt has a PMH of L retina surgeries, L knee arthroscopic surgery, and a R TKA. Pt also mention that she had a fall in , which led her to fx 2 ribs and her R orbital bone. Pt also mentioned that she was dx'd with a DVT on 2023. Pt has been taking medication and was cleared to perform skilled PT.      Occupation: retired     Pain  At best pain ratin/10  At worst pain rating: 3/10  Location: L knee    Social Support  Lives with her  in a 1 story with 1 FF with railings and a walk-in shower with seat.       Patient Goals  Patient goals for therapy: decrease pain with ADLs    STGs  1. Decrease pain by 20% in 2-4 weeks to improve standing tolerance.-Met  2. Improve L knee ROM by 10 degrees in 2-4 weeks.-Met  3. (I) with HEP within 2 weeks in order to improve PT outcomes.-Met     LTGs  1. Decrease pain by 60% in 6-8 weeks.-Partially Met  2. Improve walking tolerance to >30 minutes in 6-8 weeks to  perform community ambulation. (Partially Met, 20 min)  3. Increase L knee AROM WNL and LLE MMT 5/5 within 6-8 weeks. -Not Met  4. Improve stairs tolerance to 1 flight  in 8 weeks. -Not Met  5. Improve FOTO to greater than or equal to 48. -Not Met      Objective Measurements:    Lumbar ROM R L Strength knee R L   Flex    Flex 4+ 4+   Ext   Ext 4+ 4+   SB        Rot                Hip A/PROM        Flex  /  / Flex 4+ 4   ER at 90   ER     IR at 90   IR     Abd   Abd     Ext   Ext             Knee A/PROM   Ankle     Flex - 112 deg DF     Ext - -2 deg PF       TUG: NT    Assessment:    Coretta Hines is a pleasant 68 y.o. female who has attended 33 visits of skilled PT for L knee AROM. Pt demonstrated fair progress toward goals and reported a subjective improvement percentage of 70%. Pt demonstrated improvements in LE MMT but still demonstrates deficits in knee AROM/LE strength. Pt scored a 56 on FOTO. Program was modified due to re-evaluation being performed. MHP was utilized post-treatment. Pt would benefit from further skilled PT in order to decrease pain, address deficits (ROM/MMT), improve gait mechanics, help pt achieve goals, decrease fall risk, and progress program as tolerated.     Thank you for the referral!    Plan  Patient would benefit from:Skilled physical therapy  Planned therapy interventions: manual therapy, neuromuscular re-education, stretching, strengthening, therapeutic activities, therapeutic exercise, patient education, home exercise program, modalities to decrease pain, and activity modification.    Frequency: 2x week  Duration in weeks: 8  Treatment plan discussed with: patient    Precautions: Previous LLE Blood Clot, L eye blindness  Dx: L Knee OA    Daily Treatment Diary   Goals: Patient's goal is to decrease pain with ADLs      Dx: L Knee OA    Daily Treatment Diary     Manuals 2/12 02/15 2/20  2/7 2/9    L Knee PROM         L gastroc/HS str         L patella mobs                  Ther Ex       "2/9   Quad Set         Gastroc Str 30\"x3 30\"x3 30\"x3  30\"x3 30\"x3   SLR         Heel Slide         Seated HS str         Standing hip abd/ext 3#, 20x each  3# 20 ea 3#, 20x  2#, 20x each 2# 20x each   Standing knee flexion  3#, 20x 3# 20 ea  3#, 20x  2#, 20x 2#, 20x   Seated HS str 15\"x4 15\"x4   15\"x4 15\" x 4   R. Bike (rocking) 5' 6 min 5'  5' 5'   Pt Edu   POC/  progress      Neuro Re-ed      2/9   TKE with TB BTB 10x2  Btb 20 BTB 20x  BTB, 10x2 BTB, 10x2   Leg Press 55#, 10x2   55# 2x10  55#, 10x2  50# 10x2 50# 10x2   Sidestepping with TB BTB 3 laps BTB 4 laps    BTB, 3 laps BTB, 3 laps                     Ther Activity      2/9   minisquat 10x2 2x10 10x2  10x2 10x2   Step up  L3, 10x L3 10x    L3, 10x 2#   L3, 10x    Step up and overs  L1, 10x L 1 10x L1, 10x  L1, 10x L1, 10x   HR on step L1, 20x L 1 20x L1, 20x  L1, 10x2 L1, 10x2   STS         Gait Training                           Modalities      2/9    Ice 10' MHP 10' mhp 10' MHP  10' MHP 10' MHP                                                                 "

## 2024-02-22 ENCOUNTER — OFFICE VISIT (OUTPATIENT)
Dept: CARDIOLOGY CLINIC | Facility: CLINIC | Age: 69
End: 2024-02-22
Payer: MEDICARE

## 2024-02-22 ENCOUNTER — OFFICE VISIT (OUTPATIENT)
Dept: PHYSICAL THERAPY | Facility: CLINIC | Age: 69
End: 2024-02-22
Payer: MEDICARE

## 2024-02-22 VITALS
BODY MASS INDEX: 38.54 KG/M2 | WEIGHT: 217.5 LBS | SYSTOLIC BLOOD PRESSURE: 110 MMHG | HEART RATE: 75 BPM | HEIGHT: 63 IN | OXYGEN SATURATION: 98 % | DIASTOLIC BLOOD PRESSURE: 62 MMHG

## 2024-02-22 DIAGNOSIS — R26.2 DIFFICULTY WALKING: ICD-10-CM

## 2024-02-22 DIAGNOSIS — I87.2 EDEMA OF BOTH LOWER EXTREMITIES DUE TO PERIPHERAL VENOUS INSUFFICIENCY: ICD-10-CM

## 2024-02-22 DIAGNOSIS — M65.30 TRIGGER FINGER, UNSPECIFIED FINGER, UNSPECIFIED LATERALITY: ICD-10-CM

## 2024-02-22 DIAGNOSIS — M25.562 LEFT KNEE PAIN, UNSPECIFIED CHRONICITY: Primary | ICD-10-CM

## 2024-02-22 DIAGNOSIS — R06.09 DYSPNEA ON EXERTION: Primary | ICD-10-CM

## 2024-02-22 DIAGNOSIS — I10 PRIMARY HYPERTENSION: ICD-10-CM

## 2024-02-22 DIAGNOSIS — M17.12 PRIMARY OSTEOARTHRITIS OF LEFT KNEE: ICD-10-CM

## 2024-02-22 PROCEDURE — 97110 THERAPEUTIC EXERCISES: CPT

## 2024-02-22 PROCEDURE — 99204 OFFICE O/P NEW MOD 45 MIN: CPT | Performed by: INTERNAL MEDICINE

## 2024-02-22 PROCEDURE — 93000 ELECTROCARDIOGRAM COMPLETE: CPT | Performed by: INTERNAL MEDICINE

## 2024-02-22 PROCEDURE — 97112 NEUROMUSCULAR REEDUCATION: CPT

## 2024-02-22 RX ORDER — DILTIAZEM HYDROCHLORIDE 240 MG/1
240 CAPSULE, COATED, EXTENDED RELEASE ORAL DAILY
Qty: 90 CAPSULE | Refills: 3 | Status: SHIPPED | OUTPATIENT
Start: 2024-02-22

## 2024-02-22 NOTE — PROGRESS NOTES
Consultation - Cardiology   Coretta Hines 68 y.o. female MRN: [id number]    Encounter: [id number]    1. Dyspnea on exertion  Echo complete w/ contrast if indicated    NM myocardial perfusion spect (rx stress and/or rest)      2. Primary hypertension  Ambulatory Referral to Cardiology    POCT ECG    diltiazem (CARDIZEM CD) 240 mg 24 hr capsule    Echo complete w/ contrast if indicated    NM myocardial perfusion spect (rx stress and/or rest)      3. Edema of both lower extremities due to peripheral venous insufficiency  Ambulatory Referral to Cardiology      4. Trigger finger, unspecified finger, unspecified laterality  Ambulatory Referral to Hand Surgery          Assessment/Plan     Assessment:     Hypertension    Plan:    Hypertension: BP is stable on Diltiazem. No changes today.     Hyperlipidemia: Lipids were elevated in December but she was off rosuvastatin. Historically her lipids have been controlled on rosuvastatin.     Dyspnea: Chronic and likely multifactorial. Check Lexiscan MPI and echocardiogram.     Murmur: likely flow murmur. Followup on echo.     History of Present Illness   Physician Requesting Consult: No att. providers found  Reason for Consult / Principal Problem: Hypertension  HPI: Coretta Hines is a 68 y.o. year old female who presents with a history of Hypertension, hyperlipidemia and Type 2 DM. She has been on Diltiazem for Hypertension.     She has stable dyspnea on exertion. No chest pain or palpitations. She has a history of DVT in march of last year. She does have bilateral LE edema. She has had stress testing in the past that was normal. She has no history of cad, chf or arrhythmia.     Family history: Father Hypertension.         Review of Systems   Constitutional: Negative.   HENT: Negative.     Eyes: Negative.    Cardiovascular: Negative.    Respiratory: Negative.     Endocrine: Negative.    Hematologic/Lymphatic: Negative.    Skin: Negative.    Musculoskeletal: Negative.     Gastrointestinal: Negative.    Genitourinary: Negative.    Neurological: Negative.    Psychiatric/Behavioral: Negative.     Allergic/Immunologic: Negative.        Historical Information   Past Medical History:   Diagnosis Date    Allergic     Arthritis     Asthma     Blindness of left eye     COPD (chronic obstructive pulmonary disease) ([facility])     Diabetes mellitus (HCC)     GERD (gastroesophageal reflux disease)     Hypertension     Obesity     Visual impairment      Past Surgical History:   Procedure Laterality Date     SECTION  10/16/1990    Fibroids    COLONOSCOPY      EYE SURGERY      MULTIPLE    HYSTERECTOMY  2000    REPLACEMENT TOTAL KNEE Right        Social History:  Social History     Substance and Sexual Activity   Alcohol Use Yes    Alcohol/week: 1.0 standard drink of alcohol    Types: 1 Standard drinks or equivalent per week    Comment: on occasion, 1 drink with low sugar content     Social History     Substance and Sexual Activity   Drug Use Never     Social History     Tobacco Use   Smoking Status Never   Smokeless Tobacco Never       Family History:   Family History   Problem Relation Age of Onset    Dementia Mother     Arthritis Mother         Late in life    Hypertension Father         HBP    Heart disease Father         HBP    Diabetes Father         managed through diet    Hearing loss Father         Industrial/construction work with no hearing protection    Glaucoma Paternal Grandmother        Meds/Allergies   Allergies   Allergen Reactions    Other Nausea Only, Vomiting and Cough     LOBSTER    Penicillins Seizures    Sulfa Antibiotics Rash       Current Outpatient Medications:     albuterol (PROVENTIL HFA,VENTOLIN HFA) 90 mcg/act inhaler, Inhale 2 puffs every 6 (six) hours as needed for wheezing, Disp: 8.5 g, Rfl: 2    apixaban (Eliquis) 2.5 mg, Take 1 tablet (2.5 mg total) by mouth 2 (two) times a day, Disp: 60 tablet, Rfl: 5    Azelastine HCl 137 MCG/SPRAY SOLN, SPRAY 2 SPRAYS INTO  EACH NOSTRIL 2 TIMES A DAY USE IN EACH NOSTRIL AS DIRECTED, Disp: 30 mL, Rfl: 3    bimatoprost (LUMIGAN) 0.01 % ophthalmic drops, , Disp: , Rfl:     dulaglutide (Trulicity) 3 MG/0.5ML injection, Inject 0.5 mL (3 mg total) under the skin every 7 days, Disp: 6 mL, Rfl: 1    famotidine (PEPCID) 20 mg tablet, Take 20 mg by mouth, Disp: , Rfl:     fluticasone (FLONASE) 50 mcg/act nasal spray, 1 spray into each nostril, Disp: , Rfl:     Fluticasone-Salmeterol (Advair) 500-50 mcg/dose inhaler, Inhale 1 puff every 12 (twelve) hours, Disp: 180 blister, Rfl: 3    ipratropium (ATROVENT) 0.03 % nasal spray, SPRAY 2 SPRAYS INTO EACH NOSTRIL EVERY 12 HOURS, Disp: 30 mL, Rfl: 1    ketorolac (ACULAR) 0.4 % SOLN, INSTILL 1 DROP INTO LEFT EYE TWICE A DAY AS DIRECTED, Disp: , Rfl:     loratadine (CLARITIN) 10 mg tablet, Take 10 mg by mouth daily, Disp: , Rfl:     metFORMIN (GLUCOPHAGE) 500 mg tablet, 1 tablet each morning and 2 tablets each evening, Disp: 270 tablet, Rfl: 3    omeprazole (PriLOSEC) 40 MG capsule, Take 1 capsule (40 mg total) by mouth daily, Disp: 90 capsule, Rfl: 2    rosuvastatin (CRESTOR) 10 MG tablet, Take 1 tablet (10 mg total) by mouth daily, Disp: 90 tablet, Rfl: 3    Timolol Maleate PF (Timolol Maleate Ocudose) 0.5 % SOLN, , Disp: , Rfl:     polyethylene glycol (GOLYTELY) 4000 mL solution, Take as directed by the office for colonoscopy. (Patient not taking: Reported on 2/22/2024), Disp: 4000 mL, Rfl: 0    Vitals:   Pulse: 75  Blood Pressue: 110/62  Weight: 98.7 kg (217 lb 8 oz)      Physical Exam  Constitutional:       General: She is not in acute distress.     Appearance: She is well-developed. She is not diaphoretic.   HENT:      Head: Normocephalic.   Eyes:      General: No scleral icterus.        Right eye: No discharge.      Conjunctiva/sclera: Conjunctivae normal.   Neck:      Vascular: No JVD.   Cardiovascular:      Rate and Rhythm: Normal rate and regular rhythm.      Heart sounds: Murmur heard.      No  "friction rub. No gallop.   Pulmonary:      Effort: Pulmonary effort is normal. No respiratory distress.      Breath sounds: Normal breath sounds. No wheezing or rales.   Abdominal:      General: Bowel sounds are normal. There is no distension.      Palpations: Abdomen is soft.      Tenderness: There is no abdominal tenderness. There is no rebound.   Musculoskeletal:         General: No tenderness or deformity.      Cervical back: Normal range of motion.   Skin:     General: Skin is warm and dry.   Neurological:      Mental Status: She is alert and oriented to person, place, and time.         [unfilled]    Invasive Devices       None                   Lab Results   Component Value Date     12/12/2023    CO2 30 12/12/2023    BUN 18 12/12/2023    CREATININE 0.65 12/12/2023    EGFR 92 12/12/2023    CALCIUM 9.1 12/12/2023    AST 16 06/16/2023    ALT 11 06/16/2023    ALKPHOS 105 (H) 06/16/2023     Lab Results   Component Value Date    WBC 8.36 06/16/2023    HGB 14.1 06/16/2023     06/16/2023     No components found for: \"TROP\"    Imaging:     EKG: NSR Normal ECG     Counseling / Coordination of Care  Total floor / unit time spent today 45 minutes.  Greater than 50% of total time was spent with the patient and / or family counseling and / or coordination of care.  A description of the counseling / coordination of care.      "

## 2024-02-22 NOTE — PROGRESS NOTES
"Daily Note     Today's date: 2024  Patient name: Coretta Hines  : 1955  MRN: [id number]  Referring provider: Nafisa Duckworth MD  Dx:   Encounter Diagnosis     ICD-10-CM    1. Left knee pain, unspecified chronicity  M25.562       2. Difficulty walking  R26.2       3. Primary osteoarthritis of left knee  M17.12           Start Time: 0930  Stop Time: 1012  Total time in clinic (min): 42 minutes    Subjective: Pt reported that her knee pain was about 3-4/10. Pt also reported that she is getting an injection in her L knee tomorrow.       Objective: See treatment diary below      Assessment: Tolerated treatment well. Program started with the R. Bike. Manual therapy focused on improving patella mobility and increasing knee PROM (flexion with distraction). Pt still demonstrates deficits with stair negotiation. MHP was utilized post-treatment. Patient would benefit from continued PT      Plan: Continue per plan of care.      Goals: Patient's goal is to decrease pain with ADLs    Precautions: Previous LLE Blood Clot, L eye blindness  Dx: L Knee OA    Daily Treatment Diary      Manuals 2/12 02/15 2/20 2/22  2/9    L Knee PROM    MS     L gastroc/HS str         L patella mobs    MS              Ther Ex         Quad Set         Gastroc Str 30\"x3 30\"x3 30\"x3 30\"x3  30\"x3   SLR         Heel Slide         Seated HS str         Standing hip abd/ext 3#, 20x each  3# 20 ea 3#, 20x 3#, 20x each  2# 20x each   Standing knee flexion  3#, 20x 3# 20 ea  3#, 20x 3#, 20x  2#, 20x   Seated HS str 15\"x4 15\"x4    15\" x 4   R. Bike (rocking) 5' 6 min 5' 5'  5'   Pt Edu   POC/  progress      Neuro Re-ed         TKE with TB BTB 10x2  Btb 20 BTB 20x BTB, 5\"x20  BTB, 10x2   Leg Press 55#, 10x2   55# 2x10  55#, 10x2 55#, 10x2  50# 10x2   Sidestepping with TB BTB 3 laps BTB 4 laps   BTB 4 laps  BTB, 3 laps                     Ther Activity      2/9   minisquat 10x2 2x10 10x2 10x2  10x2   Step up  L3, 10x L3 10x     2#   L3, 10x " BLL Blepharoplasty trans conj laserRecommended. "   Step up and overs  L1, 10x L 1 10x L1, 10x L1, 10x  L1, 10x   HR on step L1, 20x L 1 20x L1, 20x L1, 20x  L1, 10x2   STS         Gait Training                           Modalities      2/9    Ice 10' MHP 10' mhp 10' MHP 10\"' MHP  10' MHP                                                 "

## 2024-02-23 ENCOUNTER — OFFICE VISIT (OUTPATIENT)
Dept: OBGYN CLINIC | Facility: CLINIC | Age: 69
End: 2024-02-23
Payer: MEDICARE

## 2024-02-23 VITALS
SYSTOLIC BLOOD PRESSURE: 122 MMHG | HEIGHT: 63 IN | BODY MASS INDEX: 38.54 KG/M2 | DIASTOLIC BLOOD PRESSURE: 75 MMHG | WEIGHT: 217.5 LBS | HEART RATE: 79 BPM

## 2024-02-23 DIAGNOSIS — M17.12 PRIMARY OSTEOARTHRITIS OF LEFT KNEE: Primary | ICD-10-CM

## 2024-02-23 PROCEDURE — 99213 OFFICE O/P EST LOW 20 MIN: CPT | Performed by: ORTHOPAEDIC SURGERY

## 2024-02-23 PROCEDURE — 20610 DRAIN/INJ JOINT/BURSA W/O US: CPT | Performed by: ORTHOPAEDIC SURGERY

## 2024-02-23 RX ORDER — METHYLPREDNISOLONE ACETATE 40 MG/ML
2 INJECTION, SUSPENSION INTRA-ARTICULAR; INTRALESIONAL; INTRAMUSCULAR; SOFT TISSUE
Status: COMPLETED | OUTPATIENT
Start: 2024-02-23 | End: 2024-02-23

## 2024-02-23 RX ORDER — KETOROLAC TROMETHAMINE 30 MG/ML
30 INJECTION, SOLUTION INTRAMUSCULAR; INTRAVENOUS
Status: COMPLETED | OUTPATIENT
Start: 2024-02-23 | End: 2024-02-23

## 2024-02-23 RX ORDER — BUPIVACAINE HYDROCHLORIDE 2.5 MG/ML
2 INJECTION, SOLUTION INFILTRATION; PERINEURAL
Status: COMPLETED | OUTPATIENT
Start: 2024-02-23 | End: 2024-02-23

## 2024-02-23 RX ADMIN — METHYLPREDNISOLONE ACETATE 2 ML: 40 INJECTION, SUSPENSION INTRA-ARTICULAR; INTRALESIONAL; INTRAMUSCULAR; SOFT TISSUE at 09:45

## 2024-02-23 RX ADMIN — BUPIVACAINE HYDROCHLORIDE 2 ML: 2.5 INJECTION, SOLUTION INFILTRATION; PERINEURAL at 09:45

## 2024-02-23 RX ADMIN — KETOROLAC TROMETHAMINE 30 MG: 30 INJECTION, SOLUTION INTRAMUSCULAR; INTRAVENOUS at 09:45

## 2024-02-23 NOTE — PROGRESS NOTES
Orthopedics          Coretta Hines 68 y.o. female MRN: [id number]      Chief Complaint:   left knee pain    HPI:   68 y.o.female complaining of left knee pain.  She presents office today regarding left knee pain due to arthritis.  She has had significant pain relief in the past with viscosupplementation injections she states having pain relief following injections however the pain since returned.  Patient states the pain is anywhere from a 2 to a 6 out of 10.  Pain is localized to the left knee worse with activity worse weightbearing mildly relieved with rest.  She does ambulate with the assistance of a cane.                Review Of Systems:   Skin: Normal  Neuro: See HPI  Musculoskeletal: See HPI  All other systems reviewed and are negative    Past Medical History:   Past Medical History:   Diagnosis Date    Allergic     Arthritis     Asthma     Blindness of left eye     COPD (chronic obstructive pulmonary disease) (HCC)     Diabetes mellitus (HCC)     GERD (gastroesophageal reflux disease)     Hypertension     Obesity     Visual impairment        Past Surgical History:   Past Surgical History:   Procedure Laterality Date     SECTION  10/16/1990    Fibroids    COLONOSCOPY      EYE SURGERY      MULTIPLE    HYSTERECTOMY  2000    REPLACEMENT TOTAL KNEE Right        Family History:  Family history reviewed and non-contributory  Family History   Problem Relation Age of Onset    Dementia Mother     Arthritis Mother         Late in life    Hypertension Father         HBP    Heart disease Father         HBP    Diabetes Father         managed through diet    Hearing loss Father         Industrial/construction work with no hearing protection    Glaucoma Paternal Grandmother          Social History:  Social History     Socioeconomic History    Marital status: /Civil Union     Spouse name: None    Number of children: None    Years of education: None    Highest education level: None   Occupational History     None   Tobacco Use    Smoking status: Never    Smokeless tobacco: Never   Vaping Use    Vaping status: Never Used   Substance and Sexual Activity    Alcohol use: Yes     Alcohol/week: 1.0 standard drink of alcohol     Types: 1 Standard drinks or equivalent per week     Comment: on occasion, 1 drink with low sugar content    Drug use: Never    Sexual activity: None   Other Topics Concern    None   Social History Narrative    None     Social Determinants of Health     Financial Resource Strain: Low Risk  (9/17/2023)    Overall Financial Resource Strain (CARDIA)     Difficulty of Paying Living Expenses: Not hard at all   Food Insecurity: No Food Insecurity (9/13/2022)    Received from [facility]    Food Insecurity     Within the past 12 months, you worried that your food would run out before you got the money to buy more.: Never true     Within the past 12 months, the food you bought just didn't last and you didn't have money to get more.: Never true     Within the past 12 months, you worried that your food would run out before you got the money to buy more.: Never true   Transportation Needs: No Transportation Needs (9/17/2023)    PRAPARE - Transportation     Lack of Transportation (Medical): No     Lack of Transportation (Non-Medical): No   Physical Activity: Not on file   Stress: Not on file   Social Connections: Not on file   Intimate Partner Violence: Not on file   Housing Stability: Not on file       Allergies:   Allergies   Allergen Reactions    Other Nausea Only, Vomiting and Cough     LOBSTER    Penicillins Seizures    Sulfa Antibiotics Rash       Labs:  0   Lab Value Date/Time    HCT 44.6 06/16/2023 1103    HCT 42.5 05/03/2023 1303    HGB 14.1 06/16/2023 1103    HGB 13.9 05/03/2023 1303    PT 12.2 03/30/2023 1428    INR 1.38 (H) 05/03/2023 1303    INR 0.9 03/30/2023 1428    WBC 8.36 06/16/2023 1103    WBC 9.70 05/03/2023 1303       Meds:    Current Outpatient Medications:     albuterol (PROVENTIL  HFA,VENTOLIN HFA) 90 mcg/act inhaler, Inhale 2 puffs every 6 (six) hours as needed for wheezing, Disp: 8.5 g, Rfl: 2    apixaban (Eliquis) 2.5 mg, Take 1 tablet (2.5 mg total) by mouth 2 (two) times a day, Disp: 60 tablet, Rfl: 5    Azelastine HCl 137 MCG/SPRAY SOLN, SPRAY 2 SPRAYS INTO EACH NOSTRIL 2 TIMES A DAY USE IN EACH NOSTRIL AS DIRECTED, Disp: 30 mL, Rfl: 3    bimatoprost (LUMIGAN) 0.01 % ophthalmic drops, , Disp: , Rfl:     diltiazem (CARDIZEM CD) 240 mg 24 hr capsule, Take 1 capsule (240 mg total) by mouth daily, Disp: 90 capsule, Rfl: 3    dulaglutide (Trulicity) 3 MG/0.5ML injection, Inject 0.5 mL (3 mg total) under the skin every 7 days, Disp: 6 mL, Rfl: 1    famotidine (PEPCID) 20 mg tablet, Take 20 mg by mouth, Disp: , Rfl:     fluticasone (FLONASE) 50 mcg/act nasal spray, 1 spray into each nostril, Disp: , Rfl:     Fluticasone-Salmeterol (Advair) 500-50 mcg/dose inhaler, Inhale 1 puff every 12 (twelve) hours, Disp: 180 blister, Rfl: 3    ipratropium (ATROVENT) 0.03 % nasal spray, SPRAY 2 SPRAYS INTO EACH NOSTRIL EVERY 12 HOURS, Disp: 30 mL, Rfl: 1    ketorolac (ACULAR) 0.4 % SOLN, INSTILL 1 DROP INTO LEFT EYE TWICE A DAY AS DIRECTED, Disp: , Rfl:     loratadine (CLARITIN) 10 mg tablet, Take 10 mg by mouth daily, Disp: , Rfl:     metFORMIN (GLUCOPHAGE) 500 mg tablet, 1 tablet each morning and 2 tablets each evening, Disp: 270 tablet, Rfl: 3    omeprazole (PriLOSEC) 40 MG capsule, Take 1 capsule (40 mg total) by mouth daily, Disp: 90 capsule, Rfl: 2    rosuvastatin (CRESTOR) 10 MG tablet, Take 1 tablet (10 mg total) by mouth daily, Disp: 90 tablet, Rfl: 3    Timolol Maleate PF (Timolol Maleate Ocudose) 0.5 % SOLN, , Disp: , Rfl:     polyethylene glycol (GOLYTELY) 4000 mL solution, Take as directed by the office for colonoscopy. (Patient not taking: Reported on 2/22/2024), Disp: 4000 mL, Rfl: 0    Body mass index is 38.53 kg/m².  Wt Readings from Last 3 Encounters:   02/23/24 98.7 kg (217 lb 8 oz)    02/22/24 98.7 kg (217 lb 8 oz)   02/05/24 97.5 kg (215 lb)     Physical Exam:   Vitals:    02/23/24 1004   BP: 122/75   Pulse: 79       General Appearance:    Alert, cooperative, no distress, appears stated age   Head:    Normocephalic, without obvious abnormality, atraumatic   Eyes:    conjunctiva/corneas clear, both eyes        Nose:   Nares normal, septum midline, no drainage    Throat:   Lips normal; teeth and gums normal   Neck:    symmetrical, trachea midline, ;     thyroid:  no enlargement/   Back:     Symmetric, no curvature, ROM normal   Lungs:   No audible wheezing or labored breathing   Chest Wall:    No tenderness or deformity    Heart:    Regular rate and rhythm               Pulses:   2+ and symmetric all extremities   Skin:   Skin color, texture, turgor normal, no rashes or lesions   Neurologic:   normal strength, sensation and reflexes     throughout       Musculoskeletal: left lower extremity  On examination of the left knee there is no effusion, no erythema.  Range of motion to full active extension and flexion to greater than 120°.  Pain on palpation medial and lateral joint lines.  There is crepitus with range of motion, no warmth to palpation, bony enlargement noted. No pain on palpation pes anserine bursa region or distal iliotibial band.  Stable to varus and valgus stress without pain or gapping.  Negative anterior and posterior drawer testing.  Sensation intact distal pulses present.    Large joint arthrocentesis: L knee  Universal Protocol:  Consent: Verbal consent obtained.  Consent given by: patient  Patient understanding: patient states understanding of the procedure being performed  Site marked: the operative site was marked  Patient identity confirmed: verbally with patient  Supporting Documentation  Indications: pain   Procedure Details  Location: knee - L knee  Needle size: 22 G  Approach: superior  Medications administered: 2 mL bupivacaine 0.25 %; 2 mL methylPREDNISolone acetate  40 mg/mL; 30 mg ketorolac 30 mg/mL    Patient tolerance: patient tolerated the procedure well with no immediate complications         _*_*_*_*_*_*_*_*_*_*_*_*_*_*_*_*_*_*_*_*_*_*_*_*_*_*_*_*_*_*_*_*_*_*_*_*_*_*_*_*_*    Assessment:  68 y.o.female with left knee pain due to arthritis    Plan:   Weight bearing as tolerated  left lower extremity  Left knee intra-articular corticosteroid in the form of Depo-Medrol as well as Toradol administered as noted above  Patient advised should they develop any increasing pain, redness, drainage, numbness, tingling or swelling surrounding the injection site, they are to contact our office or present to the emergency department.  Advised patient they may experience 24-48 hour increase in blood glucose levels.  Patient is aware and will appropriately monitor.  Continue physical therapy range of motion stretching strengthening exercises  Follow up in 3 months or sooner if needed      Arturo Holland PA-C                    .

## 2024-02-27 ENCOUNTER — OFFICE VISIT (OUTPATIENT)
Dept: PHYSICAL THERAPY | Facility: CLINIC | Age: 69
End: 2024-02-27
Payer: MEDICARE

## 2024-02-27 ENCOUNTER — OFFICE VISIT (OUTPATIENT)
Dept: OBGYN CLINIC | Facility: CLINIC | Age: 69
End: 2024-02-27
Payer: MEDICARE

## 2024-02-27 DIAGNOSIS — M17.12 PRIMARY OSTEOARTHRITIS OF LEFT KNEE: ICD-10-CM

## 2024-02-27 DIAGNOSIS — M65.331 TRIGGER FINGER, RIGHT MIDDLE FINGER: ICD-10-CM

## 2024-02-27 DIAGNOSIS — M65.311 TRIGGER THUMB OF RIGHT HAND: Primary | ICD-10-CM

## 2024-02-27 DIAGNOSIS — M25.562 LEFT KNEE PAIN, UNSPECIFIED CHRONICITY: Primary | ICD-10-CM

## 2024-02-27 DIAGNOSIS — R26.2 DIFFICULTY WALKING: ICD-10-CM

## 2024-02-27 PROCEDURE — 97112 NEUROMUSCULAR REEDUCATION: CPT

## 2024-02-27 PROCEDURE — 97110 THERAPEUTIC EXERCISES: CPT

## 2024-02-27 PROCEDURE — 20550 NJX 1 TENDON SHEATH/LIGAMENT: CPT | Performed by: STUDENT IN AN ORGANIZED HEALTH CARE EDUCATION/TRAINING PROGRAM

## 2024-02-27 PROCEDURE — 99214 OFFICE O/P EST MOD 30 MIN: CPT | Performed by: STUDENT IN AN ORGANIZED HEALTH CARE EDUCATION/TRAINING PROGRAM

## 2024-02-27 RX ADMIN — BETAMETHASONE SODIUM PHOSPHATE AND BETAMETHASONE ACETATE 3 MG: 3; 3 INJECTION, SUSPENSION INTRA-ARTICULAR; INTRALESIONAL; INTRAMUSCULAR; SOFT TISSUE at 15:00

## 2024-02-27 RX ADMIN — BUPIVACAINE HYDROCHLORIDE 1 ML: 2.5 INJECTION, SOLUTION INFILTRATION; PERINEURAL at 15:00

## 2024-02-27 NOTE — PROGRESS NOTES
"Daily Note     Today's date: 2024  Patient name: Coretta Hines  : 1955  MRN: [id number]  Referring provider: Nafisa Duckworth MD  Dx:   Encounter Diagnosis     ICD-10-CM    1. Left knee pain, unspecified chronicity  M25.562       2. Difficulty walking  R26.2       3. Primary osteoarthritis of left knee  M17.12           Start Time: 1030  Stop Time: 1112  Total time in clinic (min): 42 minutes    Subjective: Pt reported that her knee is feeling a little better after getting an injection.       Objective: See treatment diary below      Assessment: Tolerated treatment well. Program started with the R. Bike. Manual therapy focused on increasing L patella mobility and increasing L knee flexion PROM with distraction. Pt still demonstrates difficulty with step up and overs. MHP was utilized post-treatment. Patient would benefit from continued PT      Plan: Continue per plan of care.      Goals: Patient's goal is to decrease pain with ADLs    Precautions: Previous LLE Blood Clot, L eye blindness  Dx: L Knee OA    Daily Treatment Diary      Manuals 2/12 02/15 2/20 2/22 2/27     L Knee PROM    MS MS    L gastroc/HS str         L patella mobs    MS MS             Ther Ex         Quad Set         Gastroc Str 30\"x3 30\"x3 30\"x3 30\"x3 30\"x3    SLR         Heel Slide         Seated HS str         Standing hip abd/ext 3#, 20x each  3# 20 ea 3#, 20x 3#, 20x each 3#, 20x    Standing knee flexion  3#, 20x 3# 20 ea  3#, 20x 3#, 20x 3#, 20x    Seated HS str 15\"x4 15\"x4   15\"x4    R. Bike (rocking) 5' 6 min 5' 5' 5'    Pt Edu   POC/  progress      Neuro Re-ed         TKE with TB BTB 10x2  Btb 20 BTB 20x BTB, 5\"x20 BTB 5\"x20    Leg Press 55#, 10x2   55# 2x10  55#, 10x2 55#, 10x2 55#, 20x    Sidestepping with TB BTB 3 laps BTB 4 laps   BTB 4 laps BTB 4 laps                      Ther Activity         minisquat 10x2 2x10 10x2 10x2 10x2    Step up  L3, 10x L3 10x        Step up and overs  L1, 10x L 1 10x L1, 10x L1, 10x L1, " 10x    HR on step L1, 20x L 1 20x L1, 20x L1, 20x L1, 20x    STS         Gait Training                           Modalities         Ice 10' MHP 10' mhp 10' MHP 10' MHP 10' MHP

## 2024-02-27 NOTE — PROGRESS NOTES
ORTHOPAEDIC HAND, WRIST, AND ELBOW OFFICE  VISIT      ASSESSMENT/PLAN:      Diagnoses and all orders for this visit:    Trigger thumb of right hand  -     Hand/upper extremity injection: R thumb A1  -     bupivacaine (MARCAINE) 0.25 % injection 1 mL  -     betamethasone acetate-betamethasone sodium phosphate (CELESTONE) injection 3 mg    Trigger finger, right middle finger  -     Hand/upper extremity injection: R long A1  -     bupivacaine (MARCAINE) 0.25 % injection 1 mL  -     betamethasone acetate-betamethasone sodium phosphate (CELESTONE) injection 3 mg          68 y.o. female with trigger finger to right thumb and long finger  Treatment options and expected outcomes were discussed.  The patient verbalized understanding of exam findings and treatment plan.   The patient was given the opportunity to ask questions.  Questions were answered to the patient's satisfaction.  A right thumb and right long trigger finger corticosteroid injection was offered, accepted, and administered in clinic. Procedure tolerated well, post injection protocol advised.   Discussed splinting at night if symptoms are not improving       Follow Up:  6 weeks       To Do Next Visit:  Re-evaluation of current issue      Discussions:  Trigger Finger: The anatomy and physiology of trigger finger was discussed with the patient today in the office.  Edema and increased contact pressure within the flexor tendons at the A1 pulley can cause pain, crepitation, and triggering or locking of the digit resulting in limitation of function.  Symptoms can occur at anytime but are typically worse in the morning or after a brief rest from repetitive activity.  Treatment options include resting/nighttime MP blocking splints to decrease edema, oral anti-inflammatory medications, home or formal therapy exercises, up to 2 steroid injections within the tendon sheath, or surgical release.  While majority of patients do respond to conservative treatment, up to 20%  "may require surgical release.       Sarath Jalloh MD  Attending, Orthopaedic Surgery  Hand, Wrist, and Elbow Surgery  [facility]    ______________________________________________________________________________________________    CHIEF COMPLAINT:  Chief Complaint   Patient presents with   • Right Middle Finger - Pain     triggering   • Right Thumb - Pain     triggering       SUBJECTIVE:  Patient is a 68 y.o. RHD female who presents today for evaluation and treatment of right thumb and long finger trigger finger. Patient notes a history of previous trigger fingers treated successfully with corticosteroid injection. Pain is localized to the volar thumb/A1 pulley and A1 pulley of long finger. She notes the fingers \"get stuck\" through out the day and she has to use her other hand to unlock them. Denies numbness or paresthesias.      Occupation:  retired     I have personally reviewed all the relevant PMH, PSH, SH, FH, Medications and allergies      PAST MEDICAL HISTORY:  Past Medical History:   Diagnosis Date   • Allergic    • Arthritis    • Asthma    • Blindness of left eye    • COPD (chronic obstructive pulmonary disease) ([facility])    • Diabetes mellitus ([facility])    • GERD (gastroesophageal reflux disease)    • Hypertension    • Obesity    • Visual impairment        PAST SURGICAL HISTORY:  Past Surgical History:   Procedure Laterality Date   •  SECTION  10/16/1990    Fibroids   • COLONOSCOPY     • EYE SURGERY      MULTIPLE   • HYSTERECTOMY     • REPLACEMENT TOTAL KNEE Right        FAMILY HISTORY:  Family History   Problem Relation Age of Onset   • Dementia Mother    • Arthritis Mother         Late in life   • Hypertension Father         HBP   • Heart disease Father         HBP   • Diabetes Father         managed through diet   • Hearing loss Father         Industrial/construction work with no hearing protection   • Glaucoma Paternal Grandmother        SOCIAL HISTORY:  Social History "     Tobacco Use   • Smoking status: Never   • Smokeless tobacco: Never   Vaping Use   • Vaping status: Never Used   Substance Use Topics   • Alcohol use: Yes     Alcohol/week: 1.0 standard drink of alcohol     Types: 1 Standard drinks or equivalent per week     Comment: on occasion, 1 drink with low sugar content   • Drug use: Never       MEDICATIONS:    Current Outpatient Medications:   •  albuterol (PROVENTIL HFA,VENTOLIN HFA) 90 mcg/act inhaler, Inhale 2 puffs every 6 (six) hours as needed for wheezing, Disp: 8.5 g, Rfl: 2  •  apixaban (Eliquis) 2.5 mg, Take 1 tablet (2.5 mg total) by mouth 2 (two) times a day, Disp: 60 tablet, Rfl: 5  •  Azelastine HCl 137 MCG/SPRAY SOLN, SPRAY 2 SPRAYS INTO EACH NOSTRIL 2 TIMES A DAY USE IN EACH NOSTRIL AS DIRECTED, Disp: 30 mL, Rfl: 3  •  bimatoprost (LUMIGAN) 0.01 % ophthalmic drops, , Disp: , Rfl:   •  diltiazem (CARDIZEM CD) 240 mg 24 hr capsule, Take 1 capsule (240 mg total) by mouth daily, Disp: 90 capsule, Rfl: 3  •  dulaglutide (Trulicity) 3 MG/0.5ML injection, Inject 0.5 mL (3 mg total) under the skin every 7 days, Disp: 6 mL, Rfl: 1  •  famotidine (PEPCID) 20 mg tablet, Take 20 mg by mouth, Disp: , Rfl:   •  fluticasone (FLONASE) 50 mcg/act nasal spray, 1 spray into each nostril, Disp: , Rfl:   •  Fluticasone-Salmeterol (Advair) 500-50 mcg/dose inhaler, Inhale 1 puff every 12 (twelve) hours, Disp: 180 blister, Rfl: 3  •  ipratropium (ATROVENT) 0.03 % nasal spray, SPRAY 2 SPRAYS INTO EACH NOSTRIL EVERY 12 HOURS, Disp: 30 mL, Rfl: 1  •  ketorolac (ACULAR) 0.4 % SOLN, INSTILL 1 DROP INTO LEFT EYE TWICE A DAY AS DIRECTED, Disp: , Rfl:   •  loratadine (CLARITIN) 10 mg tablet, Take 10 mg by mouth daily, Disp: , Rfl:   •  metFORMIN (GLUCOPHAGE) 500 mg tablet, 1 tablet each morning and 2 tablets each evening, Disp: 270 tablet, Rfl: 3  •  omeprazole (PriLOSEC) 40 MG capsule, Take 1 capsule (40 mg total) by mouth daily, Disp: 90 capsule, Rfl: 2  •  rosuvastatin (CRESTOR) 10 MG  tablet, Take 1 tablet (10 mg total) by mouth daily, Disp: 90 tablet, Rfl: 3  •  Timolol Maleate PF (Timolol Maleate Ocudose) 0.5 % SOLN, , Disp: , Rfl:   •  polyethylene glycol (GOLYTELY) 4000 mL solution, Take as directed by the office for colonoscopy. (Patient not taking: Reported on 2/22/2024), Disp: 4000 mL, Rfl: 0  No current facility-administered medications for this visit.    ALLERGIES:  Allergies   Allergen Reactions   • Other Nausea Only, Vomiting and Cough     LOBSTER   • Penicillins Seizures   • Sulfa Antibiotics Rash           REVIEW OF SYSTEMS:  Musculoskeletal:        As noted in HPI.   All other systems reviewed and are negative.    VITALS:  There were no vitals filed for this visit.    LABS:  HgA1c:   Lab Results   Component Value Date    HGBA1C 6.9 (H) 12/12/2023     BMP:   Lab Results   Component Value Date    CALCIUM 9.1 12/12/2023    K 3.8 12/12/2023    CO2 30 12/12/2023     12/12/2023    BUN 18 12/12/2023    CREATININE 0.65 12/12/2023       _____________________________________________________  PHYSICAL EXAMINATION:  General: Well developed and well nourished, alert & oriented x 3, appears comfortable  Psychiatric: Normal  HEENT: Normocephalic, Atraumatic Trachea Midline, No torticollis  Pulmonary: No audible wheezing or respiratory distress   Abdomen/GI: Non tender, non distended   Cardiovascular: No pitting edema, 2+ radial pulse   Skin: No masses, erythema, lacerations, fluctation, ulcerations  Neurovascular: Sensation Intact to the Median, Ulnar, Radial Nerve, Motor Intact to the Median, Ulnar, Radial Nerve, and Pulses Intact  Musculoskeletal: Normal, except as noted in detailed exam and in HPI.      MUSCULOSKELETAL EXAMINATION:    right Hand -    Patient presents with (no) obvious anatomical deformity  Skin is warm and dry to touch with no signs of erythema, ecchymosis, or infection  No soft tissue swelling or effusion noted  Full FDS, FDP, extensor mechanisms are intact  No  rotational deformity with composite finger flexion  TTP with palpable nodule in the long finger flexor tendon local to A1 Pulley  Reproducible triggering on exam  TTP with palpable nodule in the thumb flexor tendon local to A1 Pulley  Reproducible triggering on exam  Demonstrates normal wrist, elbow, and shoulder motion  Forearm compartments are soft and supple  2+ distal radial pulse with brisk capillary refill to the fingers  Radial, median, and ulnar motor and sensory distribution intact  Sensations light to touch intact distally      ___________________________________________________  STUDIES REVIEWED:  No new studies to review         PROCEDURES PERFORMED:  Hand/upper extremity injection: R thumb A1  Universal Protocol:  Consent: Verbal consent obtained.  Risks and benefits: risks, benefits and alternatives were discussed  Consent given by: patient  Timeout called at: 2/27/2024 3:19 PM.  Patient understanding: patient states understanding of the procedure being performed  Patient identity confirmed: verbally with patient  Supporting Documentation  Indications: diagnostic, tendon swelling, pain and therapeutic   Procedure Details  Condition:trigger finger Location: thumb - R thumb A1   Preparation: Patient was prepped and draped in the usual sterile fashion  Needle size: 25 G  Approach: volar  Medications administered: 1 mL bupivacaine 0.25 %; 3 mg betamethasone acetate-betamethasone sodium phosphate 6 (3-3) mg/mL  Patient tolerance: patient tolerated the procedure well with no immediate complications  Dressing:  Sterile dressing applied       Hand/upper extremity injection: R long A1  Universal Protocol:  Consent: Verbal consent obtained.  Risks and benefits: risks, benefits and alternatives were discussed  Consent given by: patient  Timeout called at: 2/27/2024 3:20 PM.  Patient understanding: patient states understanding of the procedure being performed  Patient identity confirmed: verbally with  patient  Supporting Documentation  Indications: diagnostic, tendon swelling, pain and therapeutic   Procedure Details  Condition:trigger finger Location: long finger - R long A1   Preparation: Patient was prepped and draped in the usual sterile fashion  Needle size: 25 G  Ultrasound guidance: no  Medications administered: 1 mL bupivacaine 0.25 %; 3 mg betamethasone acetate-betamethasone sodium phosphate 6 (3-3) mg/mL  Patient tolerance: patient tolerated the procedure well with no immediate complications  Dressing:  Sterile dressing applied              _____________________________________________________      Scribe Attestation    I,:  Jayda Adams am acting as a scribe while in the presence of the attending physician.:       I,:  Sarath Jalloh MD personally performed the services described in this documentation    as scribed in my presence.:

## 2024-02-28 ENCOUNTER — OFFICE VISIT (OUTPATIENT)
Dept: URGENT CARE | Age: 69
End: 2024-02-28
Payer: MEDICARE

## 2024-02-28 VITALS
RESPIRATION RATE: 16 BRPM | WEIGHT: 214.9 LBS | BODY MASS INDEX: 38.08 KG/M2 | OXYGEN SATURATION: 97 % | SYSTOLIC BLOOD PRESSURE: 143 MMHG | HEIGHT: 63 IN | DIASTOLIC BLOOD PRESSURE: 92 MMHG | HEART RATE: 81 BPM | TEMPERATURE: 98.8 F

## 2024-02-28 DIAGNOSIS — J06.9 UPPER RESPIRATORY TRACT INFECTION, UNSPECIFIED TYPE: Primary | ICD-10-CM

## 2024-02-28 PROCEDURE — 99213 OFFICE O/P EST LOW 20 MIN: CPT

## 2024-02-28 PROCEDURE — G0463 HOSPITAL OUTPT CLINIC VISIT: HCPCS

## 2024-02-28 RX ORDER — BUPIVACAINE HYDROCHLORIDE 2.5 MG/ML
1 INJECTION, SOLUTION INFILTRATION; PERINEURAL
Status: COMPLETED | OUTPATIENT
Start: 2024-02-27 | End: 2024-02-27

## 2024-02-28 RX ORDER — DM/ACETAMINOPHEN/DOXYLAMINE 10-325/15
30 LIQUID (ML) ORAL 3 TIMES DAILY PRN
Qty: 355 ML | Refills: 0 | Status: SHIPPED | OUTPATIENT
Start: 2024-02-28

## 2024-02-28 RX ORDER — PREDNISONE 20 MG/1
20 TABLET ORAL 2 TIMES DAILY WITH MEALS
Qty: 10 TABLET | Refills: 0 | Status: SHIPPED | OUTPATIENT
Start: 2024-02-28 | End: 2024-03-04

## 2024-02-28 RX ORDER — BETAMETHASONE SODIUM PHOSPHATE AND BETAMETHASONE ACETATE 3; 3 MG/ML; MG/ML
3 INJECTION, SUSPENSION INTRA-ARTICULAR; INTRALESIONAL; INTRAMUSCULAR; SOFT TISSUE
Status: COMPLETED | OUTPATIENT
Start: 2024-02-27 | End: 2024-02-27

## 2024-02-28 RX ORDER — ACETAMINOPHEN 325 MG/1
650 TABLET ORAL EVERY 6 HOURS PRN
COMMUNITY

## 2024-02-28 NOTE — PATIENT INSTRUCTIONS
Please begin Coricidin cough/decongestant as directed.   Please continue as needed inhaler, hot shower steam/humidifier.   Stay well hydrated.   If symptoms worsen over the next 48-72 hours, may begin short course of steroids as directed.   Follow up with PCP if no relief within one week.

## 2024-02-28 NOTE — PROGRESS NOTES
[facility]        NAME: Coretta Hines is a 68 y.o. female  : 1955    MRN: [id number]  DATE: 2024  TIME: 9:44 AM    Assessment and Plan   Upper respiratory tract infection, unspecified type [J06.9]  1. Upper respiratory tract infection, unspecified type  Dextromethorphan-GG-APAP (Coricidin HBP Cold/Cough/Flu) -325 MG/15ML LIQD    predniSONE 20 mg tablet      Please begin Coricidin cough/decongestant as directed.   Please continue as needed inhaler, hot shower steam/humidifier.   Stay well hydrated.   If symptoms worsen over the next 48-72 hours, may begin short course of steroids as directed.   Follow up with PCP if no relief within one week.       Patient Instructions   Upper Respiratory Infection   WHAT YOU NEED TO KNOW:   An upper respiratory infection is also called a cold. It can affect your nose, throat, ears, and sinuses. Cold symptoms are usually worst for the first 3 to 5 days. Most people get better in 7 to 14 days. You may continue to cough for 2 to 3 weeks. Colds are caused by viruses and do not get better with antibiotics.  DISCHARGE INSTRUCTIONS:   Call your local emergency number (911 in the [place]) if:   You have chest pain or trouble breathing.        Return to the emergency department if:   You have a fever over 102ºF (39ºC).        Call your doctor if:   You have a low fever.     Your sore throat gets worse or you see white or yellow spots in your throat.     Your symptoms get worse after 3 to 5 days or are not better in 14 days.     You have a rash anywhere on your skin.     You have large, tender lumps in your neck.     You have thick, green, or yellow drainage from your nose.     You cough up thick yellow, green, or bloody mucus.     You have a bad earache.     You have questions or concerns about your condition or care.     Medicines:  You may need any of the following:  Decongestants  help reduce nasal congestion and help you breathe more easily. If you take  decongestant pills, they may make you feel restless or cause problems with your sleep. Do not use decongestant sprays for more than a few days.     Cough suppressants  help reduce coughing. Ask your healthcare provider which type of cough medicine is best for you.      NSAIDs , such as ibuprofen, help decrease swelling, pain, and fever. NSAIDs can cause stomach bleeding or kidney problems in certain people. If you take blood thinner medicine, always ask your healthcare provider if NSAIDs are safe for you. Always read the medicine label and follow directions.     Acetaminophen  decreases pain and fever. It is available without a doctor's order. Ask how much to take and how often to take it. Follow directions. Read the labels of all other medicines you are using to see if they also contain acetaminophen, or ask your doctor or pharmacist. Acetaminophen can cause liver damage if not taken correctly.     Take your medicine as directed.  Contact your healthcare provider if you think your medicine is not helping or if you have side effects. Tell your provider if you are allergic to any medicine. Keep a list of the medicines, vitamins, and herbs you take. Include the amounts, and when and why you take them. Bring the list or the pill bottles to follow-up visits. Carry your medicine list with you in case of an emergency.     Self-care:   Rest as much as possible.  Slowly start to do more each day.     Drink more liquids as directed.  Liquids will help thin and loosen mucus so you can cough it up. Liquids will also help prevent dehydration. Liquids that help prevent dehydration include water, fruit juice, and broth. Do not drink liquids that contain caffeine. Caffeine can increase your risk for dehydration. Ask your healthcare provider how much liquid to drink each day.     Soothe a sore throat.  Gargle with warm salt water. Make salt water by dissolving ¼ teaspoon salt in 1 cup warm water. You may also suck on hard candy or  throat lozenges. You may use a sore throat spray.     Use a humidifier or vaporizer.  Use a cool mist humidifier or a vaporizer to increase air moisture in your home. This may make it easier for you to breathe and help decrease your cough.     Use saline nasal drops as directed.  These help relieve congestion.     Apply petroleum-based jelly around the outside of your nostrils.  This can decrease irritation from blowing your nose.     Do not smoke.  Nicotine and other chemicals in cigarettes and cigars can make your symptoms worse. They can also cause infections such as bronchitis or pneumonia. Ask your healthcare provider for information if you currently smoke and need help to quit. E-cigarettes or smokeless tobacco still contain nicotine. Talk to your healthcare provider before you use these products.     Prevent a cold:   Wash your hands often.  Use soap and water every time you wash your hands. Rub your soapy hands together, lacing your fingers. Use the fingers of one hand to scrub under the nails of the other hand. Wash for at least 20 seconds. Rinse with warm, running water for several seconds. Then dry your hands. Use hand  gel if soap and water are not available. Do not touch your eyes or mouth without washing your hands first.          Cover a sneeze or cough.  Use a tissue that covers your mouth and nose. Put the used tissue in the trash right away. Use the bend of your arm if a tissue is not available. Wash your hands well with soap and water or use a hand . Do not stand close to anyone who is sneezing or coughing.     Try to stay away from others while you are sick.  This is especially important during the first 2 to 3 days when the virus is more easily spread. Wait until a fever, cough, or other symptoms are gone before you return to work or other regular activities.     Do not share items while you are sick.  This includes food, drinks, eating utensils, and dishes.     Follow up with  your doctor as directed:  Write down your questions so you remember to ask them during your visits.  © Copyright [employer] 2023 Information is for End User's use only and may not be sold, redistributed or otherwise used for commercial purposes.  The above information is an  only. It is not intended as medical advice for individual conditions or treatments. Talk to your doctor, nurse or pharmacist before following any medical regimen to see if it is safe and effective for you.       Follow up with PCP in 3-5 days.  Proceed to  ER if symptoms worsen.    Chief Complaint     Chief Complaint   Patient presents with    Cough     Started Monday with cough and congestion.. post nasal drip... asthma hx         History of Present Illness       Cough  This is a new problem. The current episode started in the past 7 days (x 2 days). The problem has been unchanged. The problem occurs every few minutes. The cough is Non-productive. Associated symptoms include postnasal drip and a sore throat. Pertinent negatives include no chest pain, chills, ear congestion, ear pain, eye redness, fever, headaches, heartburn, hemoptysis, myalgias, nasal congestion, rash, rhinorrhea, shortness of breath, sweats, weight loss or wheezing. Nothing aggravates the symptoms. She has tried a beta-agonist inhaler for the symptoms. The treatment provided mild relief. Her past medical history is significant for asthma and bronchitis. There is no history of bronchiectasis, COPD, emphysema, environmental allergies or pneumonia.       Review of Systems   Review of Systems   Constitutional:  Negative for chills, fatigue, fever and weight loss.   HENT:  Positive for postnasal drip and sore throat. Negative for congestion, ear discharge, ear pain, rhinorrhea, sinus pressure, sinus pain and sneezing.    Eyes: Negative.  Negative for pain, discharge, redness and itching.   Respiratory:  Positive for cough. Negative for apnea, hemoptysis, choking,  chest tightness, shortness of breath, wheezing and stridor.    Cardiovascular: Negative.  Negative for chest pain and palpitations.   Gastrointestinal: Negative.  Negative for diarrhea, heartburn, nausea and vomiting.   Endocrine: Negative.  Negative for polydipsia, polyphagia and polyuria.   Genitourinary: Negative.  Negative for decreased urine volume and flank pain.   Musculoskeletal: Negative.  Negative for arthralgias, back pain, myalgias, neck pain and neck stiffness.   Skin: Negative.  Negative for color change and rash.   Allergic/Immunologic: Negative.  Negative for environmental allergies.   Neurological: Negative.  Negative for dizziness, facial asymmetry, light-headedness, numbness and headaches.   Hematological: Negative.  Negative for adenopathy.   Psychiatric/Behavioral: Negative.           Current Medications       Current Outpatient Medications:     acetaminophen (Tylenol) 325 mg tablet, Take 650 mg by mouth every 6 (six) hours as needed for mild pain, Disp: , Rfl:     albuterol (PROVENTIL HFA,VENTOLIN HFA) 90 mcg/act inhaler, Inhale 2 puffs every 6 (six) hours as needed for wheezing, Disp: 8.5 g, Rfl: 2    apixaban (Eliquis) 2.5 mg, Take 1 tablet (2.5 mg total) by mouth 2 (two) times a day, Disp: 60 tablet, Rfl: 5    Azelastine HCl 137 MCG/SPRAY SOLN, SPRAY 2 SPRAYS INTO EACH NOSTRIL 2 TIMES A DAY USE IN EACH NOSTRIL AS DIRECTED, Disp: 30 mL, Rfl: 3    bimatoprost (LUMIGAN) 0.01 % ophthalmic drops, , Disp: , Rfl:     Dextromethorphan-GG-APAP (Coricidin HBP Cold/Cough/Flu) -325 MG/15ML LIQD, Take 30 mL by mouth 3 (three) times a day as needed (cough, congestion), Disp: 355 mL, Rfl: 0    diltiazem (CARDIZEM CD) 240 mg 24 hr capsule, Take 1 capsule (240 mg total) by mouth daily, Disp: 90 capsule, Rfl: 3    dulaglutide (Trulicity) 3 MG/0.5ML injection, Inject 0.5 mL (3 mg total) under the skin every 7 days, Disp: 6 mL, Rfl: 1    famotidine (PEPCID) 20 mg tablet, Take 20 mg by mouth, Disp: , Rfl:      fluticasone (FLONASE) 50 mcg/act nasal spray, 1 spray into each nostril, Disp: , Rfl:     Fluticasone-Salmeterol (Advair) 500-50 mcg/dose inhaler, Inhale 1 puff every 12 (twelve) hours, Disp: 180 blister, Rfl: 3    ipratropium (ATROVENT) 0.03 % nasal spray, SPRAY 2 SPRAYS INTO EACH NOSTRIL EVERY 12 HOURS, Disp: 30 mL, Rfl: 1    ketorolac (ACULAR) 0.4 % SOLN, INSTILL 1 DROP INTO LEFT EYE TWICE A DAY AS DIRECTED, Disp: , Rfl:     loratadine (CLARITIN) 10 mg tablet, Take 10 mg by mouth daily, Disp: , Rfl:     metFORMIN (GLUCOPHAGE) 500 mg tablet, 1 tablet each morning and 2 tablets each evening, Disp: 270 tablet, Rfl: 3    omeprazole (PriLOSEC) 40 MG capsule, Take 1 capsule (40 mg total) by mouth daily, Disp: 90 capsule, Rfl: 2    predniSONE 20 mg tablet, Take 1 tablet (20 mg total) by mouth 2 (two) times a day with meals for 5 days, Disp: 10 tablet, Rfl: 0    rosuvastatin (CRESTOR) 10 MG tablet, Take 1 tablet (10 mg total) by mouth daily, Disp: 90 tablet, Rfl: 3    Timolol Maleate PF (Timolol Maleate Ocudose) 0.5 % SOLN, , Disp: , Rfl:     polyethylene glycol (GOLYTELY) 4000 mL solution, Take as directed by the office for colonoscopy. (Patient not taking: Reported on 2/22/2024), Disp: 4000 mL, Rfl: 0  No current facility-administered medications for this visit.    Current Allergies     Allergies as of 02/28/2024 - Reviewed 02/28/2024   Allergen Reaction Noted    Other Nausea Only, Vomiting, and Cough 01/01/1984    Penicillins Seizures 05/03/2023    Sulfa antibiotics Rash 05/03/2023            The following portions of the patient's history were reviewed and updated as appropriate: allergies, current medications, past family history, past medical history, past social history, past surgical history and problem list.     Past Medical History:   Diagnosis Date    Allergic     Arthritis     Asthma     Blindness of left eye     COPD (chronic obstructive pulmonary disease) (HCC)     Diabetes mellitus (HCC)     GERD  "(gastroesophageal reflux disease)     Hypertension     Obesity     Visual impairment        Past Surgical History:   Procedure Laterality Date     SECTION  10/16/1990    Fibroids    COLONOSCOPY      EYE SURGERY      MULTIPLE    HYSTERECTOMY  2000    REPLACEMENT TOTAL KNEE Right        Family History   Problem Relation Age of Onset    Dementia Mother     Arthritis Mother         Late in life    Hypertension Father         HBP    Heart disease Father         HBP    Diabetes Father         managed through diet    Hearing loss Father         Industrial/construction work with no hearing protection    Glaucoma Paternal Grandmother          Medications have been verified.        Objective   /92   Pulse 81   Temp 98.8 °F (37.1 °C)   Resp 16   Ht 5' 3\" (1.6 m)   Wt 97.5 kg (214 lb 14.4 oz)   SpO2 97%   BMI 38.07 kg/m²        Physical Exam     Physical Exam  Vitals and nursing note reviewed.   Constitutional:       General: She is not in acute distress.     Appearance: Normal appearance. She is not ill-appearing, toxic-appearing or diaphoretic.      Interventions: She is not intubated.  HENT:      Head: Normocephalic and atraumatic.      Right Ear: Tympanic membrane, ear canal and external ear normal. There is no impacted cerumen.      Left Ear: Tympanic membrane, ear canal and external ear normal. There is no impacted cerumen.      Nose: Nose normal. No congestion or rhinorrhea.      Mouth/Throat:      Mouth: Mucous membranes are moist.      Pharynx: Oropharynx is clear. Uvula midline. No pharyngeal swelling, oropharyngeal exudate, posterior oropharyngeal erythema or uvula swelling.      Tonsils: No tonsillar exudate or tonsillar abscesses. 0 on the right. 0 on the left.   Eyes:      Extraocular Movements: Extraocular movements intact.      Conjunctiva/sclera: Conjunctivae normal.      Pupils: Pupils are equal, round, and reactive to light.   Neck:      Thyroid: No thyroid mass, thyromegaly or thyroid " tenderness.   Cardiovascular:      Rate and Rhythm: Normal rate and regular rhythm.      Pulses: Normal pulses.      Heart sounds: Normal heart sounds, S1 normal and S2 normal. Heart sounds not distant. No murmur heard.     No friction rub. No gallop.   Pulmonary:      Effort: Pulmonary effort is normal. No tachypnea, bradypnea, accessory muscle usage, prolonged expiration, respiratory distress or retractions. She is not intubated.      Breath sounds: Normal breath sounds. No stridor, decreased air movement or transmitted upper airway sounds. No decreased breath sounds, wheezing, rhonchi or rales.   Abdominal:      General: Bowel sounds are normal.      Palpations: Abdomen is soft.      Tenderness: There is no abdominal tenderness. There is no guarding or rebound.   Musculoskeletal:         General: Normal range of motion.      Cervical back: Full passive range of motion without pain, normal range of motion and neck supple. No rigidity or tenderness. No spinous process tenderness or muscular tenderness. Normal range of motion.   Lymphadenopathy:      Cervical: No cervical adenopathy.      Right cervical: No superficial cervical adenopathy.     Left cervical: No superficial cervical adenopathy.   Skin:     General: Skin is warm and dry.      Capillary Refill: Capillary refill takes less than 2 seconds.   Neurological:      General: No focal deficit present.      Mental Status: She is alert and oriented to person, place, and time.      Cranial Nerves: No cranial nerve deficit.   Psychiatric:         Mood and Affect: Mood normal.         Behavior: Behavior normal.

## 2024-02-29 ENCOUNTER — APPOINTMENT (OUTPATIENT)
Dept: PHYSICAL THERAPY | Facility: CLINIC | Age: 69
End: 2024-02-29
Payer: MEDICARE

## 2024-03-05 ENCOUNTER — OFFICE VISIT (OUTPATIENT)
Dept: URGENT CARE | Age: 69
End: 2024-03-05
Payer: MEDICARE

## 2024-03-05 ENCOUNTER — APPOINTMENT (OUTPATIENT)
Dept: RADIOLOGY | Age: 69
End: 2024-03-05
Payer: MEDICARE

## 2024-03-05 ENCOUNTER — OFFICE VISIT (OUTPATIENT)
Dept: PHYSICAL THERAPY | Facility: CLINIC | Age: 69
End: 2024-03-05
Payer: MEDICARE

## 2024-03-05 VITALS
DIASTOLIC BLOOD PRESSURE: 80 MMHG | HEART RATE: 70 BPM | OXYGEN SATURATION: 98 % | TEMPERATURE: 97.1 F | SYSTOLIC BLOOD PRESSURE: 134 MMHG | RESPIRATION RATE: 22 BRPM

## 2024-03-05 DIAGNOSIS — R05.1 ACUTE COUGH: ICD-10-CM

## 2024-03-05 DIAGNOSIS — M25.562 LEFT KNEE PAIN, UNSPECIFIED CHRONICITY: Primary | ICD-10-CM

## 2024-03-05 DIAGNOSIS — J20.9 ACUTE BRONCHITIS, UNSPECIFIED ORGANISM: ICD-10-CM

## 2024-03-05 DIAGNOSIS — R05.1 ACUTE COUGH: Primary | ICD-10-CM

## 2024-03-05 DIAGNOSIS — M17.12 PRIMARY OSTEOARTHRITIS OF LEFT KNEE: ICD-10-CM

## 2024-03-05 DIAGNOSIS — R26.2 DIFFICULTY WALKING: ICD-10-CM

## 2024-03-05 PROCEDURE — 71046 X-RAY EXAM CHEST 2 VIEWS: CPT

## 2024-03-05 PROCEDURE — 97110 THERAPEUTIC EXERCISES: CPT

## 2024-03-05 PROCEDURE — 97112 NEUROMUSCULAR REEDUCATION: CPT

## 2024-03-05 PROCEDURE — 99213 OFFICE O/P EST LOW 20 MIN: CPT

## 2024-03-05 PROCEDURE — G0463 HOSPITAL OUTPT CLINIC VISIT: HCPCS

## 2024-03-05 RX ORDER — DOXYCYCLINE 100 MG/1
100 TABLET ORAL 2 TIMES DAILY
Qty: 10 TABLET | Refills: 0 | Status: SHIPPED | OUTPATIENT
Start: 2024-03-05 | End: 2024-03-10

## 2024-03-05 RX ORDER — GUAIFENESIN 600 MG/1
1200 TABLET, EXTENDED RELEASE ORAL EVERY 12 HOURS SCHEDULED
Qty: 28 TABLET | Refills: 0 | Status: SHIPPED | OUTPATIENT
Start: 2024-03-05

## 2024-03-05 RX ORDER — BENZONATATE 200 MG/1
200 CAPSULE ORAL 3 TIMES DAILY PRN
Qty: 20 CAPSULE | Refills: 0 | Status: SHIPPED | OUTPATIENT
Start: 2024-03-05

## 2024-03-05 NOTE — PROGRESS NOTES
"Daily Note     Today's date: 3/5/2024  Patient name: Coretta Hines  : 1955  MRN: [id number]  Referring provider: Nafisa Duckworth MD  Dx:   Encounter Diagnosis     ICD-10-CM    1. Left knee pain, unspecified chronicity  M25.562       2. Difficulty walking  R26.2       3. Primary osteoarthritis of left knee  M17.12                      Subjective: Pt reported that her knee is feeling very stiff today.       Objective: See treatment diary below      Assessment: Tolerated treatment well. Patient had increased soreness this session. Patient was able to perform exercises as noted below with minimal increase in pain. Patient did require increased rest breaks throughout session due to not feeling well. Patient did have increased soreness with step up and overs. MHP was utilized post-treatment. Patient would benefit from continued PT      Plan: Continue per plan of care.      Goals: Patient's goal is to decrease pain with ADLs    Precautions: Previous LLE Blood Clot, L eye blindness  Dx: L Knee OA    Daily Treatment Diary      Manuals 2/12 02/15 2/20 2/22 2/27 3/5    L Knee PROM    MS MS    L gastroc/HS str         L patella mobs    MS MS             Ther Ex         Quad Set         Gastroc Str 30\"x3 30\"x3 30\"x3 30\"x3 30\"x3 30\" x 3    SLR         Heel Slide         Seated HS str         Standing hip abd/ext 3#, 20x each  3# 20 ea 3#, 20x 3#, 20x each 3#, 20x 3# 20x    Standing knee flexion  3#, 20x 3# 20 ea  3#, 20x 3#, 20x 3#, 20x 3# x 20    Seated HS str 15\"x4 15\"x4   15\"x4 15\" x 4    R. Bike (rocking) 5' 6 min 5' 5' 5' 5'   Pt Edu   POC/  progress      Neuro Re-ed         TKE with TB BTB 10x2  Btb 20 BTB 20x BTB, 5\"x20 BTB 5\"x20    Leg Press 55#, 10x2   55# 2x10  55#, 10x2 55#, 10x2 55#, 20x 55# 20x   Sidestepping with TB BTB 3 laps BTB 4 laps   BTB 4 laps BTB 4 laps BTB 4 laps                      Ther Activity         minisquat 10x2 2x10 10x2 10x2 10x2 2 x 10   Step up  L3, 10x L3 10x        Step up and " overs  L1, 10x L 1 10x L1, 10x L1, 10x L1, 10x L1 10x   HR on step L1, 20x L 1 20x L1, 20x L1, 20x L1, 20x L1 x20   STS         Gait Training                           Modalities         Ice 10' MHP 10' mhp 10' MHP 10' MHP 10' [facility] 10' P

## 2024-03-05 NOTE — PATIENT INSTRUCTIONS
Chest X-ray: No acute cardiopulmonary findings.  Please monitor MyChart for results.    Starts Doxycycline twice a day for 5 days.  Take antibiotic as directed.  Recommend daily probiotic while on antibiotic or eat yogurt with live cultures daily while on antibiotic.     Tessalon as needed for cough at night.  Mucinex for cough during the day.    Recommend the following options: room humidifier, vicks vapo rub, hot/steamy shower.  Offer fluids frequently to help with hydration, as staying hydrated helps loosen up thick mucous.   May drink warm water with honey. May use lemon.  Tylenol/Ibuprofen for pain/fever.  Encourage coughing into the elbow instead of the hand.   Washing hands frequently with warm water and soap may help stop spread of infection.    If symptoms do not improve or worsen, please follow with PCP for further evaluation.

## 2024-03-05 NOTE — PROGRESS NOTES
[facility]        NAME: Coretta Hines is a 68 y.o. female  : 1955    MRN: [id number]  DATE: 2024  TIME: 10:23 AM    Assessment and Plan   Acute cough [R05.1]  1. Acute cough  XR chest pa & lateral      2. Acute bronchitis, unspecified organism  doxycycline (ADOXA) 100 MG tablet    benzonatate (TESSALON) 200 MG capsule    guaiFENesin (MUCINEX) 600 mg 12 hr tablet        Chest X-ray: No acute cardiopulmonary findings.  Please monitor MyChart for results.    Starts Doxycycline twice a day for 5 days.  Take antibiotic as directed.  Recommend daily probiotic while on antibiotic or eat yogurt with live cultures daily while on antibiotic.     Tessalon as needed for cough at night.  Mucinex for cough during the day.    Recommend the following options: room humidifier, vicks vapo rub, hot/steamy shower.  Offer fluids frequently to help with hydration, as staying hydrated helps loosen up thick mucous.   May drink warm water with honey. May use lemon.  Tylenol/Ibuprofen for pain/fever.  Encourage coughing into the elbow instead of the hand.   Washing hands frequently with warm water and soap may help stop spread of infection.    If symptoms do not improve or worsen, please follow with PCP for further evaluation.     Patient Instructions       Follow up with PCP in 3-5 days.  Proceed to  ER if symptoms worsen.    If tests have been performed at [facility], our office will contact you with results if changes need to be made to the care plan discussed with you at the visit.  You can review your full results on [facility].    Chief Complaint     Chief Complaint   Patient presents with   • Cough     Patient states that for almost 2 weeks now she has had chest congestion with PND and productive cough. She started her prednisone medication but notes increase in chest congestion and worsening cough. She has slight wheezing as well as SOB with coughing fits and ambulation. She last used her albuterol  last night before bed.          History of Present Illness       Six 8-year-old female presenting with 3 weeks of cough, congestion, sinus pressure.  Patient reports that she was here approximate 1 week ago started with prednisone p.o. for 5 days, last dose today.  Patient denies change the pain and reports she has increased sinus pressure headaches and drainage.  Patient reports cough is ongoing and is not productive but feels loose in the chest.  She denies fever or chills.  No chest pain or shortness of breath.  She denies taking anything for cough.    Cough  Associated symptoms include postnasal drip and rhinorrhea. Pertinent negatives include no chest pain or shortness of breath.       Review of Systems   Review of Systems   HENT:  Positive for congestion, postnasal drip, rhinorrhea, sinus pressure and sinus pain.    Respiratory:  Positive for cough. Negative for chest tightness and shortness of breath.    Cardiovascular:  Negative for chest pain.         Current Medications       Current Outpatient Medications:   •  benzonatate (TESSALON) 200 MG capsule, Take 1 capsule (200 mg total) by mouth 3 (three) times a day as needed for cough, Disp: 20 capsule, Rfl: 0  •  doxycycline (ADOXA) 100 MG tablet, Take 1 tablet (100 mg total) by mouth 2 (two) times a day for 5 days, Disp: 10 tablet, Rfl: 0  •  guaiFENesin (MUCINEX) 600 mg 12 hr tablet, Take 2 tablets (1,200 mg total) by mouth every 12 (twelve) hours, Disp: 28 tablet, Rfl: 0  •  acetaminophen (Tylenol) 325 mg tablet, Take 650 mg by mouth every 6 (six) hours as needed for mild pain, Disp: , Rfl:   •  albuterol (PROVENTIL HFA,VENTOLIN HFA) 90 mcg/act inhaler, Inhale 2 puffs every 6 (six) hours as needed for wheezing, Disp: 8.5 g, Rfl: 2  •  apixaban (Eliquis) 2.5 mg, Take 1 tablet (2.5 mg total) by mouth 2 (two) times a day, Disp: 60 tablet, Rfl: 5  •  Azelastine HCl 137 MCG/SPRAY SOLN, SPRAY 2 SPRAYS INTO EACH NOSTRIL 2 TIMES A DAY USE IN EACH NOSTRIL AS  DIRECTED, Disp: 30 mL, Rfl: 3  •  bimatoprost (LUMIGAN) 0.01 % ophthalmic drops, , Disp: , Rfl:   •  Dextromethorphan-GG-APAP (Coricidin HBP Cold/Cough/Flu) -325 MG/15ML LIQD, Take 30 mL by mouth 3 (three) times a day as needed (cough, congestion), Disp: 355 mL, Rfl: 0  •  diltiazem (CARDIZEM CD) 240 mg 24 hr capsule, Take 1 capsule (240 mg total) by mouth daily, Disp: 90 capsule, Rfl: 3  •  dulaglutide (Trulicity) 3 MG/0.5ML injection, Inject 0.5 mL (3 mg total) under the skin every 7 days, Disp: 6 mL, Rfl: 1  •  famotidine (PEPCID) 20 mg tablet, Take 20 mg by mouth, Disp: , Rfl:   •  fluticasone (FLONASE) 50 mcg/act nasal spray, 1 spray into each nostril, Disp: , Rfl:   •  Fluticasone-Salmeterol (Advair) 500-50 mcg/dose inhaler, Inhale 1 puff every 12 (twelve) hours, Disp: 180 blister, Rfl: 3  •  ipratropium (ATROVENT) 0.03 % nasal spray, SPRAY 2 SPRAYS INTO EACH NOSTRIL EVERY 12 HOURS, Disp: 30 mL, Rfl: 1  •  ketorolac (ACULAR) 0.4 % SOLN, INSTILL 1 DROP INTO LEFT EYE TWICE A DAY AS DIRECTED, Disp: , Rfl:   •  loratadine (CLARITIN) 10 mg tablet, Take 10 mg by mouth daily, Disp: , Rfl:   •  metFORMIN (GLUCOPHAGE) 500 mg tablet, 1 tablet each morning and 2 tablets each evening, Disp: 270 tablet, Rfl: 3  •  omeprazole (PriLOSEC) 40 MG capsule, Take 1 capsule (40 mg total) by mouth daily, Disp: 90 capsule, Rfl: 2  •  polyethylene glycol (GOLYTELY) 4000 mL solution, Take as directed by the office for colonoscopy. (Patient not taking: Reported on 2/22/2024), Disp: 4000 mL, Rfl: 0  •  rosuvastatin (CRESTOR) 10 MG tablet, Take 1 tablet (10 mg total) by mouth daily, Disp: 90 tablet, Rfl: 3  •  Timolol Maleate PF (Timolol Maleate Ocudose) 0.5 % SOLN, , Disp: , Rfl:     Current Allergies     Allergies as of 03/05/2024 - Reviewed 03/05/2024   Allergen Reaction Noted   • Other Nausea Only, Vomiting, and Cough 01/01/1984   • Penicillins Seizures 05/03/2023   • Sulfa antibiotics Rash 05/03/2023            The following  portions of the patient's history were reviewed and updated as appropriate: allergies, current medications, past family history, past medical history, past social history, past surgical history and problem list.     Past Medical History:   Diagnosis Date   • Allergic    • Arthritis    • Asthma    • Blindness of left eye    • COPD (chronic obstructive pulmonary disease) (HCC)    • Diabetes mellitus (HCC)    • GERD (gastroesophageal reflux disease)    • Hypertension    • Obesity    • Visual impairment        Past Surgical History:   Procedure Laterality Date   •  SECTION  10/16/1990    Fibroids   • COLONOSCOPY     • EYE SURGERY      MULTIPLE   • HYSTERECTOMY     • REPLACEMENT TOTAL KNEE Right        Family History   Problem Relation Age of Onset   • Dementia Mother    • Arthritis Mother         Late in life   • Hypertension Father         HBP   • Heart disease Father         HBP   • Diabetes Father         managed through diet   • Hearing loss Father         Industrial/construction work with no hearing protection   • Glaucoma Paternal Grandmother          Medications have been verified.        Objective   /80   Pulse 70   Temp (!) 97.1 °F (36.2 °C)   Resp 22   SpO2 98%   No LMP recorded. Patient has had a hysterectomy.       Physical Exam     Physical Exam  Vitals and nursing note reviewed.   Constitutional:       Appearance: Normal appearance. She is obese.   HENT:      Head: Atraumatic.      Right Ear: Tympanic membrane normal.      Left Ear: Tympanic membrane normal.      Nose: Congestion and rhinorrhea present.      Mouth/Throat:      Mouth: Mucous membranes are moist.      Pharynx: Posterior oropharyngeal erythema present.   Eyes:      Extraocular Movements: Extraocular movements intact.   Cardiovascular:      Rate and Rhythm: Normal rate and regular rhythm.      Pulses: Normal pulses.   Pulmonary:      Effort: Pulmonary effort is normal. No respiratory distress.      Breath sounds: No  stridor. Rhonchi present. No wheezing or rales.   Chest:      Chest wall: No tenderness.   Abdominal:      General: Bowel sounds are normal.      Palpations: Abdomen is soft.   Lymphadenopathy:      Cervical: No cervical adenopathy.   Skin:     General: Skin is warm and dry.   Neurological:      General: No focal deficit present.      Mental Status: She is alert.

## 2024-03-06 ENCOUNTER — TELEPHONE (OUTPATIENT)
Dept: GASTROENTEROLOGY | Facility: CLINIC | Age: 69
End: 2024-03-06

## 2024-03-06 DIAGNOSIS — Z86.010 HISTORY OF COLON POLYPS: ICD-10-CM

## 2024-03-07 ENCOUNTER — APPOINTMENT (OUTPATIENT)
Dept: PHYSICAL THERAPY | Facility: CLINIC | Age: 69
End: 2024-03-07
Payer: MEDICARE

## 2024-03-12 ENCOUNTER — OFFICE VISIT (OUTPATIENT)
Dept: PHYSICAL THERAPY | Facility: CLINIC | Age: 69
End: 2024-03-12
Payer: MEDICARE

## 2024-03-12 DIAGNOSIS — M25.562 LEFT KNEE PAIN, UNSPECIFIED CHRONICITY: Primary | ICD-10-CM

## 2024-03-12 DIAGNOSIS — R26.2 DIFFICULTY WALKING: ICD-10-CM

## 2024-03-12 DIAGNOSIS — M17.12 PRIMARY OSTEOARTHRITIS OF LEFT KNEE: ICD-10-CM

## 2024-03-12 PROCEDURE — 97112 NEUROMUSCULAR REEDUCATION: CPT

## 2024-03-12 PROCEDURE — 97110 THERAPEUTIC EXERCISES: CPT

## 2024-03-12 NOTE — PROGRESS NOTES
"Daily Note     Today's date: 3/12/2024  Patient name: Coretta Hines  : 1955  MRN: [id number]  Referring provider: Nafisa Duckworth MD  Dx:   Encounter Diagnosis     ICD-10-CM    1. Left knee pain, unspecified chronicity  M25.562       2. Difficulty walking  R26.2       3. Primary osteoarthritis of left knee  M17.12           Start Time: 1100  Stop Time: 1140  Total time in clinic (min): 40 minutes    Subjective: Pt reported that she started using leg compression pumps at home a few weeks ago. Pt noted that she still feels therapy is helping.       Objective: See treatment diary below      Assessment: Tolerated treatment well. Program started with the R. Bike. Manual therapy focused on improving patella mobility and increasing knee PROM. Pt is still challenged with all exercises, especially step up and overs. MHP was utilized post-treatment. Patient would benefit from continued PT      Plan: Continue per plan of care.      Goals: Patient's goal is to decrease pain with ADLs    Precautions: Previous LLE Blood Clot, L eye blindness  Dx: L Knee OA    Daily Treatment Diary      Manuals 3/12  2/20 2/22 2/27 3/5    L Knee PROM MS   MS MS    L gastroc/HS str         L patella mobs MS   MS MS             Ther Ex         Quad Set         Gastroc Str 30\"x3  30\"x3 30\"x3 30\"x3 30\" x 3    SLR         Heel Slide         Seated HS str         Standing hip abd/ext 3#, 20x each  3#, 20x 3#, 20x each 3#, 20x 3# 20x    Standing knee flexion  3#, 20x  3#, 20x 3#, 20x 3#, 20x 3# x 20    Seated HS str 15\"x4    15\"x4 15\" x 4    R. Bike (rocking) 5'  5' 5' 5' 5'   Pt Edu   POC/  progress      Neuro Re-ed         TKE with TB BTB 20x  BTB 20x BTB, 5\"x20 BTB 5\"x20    Leg Press 55# 20x  55#, 10x2 55#, 10x2 55#, 20x 55# 20x   Sidestepping with TB BTB 4 laps   BTB 4 laps BTB 4 laps BTB 4 laps                      Ther Activity         minisquat 10x2  10x2 10x2 10x2 2 x 10   Step up          Step up and overs  L1, 10x  L1, 10x L1, 10x " L1, 10x L1 10x   HR on step L1, 20x  L1, 20x L1, 20x L1, 20x L1 x20   STS         Gait Training                           Modalities         Ice 10' MHP  10' MHP 10' MHP 10' MHP 10' MHP

## 2024-03-13 ENCOUNTER — TELEPHONE (OUTPATIENT)
Age: 69
End: 2024-03-13

## 2024-03-13 NOTE — TELEPHONE ENCOUNTER
Pt. Calling asking if she should cancel her EGS and colonoscopy, has an upper respiratory cold, treated with steroids and antibitiocs which she has finished, seen at urgent care, pt. Scheduled for procedures next Tuesday 3/19/24, pt. Asking if she should cancel, advised to call back next Monday with update on symptoms and if not improved we can re-schedule, pt. Is taking cough medicine and using inhaler as prescribed

## 2024-03-14 ENCOUNTER — TELEPHONE (OUTPATIENT)
Dept: VASCULAR SURGERY | Facility: CLINIC | Age: 69
End: 2024-03-14

## 2024-03-14 ENCOUNTER — OFFICE VISIT (OUTPATIENT)
Dept: PHYSICAL THERAPY | Facility: CLINIC | Age: 69
End: 2024-03-14
Payer: MEDICARE

## 2024-03-14 DIAGNOSIS — M25.562 LEFT KNEE PAIN, UNSPECIFIED CHRONICITY: Primary | ICD-10-CM

## 2024-03-14 DIAGNOSIS — R26.2 DIFFICULTY WALKING: ICD-10-CM

## 2024-03-14 DIAGNOSIS — M17.12 PRIMARY OSTEOARTHRITIS OF LEFT KNEE: ICD-10-CM

## 2024-03-14 PROCEDURE — 97110 THERAPEUTIC EXERCISES: CPT

## 2024-03-14 NOTE — PROGRESS NOTES
"Daily Note     Today's date: 3/14/2024  Patient name: Coretta Hines  : 1955  MRN: [id number]  Referring provider: Nafisa Duckworth MD  Dx:   Encounter Diagnosis     ICD-10-CM    1. Left knee pain, unspecified chronicity  M25.562       2. Primary osteoarthritis of left knee  M17.12       3. Difficulty walking  R26.2                      Subjective: Pt reported that her L calf was tender after the bike today. Pt reported a history of DVT>       Objective: See treatment diary below  Well's 3+ High Risk    Assessment: Tolerated treatment poor. Program was modified due to L calf tenderness. Due to Cord 3\" score (high), further exercise was held and pt was educated and demonstrated understanding of the importance of following up with PCP for possible DVT. Patient would benefit from continued PT      Plan: Continue per plan of care.      Goals: Patient's goal is to decrease pain with ADLs    Precautions: Previous LLE Blood Clot, L eye blindness  Dx: L Knee OA    Daily Treatment Diary      Manuals 3/12 3/14 2/20 2/22 2/27 3    L Knee PROM MS   MS MS    L gastroc/HS str         L patella mobs MS   MS MS             Ther Ex         Quad Set         Gastroc Str 30\"x3  30\"x3 30\"x3 30\"x3 30\" x 3    SLR         Heel Slide         Seated HS str         Standing hip abd/ext 3#, 20x each  3#, 20x 3#, 20x each 3#, 20x 3# 20x    Standing knee flexion  3#, 20x  3#, 20x 3#, 20x 3#, 20x 3# x 20    Seated HS str 15\"x4    15\"x4 15\" x 4    R. Bike (rocking) 5' 5' 5' 5' 5' 5'   Pt [facility]  [place] POC/  progress      Neuro Re-ed         TKE with TB BTB 20x  BTB 20x BTB, 5\"x20 BTB 5\"x20    Leg Press 55# 20x 55# 20x 55#, 10x2 55#, 10x2 55#, 20x 55# 20x   Sidestepping with TB BTB 4 laps   BTB 4 laps BTB 4 laps BTB 4 laps                      Ther Activity         minisquat 10x2  10x2 10x2 10x2 2 x 10   Step up          Step up and overs  L1, 10x  L1, 10x L1, 10x L1, 10x L1 10x   HR on step L1, 20x  L1, 20x L1, 20x L1, 20x L1 x20   STS  "        Gait Training                           Modalities         Ice 10' MHP  10' MHP 10' MHP 10' MHP 10' MHP

## 2024-03-14 NOTE — TELEPHONE ENCOUNTER
----- Message from SCARLETT Whittaker sent at 3/14/2024  2:33 PM EDT -----  This patient has not missed any of her Eliquis doses I have very low suspicion that she has developed another DVT.  Symptoms are likely more consistent with a muscle strain for which patient can alternate warm compress and ice pack.  Should she develop significantly worsening swelling or pain to her left leg she should notify the office and at that time we can obtain another venous duplex.

## 2024-03-14 NOTE — TELEPHONE ENCOUNTER
Pt informed of same and verbalized understanding, pt feels she pulled her hamstring and her symptoms are a result of this.  She will call if any additional ?/concerns or symptoms.

## 2024-03-15 NOTE — TELEPHONE ENCOUNTER
Pt called in to reschedule her procedures to 5/23/24 with Dr. Chacko in [place]. Pt not feeling well and on antibiotics.

## 2024-03-18 ENCOUNTER — OFFICE VISIT (OUTPATIENT)
Dept: URGENT CARE | Age: 69
End: 2024-03-18
Payer: MEDICARE

## 2024-03-18 VITALS
DIASTOLIC BLOOD PRESSURE: 86 MMHG | WEIGHT: 216 LBS | BODY MASS INDEX: 38.27 KG/M2 | SYSTOLIC BLOOD PRESSURE: 142 MMHG | HEART RATE: 105 BPM | HEIGHT: 63 IN | RESPIRATION RATE: 16 BRPM | OXYGEN SATURATION: 98 % | TEMPERATURE: 98.6 F

## 2024-03-18 DIAGNOSIS — R05.8 POST-VIRAL COUGH SYNDROME: Primary | ICD-10-CM

## 2024-03-18 PROCEDURE — G0463 HOSPITAL OUTPT CLINIC VISIT: HCPCS | Performed by: STUDENT IN AN ORGANIZED HEALTH CARE EDUCATION/TRAINING PROGRAM

## 2024-03-18 PROCEDURE — 99214 OFFICE O/P EST MOD 30 MIN: CPT | Performed by: STUDENT IN AN ORGANIZED HEALTH CARE EDUCATION/TRAINING PROGRAM

## 2024-03-18 RX ORDER — BENZONATATE 200 MG/1
200 CAPSULE ORAL 3 TIMES DAILY PRN
Qty: 30 CAPSULE | Refills: 0 | Status: SHIPPED | OUTPATIENT
Start: 2024-03-18

## 2024-03-18 RX ORDER — PREDNISONE 20 MG/1
40 TABLET ORAL DAILY
Qty: 10 TABLET | Refills: 0 | Status: SHIPPED | OUTPATIENT
Start: 2024-03-18 | End: 2024-03-23

## 2024-03-18 NOTE — PROGRESS NOTES
[facility]        NAME: Coretta Hines is a 68 y.o. female  : 1955    MRN: [id number]  DATE: 2024  TIME: 8:15 PM    Assessment and Plan   Post-viral cough syndrome [R05.8]  1. Post-viral cough syndrome  benzonatate (TESSALON) 200 MG capsule    predniSONE 20 mg tablet      Previous notes and imaging reviewed.  Reviewed her several chronic conditions which may contribute to cough, she is missing doses of medication for both her asthma and her allergies.  Reminded her to take these as prescribed.  Refilled Tessalon as this is helpful at night.  Her symptoms are most consistent with postviral cough syndrome, possible contribution from her allergies.  Will Rx 1 more course of prednisone, we did discuss the side effects of steroids, she is a well-controlled diabetic.  She is in understanding of these effects, she understands that she should follow-up with her PCP if she is not improving.      Patient Instructions   You have a few chronic conditions which can cause cough.  These include allergies, asthma, GERD.  Please continue to take your omeprazole every day for your GERD.  You have not been taking her Advair recently, please restart this and take it as prescribed it is important not to miss doses.  You have also been missing a few doses of your loratadine which is your allergy medication.  It is important to take this every day as well while you are having the cough as it may help reduce your symptoms.    I am sending in more Tessalon to help with the cough at night.  I am sending in another course of steroids.  We discussed the side effects of steroids and importance of minimizing these medications.  We discussed that your cough may be a combination of your above conditions as well as postviral cough which is a dry cough that persists after viral illness.  This can take up to 8 weeks to fully go away.  If you feel that your symptoms are not going away in this timeframe or you feel that you  are worsening again, please follow-up with your PCP.    Follow up with PCP in 3-5 days.  Proceed to  ER if symptoms worsen.    If tests have been performed at [facility], our office will contact you with results if changes need to be made to the care plan discussed with you at the visit.  You can review your full results on [facility].    Chief Complaint     Chief Complaint   Patient presents with    Cough     Per pt this started on Feb. 27th. She has been here 3x for the same symptoms, cough is persistent and not going getting better. She's not getting sleep because of the cough. As of last night she had left flank pain, not sure if from the cough.           History of Present Illness       Patient presents for reevaluation of cough.  Started with viral URI sx at the end of February, brother had the same symptoms at that time, she was first seen here on 2/28.  Prednisone helped her symptoms initially, but she continued with a dry cough afterwards.  She was also having some sinus pressure at that time, she returned on 3/5 and was prescribed a course of doxycycline, Tessalon.  She reports that since taking as she has not felt any more sinus pressure, Tessalon helps with the cough at night.  But she is still having very bothersome dry cough that we will keep her up for several hours at night.  She notes that she has had similar issues in the past and has required several courses of steroids.  She has PMHx notable for asthma, seasonal allergies, GERD.  She knows that she is taking her omeprazole daily as prescribed, she has not been taking her Advair maintenance as she lost her inhaler but just recently found it again, she has been using her nasal sprays for her postnasal drip, she has not been taking her loratadine daily, forgetting some doses.  Not having significant wheezing, though does note this occasionally especially around times of coughing.  Used her albuterol inhaler once today but has not been  requiring it frequently.  No shortness of breath, but does get more winded with coughing fits.  No new fevers, chills.  No pain with breathing.          Review of Systems   Review of Systems   All other systems reviewed and are negative.        Current Medications       Current Outpatient Medications:     albuterol (PROVENTIL HFA,VENTOLIN HFA) 90 mcg/act inhaler, Inhale 2 puffs every 6 (six) hours as needed for wheezing, Disp: 8.5 g, Rfl: 2    apixaban (Eliquis) 2.5 mg, Take 1 tablet (2.5 mg total) by mouth 2 (two) times a day, Disp: 60 tablet, Rfl: 5    Azelastine HCl 137 MCG/SPRAY SOLN, SPRAY 2 SPRAYS INTO EACH NOSTRIL 2 TIMES A DAY USE IN EACH NOSTRIL AS DIRECTED, Disp: 30 mL, Rfl: 3    benzonatate (TESSALON) 200 MG capsule, Take 1 capsule (200 mg total) by mouth 3 (three) times a day as needed for cough, Disp: 30 capsule, Rfl: 0    bimatoprost (LUMIGAN) 0.01 % ophthalmic drops, , Disp: , Rfl:     Dextromethorphan-GG-APAP (Coricidin HBP Cold/Cough/Flu) -325 MG/15ML LIQD, Take 30 mL by mouth 3 (three) times a day as needed (cough, congestion), Disp: 355 mL, Rfl: 0    diltiazem (CARDIZEM CD) 240 mg 24 hr capsule, Take 1 capsule (240 mg total) by mouth daily, Disp: 90 capsule, Rfl: 3    dulaglutide (Trulicity) 3 MG/0.5ML injection, Inject 0.5 mL (3 mg total) under the skin every 7 days, Disp: 6 mL, Rfl: 1    famotidine (PEPCID) 20 mg tablet, Take 20 mg by mouth, Disp: , Rfl:     fluticasone (FLONASE) 50 mcg/act nasal spray, 1 spray into each nostril, Disp: , Rfl:     Fluticasone-Salmeterol (Advair) 500-50 mcg/dose inhaler, Inhale 1 puff every 12 (twelve) hours (Patient taking differently: Inhale 1 puff every 12 (twelve) hours Has not been using recently), Disp: 180 blister, Rfl: 3    guaiFENesin (MUCINEX) 600 mg 12 hr tablet, Take 2 tablets (1,200 mg total) by mouth every 12 (twelve) hours, Disp: 28 tablet, Rfl: 0    ipratropium (ATROVENT) 0.03 % nasal spray, SPRAY 2 SPRAYS INTO EACH NOSTRIL EVERY 12 HOURS,  Disp: 30 mL, Rfl: 1    ketorolac (ACULAR) 0.4 % SOLN, INSTILL 1 DROP INTO LEFT EYE TWICE A DAY AS DIRECTED, Disp: , Rfl:     loratadine (CLARITIN) 10 mg tablet, Take 10 mg by mouth daily, Disp: , Rfl:     metFORMIN (GLUCOPHAGE) 500 mg tablet, 1 tablet each morning and 2 tablets each evening, Disp: 270 tablet, Rfl: 3    omeprazole (PriLOSEC) 40 MG capsule, Take 1 capsule (40 mg total) by mouth daily, Disp: 90 capsule, Rfl: 2    predniSONE 20 mg tablet, Take 2 tablets (40 mg total) by mouth daily for 5 days, Disp: 10 tablet, Rfl: 0    rosuvastatin (CRESTOR) 10 MG tablet, Take 1 tablet (10 mg total) by mouth daily, Disp: 90 tablet, Rfl: 3    Timolol Maleate PF (Timolol Maleate Ocudose) 0.5 % SOLN, , Disp: , Rfl:     acetaminophen (Tylenol) 325 mg tablet, Take 650 mg by mouth every 6 (six) hours as needed for mild pain, Disp: , Rfl:     polyethylene glycol (GOLYTELY) 4000 mL solution, Take as directed by the office for colonoscopy. (Patient not taking: Reported on 3/18/2024), Disp: 4000 mL, Rfl: 0    Current Allergies     Allergies as of 2024 - Reviewed 2024   Allergen Reaction Noted    Other Nausea Only, Vomiting, and Cough 1984    Penicillins Seizures 2023    Sulfa antibiotics Rash 2023            The following portions of the patient's history were reviewed and updated as appropriate: allergies, current medications, past family history, past medical history, past social history, past surgical history and problem list.     Past Medical History:   Diagnosis Date    Allergic     Arthritis     Asthma     Blindness of left eye     COPD (chronic obstructive pulmonary disease) (HCC)     Diabetes mellitus (HCC)     GERD (gastroesophageal reflux disease)     Hypertension     Obesity     Visual impairment        Past Surgical History:   Procedure Laterality Date     SECTION  10/16/1990    Fibroids    COLONOSCOPY      EYE SURGERY      MULTIPLE    HYSTERECTOMY  2000    REPLACEMENT TOTAL KNEE  "Right        Family History   Problem Relation Age of Onset    Dementia Mother     Arthritis Mother         Late in life    Hypertension Father         HBP    Heart disease Father         HBP    Diabetes Father         managed through diet    Hearing loss Father         Industrial/construction work with no hearing protection    Glaucoma Paternal Grandmother          Medications have been verified.        Objective   /86   Pulse 105   Temp 98.6 °F (37 °C) (Tympanic)   Resp 16   Ht 5' 3\" (1.6 m)   Wt 98 kg (216 lb)   SpO2 98%   BMI 38.26 kg/m²   No LMP recorded. Patient has had a hysterectomy.       Physical Exam     Physical Exam  Vitals and nursing note reviewed.   Constitutional:       General: She is not in acute distress.     Appearance: Normal appearance. She is not ill-appearing or toxic-appearing.   HENT:      Head: Normocephalic and atraumatic.      Right Ear: Tympanic membrane, ear canal and external ear normal.      Left Ear: Tympanic membrane, ear canal and external ear normal.      Nose: Nose normal. No congestion or rhinorrhea.      Mouth/Throat:      Mouth: Mucous membranes are moist.      Comments: Cobblestoning, no erythema  Eyes:      Extraocular Movements: Extraocular movements intact.   Cardiovascular:      Rate and Rhythm: Normal rate and regular rhythm.      Heart sounds: Normal heart sounds.   Pulmonary:      Effort: Pulmonary effort is normal. No respiratory distress.      Breath sounds: Normal breath sounds. No stridor. No wheezing, rhonchi or rales.   Skin:     General: Skin is warm and dry.      Capillary Refill: Capillary refill takes less than 2 seconds.      Findings: No rash.   Neurological:      Mental Status: She is alert.      Gait: Gait normal.   Psychiatric:         Behavior: Behavior normal.                     "

## 2024-03-19 NOTE — PATIENT INSTRUCTIONS
You have a few chronic conditions which can cause cough.  These include allergies, asthma, GERD.  Please continue to take your omeprazole every day for your GERD.  You have not been taking her Advair recently, please restart this and take it as prescribed it is important not to miss doses.  You have also been missing a few doses of your loratadine which is your allergy medication.  It is important to take this every day as well while you are having the cough as it may help reduce your symptoms.    I am sending in more Tessalon to help with the cough at night.  I am sending in another course of steroids.  We discussed the side effects of steroids and importance of minimizing these medications.  We discussed that your cough may be a combination of your above conditions as well as postviral cough which is a dry cough that persists after viral illness.  This can take up to 8 weeks to fully go away.  If you feel that your symptoms are not going away in this timeframe or you feel that you are worsening again, please follow-up with your PCP.

## 2024-03-20 DIAGNOSIS — E78.5 DYSLIPIDEMIA: ICD-10-CM

## 2024-03-20 RX ORDER — ROSUVASTATIN CALCIUM 10 MG/1
10 TABLET, COATED ORAL DAILY
Qty: 90 TABLET | Refills: 0 | Status: SHIPPED | OUTPATIENT
Start: 2024-03-20

## 2024-03-21 ENCOUNTER — HOSPITAL ENCOUNTER (EMERGENCY)
Facility: HOSPITAL | Age: 69
Discharge: HOME | End: 2024-03-21
Attending: EMERGENCY MEDICINE
Payer: MEDICARE

## 2024-03-21 ENCOUNTER — APPOINTMENT (OUTPATIENT)
Dept: PHYSICAL THERAPY | Facility: CLINIC | Age: 69
End: 2024-03-21
Payer: MEDICARE

## 2024-03-21 ENCOUNTER — OFFICE VISIT (OUTPATIENT)
Dept: PODIATRY | Facility: CLINIC | Age: 69
End: 2024-03-21
Payer: MEDICARE

## 2024-03-21 ENCOUNTER — APPOINTMENT (OUTPATIENT)
Dept: LAB | Facility: CLINIC | Age: 69
End: 2024-03-21
Payer: MEDICARE

## 2024-03-21 ENCOUNTER — APPOINTMENT (EMERGENCY)
Dept: VASCULAR ULTRASOUND | Facility: HOSPITAL | Age: 69
End: 2024-03-21
Payer: MEDICARE

## 2024-03-21 VITALS
DIASTOLIC BLOOD PRESSURE: 77 MMHG | HEART RATE: 89 BPM | RESPIRATION RATE: 19 BRPM | TEMPERATURE: 97.9 F | SYSTOLIC BLOOD PRESSURE: 185 MMHG | OXYGEN SATURATION: 95 %

## 2024-03-21 VITALS
SYSTOLIC BLOOD PRESSURE: 140 MMHG | BODY MASS INDEX: 37.95 KG/M2 | HEIGHT: 63 IN | DIASTOLIC BLOOD PRESSURE: 84 MMHG | HEART RATE: 82 BPM | WEIGHT: 214.2 LBS

## 2024-03-21 DIAGNOSIS — S86.819A STRAIN OF CALF MUSCLE: Primary | ICD-10-CM

## 2024-03-21 DIAGNOSIS — E11.9 CONTROLLED TYPE 2 DIABETES MELLITUS WITHOUT COMPLICATION, WITHOUT LONG-TERM CURRENT USE OF INSULIN (HCC): ICD-10-CM

## 2024-03-21 DIAGNOSIS — L84 CALLUS: ICD-10-CM

## 2024-03-21 DIAGNOSIS — L60.0 INGROWN NAIL OF GREAT TOE OF LEFT FOOT: ICD-10-CM

## 2024-03-21 DIAGNOSIS — B35.1 ONYCHOMYCOSIS: Primary | ICD-10-CM

## 2024-03-21 DIAGNOSIS — Z79.01 CHRONIC ANTICOAGULATION: ICD-10-CM

## 2024-03-21 DIAGNOSIS — E61.1 IRON DEFICIENCY: ICD-10-CM

## 2024-03-21 LAB
ALBUMIN SERPL BCP-MCNC: 3.6 G/DL (ref 3.5–5)
ALP SERPL-CCNC: 97 U/L (ref 34–104)
ALT SERPL W P-5'-P-CCNC: 10 U/L (ref 7–52)
ANION GAP SERPL CALCULATED.3IONS-SCNC: 5 MMOL/L (ref 4–13)
AST SERPL W P-5'-P-CCNC: 10 U/L (ref 13–39)
BASOPHILS # BLD AUTO: 0.05 THOUSANDS/ÂΜL (ref 0–0.1)
BASOPHILS NFR BLD AUTO: 1 % (ref 0–1)
BILIRUB SERPL-MCNC: 1.03 MG/DL (ref 0.2–1)
BUN SERPL-MCNC: 11 MG/DL (ref 5–25)
CALCIUM SERPL-MCNC: 8.9 MG/DL (ref 8.4–10.2)
CHLORIDE SERPL-SCNC: 104 MMOL/L (ref 96–108)
CHOLEST SERPL-MCNC: 189 MG/DL
CO2 SERPL-SCNC: 30 MMOL/L (ref 21–32)
CREAT SERPL-MCNC: 0.71 MG/DL (ref 0.6–1.3)
EOSINOPHIL # BLD AUTO: 0.2 THOUSAND/ÂΜL (ref 0–0.61)
EOSINOPHIL NFR BLD AUTO: 3 % (ref 0–6)
ERYTHROCYTE [DISTWIDTH] IN BLOOD BY AUTOMATED COUNT: 12.7 % (ref 11.6–15.1)
FERRITIN SERPL-MCNC: 43 NG/ML (ref 11–307)
GFR SERPL CREATININE-BSD FRML MDRD: 89 ML/MIN/1.73SQ M
GLUCOSE P FAST SERPL-MCNC: 114 MG/DL (ref 65–99)
HCT VFR BLD AUTO: 43.5 % (ref 34.8–46.1)
HDLC SERPL-MCNC: 39 MG/DL
HGB BLD-MCNC: 13.8 G/DL (ref 11.5–15.4)
IMM GRANULOCYTES # BLD AUTO: 0.02 THOUSAND/UL (ref 0–0.2)
IMM GRANULOCYTES NFR BLD AUTO: 0 % (ref 0–2)
IRON SATN MFR SERPL: 22 % (ref 15–50)
IRON SERPL-MCNC: 78 UG/DL (ref 50–212)
LDLC SERPL CALC-MCNC: 110 MG/DL (ref 0–100)
LYMPHOCYTES # BLD AUTO: 2.33 THOUSANDS/ÂΜL (ref 0.6–4.47)
LYMPHOCYTES NFR BLD AUTO: 31 % (ref 14–44)
MCH RBC QN AUTO: 27.7 PG (ref 26.8–34.3)
MCHC RBC AUTO-ENTMCNC: 31.7 G/DL (ref 31.4–37.4)
MCV RBC AUTO: 87 FL (ref 82–98)
MONOCYTES # BLD AUTO: 0.94 THOUSAND/ÂΜL (ref 0.17–1.22)
MONOCYTES NFR BLD AUTO: 13 % (ref 4–12)
NEUTROPHILS # BLD AUTO: 3.98 THOUSANDS/ÂΜL (ref 1.85–7.62)
NEUTS SEG NFR BLD AUTO: 52 % (ref 43–75)
NONHDLC SERPL-MCNC: 150 MG/DL
NRBC BLD AUTO-RTO: 0 /100 WBCS
PLATELET # BLD AUTO: 209 THOUSANDS/UL (ref 149–390)
PMV BLD AUTO: 11.1 FL (ref 8.9–12.7)
POTASSIUM SERPL-SCNC: 4.1 MMOL/L (ref 3.5–5.3)
PROT SERPL-MCNC: 6.1 G/DL (ref 6.4–8.4)
RBC # BLD AUTO: 4.99 MILLION/UL (ref 3.81–5.12)
SODIUM SERPL-SCNC: 139 MMOL/L (ref 135–147)
TIBC SERPL-MCNC: 359 UG/DL (ref 250–450)
TRIGL SERPL-MCNC: 199 MG/DL
UIBC SERPL-MCNC: 281 UG/DL (ref 155–355)
WBC # BLD AUTO: 7.52 THOUSAND/UL (ref 4.31–10.16)

## 2024-03-21 PROCEDURE — 11719 TRIM NAIL(S) ANY NUMBER: CPT | Performed by: PODIATRIST

## 2024-03-21 PROCEDURE — 11720 DEBRIDE NAIL 1-5: CPT | Performed by: PODIATRIST

## 2024-03-21 PROCEDURE — 83540 ASSAY OF IRON: CPT

## 2024-03-21 PROCEDURE — 99285 EMERGENCY DEPT VISIT HI MDM: CPT | Performed by: EMERGENCY MEDICINE

## 2024-03-21 PROCEDURE — 83550 IRON BINDING TEST: CPT

## 2024-03-21 PROCEDURE — 93971 EXTREMITY STUDY: CPT

## 2024-03-21 PROCEDURE — 99284 EMERGENCY DEPT VISIT MOD MDM: CPT

## 2024-03-21 PROCEDURE — 99203 OFFICE O/P NEW LOW 30 MIN: CPT | Performed by: PODIATRIST

## 2024-03-21 PROCEDURE — 93971 EXTREMITY STUDY: CPT | Performed by: INTERNAL MEDICINE

## 2024-03-21 PROCEDURE — 82728 ASSAY OF FERRITIN: CPT

## 2024-03-21 NOTE — PROGRESS NOTES
Name: Coretta Hines      : 1955      MRN: [id number]  Encounter Provider: Didier Pollock DPM  Encounter Date: 3/21/2024   Encounter department: [facility] PODI[facility] [place]    Assessment & Plan     1. Onychomycosis    2. Callus    3. Controlled type 2 diabetes mellitus without complication, without long-term current use of insulin (HCC)  -     Nail removal    4. Ingrown nail of great toe of left foot    5. Chronic anticoagulation        Nail debridement completed today to the great toes bilaterally.  The other nails were trimmed in length.    Monitor feet for any signs of open ulcerations or lesions.        Return in about 3 months (around 2024).      Subjective      Last hemoglobin A1c was 6.9.  Patient moved to the area is looking for footcare.  Patient denies any new acute issues she does relate occasional numbness and tingling.  She denies any recent injury or open ulceration or lesion.      Review of Systems   Constitutional:  Negative for chills and fever.   Respiratory:  Negative for chest tightness, shortness of breath and wheezing.    Cardiovascular:  Negative for chest pain and leg swelling.   Gastrointestinal:  Negative for diarrhea, nausea and vomiting.   Musculoskeletal:  Negative for joint swelling.   Skin:  Negative for color change and wound.   Neurological:  Negative for dizziness, weakness and numbness.   Hematological:  Does not bruise/bleed easily.   Psychiatric/Behavioral:  Negative for agitation.        Current Outpatient Medications on File Prior to Visit   Medication Sig   • acetaminophen (Tylenol) 325 mg tablet Take 650 mg by mouth every 6 (six) hours as needed for mild pain   • albuterol (PROVENTIL HFA,VENTOLIN HFA) 90 mcg/act inhaler Inhale 2 puffs every 6 (six) hours as needed for wheezing   • apixaban (Eliquis) 2.5 mg Take 1 tablet (2.5 mg total) by mouth 2 (two) times a day   • Azelastine HCl 137 MCG/SPRAY SOLN SPRAY 2 SPRAYS INTO EACH NOSTRIL 2 TIMES A DAY USE  "IN EACH NOSTRIL AS DIRECTED   • benzonatate (TESSALON) 200 MG capsule Take 1 capsule (200 mg total) by mouth 3 (three) times a day as needed for cough   • bimatoprost (LUMIGAN) 0.01 % ophthalmic drops    • Dextromethorphan-GG-APAP (Coricidin HBP Cold/Cough/Flu) -325 MG/15ML LIQD Take 30 mL by mouth 3 (three) times a day as needed (cough, congestion)   • diltiazem (CARDIZEM CD) 240 mg 24 hr capsule Take 1 capsule (240 mg total) by mouth daily   • dulaglutide (Trulicity) 3 MG/0.5ML injection Inject 0.5 mL (3 mg total) under the skin every 7 days   • famotidine (PEPCID) 20 mg tablet Take 20 mg by mouth   • fluticasone (FLONASE) 50 mcg/act nasal spray 1 spray into each nostril   • Fluticasone-Salmeterol (Advair) 500-50 mcg/dose inhaler Inhale 1 puff every 12 (twelve) hours (Patient taking differently: Inhale 1 puff every 12 (twelve) hours Has not been using recently)   • guaiFENesin (MUCINEX) 600 mg 12 hr tablet Take 2 tablets (1,200 mg total) by mouth every 12 (twelve) hours   • ipratropium (ATROVENT) 0.03 % nasal spray SPRAY 2 SPRAYS INTO EACH NOSTRIL EVERY 12 HOURS   • ketorolac (ACULAR) 0.4 % SOLN INSTILL 1 DROP INTO LEFT EYE TWICE A DAY AS DIRECTED   • loratadine (CLARITIN) 10 mg tablet Take 10 mg by mouth daily   • metFORMIN (GLUCOPHAGE) 500 mg tablet 1 tablet each morning and 2 tablets each evening   • omeprazole (PriLOSEC) 40 MG capsule Take 1 capsule (40 mg total) by mouth daily   • rosuvastatin (CRESTOR) 10 MG tablet Take 1 tablet (10 mg total) by mouth daily   • Timolol Maleate PF (Timolol Maleate Ocudose) 0.5 % SOLN    • [DISCONTINUED] polyethylene glycol (GOLYTELY) 4000 mL solution Take as directed by the office for colonoscopy. (Patient not taking: Reported on 3/18/2024)           Objective     /84   Pulse 82   Ht 5' 3\" (1.6 m)   Wt 97.2 kg (214 lb 3.2 oz)   BMI 37.94 kg/m²     Physical Exam  Constitutional:       Appearance: Normal appearance.   Cardiovascular:      Pulses: no weak pulses. "           Dorsalis pedis pulses are 2+ on the right side and 2+ on the left side.        Posterior tibial pulses are 2+ on the right side and 2+ on the left side.   Feet:      Right foot:      Skin integrity: No ulcer, skin breakdown, erythema, warmth, callus or dry skin.      Left foot:      Skin integrity: No ulcer, skin breakdown, erythema, warmth, callus or dry skin.      Comments: Vascular: Intact pedal pulses bilateral DP and PT.  Neurological: Gross protective sensation intact bilateral  Musculoskeletal: Muscle strength bilateral intact with dorsiflexion, inversion, eversion and plantarflexion.  Dermatological: No open lesions or ulcerations noted bilateral.    Great toe bilateral elongated dystrophic with subungual debris.    2-5 elongated nondystrophic     Callus great toe right IPJ.      Neurological:      Mental Status: She is alert.         Nail removal    Date/Time: 3/21/2024 9:45 AM    Performed by: Didier Pollock DPM  Authorized by: Didier Pollock DPM    Nails trimmed:     Number of nails trimmed:  8      Patient is on chronic anticoagulation.    Diabetic Foot Exam    Patient's shoes and socks removed.    Right Foot/Ankle   Right Foot Inspection  Skin Exam: skin normal and skin intact. No dry skin, no warmth, no callus, no erythema, no maceration, no abnormal color, no pre-ulcer, no ulcer and no callus.     Toe Exam: ROM and strength within normal limits.     Sensory   Monofilament testing: intact    Vascular  Capillary refills: < 3 seconds  The right DP pulse is 2+. The right PT pulse is 2+.     Left Foot/Ankle  Left Foot Inspection  Skin Exam: skin normal and skin intact. No dry skin, no warmth, no erythema, no maceration, normal color, no pre-ulcer, no ulcer and no callus.     Toe Exam: ROM and strength within normal limits.     Sensory   Monofilament testing: intact    Vascular  Capillary refills: < 3 seconds  The left DP pulse is 2+. The left PT pulse is 2+.     Assign Risk  Category  No deformity present  No loss of protective sensation  No weak pulses  Risk: 0

## 2024-03-21 NOTE — ED ATTENDING ATTESTATION
3/21/2024  I, Cesar Wellington DO, saw and evaluated the patient. I have discussed the patient with the resident/non-physician practitioner and agree with the resident's/non-physician practitioner's findings, Plan of Care, and MDM as documented in the resident's/non-physician practitioner's note, except where noted. All available labs and Radiology studies were reviewed.  I was present for key portions of any procedure(s) performed by the resident/non-physician practitioner and I was immediately available to provide assistance.       At this point I agree with the current assessment done in the Emergency Department.  I have conducted an independent evaluation of this patient a history and physical is as follows:      Pleasant 68-year-old female left lower extremity calf pain.  But she had been working very hard in physical therapy but has had increasing pain in her left calf, history of DVT but is on Eliquis, was advised by her physical therapist come in for evaluation for possible current DVT on exam she is tender throughout her calf, soft compartments no pain with passive or active flexion extension of the ankle, no evidence of compartment syndrome, ultrasound performed for possibility of DVT, as per ultrasound tech no evidence of acute DVT but does have  nonocclusive chronic DVT this is explained to the patient, will continue taking Eliquis will discharge                    ED Course         Critical Care Time  Procedures

## 2024-03-21 NOTE — ED PROVIDER NOTES
History  Chief Complaint   Patient presents with    Leg Pain     History of dvt last march. States she hyperextended left hamstring last week at PT. Reports left leg has been hurting since but reports mild pain in right as well. Concerned for DVT due to history. Denies SOB     68-year-old female with prior history of DVT presents to the ED for left lower extremity tenderness.  She states she was at physical therapy when her PT noticed that her left calf was tender and slightly larger comparable to her right.  Upon measurement, her left calf was 37 cm and her right calf was 36 cm.  She denies any redness, warmth, swelling overall.  Her left calf is slightly darker, however she notes that that is her baseline.  She has a history of DVT and is currently on Eliquis and wears compression stockings daily.  She denies any chest pain, shortness of breath, cough.  She is able to bear weight and ambulate appropriately with no antalgic gait.        Prior to Admission Medications   Prescriptions Last Dose Informant Patient Reported? Taking?   Azelastine HCl 137 MCG/SPRAY SOLN  Self No No   Sig: SPRAY 2 SPRAYS INTO EACH NOSTRIL 2 TIMES A DAY USE IN EACH NOSTRIL AS DIRECTED   Dextromethorphan-GG-APAP (Coricidin HBP Cold/Cough/Flu) -325 MG/15ML LIQD   No No   Sig: Take 30 mL by mouth 3 (three) times a day as needed (cough, congestion)   Fluticasone-Salmeterol (Advair) 500-50 mcg/dose inhaler  Self No No   Sig: Inhale 1 puff every 12 (twelve) hours   Patient taking differently: Inhale 1 puff every 12 (twelve) hours Has not been using recently   Timolol Maleate PF (Timolol Maleate Ocudose) 0.5 % SOLN  Self Yes No   acetaminophen (Tylenol) 325 mg tablet   Yes No   Sig: Take 650 mg by mouth every 6 (six) hours as needed for mild pain   albuterol (PROVENTIL HFA,VENTOLIN HFA) 90 mcg/act inhaler  Self No No   Sig: Inhale 2 puffs every 6 (six) hours as needed for wheezing   apixaban (Eliquis) 2.5 mg  Self No No   Sig: Take 1 tablet  (2.5 mg total) by mouth 2 (two) times a day   benzonatate (TESSALON) 200 MG capsule   No No   Sig: Take 1 capsule (200 mg total) by mouth 3 (three) times a day as needed for cough   bimatoprost (LUMIGAN) 0.01 % ophthalmic drops  Self Yes No   diltiazem (CARDIZEM CD) 240 mg 24 hr capsule   No No   Sig: Take 1 capsule (240 mg total) by mouth daily   dulaglutide (Trulicity) 3 MG/0.5ML injection  Self No No   Sig: Inject 0.5 mL (3 mg total) under the skin every 7 days   famotidine (PEPCID) 20 mg tablet  Self Yes No   Sig: Take 20 mg by mouth   fluticasone (FLONASE) 50 mcg/act nasal spray  Self Yes No   Si spray into each nostril   guaiFENesin (MUCINEX) 600 mg 12 hr tablet   No No   Sig: Take 2 tablets (1,200 mg total) by mouth every 12 (twelve) hours   ipratropium (ATROVENT) 0.03 % nasal spray  Self No No   Sig: SPRAY 2 SPRAYS INTO EACH NOSTRIL EVERY 12 HOURS   ketorolac (ACULAR) 0.4 % SOLN  Self Yes No   Sig: INSTILL 1 DROP INTO LEFT EYE TWICE A DAY AS DIRECTED   loratadine (CLARITIN) 10 mg tablet  Self Yes No   Sig: Take 10 mg by mouth daily   metFORMIN (GLUCOPHAGE) 500 mg tablet  Self No No   Si tablet each morning and 2 tablets each evening   omeprazole (PriLOSEC) 40 MG capsule  Self No No   Sig: Take 1 capsule (40 mg total) by mouth daily   polyethylene glycol (GOLYTELY) 4000 mL solution   No No   Sig: Take as directed by the office for colonoscopy.   Patient not taking: Reported on 3/18/2024   predniSONE 20 mg tablet   No No   Sig: Take 2 tablets (40 mg total) by mouth daily for 5 days   rosuvastatin (CRESTOR) 10 MG tablet   No No   Sig: Take 1 tablet (10 mg total) by mouth daily      Facility-Administered Medications: None       Past Medical History:   Diagnosis Date    Allergic     Arthritis     Asthma     Blindness of left eye     COPD (chronic obstructive pulmonary disease) (HCC)     Diabetes mellitus (HCC)     GERD (gastroesophageal reflux disease)     Hypertension     Obesity     Visual impairment         Past Surgical History:   Procedure Laterality Date     SECTION  10/16/1990    Fibroids    COLONOSCOPY      EYE SURGERY      MULTIPLE    HYSTERECTOMY  2000    REPLACEMENT TOTAL KNEE Right        Family History   Problem Relation Age of Onset    Dementia Mother     Arthritis Mother         Late in life    Hypertension Father         HBP    Heart disease Father         HBP    Diabetes Father         managed through diet    Hearing loss Father         Industrial/construction work with no hearing protection    Glaucoma Paternal Grandmother      I have reviewed and agree with the history as documented.    E-Cigarette/Vaping    E-Cigarette Use Never User      E-Cigarette/Vaping Substances    Nicotine No     THC No     CBD No     Flavoring No     Other No     Unknown No      Social History     Tobacco Use    Smoking status: Never    Smokeless tobacco: Never   Vaping Use    Vaping status: Never Used   Substance Use Topics    Alcohol use: Yes     Alcohol/week: 1.0 standard drink of alcohol     Types: 1 Standard drinks or equivalent per week     Comment: on occasion, 1 drink with low sugar content    Drug use: Never        Review of Systems   Constitutional:  Negative for chills and fever.   HENT:  Negative for ear pain and sore throat.    Eyes:  Negative for pain and visual disturbance.   Respiratory:  Negative for cough and shortness of breath.    Cardiovascular:  Negative for chest pain and palpitations.   Gastrointestinal:  Negative for abdominal pain and vomiting.   Genitourinary:  Negative for dysuria and hematuria.   Musculoskeletal:  Negative for arthralgias, back pain, gait problem and joint swelling.        Stiffness and tenderness of left lower extremity   Skin:  Negative for color change and rash.   Neurological:  Negative for seizures and syncope.   All other systems reviewed and are negative.      Physical Exam  ED Triage Vitals [24 1243]   Temperature Pulse Respirations Blood Pressure SpO2    97.9 °F (36.6 °C) 89 19 (!) 185/77 95 %      Temp Source Heart Rate Source Patient Position - Orthostatic VS BP Location FiO2 (%)   Oral Monitor Sitting Right arm --      Pain Score       3             Orthostatic Vital Signs  Vitals:    03/21/24 1243   BP: (!) 185/77   Pulse: 89   Patient Position - Orthostatic VS: Sitting       Physical Exam  Vitals and nursing note reviewed.   Constitutional:       General: She is not in acute distress.     Appearance: She is well-developed.   HENT:      Head: Normocephalic and atraumatic.   Eyes:      Conjunctiva/sclera: Conjunctivae normal.   Cardiovascular:      Rate and Rhythm: Normal rate and regular rhythm.      Heart sounds: Murmur (Pre-existing and chronic) heard.   Pulmonary:      Effort: Pulmonary effort is normal. No respiratory distress.      Breath sounds: Normal breath sounds.   Abdominal:      Palpations: Abdomen is soft.      Tenderness: There is no abdominal tenderness.   Musculoskeletal:         General: Tenderness (Left gastroc stiffness) present. No swelling.      Cervical back: Neck supple.      Comments: Darker skin noted on the anterior, lateral and posterior lower leg, this is pre-existing.   Skin:     General: Skin is warm and dry.      Capillary Refill: Capillary refill takes less than 2 seconds.   Neurological:      Mental Status: She is alert.   Psychiatric:         Mood and Affect: Mood normal.         ED Medications  Medications - No data to display    Diagnostic Studies  Results Reviewed       None                   VAS lower limb venous duplex study, unilateral/limited    (Results Pending)         Procedures  Procedures      ED Course  ED Course as of 03/21/24 1414   Thu Mar 21, 2024   1413 Venous duplex showed chronic nonocclusive DVT which is similar to her previous.  She is negative for any acute new onset DVTs.  Advised to resume physical therapy, however reduced effort for the next 1-2 sessions.           Medical Decision Making  68-year-old  female with prior DVT history presents with left lower leg tenderness and symptoms.  She is currently on Eliquis and wears compression stockings daily.  On physical exam, patient displays left lower leg stiffness and tenderness to palpation on passive stretching.  Differential diagnosis includes DVT vs gastrocsoleus complex restrictions vs possible Baker's cyst.          Disposition  Final diagnoses:   Strain of calf muscle     Time reflects when diagnosis was documented in both MDM as applicable and the Disposition within this note       Time User Action Codes Description Comment    3/21/2024  2:08 PM Blake Montanez Add [S86.819A] Strain of calf muscle           ED Disposition       ED Disposition   Discharge    Condition   Stable    Date/Time   Thu Mar 21, 2024  2:08 PM    Comment   Coretta Penaor discharge to home/self care.                   Follow-up Information    None         Discharge Medication List as of 3/21/2024  2:09 PM        CONTINUE these medications which have NOT CHANGED    Details   acetaminophen (Tylenol) 325 mg tablet Take 650 mg by mouth every 6 (six) hours as needed for mild pain, Historical Med      albuterol (PROVENTIL HFA,VENTOLIN HFA) 90 mcg/act inhaler Inhale 2 puffs every 6 (six) hours as needed for wheezing, Starting Thu 6/8/2023, Normal      apixaban (Eliquis) 2.5 mg Take 1 tablet (2.5 mg total) by mouth 2 (two) times a day, Starting Sat 2/17/2024, Normal      Azelastine HCl 137 MCG/SPRAY SOLN SPRAY 2 SPRAYS INTO EACH NOSTRIL 2 TIMES A DAY USE IN EACH NOSTRIL AS DIRECTED, Normal      benzonatate (TESSALON) 200 MG capsule Take 1 capsule (200 mg total) by mouth 3 (three) times a day as needed for cough, Starting Mon 3/18/2024, Normal      bimatoprost (LUMIGAN) 0.01 % ophthalmic drops Historical Med      Dextromethorphan-GG-APAP (Coricidin HBP Cold/Cough/Flu) -325 MG/15ML LIQD Take 30 mL by mouth 3 (three) times a day as needed (cough, congestion), Starting Wed 2/28/2024, Normal       diltiazem (CARDIZEM CD) 240 mg 24 hr capsule Take 1 capsule (240 mg total) by mouth daily, Starting Thu 2/22/2024, Normal      dulaglutide (Trulicity) 3 MG/0.5ML injection Inject 0.5 mL (3 mg total) under the skin every 7 days, Starting Tue 2/6/2024, Normal      famotidine (PEPCID) 20 mg tablet Take 20 mg by mouth, Historical Med      fluticasone (FLONASE) 50 mcg/act nasal spray 1 spray into each nostril, Historical Med      Fluticasone-Salmeterol (Advair) 500-50 mcg/dose inhaler Inhale 1 puff every 12 (twelve) hours, Starting Mon 6/19/2023, Normal      guaiFENesin (MUCINEX) 600 mg 12 hr tablet Take 2 tablets (1,200 mg total) by mouth every 12 (twelve) hours, Starting Tue 3/5/2024, Normal      ipratropium (ATROVENT) 0.03 % nasal spray SPRAY 2 SPRAYS INTO EACH NOSTRIL EVERY 12 HOURS, Starting Mon 1/15/2024, Normal      ketorolac (ACULAR) 0.4 % SOLN INSTILL 1 DROP INTO LEFT EYE TWICE A DAY AS DIRECTED, Historical Med      loratadine (CLARITIN) 10 mg tablet Take 10 mg by mouth daily, Historical Med      metFORMIN (GLUCOPHAGE) 500 mg tablet 1 tablet each morning and 2 tablets each evening, Normal      omeprazole (PriLOSEC) 40 MG capsule Take 1 capsule (40 mg total) by mouth daily, Starting Tue 2/13/2024, Normal      polyethylene glycol (GOLYTELY) 4000 mL solution Take as directed by the office for colonoscopy., Normal      predniSONE 20 mg tablet Take 2 tablets (40 mg total) by mouth daily for 5 days, Starting Mon 3/18/2024, Until Sat 3/23/2024, Normal      rosuvastatin (CRESTOR) 10 MG tablet Take 1 tablet (10 mg total) by mouth daily, Starting Wed 3/20/2024, Normal      Timolol Maleate PF (Timolol Maleate Ocudose) 0.5 % SOLN Historical Med           No discharge procedures on file.    PDMP Review       None             ED Provider  Attending physically available and evaluated Coretta Hines. I managed the patient along with the ED Attending.    Electronically Signed by           Blake Montanez MD  03/21/24 8115

## 2024-03-22 ENCOUNTER — OFFICE VISIT (OUTPATIENT)
Dept: PHYSICAL THERAPY | Facility: CLINIC | Age: 69
End: 2024-03-22
Payer: MEDICARE

## 2024-03-22 DIAGNOSIS — M17.12 PRIMARY OSTEOARTHRITIS OF LEFT KNEE: ICD-10-CM

## 2024-03-22 DIAGNOSIS — M25.562 LEFT KNEE PAIN, UNSPECIFIED CHRONICITY: Primary | ICD-10-CM

## 2024-03-22 DIAGNOSIS — R26.2 DIFFICULTY WALKING: ICD-10-CM

## 2024-03-22 LAB
EST. AVERAGE GLUCOSE BLD GHB EST-MCNC: 169 MG/DL
HBA1C MFR BLD: 7.5 %

## 2024-03-22 PROCEDURE — 97110 THERAPEUTIC EXERCISES: CPT

## 2024-03-22 NOTE — PROGRESS NOTES
"Daily Note     Today's date: 3/22/2024  Patient name: Coretta Hines  : 1955  MRN: [id number]  Referring provider: Nafisa Duckworth MD  Dx:   Encounter Diagnosis     ICD-10-CM    1. Left knee pain, unspecified chronicity  M25.562       2. Primary osteoarthritis of left knee  M17.12       3. Difficulty walking  R26.2           Start Time: 0830  Stop Time: 0853  Total time in clinic (min): 23 minutes    Subjective: Pt reports that she went to the ED and was checked for blood clots. She was advised to continue PT but at a reduced effort for 2 weeks.       Objective: See treatment diary below      Assessment: Tolerated treatment well. Patient would benefit from continued PT      Plan: Continue per plan of care.      Goals: Patient's goal is to decrease pain with ADLs    Precautions: Previous LLE Blood Clot, L eye blindness  Dx: L Knee OA    Daily Treatment Diary      Manuals 3/12 3/14 03/22  2/27 3/5    L Knee PROM MS    MS    L gastroc/HS str         L patella mobs MS    MS             Ther Ex         Quad Set         Gastroc Str 30\"x3  30\"x3  30\"x3 30\" x 3    SLR         Heel Slide         Seated HS str         Standing hip abd/ext 3#, 20x each  20  3#, 20x 3# 20x    Standing knee flexion  3#, 20x  20   3#, 20x 3# x 20    Seated HS str 15\"x4  15\"x4  15\"x4 15\" x 4    R. Bike (rocking) 5' 5' decline  5' 5'   Pt [place]       Neuro Re-ed         TKE with TB BTB 20x    BTB 5\"x20    Leg Press 55# 20x 55# 20x 45# 20x   55#, 20x 55# 20x   Sidestepping with TB BTB 4 laps  4 laps   BTB 4 laps BTB 4 laps                      Ther Activity         minisquat 10x2  2x10  10x2 2 x 10   Step up          Step up and overs  L1, 10x    L1, 10x L1 10x   HR on step L1, 20x    L1, 20x L1 x20   STS         Gait Training                           Modalities         Ice 10' MHP    10' MHP 10' MHP                                                         "

## 2024-03-26 ENCOUNTER — TELEPHONE (OUTPATIENT)
Age: 69
End: 2024-03-26

## 2024-03-26 ENCOUNTER — OFFICE VISIT (OUTPATIENT)
Dept: INTERNAL MEDICINE CLINIC | Facility: CLINIC | Age: 69
End: 2024-03-26
Payer: MEDICARE

## 2024-03-26 ENCOUNTER — OFFICE VISIT (OUTPATIENT)
Dept: PHYSICAL THERAPY | Facility: CLINIC | Age: 69
End: 2024-03-26
Payer: MEDICARE

## 2024-03-26 VITALS
BODY MASS INDEX: 37.92 KG/M2 | DIASTOLIC BLOOD PRESSURE: 84 MMHG | WEIGHT: 214 LBS | SYSTOLIC BLOOD PRESSURE: 138 MMHG | HEIGHT: 63 IN | HEART RATE: 74 BPM | OXYGEN SATURATION: 99 % | TEMPERATURE: 97.2 F

## 2024-03-26 DIAGNOSIS — M17.12 PRIMARY OSTEOARTHRITIS OF LEFT KNEE: ICD-10-CM

## 2024-03-26 DIAGNOSIS — J45.40 MODERATE PERSISTENT ASTHMA WITHOUT COMPLICATION: Primary | ICD-10-CM

## 2024-03-26 DIAGNOSIS — E66.01 CLASS 2 SEVERE OBESITY WITH SERIOUS COMORBIDITY AND BODY MASS INDEX (BMI) OF 35.0 TO 35.9 IN ADULT, UNSPECIFIED OBESITY TYPE (HCC): ICD-10-CM

## 2024-03-26 DIAGNOSIS — J45.40 MODERATE PERSISTENT ASTHMA WITHOUT COMPLICATION: ICD-10-CM

## 2024-03-26 DIAGNOSIS — M25.562 LEFT KNEE PAIN, UNSPECIFIED CHRONICITY: Primary | ICD-10-CM

## 2024-03-26 DIAGNOSIS — E11.65 TYPE 2 DIABETES MELLITUS WITH HYPERGLYCEMIA, WITHOUT LONG-TERM CURRENT USE OF INSULIN (HCC): Primary | ICD-10-CM

## 2024-03-26 DIAGNOSIS — E78.5 DYSLIPIDEMIA: ICD-10-CM

## 2024-03-26 DIAGNOSIS — I10 PRIMARY HYPERTENSION: ICD-10-CM

## 2024-03-26 PROCEDURE — 99214 OFFICE O/P EST MOD 30 MIN: CPT | Performed by: INTERNAL MEDICINE

## 2024-03-26 PROCEDURE — 97112 NEUROMUSCULAR REEDUCATION: CPT | Performed by: PHYSICAL THERAPIST

## 2024-03-26 PROCEDURE — G2211 COMPLEX E/M VISIT ADD ON: HCPCS | Performed by: INTERNAL MEDICINE

## 2024-03-26 PROCEDURE — 97110 THERAPEUTIC EXERCISES: CPT | Performed by: PHYSICAL THERAPIST

## 2024-03-26 RX ORDER — ROSUVASTATIN CALCIUM 20 MG/1
20 TABLET, COATED ORAL DAILY
Qty: 30 TABLET | Refills: 5 | Status: CANCELLED | OUTPATIENT
Start: 2024-03-26

## 2024-03-26 RX ORDER — PREDNISONE 10 MG/1
TABLET ORAL
Qty: 20 TABLET | Refills: 0 | Status: SHIPPED | OUTPATIENT
Start: 2024-03-26

## 2024-03-26 NOTE — PROGRESS NOTES
"Daily Note     Today's date: 3/26/2024  Patient name: Coretta Hines  : 1955  MRN: [id number]  Referring provider: Nafisa Duckworth MD  Dx:   Encounter Diagnosis     ICD-10-CM    1. Left knee pain, unspecified chronicity  M25.562       2. Primary osteoarthritis of left knee  M17.12                        Subjective: Pt reports her knee is \"not bad\" today.       Objective: See treatment diary below      Assessment: Tolerated treatment well. Deferred bike again today; may add back into program next week. Good tolerance to adding additional exercises/progressing back into her program.       Plan: Continue per plan of care.      Goals: Patient's goal is to decrease pain with ADLs    Precautions: Previous LLE Blood Clot, L eye blindness  Dx: L Knee OA    Daily Treatment Diary      Manuals 3/12 3/14 03/22 3/26  3    L Knee PROM MS        L gastroc/HS str         L patella mobs MS                 Ther Ex         Quad Set         Gastroc Str 30\"x3  30\"x3 30\"x3  30\" x 3    SLR         Heel Slide         Seated HS str         Standing hip abd/ext 3#, 20x each  20 20 RTB  3# 20x    Standing knee flexion  3#, 20x  20  20 RTB  3# x 20    Seated HS str 15\"x4  15\"x4 15\"x4  15\" x 4    R. Bike (rocking) 5' 5' decline decline  5'   Pt Edu  [place]       Neuro Re-ed         TKE with TB BTB 20x        Leg Press 55# 20x 55# 20x 45# 20x  55# 20x  55# 20x   Sidestepping with TB BTB 4 laps  4 laps  4 laps  BTB 4 laps                      Ther Activity         minisquat 10x2  2x10 2x10  2 x 10   Step up          Step up and overs  L1, 10x   L1 10x  L1 10x   HR on step L1, 20x   L1 20x  L1 x20   STS         Gait Training                           Modalities         Ice 10' MHP     10' MHP                                                         "

## 2024-03-26 NOTE — PROGRESS NOTES
Assessment/Plan:    1. Type 2 diabetes mellitus with hyperglycemia, without long-term current use of insulin (HCC)  Hemoglobin A1C    Basic metabolic panel      2. Primary hypertension        3. Dyslipidemia  Lipid panel      4. Class 2 severe obesity with serious comorbidity and body mass index (BMI) of 35.0 to 35.9 in adult, unspecified obesity type         5. Moderate persistent asthma without complication             A&P Notes:    1. Type 2 diabetes mellitus with hyperglycemia, without long-term current use of insulin (HCC)  Hemoglobin A1c went up to 7.5  Patient has hyperlipidemia as well  Does not have good diet and consumes lots of sugar  At home takes metformin 500 in the morning and 1000 in the evening    - Hemoglobin A1C  - Basic metabolic panel  - Discussed lifestyle modifications, including healthier diet and more physical activity as tolerated  - Discussed diabetes clinical manifestations and thinks to look out for    2. Primary hypertension  Patient is on Cardizem 240 mg daily  Overall blood pressure has been in 130s  Will continue current management and if needed will consider adding ACE/ARB especially in the setting of type 2 diabetes  Lifestyle management and weight loss    3. Dyslipidemia  Patient apparently was not taking Crestor 10 mg daily because of an error in the pillbox  She just restarted taking the medication    -Continue current dose  -Repeat lipid panel    4. Class 2 severe obesity with serious comorbidity and body mass index (BMI) of 35.0 to 35.9 in adult, unspecified obesity type   Patient said that in the past she has lost weight using one of the programs  Currently is not interested in referral to weight management  Is planning to do healthier diet and to manage with more physical activity as well  Is thinking to join the program that has helped her before in the past    5.  Asthma, moderate persistent    Patient has history of asthma  No PFTs available in the chart  Said that in the  past she thinks she has done PFTs but they are not in the chart  At home takes Advair 500-50 1 puff 2 times daily, albuterol inhaler as needed and also has albuterol nebulizer  Still has cough because of asthma, also has GERD and postnasal drip  Does not have a pulmonologist since she moved to this area and is interested in establishing care with pulmonologist    Plan:  -Ordered pulmonary referral  -Will continue current management  -Will avoid ordering prednisone for now        Subjective: Patient came to the office for a 3-month follow-up visit.  Overall she has been doing well but lately she had left foot pain for which she went to get Doppler done which was negative for DVT and she is already on Eliquis.  The pain resolved.  Otherwise she is worried about her hemoglobin A1c going up and says that her diet is not good, for which she is going to start working on losing weight and will decrease amount of sugars and simple carbohydrates. She also has been having chronic cough due to asthma which gets sometimes worse but sometimes improves, also has GERD and post-nasal drip which could also contribute. Patient is interested in seeing a pulmonologist, has no PFTs on file but thinks that they were done in the past.      Patient ID: Coretta Hines is a 68 y.o. female.    Past Medical History:   Diagnosis Date    Allergic     Arthritis     Asthma     Blindness of left eye     COPD (chronic obstructive pulmonary disease) (HCC)     Diabetes mellitus (HCC)     GERD (gastroesophageal reflux disease)     Hypertension     Obesity     Visual impairment         Family History   Problem Relation Age of Onset    Dementia Mother     Arthritis Mother         Late in life    Hypertension Father         HBP    Heart disease Father         HBP    Diabetes Father         managed through diet    Hearing loss Father         Industrial/construction work with no hearing protection    Glaucoma Paternal Grandmother         Social History      Socioeconomic History    Marital status: /Civil Union     Spouse name: Not on file    Number of children: Not on file    Years of education: Not on file    Highest education level: Not on file   Occupational History    Not on file   Tobacco Use    Smoking status: Never    Smokeless tobacco: Never   Vaping Use    Vaping status: Never Used   Substance and Sexual Activity    Alcohol use: Yes     Alcohol/week: 1.0 standard drink of alcohol     Types: 1 Standard drinks or equivalent per week     Comment: on occasion, 1 drink with low sugar content    Drug use: Never    Sexual activity: Not on file   Other Topics Concern    Not on file   Social History Narrative    Not on file     Social Determinants of Health     Financial Resource Strain: Low Risk  (9/17/2023)    Overall Financial Resource Strain (CARDIA)     Difficulty of Paying Living Expenses: Not hard at all   Food Insecurity: No Food Insecurity (9/13/2022)    Received from [facility]    Food Insecurity     Within the past 12 months, you worried that your food would run out before you got the money to buy more.: Never true     Within the past 12 months, the food you bought just didn't last and you didn't have money to get more.: Never true     Within the past 12 months, you worried that your food would run out before you got the money to buy more.: Never true   Transportation Needs: No Transportation Needs (9/17/2023)    PRAPARE - Transportation     Lack of Transportation (Medical): No     Lack of Transportation (Non-Medical): No   Physical Activity: Not on file   Stress: Not on file   Social Connections: Not on file   Intimate Partner Violence: Not on file   Housing Stability: Not on file        Allergies   Allergen Reactions    Other Nausea Only, Vomiting and Cough     LOBSTER    Penicillins Seizures    Sulfa Antibiotics Rash        Current Outpatient Medications   Medication Sig Dispense Refill    acetaminophen (Tylenol) 325 mg tablet Take 650 mg by  mouth every 6 (six) hours as needed for mild pain      albuterol (PROVENTIL HFA,VENTOLIN HFA) 90 mcg/act inhaler Inhale 2 puffs every 6 (six) hours as needed for wheezing 8.5 g 2    apixaban (Eliquis) 2.5 mg Take 1 tablet (2.5 mg total) by mouth 2 (two) times a day 60 tablet 5    Azelastine HCl 137 MCG/SPRAY SOLN SPRAY 2 SPRAYS INTO EACH NOSTRIL 2 TIMES A DAY USE IN EACH NOSTRIL AS DIRECTED 30 mL 3    benzonatate (TESSALON) 200 MG capsule Take 1 capsule (200 mg total) by mouth 3 (three) times a day as needed for cough 30 capsule 0    bimatoprost (LUMIGAN) 0.01 % ophthalmic drops       Dextromethorphan-GG-APAP (Coricidin HBP Cold/Cough/Flu) -325 MG/15ML LIQD Take 30 mL by mouth 3 (three) times a day as needed (cough, congestion) 355 mL 0    diltiazem (CARDIZEM CD) 240 mg 24 hr capsule Take 1 capsule (240 mg total) by mouth daily 90 capsule 3    dulaglutide (Trulicity) 3 MG/0.5ML injection Inject 0.5 mL (3 mg total) under the skin every 7 days 6 mL 1    famotidine (PEPCID) 20 mg tablet Take 20 mg by mouth      fluticasone (FLONASE) 50 mcg/act nasal spray 1 spray into each nostril      Fluticasone-Salmeterol (Advair) 500-50 mcg/dose inhaler Inhale 1 puff every 12 (twelve) hours (Patient taking differently: Inhale 1 puff every 12 (twelve) hours Has not been using recently) 180 blister 3    guaiFENesin (MUCINEX) 600 mg 12 hr tablet Take 2 tablets (1,200 mg total) by mouth every 12 (twelve) hours 28 tablet 0    ipratropium (ATROVENT) 0.03 % nasal spray SPRAY 2 SPRAYS INTO EACH NOSTRIL EVERY 12 HOURS 30 mL 1    ketorolac (ACULAR) 0.4 % SOLN INSTILL 1 DROP INTO LEFT EYE TWICE A DAY AS DIRECTED      loratadine (CLARITIN) 10 mg tablet Take 10 mg by mouth daily      metFORMIN (GLUCOPHAGE) 500 mg tablet 1 tablet each morning and 2 tablets each evening 270 tablet 3    omeprazole (PriLOSEC) 40 MG capsule Take 1 capsule (40 mg total) by mouth daily 90 capsule 2    rosuvastatin (CRESTOR) 10 MG tablet Take 1 tablet (10 mg  "total) by mouth daily 90 tablet 0    Timolol Maleate PF (Timolol Maleate Ocudose) 0.5 % SOLN        No current facility-administered medications for this visit.          Review of Systems   Constitutional:  Negative for chills and fever.   HENT:  Negative for ear pain and sore throat.    Eyes:  Negative for pain and visual disturbance.   Respiratory:  Positive for cough and shortness of breath (with going up or down stairs and having physical activity).    Cardiovascular:  Negative for chest pain and palpitations.   Gastrointestinal:  Negative for abdominal pain and vomiting.   Genitourinary:  Negative for dysuria and hematuria.   Musculoskeletal:  Negative for arthralgias and back pain.   Skin:  Negative for color change and rash.   Allergic/Immunologic: Positive for environmental allergies.   Neurological:  Negative for seizures and syncope.   All other systems reviewed and are negative.        Objective:      /84   Pulse 74   Temp (!) 97.2 °F (36.2 °C)   Ht 5' 3\" (1.6 m)   Wt 97.1 kg (214 lb)   SpO2 99%   BMI 37.91 kg/m²      Wt Readings from Last 3 Encounters:   03/26/24 97.1 kg (214 lb)   03/21/24 97.2 kg (214 lb 3.2 oz)   03/18/24 98 kg (216 lb)     Temp Readings from Last 3 Encounters:   03/26/24 (!) 97.2 °F (36.2 °C)   03/21/24 97.9 °F (36.6 °C) (Oral)   03/18/24 98.6 °F (37 °C) (Tympanic)     BP Readings from Last 3 Encounters:   03/26/24 138/84   03/21/24 (!) 185/77   03/21/24 140/84     Pulse Readings from Last 3 Encounters:   03/26/24 74   03/21/24 89   03/21/24 82       Physical Exam  Vitals and nursing note reviewed.   Constitutional:       General: She is not in acute distress.     Appearance: She is well-developed.   HENT:      Head: Normocephalic and atraumatic.   Eyes:      Conjunctiva/sclera: Conjunctivae normal.   Cardiovascular:      Rate and Rhythm: Normal rate and regular rhythm.      Heart sounds: No murmur heard.  Pulmonary:      Effort: Pulmonary effort is normal. No respiratory " distress.      Breath sounds: Wheezing (Minimal wheezing) present.   Abdominal:      Palpations: Abdomen is soft.      Tenderness: There is no abdominal tenderness.   Musculoskeletal:         General: No swelling.      Cervical back: Neck supple.   Skin:     General: Skin is warm and dry.      Capillary Refill: Capillary refill takes less than 2 seconds.   Neurological:      Mental Status: She is alert.   Psychiatric:         Mood and Affect: Mood normal.         Nutrition Assessment and Intervention:     Reviewed food recall journal    Reviewed and updated Nutrition Prescription      Physical Activity Assessment and Intervention:    Activity journal reviewed    Reviewed and updated Physical Activity Prescription with patient      Therapeutic Lifestyle Change Visit:     One-on-one comprehensive counseling, coaching, and health behavior change visit completed         I have reviewed pertinent labs:  Most Recent Labs:   Lab Results   Component Value Date    WBC 7.52 03/21/2024    RBC 4.99 03/21/2024    HGB 13.8 03/21/2024    HCT 43.5 03/21/2024     03/21/2024    RDW 12.7 03/21/2024    NEUTROABS 3.98 03/21/2024    SODIUM 139 03/21/2024    K 4.1 03/21/2024     03/21/2024    CO2 30 03/21/2024    BUN 11 03/21/2024    CREATININE 0.71 03/21/2024    GLUC 128 05/03/2023    GLUF 114 (H) 03/21/2024    CALCIUM 8.9 03/21/2024    AST 10 (L) 03/21/2024    ALT 10 03/21/2024    ALKPHOS 97 03/21/2024    TP 6.1 (L) 03/21/2024    ALB 3.6 03/21/2024    TBILI 1.03 (H) 03/21/2024    CHOLESTEROL 189 03/21/2024    HDL 39 (L) 03/21/2024    TRIG 199 (H) 03/21/2024    LDLCALC 110 (H) 03/21/2024    NONHDLC 150 03/21/2024    HGBA1C 7.5 (H) 03/21/2024     03/21/2024

## 2024-03-28 ENCOUNTER — EVALUATION (OUTPATIENT)
Dept: PHYSICAL THERAPY | Facility: CLINIC | Age: 69
End: 2024-03-28
Payer: MEDICARE

## 2024-03-28 DIAGNOSIS — R26.2 DIFFICULTY WALKING: ICD-10-CM

## 2024-03-28 DIAGNOSIS — M25.562 LEFT KNEE PAIN, UNSPECIFIED CHRONICITY: Primary | ICD-10-CM

## 2024-03-28 DIAGNOSIS — M17.12 PRIMARY OSTEOARTHRITIS OF LEFT KNEE: ICD-10-CM

## 2024-03-28 PROCEDURE — 97110 THERAPEUTIC EXERCISES: CPT

## 2024-03-28 PROCEDURE — 97112 NEUROMUSCULAR REEDUCATION: CPT

## 2024-03-28 NOTE — PROGRESS NOTES
Re-Evaluation     Today's date: 3/28/2024  Patient name: Coretta Hines  : 1955  MRN: [id number]  Referring provider: Nafisa Duckworth MD  Dx:   Encounter Diagnosis     ICD-10-CM    1. Left knee pain, unspecified chronicity  M25.562       2. Primary osteoarthritis of left knee  M17.12       3. Difficulty walking  R26.2           Start Time: 1100  Stop Time: 1145  Total time in clinic (min): 45 minutes    History of Present Illness  3/28: Pt mentioned that her sx range between 2-5/10 and also reported a subjective improvement percentage of 75%. Pt noted that performing STS transfer is improving and also improved overall stability. Pt also noted that recently she has gotten an US which was negative for a DVT. Pt would still like to improve walking more confidently and going up/down the stairs more easier.     IE: Patient presents with c/o L knee pain. Pt reported that her sx started years ago. Pt reported having imaging showing L knee OA. Pt reported that her sx increase with walking and going up/down the steps. Pt also reported that her sx decrease with injections. Pt has a PMH of L retina surgeries, L knee arthroscopic surgery, and a R TKA. Pt also mention that she had a fall in , which led her to fx 2 ribs and her R orbital bone. Pt also mentioned that she was dx'd with a DVT on 2023. Pt has been taking medication and was cleared to perform skilled PT.      Occupation: retired     Pain  At best pain ratin/10  At worst pain ratin/10  Location: L knee    Social Support  Lives with her  in a 1 story with 1 FF with railings and a walk-in shower with seat.       Patient Goals  Patient goals for therapy: decrease pain with ADLs    STGs  1. Decrease pain by 20% in 2-4 weeks to improve standing tolerance.-Met  2. Improve L knee ROM by 10 degrees in 2-4 weeks.-Met  3. (I) with HEP within 2 weeks in order to improve PT outcomes.-Met     LTGs  1. Decrease pain by 60% in 6-8  weeks.-Partially Met  2. Improve walking tolerance to >30 minutes in 6-8 weeks to perform community ambulation. (Partially Met, 20 min)  3. Increase L knee AROM WNL and LLE MMT 5/5 within 6-8 weeks. -Not Met  4. Improve stairs tolerance to 1 flight  in 8 weeks. -Not Met  5. Improve FOTO to greater than or equal to 48. -Not Met      Objective Measurements:    Lumbar ROM R L Strength knee R L   Flex    Flex 4+ 4+   Ext   Ext 4+ 4+   SB        Rot                Hip A/PROM        Flex  /  / Flex 4+ 4   ER at 90   ER     IR at 90   IR     Abd   Abd     Ext   Ext             Knee A/PROM   Ankle     Flex - 115 deg DF     Ext - -2 deg PF       TU sec with SPC    Assessment:    Coretta Hines is a pleasant 68 y.o. female who has attended 41 visits of skilled PT for L knee AROM. Pt demonstrated fair progress toward goals and reported a subjective improvement percentage of 75%. Pt demonstrated improvements in LE MMT and L knee AROM since the start of therapy, but still demonstrates deficits in these categories. Pt still reports increased difficulty with stair negotiation but decreased difficulty with STS. Pt scored a 49 on FOTO. Pt was able to perform most of her program. MHP was utilized post-treatment. Pt would benefit from further skilled PT in order to decrease pain, address deficits (ROM/MMT), improve gait mechanics, help pt achieve goals, decrease fall risk, and progress program as tolerated.     Thank you for the referral!    Plan  Patient would benefit from:Skilled physical therapy  Planned therapy interventions: manual therapy, neuromuscular re-education, stretching, strengthening, therapeutic activities, therapeutic exercise, patient education, home exercise program, modalities to decrease pain, and activity modification.    Frequency: 2x week  Duration in weeks: 8  Treatment plan discussed with: patient    Precautions: Previous LLE Blood Clot, L eye blindness  Dx: L Knee OA    Daily Treatment Diary      Manuals  "3/12 3/14 03/22 3/26 3/28     L Knee PROM MS        L gastroc/HS str         L patella mobs MS                 Ther Ex         Quad Set         Gastroc Str 30\"x3  30\"x3 30\"x3 30\"x3    SLR         Heel Slide         Seated HS str         Standing hip abd/ext 3#, 20x each  20 20 RTB 20x each RTB    Standing knee flexion  3#, 20x  20  20 RTB RTB 20x    Seated HS str 15\"x4  15\"x4 15\"x4     R. Bike (rocking) 5' 5' decline decline declined    Pt Edu  [place]       Neuro Re-ed         TKE with TB BTB 20x        Leg Press 55# 20x 55# 20x 45# 20x  55# 20x 55#, 20x    Sidestepping with TB BTB 4 laps  4 laps  4 laps RTB 4 laps                      Ther Activity         minisquat 10x2  2x10 2x10 10x2    Step up          Step up and overs  L1, 10x   L1 10x L1, 10x    HR on step L1, 20x   L1 20x L1, 20x    STS         Gait Training                           Modalities         Ice 10' MHP    10' MHP                                                            "

## 2024-04-02 ENCOUNTER — OFFICE VISIT (OUTPATIENT)
Dept: PHYSICAL THERAPY | Facility: CLINIC | Age: 69
End: 2024-04-02
Payer: MEDICARE

## 2024-04-02 DIAGNOSIS — R26.2 DIFFICULTY WALKING: ICD-10-CM

## 2024-04-02 DIAGNOSIS — M17.12 PRIMARY OSTEOARTHRITIS OF LEFT KNEE: ICD-10-CM

## 2024-04-02 DIAGNOSIS — M25.562 LEFT KNEE PAIN, UNSPECIFIED CHRONICITY: Primary | ICD-10-CM

## 2024-04-02 PROCEDURE — 97110 THERAPEUTIC EXERCISES: CPT

## 2024-04-02 PROCEDURE — 97530 THERAPEUTIC ACTIVITIES: CPT

## 2024-04-02 NOTE — PROGRESS NOTES
"Daily Note     Today's date: 2024  Patient name: Coretta Hines  : 1955  MRN: [id number]  Referring provider: Nafisa Duckworth MD  Dx:   Encounter Diagnosis     ICD-10-CM    1. Left knee pain, unspecified chronicity  M25.562       2. Primary osteoarthritis of left knee  M17.12       3. Difficulty walking  R26.2           Start Time: 1600  Stop Time: 1638  Total time in clinic (min): 38 minutes    Subjective: Pt reported that her toe still was bothering her since she got her ingrown toenail removed. Pt reported that her knee pain was 0-2/10 today and also mentioned that PT is helping.       Objective: See treatment diary below      Assessment: Tolerated treatment well. Program started with her standing exercises because pt deferred R. Bike. Balance exercises were added in order to improve balance and decreased fall risk. Pt was challenged with all exercises. MHP was utilized post-treatment. Patient would benefit from continued PT      Plan: Continue per plan of care.      Goals: Patient's goal is to decrease pain with ADLs    Precautions: Previous LLE Blood Clot, L eye blindness  Dx: L Knee OA    Daily Treatment Diary        Manuals  3/14 03/22 3/26 3/28 4/2    L Knee PROM      MS   L gastroc/HS str      MS   L patella mobs      MS            Ther Ex         Quad Set         Gastroc Str   30\"x3 30\"x3 30\"x3 30\"x3   SLR         Heel Slide         Seated HS str         Standing hip abd/ext   20 20 RTB 20x each RTB 20x each  RTB   Standing knee flexion    20  20 RTB RTB 20x RTB 20x   Seated HS str   15\"x4 15\"x4     R. Bike (rocking)  5' decline decline declined Declined    Pt Edu  [place]       Neuro Re-ed         TKE with TB         Leg Press  55# 20x 45# 20x  55# 20x 55#, 20x 60#, 20x   Sidestepping with TB   4 laps  4 laps RTB 4 laps RTB 4 laps                     Ther Activity         minisquat   2x10 2x10 10x2 10x2   Step up          Step up and overs     L1 10x L1, 10x L1, 10x   HR on step    L1 20x " "L1, 20x L1, 20x   Romberg balance on foam      30\"   Modified tandem balance      30\" each   STS         Gait Training                           Modalities         Ice     10' MHP 10\" MHP                       "

## 2024-04-09 ENCOUNTER — OFFICE VISIT (OUTPATIENT)
Dept: PHYSICAL THERAPY | Facility: CLINIC | Age: 69
End: 2024-04-09
Payer: MEDICARE

## 2024-04-09 ENCOUNTER — OFFICE VISIT (OUTPATIENT)
Dept: OBGYN CLINIC | Facility: CLINIC | Age: 69
End: 2024-04-09
Payer: MEDICARE

## 2024-04-09 VITALS — HEIGHT: 63 IN | BODY MASS INDEX: 37.92 KG/M2 | WEIGHT: 214 LBS

## 2024-04-09 DIAGNOSIS — G56.03 BILATERAL CARPAL TUNNEL SYNDROME: ICD-10-CM

## 2024-04-09 DIAGNOSIS — M65.331 TRIGGER FINGER, RIGHT MIDDLE FINGER: Primary | ICD-10-CM

## 2024-04-09 DIAGNOSIS — M65.311 TRIGGER THUMB OF RIGHT HAND: ICD-10-CM

## 2024-04-09 DIAGNOSIS — M17.12 PRIMARY OSTEOARTHRITIS OF LEFT KNEE: ICD-10-CM

## 2024-04-09 DIAGNOSIS — R26.2 DIFFICULTY WALKING: ICD-10-CM

## 2024-04-09 DIAGNOSIS — M25.562 LEFT KNEE PAIN, UNSPECIFIED CHRONICITY: Primary | ICD-10-CM

## 2024-04-09 PROCEDURE — 97110 THERAPEUTIC EXERCISES: CPT

## 2024-04-09 PROCEDURE — 97530 THERAPEUTIC ACTIVITIES: CPT

## 2024-04-09 PROCEDURE — 99213 OFFICE O/P EST LOW 20 MIN: CPT | Performed by: STUDENT IN AN ORGANIZED HEALTH CARE EDUCATION/TRAINING PROGRAM

## 2024-04-09 NOTE — PROGRESS NOTES
ORTHOPAEDIC HAND, WRIST, AND ELBOW OFFICE  VISIT      ASSESSMENT/PLAN:      Diagnoses and all orders for this visit:    Trigger finger, right middle finger    Bilateral carpal tunnel syndrome  -     EMG 2 Limb Upper Extremity; Future    Trigger thumb of right hand          68 y.o. female with right thumb and long TF and bilateral CTS    The patient is doing well. She did see good relief from previous trigger finger injections. Discussed with the patient she likely has severe carpal tunnel syndrome. She does have significant thenar wasting on the left on exam. Discussed ordering an EMG to evaluate the severity. The patient was agreeable to this and an order was placed for this today. She will follow up once the EMG is complete to discuss the results.     Follow Up:  After testing       To Do Next Visit:  Re-evaluation of current issue      Discussions:  Cubital Tunnel Syndrome: The anatomy and physiology of cubital tunnel syndrome were discussed with the patient today in the office.  Typically, increased elbow flexion activities decrease blood flow within the intraneural spaces, resulting in a feeling of numbness, tingling, weakness, or clumsiness within the hand and fingers.  Occasionally, anatomic structures such as medial elbow osteophytes, the medial head of the triceps, were subluxing ulnar nerve may result in increased pressure or aggravation at the cubital tunnel.  Typical signs and symptoms usually include numbness and tingling within the ring and small finger, weakness with , and weakness with pinch.  Conservative treatment and includes nocturnal bracing to keep the elbow in a semi-extended position, activity modification, therapy, and avoiding excessive elbow flexion activities.  Vitamin B6 one tablet daily over the counter may helpful to reduce symptoms.  A majority of patients typically respond to conservative treatment over a period of approximately 3-6 months.  EMG/NCV testing of the ulnar nerve at  the elbow is not as reliable as carpal tunnel syndrome.  Surgical intervention in the form of in situ release of the ulnar nerve at the elbow or ulnar nerve transposition may be required in up to 20% of patients.       Sarath Jalloh MD  Attending, Orthopaedic Surgery  Hand, Wrist, and Elbow Surgery  [facility]    ______________________________________________________________________________________________    CHIEF COMPLAINT:  Chief Complaint   Patient presents with   • Right Middle Finger - Pain   • Right Thumb - Pain       SUBJECTIVE:  Patient is a 68 y.o. RHD female who presents today for follow up of right thumb and long TF. The patient underwent steroid injections at her last visit which she states provided her with good relief. She denies any pain. She denies any locking or catching. The patient states she has a history of carpal tunnel and was diagnosed appx 20 years ago. She states she will drop objects at times.      Occupation: retired.      I have personally reviewed all the relevant PMH, PSH, SH, FH, Medications and allergies      PAST MEDICAL HISTORY:  Past Medical History:   Diagnosis Date   • Allergic    • Arthritis    • Asthma    • Blindness of left eye    • COPD (chronic obstructive pulmonary disease) ([facility])    • Diabetes mellitus ([facility])    • GERD (gastroesophageal reflux disease)    • Hypertension    • Obesity    • Visual impairment        PAST SURGICAL HISTORY:  Past Surgical History:   Procedure Laterality Date   •  SECTION  10/16/1990    Fibroids   • COLONOSCOPY     • EYE SURGERY      MULTIPLE   • HYSTERECTOMY     • REPLACEMENT TOTAL KNEE Right        FAMILY HISTORY:  Family History   Problem Relation Age of Onset   • Dementia Mother    • Arthritis Mother         Late in life   • Hypertension Father         HBP   • Heart disease Father         HBP   • Diabetes Father         managed through diet   • Hearing loss Father         Industrial/construction work  with no hearing protection   • Glaucoma Paternal Grandmother        SOCIAL HISTORY:  Social History     Tobacco Use   • Smoking status: Never   • Smokeless tobacco: Never   Vaping Use   • Vaping status: Never Used   Substance Use Topics   • Alcohol use: Yes     Alcohol/week: 1.0 standard drink of alcohol     Types: 1 Standard drinks or equivalent per week     Comment: on occasion, 1 drink with low sugar content   • Drug use: Never       MEDICATIONS:    Current Outpatient Medications:   •  acetaminophen (Tylenol) 325 mg tablet, Take 650 mg by mouth every 6 (six) hours as needed for mild pain, Disp: , Rfl:   •  albuterol (PROVENTIL HFA,VENTOLIN HFA) 90 mcg/act inhaler, Inhale 2 puffs every 6 (six) hours as needed for wheezing, Disp: 8.5 g, Rfl: 2  •  apixaban (Eliquis) 2.5 mg, Take 1 tablet (2.5 mg total) by mouth 2 (two) times a day, Disp: 60 tablet, Rfl: 5  •  Azelastine HCl 137 MCG/SPRAY SOLN, SPRAY 2 SPRAYS INTO EACH NOSTRIL 2 TIMES A DAY USE IN EACH NOSTRIL AS DIRECTED, Disp: 30 mL, Rfl: 3  •  benzonatate (TESSALON) 200 MG capsule, Take 1 capsule (200 mg total) by mouth 3 (three) times a day as needed for cough, Disp: 30 capsule, Rfl: 0  •  bimatoprost (LUMIGAN) 0.01 % ophthalmic drops, , Disp: , Rfl:   •  Dextromethorphan-GG-APAP (Coricidin HBP Cold/Cough/Flu) -325 MG/15ML LIQD, Take 30 mL by mouth 3 (three) times a day as needed (cough, congestion), Disp: 355 mL, Rfl: 0  •  diltiazem (CARDIZEM CD) 240 mg 24 hr capsule, Take 1 capsule (240 mg total) by mouth daily, Disp: 90 capsule, Rfl: 3  •  dulaglutide (Trulicity) 3 MG/0.5ML injection, Inject 0.5 mL (3 mg total) under the skin every 7 days, Disp: 6 mL, Rfl: 1  •  famotidine (PEPCID) 20 mg tablet, Take 20 mg by mouth, Disp: , Rfl:   •  fluticasone (FLONASE) 50 mcg/act nasal spray, 1 spray into each nostril, Disp: , Rfl:   •  Fluticasone-Salmeterol (Advair) 500-50 mcg/dose inhaler, Inhale 1 puff every 12 (twelve) hours (Patient taking differently: Inhale  1 puff every 12 (twelve) hours Has not been using recently), Disp: 180 blister, Rfl: 3  •  ipratropium (ATROVENT) 0.03 % nasal spray, SPRAY 2 SPRAYS INTO EACH NOSTRIL EVERY 12 HOURS, Disp: 30 mL, Rfl: 1  •  ketorolac (ACULAR) 0.4 % SOLN, INSTILL 1 DROP INTO LEFT EYE TWICE A DAY AS DIRECTED, Disp: , Rfl:   •  loratadine (CLARITIN) 10 mg tablet, Take 10 mg by mouth daily, Disp: , Rfl:   •  metFORMIN (GLUCOPHAGE) 500 mg tablet, 1 tablet each morning and 2 tablets each evening, Disp: 270 tablet, Rfl: 3  •  omeprazole (PriLOSEC) 40 MG capsule, Take 1 capsule (40 mg total) by mouth daily, Disp: 90 capsule, Rfl: 2  •  predniSONE 10 mg tablet, Take 4 tablets for 2 days, then 3 tablets for 2 days, then 2 tablets for 2 days, then 1 tablet for 2 days, then discontinue, Disp: 20 tablet, Rfl: 0  •  rosuvastatin (CRESTOR) 10 MG tablet, Take 1 tablet (10 mg total) by mouth daily, Disp: 90 tablet, Rfl: 0  •  Timolol Maleate PF (Timolol Maleate Ocudose) 0.5 % SOLN, , Disp: , Rfl:   •  guaiFENesin (MUCINEX) 600 mg 12 hr tablet, Take 2 tablets (1,200 mg total) by mouth every 12 (twelve) hours (Patient not taking: Reported on 4/9/2024), Disp: 28 tablet, Rfl: 0    ALLERGIES:  Allergies   Allergen Reactions   • Other Nausea Only, Vomiting and Cough     LOBSTER   • Penicillins Seizures   • Sulfa Antibiotics Rash           REVIEW OF SYSTEMS:  Musculoskeletal:        As noted in HPI.   All other systems reviewed and are negative.    VITALS:  There were no vitals filed for this visit.    LABS:  HgA1c:   Lab Results   Component Value Date    HGBA1C 7.5 (H) 03/21/2024     BMP:   Lab Results   Component Value Date    CALCIUM 8.9 03/21/2024    K 4.1 03/21/2024    CO2 30 03/21/2024     03/21/2024    BUN 11 03/21/2024    CREATININE 0.71 03/21/2024       _____________________________________________________  PHYSICAL EXAMINATION:  General: Well developed and well nourished, alert & oriented x 3, appears comfortable  Psychiatric:  Normal  HEENT: Normocephalic, Atraumatic Trachea Midline, No torticollis  Pulmonary: No audible wheezing or respiratory distress   Abdomen/GI: Non tender, non distended   Cardiovascular: No pitting edema, 2+ radial pulse   Skin: No masses, erythema, lacerations, fluctation, ulcerations  Neurovascular: Sensation Intact to the Median, Ulnar, Radial Nerve, Motor Intact to the Median, Ulnar, Radial Nerve, and Pulses Intact  Musculoskeletal: Normal, except as noted in detailed exam and in HPI.      MUSCULOSKELETAL EXAMINATION:  Right hand  No locking  No triggering  Full fist  - tinel's at the wrist  Mild thenar atrophy   Compartments soft  Brisk capillary refill     Left hand  + tinel's  4/5 APB  Significant thenar atrophy    ___________________________________________________  STUDIES REVIEWED:  Family medicine note reviewed  ER note reviewed        PROCEDURES PERFORMED:  Procedures  No Procedures performed today    _____________________________________________________      Scribe Attestation    I,:  Deepa Marcus MA am acting as a scribe while in the presence of the attending physician.:       I,:  Sarath Jalloh MD personally performed the services described in this documentation    as scribed in my presence.:

## 2024-04-09 NOTE — PROGRESS NOTES
"Daily Note     Today's date: 2024  Patient name: Coretta Hines  : 1955  MRN: [id number]  Referring provider: Nafisa Duckworth MD  Dx:   Encounter Diagnosis     ICD-10-CM    1. Left knee pain, unspecified chronicity  M25.562       2. Primary osteoarthritis of left knee  M17.12       3. Difficulty walking  R26.2           Start Time: 1400  Stop Time: 1435  Total time in clinic (min): 35 minutes    Subjective: Pt reports that she dropped something on her L toe.       Objective: See treatment diary below      Assessment: Tolerated treatment well. Patient would benefit from continued PT      Plan: Continue per plan of care.      Goals: Patient's goal is to decrease pain with ADLs    Precautions: Previous LLE Blood Clot, L eye blindness  Dx: L Knee OA    Daily Treatment Diary        Manuals 04/09  03/22 3/26 3/28 4/2    L Knee PROM      MS   L gastroc/HS str      MS   L patella mobs      MS            Ther Ex         Quad Set         Gastroc Str 30\"x3  30\"x3 30\"x3 30\"x3 30\"x3   SLR         Heel Slide         Seated HS str         Standing hip abd/ext 20x ea RTB   20 20 RTB 20x each RTB 20x each  RTB   Standing knee flexion  RTB 20 ea  20  20 RTB RTB 20x RTB 20x   Seated HS str   15\"x4 15\"x4     R. Bike (rocking)   decline decline declined Declined    Pt Edu         Neuro Re-ed         TKE with TB         Leg Press nv  45# 20x  55# 20x 55#, 20x 60#, 20x   Sidestepping with TB RTB 4 laps   4 laps  4 laps RTB 4 laps RTB 4 laps                     Ther Activity         minisquat 2x10  2x10 2x10 10x2 10x2   Step up          Step up and overs  L 1 10x   L1 10x L1, 10x L1, 10x   HR on step L1 20    L1 20x L1, 20x L1, 20x   Romberg balance on foam 30\"      30\"   Modified tandem balance 30\"x ea     30\" each   STS         Gait Training                           Modalities         Ice MHP 10'     10' MHP 10\" MHP                         "

## 2024-04-11 ENCOUNTER — OFFICE VISIT (OUTPATIENT)
Dept: PHYSICAL THERAPY | Facility: CLINIC | Age: 69
End: 2024-04-11
Payer: MEDICARE

## 2024-04-11 DIAGNOSIS — M17.12 PRIMARY OSTEOARTHRITIS OF LEFT KNEE: ICD-10-CM

## 2024-04-11 DIAGNOSIS — M25.562 LEFT KNEE PAIN, UNSPECIFIED CHRONICITY: Primary | ICD-10-CM

## 2024-04-11 DIAGNOSIS — R26.2 DIFFICULTY WALKING: ICD-10-CM

## 2024-04-11 PROCEDURE — 97112 NEUROMUSCULAR REEDUCATION: CPT

## 2024-04-11 PROCEDURE — 97110 THERAPEUTIC EXERCISES: CPT

## 2024-04-11 NOTE — PROGRESS NOTES
"Daily Note     Today's date: 2024  Patient name: Coretta Hines  : 1955  MRN: [id number]  Referring provider: Nafisa Duckworth MD  Dx:   Encounter Diagnosis     ICD-10-CM    1. Left knee pain, unspecified chronicity  M25.562       2. Primary osteoarthritis of left knee  M17.12       3. Difficulty walking  R26.2           Start Time: 1105  Stop Time: 1144  Total time in clinic (min): 39 minutes    Subjective: Pt reported that her knee pain was 1-2/10.       Objective: See treatment diary below      Assessment: Tolerated treatment well. Pt's program started with the R. Bike. Program focused on increasing LE strength and improving standing/quality of stair negotiation tolerance. Pt was challenged with all exercises. MHP was utilized post-treatment. Patient would benefit from continued PT      Plan: Continue per plan of care.      Goals: Patient's goal is to decrease pain with ADLs    Precautions: Previous LLE Blood Clot, L eye blindness  Dx: L Knee OA    Daily Treatment Diary        Manuals 04/09 4/11  3/26 3/28 4/2    L Knee PROM      MS   L gastroc/HS str      MS   L patella mobs      MS            Ther Ex         Quad Set         Gastroc Str 30\"x3 Wedge 30\"x3  30\"x3 30\"x3 30\"x3   SLR         Heel Slide         Seated HS str         Standing hip abd/ext 20x ea RTB  20x RTB  20 RTB 20x each RTB 20x each  RTB   Standing knee flexion  RTB 20 ea RTB 20x  20 RTB RTB 20x RTB 20x   Seated HS str  15\"x4  15\"x4     R. Bike (rocking)  5'  decline declined Declined    Pt Edu         Neuro Re-ed         TKE with TB         Leg Press nv   55# 20x 55#, 20x 60#, 20x   Sidestepping with TB RTB 4 laps  RTB 4 laps  4 laps RTB 4 laps RTB 4 laps                     Ther Activity         minisquat 2x10 10x2  2x10 10x2 10x2   Step up          Step up and overs  L 1 10x L1 10x  L1 10x L1, 10x L1, 10x   HR on step L1 20  L1 20x  L1 20x L1, 20x L1, 20x   Romberg balance on foam 30\"      30\"   Modified tandem balance 30\"x ea " "30\" each    30\" each   STS         Gait Training                           Modalities         Ice MHP 10'  MHP 10'   10' MHP 10\" P                           "

## 2024-04-16 ENCOUNTER — OFFICE VISIT (OUTPATIENT)
Dept: PHYSICAL THERAPY | Facility: CLINIC | Age: 69
End: 2024-04-16
Payer: MEDICARE

## 2024-04-16 DIAGNOSIS — M17.12 PRIMARY OSTEOARTHRITIS OF LEFT KNEE: ICD-10-CM

## 2024-04-16 DIAGNOSIS — J45.40 MODERATE PERSISTENT ASTHMA WITHOUT COMPLICATION: ICD-10-CM

## 2024-04-16 DIAGNOSIS — M25.562 LEFT KNEE PAIN, UNSPECIFIED CHRONICITY: Primary | ICD-10-CM

## 2024-04-16 DIAGNOSIS — R26.2 DIFFICULTY WALKING: ICD-10-CM

## 2024-04-16 PROCEDURE — 97530 THERAPEUTIC ACTIVITIES: CPT

## 2024-04-16 PROCEDURE — 97110 THERAPEUTIC EXERCISES: CPT

## 2024-04-16 RX ORDER — ALBUTEROL SULFATE 90 UG/1
2 AEROSOL, METERED RESPIRATORY (INHALATION) EVERY 6 HOURS PRN
Qty: 8.5 G | Refills: 2 | Status: SHIPPED | OUTPATIENT
Start: 2024-04-16

## 2024-04-16 NOTE — PROGRESS NOTES
"Daily Note     Today's date: 2024  Patient name: Coretta Hines  : 1955  MRN: [id number]  Referring provider: Nafisa Duckworth MD  Dx:   Encounter Diagnosis     ICD-10-CM    1. Left knee pain, unspecified chronicity  M25.562       2. Primary osteoarthritis of left knee  M17.12       3. Difficulty walking  R26.2           Start Time: 1133  Stop Time: 1215  Total time in clinic (min): 42 minutes    Subjective: Pt reported that her knee pain was 1-2/10 today. Pt noted that performing STS transfer and walking are improving.       Objective: See treatment diary below      Assessment: Tolerated treatment well. Program started with the R. Bike. Program focused on increasing LE strength to decrease stress on the knee. MHP was utilized post-treatment. Patient would benefit from continued PT      Plan: Continue per plan of care.      Goals: Patient's goal is to decrease pain with ADLs    Precautions: Previous LLE Blood Clot, L eye blindness  Dx: L Knee OA    Daily Treatment Diary        Manuals 04/09 4/11 4/16  3/28 4/2    L Knee PROM      MS   L gastroc/HS str      MS   L patella mobs      MS            Ther Ex         Quad Set         Gastroc Str 30\"x3 Wedge 30\"x3 Wedge 30\"x3  30\"x3 30\"x3   SLR         Heel Slide         Seated HS str         Standing hip abd/ext 20x ea RTB  20x RTB 3#, 20x each  20x each RTB 20x each  RTB   Standing knee flexion  RTB 20 ea RTB 20x 3#, 20x  RTB 20x RTB 20x   Seated HS str  15\"x4       Standing knee flexion str on step   5\"x10      R. Bike (rocking)  5' 5'  declined Declined    Pt Edu         Neuro Re-ed         TKE with TB         Leg Press nv  60#, 20x  55#, 20x 60#, 20x   Sidestepping with TB RTB 4 laps  RTB 4 laps RTB 4 laps  RTB 4 laps RTB 4 laps                     Ther Activity         minisquat 2x10 10x2 10x2  10x2 10x2   Step up    L2, 10x2      Step up and overs  L 1 10x L1 10x   L1, 10x L1, 10x   HR on step L1 20  L1 20x L2, 20x  L1, 20x L1, 20x   Romberg balance " "on foam 30\"      30\"   Modified tandem balance 30\"x ea 30\" each 30\" each   30\" each   STS         Gait Training                           Modalities         Ice MHP 10'  MHP 10' MHP 10'  10' MHP 10\" MHP                             "

## 2024-04-18 ENCOUNTER — OFFICE VISIT (OUTPATIENT)
Dept: PHYSICAL THERAPY | Facility: CLINIC | Age: 69
End: 2024-04-18
Payer: MEDICARE

## 2024-04-18 DIAGNOSIS — M25.562 LEFT KNEE PAIN, UNSPECIFIED CHRONICITY: Primary | ICD-10-CM

## 2024-04-18 DIAGNOSIS — M17.12 PRIMARY OSTEOARTHRITIS OF LEFT KNEE: ICD-10-CM

## 2024-04-18 DIAGNOSIS — R26.2 DIFFICULTY WALKING: ICD-10-CM

## 2024-04-18 PROCEDURE — 97112 NEUROMUSCULAR REEDUCATION: CPT

## 2024-04-18 PROCEDURE — 97110 THERAPEUTIC EXERCISES: CPT

## 2024-04-18 NOTE — PROGRESS NOTES
"Daily Note     Today's date: 2024  Patient name: Coretta Hines  : 1955  MRN: [id number]  Referring provider: Nafisa Duckworth MD  Dx:   Encounter Diagnosis     ICD-10-CM    1. Left knee pain, unspecified chronicity  M25.562       2. Primary osteoarthritis of left knee  M17.12       3. Difficulty walking  R26.2           Start Time: 1430  Stop Time: 1507  Total time in clinic (min): 37 minutes    Subjective: Pt reported that her knee pain was 2/10 and also noted that skilled PT is still helping. Pt also noted that the weather still increases her pain.        Objective: See treatment diary below      Assessment: Tolerated treatment well. Program started with her standing exercises. Pt deferred starting with the R. Bike. TKE was added back into her program in order to increase eccentric quad control. MHP was utilized post-treatment. Patient would benefit from continued PT      Plan: Continue per plan of care.      Goals: Patient's goal is to decrease pain with ADLs    Precautions: Previous LLE Blood Clot, L eye blindness  Dx: L Knee OA    Daily Treatment Diary        Manuals   4    L Knee PROM      MS   L gastroc/HS str      MS   L patella mobs      MS            Ther Ex         Quad Set         Gastroc Str 30\"x3 Wedge 30\"x3 Wedge 30\"x3 Wedge  30\"x3  30\"x3   SLR         Heel Slide         Seated HS str         Standing hip abd/ext 20x ea RTB  20x RTB 3#, 20x each 3#, 30x each  20x each  RTB   Standing knee flexion  RTB 20 ea RTB 20x 3#, 20x 3#, 20x  RTB 20x   Seated HS str  15\"x4       Standing knee flexion str on step   5\"x10 5\"x10     R. Bike (rocking)  5' 5' Deferred  Declined    Pt Edu         Neuro Re-ed         TKE with TB    RTB 5\"x10     Leg Press nv  60#, 20x 60#, 20x  60#, 20x   Sidestepping with TB RTB 4 laps  RTB 4 laps RTB 4 laps RTB 4 laps  RTB 4 laps                     Ther Activity         minisquat 2x10 10x2 10x2 10x2  10x2   Step up    L2, 10x2 L2, 10x2     Step " "up and overs  L 1 10x L1 10x    L1, 10x   HR on step L1 20  L1 20x L2, 20x L1, 20x  L1, 20x   Romberg balance on foam 30\"      30\"   Modified tandem balance 30\"x ea 30\" each 30\" each 30\"x each  30\" each   STS         Gait Training                           Modalities         Ice MHP 10'  MHP 10' MHP 10' MHP 10'  10\" MHP                               "

## 2024-04-23 ENCOUNTER — OFFICE VISIT (OUTPATIENT)
Dept: PHYSICAL THERAPY | Facility: CLINIC | Age: 69
End: 2024-04-23
Payer: MEDICARE

## 2024-04-23 DIAGNOSIS — R26.2 DIFFICULTY WALKING: ICD-10-CM

## 2024-04-23 DIAGNOSIS — M25.562 LEFT KNEE PAIN, UNSPECIFIED CHRONICITY: Primary | ICD-10-CM

## 2024-04-23 DIAGNOSIS — M17.12 PRIMARY OSTEOARTHRITIS OF LEFT KNEE: ICD-10-CM

## 2024-04-23 PROCEDURE — 97112 NEUROMUSCULAR REEDUCATION: CPT

## 2024-04-23 PROCEDURE — 97110 THERAPEUTIC EXERCISES: CPT

## 2024-04-23 NOTE — PROGRESS NOTES
"Daily Note     Today's date: 2024  Patient name: Coretta Hines  : 1955  MRN: [id number]  Referring provider: Nafisa Duckworth MD  Dx:   Encounter Diagnosis     ICD-10-CM    1. Left knee pain, unspecified chronicity  M25.562       2. Primary osteoarthritis of left knee  M17.12       3. Difficulty walking  R26.2           Start Time: 1130  Stop Time: 1210  Total time in clinic (min): 40 minutes    Subjective: Pt reported that she was able to walk about 4 miles total this past weekend with only minor pain.        Objective: See treatment diary below      Assessment: Tolerated treatment well. Program started with her standing exercises. Pt deferred the R. Bike. Pt tolerated increased resistance with TKE and sidestepping well. MHP was utilized post-treatment. Patient would benefit from continued PT      Plan: Continue per plan of care.      Goals: Patient's goal is to decrease pain with ADLs    Precautions: Previous LLE Blood Clot, L eye blindness  Dx: L Knee OA    Daily Treatment Diary        Manuals      L Knee PROM         L gastroc/HS str         L patella mobs                  Ther Ex         Quad Set         Gastroc Str 30\"x3 Wedge 30\"x3 Wedge 30\"x3 Wedge  30\"x3 Wedge str   30\"x3    SLR         Heel Slide         Seated HS str     15\"x4    Standing hip abd/ext 20x ea RTB  20x RTB 3#, 20x each 3#, 30x each 3#, 20x each    Standing knee flexion  RTB 20 ea RTB 20x 3#, 20x 3#, 20x 3#, 20x    Seated HS str  15\"x4       Standing knee flexion str on step   5\"x10 5\"x10 5\"x10    R. Bike (rocking)  5' 5' Deferred Deferred    Pt Edu         Neuro Re-ed         TKE with TB    RTB 5\"x10 BTB 5\"x10    Leg Press nv  60#, 20x 60#, 20x 60#, 20x    Sidestepping with TB RTB 4 laps  RTB 4 laps RTB 4 laps RTB 4 laps BTB 4 laps                      Ther Activity         minisquat 2x10 10x2 10x2 10x2 10x2    Step up    L2, 10x2 L2, 10x2 L2, 10x2    Step up and overs  L 1 10x L1 10x       HR on " "step L1 20  L1 20x L2, 20x L1, 20x L1, 20x    Romberg balance on foam 30\"         Modified tandem balance 30\"x ea 30\" each 30\" each 30\"x each     STS         Gait Training                           Modalities         Ice MHP 10'  MHP 10' MHP 10' MHP 10' MHP 10'                                  "

## 2024-04-25 ENCOUNTER — OFFICE VISIT (OUTPATIENT)
Dept: VASCULAR SURGERY | Facility: CLINIC | Age: 69
End: 2024-04-25
Payer: MEDICARE

## 2024-04-25 ENCOUNTER — OFFICE VISIT (OUTPATIENT)
Dept: PHYSICAL THERAPY | Facility: CLINIC | Age: 69
End: 2024-04-25
Payer: MEDICARE

## 2024-04-25 ENCOUNTER — NURSE TRIAGE (OUTPATIENT)
Age: 69
End: 2024-04-25

## 2024-04-25 VITALS
SYSTOLIC BLOOD PRESSURE: 136 MMHG | WEIGHT: 222 LBS | HEIGHT: 63 IN | HEART RATE: 80 BPM | DIASTOLIC BLOOD PRESSURE: 78 MMHG | BODY MASS INDEX: 39.34 KG/M2

## 2024-04-25 DIAGNOSIS — R26.2 DIFFICULTY WALKING: ICD-10-CM

## 2024-04-25 DIAGNOSIS — M79.89 REDNESS AND SWELLING OF LOWER LEG: Primary | ICD-10-CM

## 2024-04-25 DIAGNOSIS — M25.562 LEFT KNEE PAIN, UNSPECIFIED CHRONICITY: Primary | ICD-10-CM

## 2024-04-25 DIAGNOSIS — R23.8 REDNESS AND SWELLING OF LOWER LEG: Primary | ICD-10-CM

## 2024-04-25 DIAGNOSIS — L03.116 CELLULITIS OF LEFT LOWER EXTREMITY: ICD-10-CM

## 2024-04-25 DIAGNOSIS — M17.12 PRIMARY OSTEOARTHRITIS OF LEFT KNEE: ICD-10-CM

## 2024-04-25 PROCEDURE — 97110 THERAPEUTIC EXERCISES: CPT

## 2024-04-25 PROCEDURE — 97112 NEUROMUSCULAR REEDUCATION: CPT

## 2024-04-25 PROCEDURE — 99215 OFFICE O/P EST HI 40 MIN: CPT | Performed by: NURSE PRACTITIONER

## 2024-04-25 RX ORDER — CLINDAMYCIN HYDROCHLORIDE 300 MG/1
300 CAPSULE ORAL 3 TIMES DAILY
Qty: 21 CAPSULE | Refills: 0 | Status: SHIPPED | OUTPATIENT
Start: 2024-04-25 | End: 2024-05-02

## 2024-04-25 NOTE — TELEPHONE ENCOUNTER
Pt returned the call. Informed her of below message from SCARLETT Dyer. Pt was scheduled for an appt today, 4/25/2024, at the [facility] with SCARLETT Whaley. [place] staff made aware.

## 2024-04-25 NOTE — PROGRESS NOTES
"Daily Note     Today's date: 2024  Patient name: Coretta Hines  : 1955  MRN: [id number]  Referring provider: Nafisa Duckworth MD  Dx:   Encounter Diagnosis     ICD-10-CM    1. Left knee pain, unspecified chronicity  M25.562       2. Primary osteoarthritis of left knee  M17.12       3. Difficulty walking  R26.2           Start Time: 1130  Stop Time: 1203  Total time in clinic (min): 33 minutes    Subjective: Pt reported that her knee pain was about 1-2/10. Pt noted light headedness when she was performing her standing knee flexion str which decreased with rest and taking her inhaler. Pt mentioned that she didn't eat much today.       Objective: See treatment diary below  BP: 140/74    Assessment: Tolerated treatment well. Pr deferred R. Bike. Program focused on increasing LE strength tp decrease stress on the knee. Pt was educated on contacting PCP if dizziness returns. Program was modified due to theses sx. MHP was utilized post-treatment. Patient would benefit from continued PT      Plan: Continue per plan of care.      Goals: Patient's goal is to decrease pain with ADLs    Precautions: Previous LLE Blood Clot, L eye blindness  Dx: L Knee OA    Daily Treatment Diary        Manuals      L Knee PROM         L gastroc/HS str         L patella mobs                  Ther Ex         Quad Set         Gastroc Str  Wedge 30\"x3 Wedge 30\"x3 Wedge  30\"x3 Wedge str   30\"x3    SLR         Heel Slide         Seated HS str     15\"x4 15\"x4   Standing hip abd/ext  20x RTB 3#, 20x each 3#, 30x each 3#, 20x each 3#, 20x each   Standing knee flexion   RTB 20x 3#, 20x 3#, 20x 3#, 20x 3# 20x   Seated HS str  15\"x4       Standing knee flexion str on step   5\"x10 5\"x10 5\"x10 10\"x10   R. Bike (rocking)  5' 5' Deferred Deferred    Pt Edu      BP/  PCP   Neuro Re-ed         TKE with TB    RTB 5\"x10 BTB 5\"x10    Leg Press   60#, 20x 60#, 20x 60#, 20x    Sidestepping with TB  RTB 4 laps RTB 4 laps RTB 4 " "laps BTB 4 laps    LAQ      5\"x20   HS curl      RTB 10x   Ther Activity         minisquat  10x2 10x2 10x2 10x2 10x2   Step up    L2, 10x2 L2, 10x2 L2, 10x2    Step up and overs   L1 10x       HR on step  L1 20x L2, 20x L1, 20x L1, 20x    Romberg balance on foam         Modified tandem balance  30\" each 30\" each 30\"x each     STS         Gait Training                           Modalities         Ice  MHP 10' MHP 10' MHP 10' MHP 10'                                    "

## 2024-04-25 NOTE — PATIENT INSTRUCTIONS
-Start taking prescribed antibiotic, Clindamycin, as instructed on the bottle for 7 days and return to the office in 7- 10 days for follow up. Call the office with any new or worsening leg pain, discoloration, wounds/ tissue loss.    -Continue with Eliquis as prescribed.    -Stop using lymph pump for 5 days.    -Use a moisturizing cream on your night daily after completion of antibiotic

## 2024-04-25 NOTE — TELEPHONE ENCOUNTER
Reviewed that is submitted by patient.  Unfortunately photos are very blurry and it is difficult to adequately visualize area of irritation.  We also cannot see the need to blisterlike sores that patient has noted.  In the absence of fever, chills, pain symptoms do not appear consistent with cellulitis, however I would advise that patient be seen in the office to further evaluate skin changes.

## 2024-04-25 NOTE — ASSESSMENT & PLAN NOTE
Pt returns to the office with new onset L mid calf erythremia and swelling.    -Redness of the left mid calf since last Thursday.  -Compliant with daily compression.  -Compliant with lymph pumps BID.  -Denies pain with walking.  -Compliant with Eliquis.      Plan  -Start taking Keflex daily as prescribed and return to the office in 7-10 day for re evaluation.   -Stop using lymph pumps for ~5 days or improvement of symptoms  -Continue with Compression use daily.  -Continue with Eliquis for hx of DVT.   -Call the office with any new or worsening symptoms.

## 2024-04-25 NOTE — TELEPHONE ENCOUNTER
"Pt was last seen on 2/5/2024 by ABDULLAHI Burleson PA-C for lymphedema / venous insufficieny. Pt called the office stating she has been using her lymphedema pumps for about 2 months. She states after using her pumps last Thursday, she developed redness in her left ankle that travels about 6 inches up her calf. She denies any pain, fever, chills, swelling, chest pain, sob. Pt has been compliant with her Eliquis and has not missed any doses. Pt states she does always have some redness on this area but feels the redness is worse now. Advised pt to send pics through Apiary which she has. Please review photos and advise.      Reason for Disposition   Leg pain    Answer Assessment - Initial Assessment Questions  1. ONSET: \"When did the redness start?\"       Left lower leg redness after using her lymphedema pumps last Thursday. Started using her pumps 2 months ago. Pt states she does always have some redness on this area but feels the redness is worse now.looks irritated. Denies any pain.  2. LOCATION: \"Where is the redness located?\"       Left lower calf and shin. States she has 2 blister like sores.   6. OTHER SYMPTOMS: \"Do you have any other symptoms?\" (e.g., chest pain, back pain, breathing difficulty, swelling, rash, fever, numbness, weakness)      Redness above the ankle, 6 inches up from the ankle. Not spreading nor getting worse. Denies fever, chills, swelling. Pt does elevate her legs and wears her compression stockings.    Protocols used: Leg Pain-ADULT-[place]    "

## 2024-04-29 ENCOUNTER — OFFICE VISIT (OUTPATIENT)
Dept: PULMONOLOGY | Facility: CLINIC | Age: 69
End: 2024-04-29
Payer: MEDICARE

## 2024-04-29 VITALS
HEIGHT: 63 IN | BODY MASS INDEX: 39.34 KG/M2 | HEART RATE: 88 BPM | DIASTOLIC BLOOD PRESSURE: 74 MMHG | WEIGHT: 222 LBS | SYSTOLIC BLOOD PRESSURE: 132 MMHG | OXYGEN SATURATION: 96 % | TEMPERATURE: 97.9 F

## 2024-04-29 DIAGNOSIS — T78.40XA ALLERGY, INITIAL ENCOUNTER: ICD-10-CM

## 2024-04-29 DIAGNOSIS — J45.40 MODERATE PERSISTENT ASTHMA WITHOUT COMPLICATION: Primary | ICD-10-CM

## 2024-04-29 DIAGNOSIS — R05.3 CHRONIC COUGH: ICD-10-CM

## 2024-04-29 PROCEDURE — 99205 OFFICE O/P NEW HI 60 MIN: CPT | Performed by: INTERNAL MEDICINE

## 2024-04-29 RX ORDER — MONTELUKAST SODIUM 10 MG/1
10 TABLET ORAL
Qty: 30 TABLET | Refills: 0 | Status: SHIPPED | OUTPATIENT
Start: 2024-04-29 | End: 2024-05-29

## 2024-04-29 RX ORDER — ALBUTEROL SULFATE 2.5 MG/3ML
2.5 SOLUTION RESPIRATORY (INHALATION) EVERY 6 HOURS PRN
Qty: 180 ML | Refills: 2 | Status: SHIPPED | OUTPATIENT
Start: 2024-04-29 | End: 2024-07-28

## 2024-04-29 NOTE — PROGRESS NOTES
Consultation - Pulmonary Medicine   Coretta Hines 68 y.o. female MRN: [id number]    Physician Requesting Consult: Lila  Reason for Consult: asthma    Coretta Hines is a 68 y.o. female hx DVT on AC, HTN, DEN, asthma, allergies, right eye vision loss, GERD, Dm2, obesity, OHS, who presents for evaluation of asthma.    # Asthma, mod persistent  # Allergies  # Chronic cough  # Hx vocal cord disease  Pt with 2 months of cough, incomplete response to 2 prednisone courses,   Hx asthma, had restarted her ICS/LABA recently. Recent CXR normal. Overall cough slightly improving but still daily. Likely due to combination of asthma and upper airway cough syndrome. Also with hx sinusitis and vocal cord disease sp injections, so this can also be playing a role  Commodities include obesity, DEN, GERD  Eos < 200    - Ent referral given hx chronic sinus issues and vocal cord disease  - continue Asteline nasal spray, claritin  - start singulair  - continue Advair, albuterol prn  - continue PPI  - PFTs w BD    # DEN  # Obesity  Bicarb elevated at 30, BMI 40. Previously diagnosed used CPAP then it broke., Reportedly repeated study (while upright) was negative for DEN, likely because not supine  - sleeps in recliner      2 months    Vaccines    Immunization History   Administered Date(s) Administered    COVID-19 PFIZER VACCINE 0.3 ML IM 03/12/2021, 04/02/2021    Tdap 07/05/2023        I have spent a total time of 55 minutes on 04/29/24 in caring for this patient including Diagnostic results, Prognosis, Risks and benefits of tx options, Instructions for management, Patient and family education, Importance of tx compliance, Risk factor reductions, Impressions, Counseling / Coordination of care, Documenting in the medical record, Reviewing / ordering tests, medicine, procedures  , and Obtaining or reviewing history  .   ______________________________________________________________________    HPI:    Coretta Hines is a 68 y.o.  female hx DVT, HTN, DEN, asthma, allergies, GERD, Dm2, obesity, OHS, who presents for evaluation of asthma.    Nonsmoker  Asthma diagnosed as adult  Hayfever symptoms, worked on farm as teenaged  Allergic to pollen, flowers, mold  Over last 10-15 yrs getting sick in Nov and Feb with change in seasons    COVID Jan 2021, hospitalized with hypoxia, discharged with oxygen for 2 months    Not cough on again for 2 months  Got 2 course of prednisone without much success. Also one course abx  Feels like a lot of post nasal drip, GERD and upper airway congestion  Taking astelin      Occupational/Exposure history:    Dogs and cats at home    Review of Systems:  Review of Systems  Aside from what is mentioned in the HPI, the review of systems otherwise negative.    Current Medications:    Current Outpatient Medications:     acetaminophen (Tylenol) 325 mg tablet, Take 650 mg by mouth every 6 (six) hours as needed for mild pain, Disp: , Rfl:     albuterol (PROVENTIL HFA,VENTOLIN HFA) 90 mcg/act inhaler, Inhale 2 puffs every 6 (six) hours as needed for wheezing, Disp: 8.5 g, Rfl: 2    apixaban (Eliquis) 2.5 mg, Take 1 tablet (2.5 mg total) by mouth 2 (two) times a day, Disp: 60 tablet, Rfl: 5    Azelastine HCl 137 MCG/SPRAY SOLN, SPRAY 2 SPRAYS INTO EACH NOSTRIL 2 TIMES A DAY USE IN EACH NOSTRIL AS DIRECTED, Disp: 30 mL, Rfl: 3    bimatoprost (LUMIGAN) 0.01 % ophthalmic drops, , Disp: , Rfl:     clindamycin (CLEOCIN) 300 MG capsule, Take 1 capsule (300 mg total) by mouth 3 (three) times a day for 7 days, Disp: 21 capsule, Rfl: 0    Dextromethorphan-GG-APAP (Coricidin HBP Cold/Cough/Flu) -325 MG/15ML LIQD, Take 30 mL by mouth 3 (three) times a day as needed (cough, congestion), Disp: 355 mL, Rfl: 0    diltiazem (CARDIZEM CD) 240 mg 24 hr capsule, Take 1 capsule (240 mg total) by mouth daily, Disp: 90 capsule, Rfl: 3    dulaglutide (Trulicity) 3 MG/0.5ML injection, Inject 0.5 mL (3 mg total) under the skin every  7 days, Disp: 6 mL, Rfl: 1    famotidine (PEPCID) 20 mg tablet, Take 20 mg by mouth, Disp: , Rfl:     Fluticasone-Salmeterol (Advair) 500-50 mcg/dose inhaler, Inhale 1 puff every 12 (twelve) hours (Patient taking differently: Inhale 1 puff every 12 (twelve) hours Has not been using recently), Disp: 180 blister, Rfl: 3    ketorolac (ACULAR) 0.4 % SOLN, INSTILL 1 DROP INTO LEFT EYE TWICE A DAY AS DIRECTED, Disp: , Rfl:     loratadine (CLARITIN) 10 mg tablet, Take 10 mg by mouth daily, Disp: , Rfl:     metFORMIN (GLUCOPHAGE) 500 mg tablet, 1 tablet each morning and 2 tablets each evening, Disp: 270 tablet, Rfl: 3    omeprazole (PriLOSEC) 40 MG capsule, Take 1 capsule (40 mg total) by mouth daily, Disp: 90 capsule, Rfl: 2    predniSONE 10 mg tablet, Take 4 tablets for 2 days, then 3 tablets for 2 days, then 2 tablets for 2 days, then 1 tablet for 2 days, then discontinue, Disp: 20 tablet, Rfl: 0    rosuvastatin (CRESTOR) 10 MG tablet, Take 1 tablet (10 mg total) by mouth daily, Disp: 90 tablet, Rfl: 0    Timolol Maleate PF (Timolol Maleate Ocudose) 0.5 % SOLN, , Disp: , Rfl:     Historical Information   Past Medical History:   Diagnosis Date    Allergic     Allergic rhinitis Decades    Arthritis     Asthma     Blindness of left eye     COPD (chronic obstructive pulmonary disease) (HCC)     Deep vein thrombosis (HCC) March 3, 2023    cataract surgeries, detached retina surgeries leftveye    Diabetes mellitus (HCC)     GERD (gastroesophageal reflux disease)     Hypertension     Obesity     Visual impairment      Past Surgical History:   Procedure Laterality Date     SECTION  10/16/1990    Fibroids    COLONOSCOPY      EYE SURGERY      MULTIPLE    HYSTERECTOMY  2000    JOINT REPLACEMENT  2016    right  knee    REPLACEMENT TOTAL KNEE Right      Social History   Social History     Tobacco Use   Smoking Status Never   Smokeless Tobacco Never       Family History:   Family History   Problem Relation Age of Onset     "Dementia Mother     Arthritis Mother         Late in life    Hypertension Father         HBP    Heart disease Father         HBP    Diabetes Father         managed through diet    Hearing loss Father         Industrial/construction work with no hearing protection    Glaucoma Paternal Grandmother     Asthma Paternal Grandfather         Inhaler used         PhysicalExamination:  Vitals:   /74 (BP Location: Left arm, Patient Position: Sitting, Cuff Size: Large)   Pulse 88   Temp 97.9 °F (36.6 °C) (Tympanic)   Ht 5' 3\" (1.6 m)   Wt 101 kg (222 lb) Comment: pt reported  SpO2 96%   BMI 39.33 kg/m²     Appearance -- NAD, speaking full sentences  HEENT -- anicteric sclera, clear OP, MMM  Neck -- no JVD  Heart -- RRR, no murmurs  Lungs -- CTAB  Abdomen -- soft, NTND, +bs  Extremities -- WWP, no LE edema  Skin -- no rash  Neuro -- A&Ox3, wnl  Psych -- no obvious depression or hallucination        Diagnostic Data:  Labs:  I personally reviewed the most recent laboratory data pertinent to today's visit    Lab Results   Component Value Date    WBC 7.52 03/21/2024    HGB 13.8 03/21/2024    HCT 43.5 03/21/2024    MCV 87 03/21/2024     03/21/2024     Lab Results   Component Value Date    CALCIUM 8.9 03/21/2024    K 4.1 03/21/2024    CO2 30 03/21/2024     03/21/2024    BUN 11 03/21/2024    CREATININE 0.71 03/21/2024     No results found for: \"IGE\"  Lab Results   Component Value Date    ALT 10 03/21/2024    AST 10 (L) 03/21/2024    ALKPHOS 97 03/21/2024       PFT results:  The most recent pulmonary function tests were reviewed.  None    Imaging:  I personally reviewed the images on the PAC system pertinent to today's visit  CXR 3/2024: normal          Morena Goodrich MD  [facility]  "

## 2024-04-30 ENCOUNTER — EVALUATION (OUTPATIENT)
Dept: PHYSICAL THERAPY | Facility: CLINIC | Age: 69
End: 2024-04-30
Payer: MEDICARE

## 2024-04-30 DIAGNOSIS — R26.2 DIFFICULTY WALKING: ICD-10-CM

## 2024-04-30 DIAGNOSIS — M17.12 PRIMARY OSTEOARTHRITIS OF LEFT KNEE: ICD-10-CM

## 2024-04-30 DIAGNOSIS — M25.562 LEFT KNEE PAIN, UNSPECIFIED CHRONICITY: Primary | ICD-10-CM

## 2024-04-30 PROCEDURE — 97530 THERAPEUTIC ACTIVITIES: CPT

## 2024-04-30 PROCEDURE — 97110 THERAPEUTIC EXERCISES: CPT

## 2024-04-30 NOTE — PROGRESS NOTES
Re-Evaluation     Today's date: 2024  Patient name: Coretta Hines  : 1955  MRN: [id number]  Referring provider: Nafisa Duckworth MD  Dx:   Encounter Diagnosis     ICD-10-CM    1. Left knee pain, unspecified chronicity  M25.562       2. Primary osteoarthritis of left knee  M17.12       3. Difficulty walking  R26.2           Start Time: 1130  Stop Time: 1212  Total time in clinic (min): 42 minutes  History of Present Illness  : Pt mentioned that her sx range between 1-5/10 and also reported a subjective improvement percentage of 75%. Pt reported that she felt comfortable decreasing to 1x/week.     IE: Patient presents with c/o L knee pain. Pt reported that her sx started years ago. Pt reported having imaging showing L knee OA. Pt reported that her sx increase with walking and going up/down the steps. Pt also reported that her sx decrease with injections. Pt has a PMH of L retina surgeries, L knee arthroscopic surgery, and a R TKA. Pt also mention that she had a fall in , which led her to fx 2 ribs and her R orbital bone. Pt also mentioned that she was dx'd with a DVT on 2023. Pt has been taking medication and was cleared to perform skilled PT.      Occupation: retired     Pain  At best pain ratin/10  At worst pain ratin/10  Location: L knee    Social Support  Lives with her  in a 1 story with 1 FF with railings and a walk-in shower with seat.       Patient Goals  Patient goals for therapy: decrease pain with ADLs    STGs  1. Decrease pain by 20% in 2-4 weeks to improve standing tolerance.-Met  2. Improve L knee ROM by 10 degrees in 2-4 weeks.-Met  3. (I) with HEP within 2 weeks in order to improve PT outcomes.-Met     LTGs  1. Decrease pain by 60% in 6-8 weeks.-Partially Met  2. Improve walking tolerance to >30 minutes in 6-8 weeks to perform community ambulation. (Partially Met, 30 min with stops)  3. Increase L knee AROM WNL and LLE MMT 5/5 within 6-8 weeks. -Not  "Met  4. Improve stairs tolerance to 1 flight  in 8 weeks. -Not Met  5. Improve FOTO to greater than or equal to 48. -Met      Objective Measurements:    Lumbar ROM R L Strength knee R L   Flex    Flex 4+ 4+   Ext   Ext 4+ 4+   SB        Rot                Hip A/PROM        Flex  /  / Flex 4+ 4   ER at 90   ER     IR at 90   IR     Abd   Abd     Ext   Ext             Knee A/PROM   Ankle     Flex - 115 deg DF     Ext - -2 deg PF       TU sec with SPC (NT )    Assessment:    Coretta Hines is a pleasant 68 y.o. female who has attended 49 visits of skilled PT for L knee AROM. Pt demonstrated fair progress toward goals since the start of skilled PT and reported a subjective improvement percentage of 75%. Pt still demonstrates deficits in L knee AROM and LLE strength. Pt scored a 55 on FOTO, demonstrating progress. Pt's program was modified due to re-evaluation being performed. MHP was utilized post-treatment. Pt would benefit from further skilled PT in order to decrease pain, address deficits (ROM/MMT), improve gait mechanics, help pt achieve goals, decrease fall risk, and progress program as tolerated.     Thank you for the referral!    Plan  Patient would benefit from:Skilled physical therapy  Planned therapy interventions: manual therapy, neuromuscular re-education, stretching, strengthening, therapeutic activities, therapeutic exercise, patient education, home exercise program, modalities to decrease pain, and activity modification.    Frequency: 1x week  Duration in weeks: 8  Treatment plan discussed with: patient  Precautions: Previous LLE Blood Clot, L eye blindness  Dx: L Knee OA    Daily Treatment Diary        Manuals     L Knee PROM         L gastroc/HS str         L patella mobs                  Ther Ex         Quad Set         Gastroc Str Wedge str  30\"x3  Wedge 30\"x3 Wedge  30\"x3 Wedge str   30\"x3    SLR         Heel Slide         Seated HS str     15\"x4 15\"x4   Standing hip " "abd/ext 3# 20x each  3#, 20x each 3#, 30x each 3#, 20x each 3#, 20x each   Standing knee flexion  3# 20x each  3#, 20x 3#, 20x 3#, 20x 3# 20x   Seated HS str         Standing knee flexion str on step   5\"x10 5\"x10 5\"x10 10\"x10   R. Bike (rocking)   5' Deferred Deferred    Pt Edu POC     BP/  PCP   Neuro Re-ed         TKE with TB    RTB 5\"x10 BTB 5\"x10    Leg Press 60#, 20x  60#, 20x 60#, 20x 60#, 20x    Sidestepping with TB   RTB 4 laps RTB 4 laps BTB 4 laps    LAQ      5\"x20   HS curl      RTB 10x   Ther Activity         minisquat 10x2  10x2 10x2 10x2 10x2   Step up  L2, 10x2  L2, 10x2 L2, 10x2 L2, 10x2    Step up and overs          HR on step   L2, 20x L1, 20x L1, 20x    Romberg balance on foam         Modified tandem balance   30\" each 30\"x each     STS         Gait Training                           Modalities         Ice MHP 10'  MHP 10' MHP 10' MHP 10'                                      "

## 2024-05-02 ENCOUNTER — APPOINTMENT (OUTPATIENT)
Dept: PHYSICAL THERAPY | Facility: CLINIC | Age: 69
End: 2024-05-02
Payer: MEDICARE

## 2024-05-03 ENCOUNTER — PATIENT MESSAGE (OUTPATIENT)
Dept: GASTROENTEROLOGY | Facility: CLINIC | Age: 69
End: 2024-05-03

## 2024-05-03 ENCOUNTER — OFFICE VISIT (OUTPATIENT)
Dept: PHYSICAL THERAPY | Facility: CLINIC | Age: 69
End: 2024-05-03
Payer: MEDICARE

## 2024-05-03 ENCOUNTER — TELEPHONE (OUTPATIENT)
Age: 69
End: 2024-05-03

## 2024-05-03 DIAGNOSIS — R26.2 DIFFICULTY WALKING: ICD-10-CM

## 2024-05-03 DIAGNOSIS — M25.562 LEFT KNEE PAIN, UNSPECIFIED CHRONICITY: Primary | ICD-10-CM

## 2024-05-03 DIAGNOSIS — M17.12 PRIMARY OSTEOARTHRITIS OF LEFT KNEE: ICD-10-CM

## 2024-05-03 PROCEDURE — 97110 THERAPEUTIC EXERCISES: CPT | Performed by: PHYSICAL THERAPIST

## 2024-05-03 NOTE — PROGRESS NOTES
"Daily Note     Today's date: 5/3/2024  Patient name: Coretta Hines  : 1955  MRN: [id number]  Referring provider: Nafisa Duckworth MD  Dx:   Encounter Diagnosis     ICD-10-CM    1. Left knee pain, unspecified chronicity  M25.562       2. Primary osteoarthritis of left knee  M17.12       3. Difficulty walking  R26.2                      Subjective: Pt doing well      Objective: See treatment diary below      Assessment: Tolerated treatment well. Patient exhibited good technique with therapeutic exercises      Plan: Continue per plan of care.      Goals: Patient's goal is to decrease pain with ADLs    Precautions: Previous LLE Blood Clot, L eye blindness  Dx: L Knee OA    Daily Treatment Diary        Manuals 5/3 4/11 4/16 4/18 4/23 4/25    L Knee PROM         L gastroc/HS str         L patella mobs                  Ther Ex         Quad Set         Gastroc Str Wedge :30/3 Wedge 30\"x3 Wedge 30\"x3 Wedge  30\"x3 Wedge str   30\"x3    SLR         Heel Slide         Seated HS str     15\"x4 15\"x4   Standing hip abd/ext 3# 20 ea 20x RTB 3#, 20x each 3#, 30x each 3#, 20x each 3#, 20x each   Standing knee flexion  3# 20 RTB 20x 3#, 20x 3#, 20x 3#, 20x 3# 20x   Seated HS str  15\"x4       Standing knee flexion str on step :10/10  5\"x10 5\"x10 5\"x10 10\"x10   R. Bike (rocking)  5' 5' Deferred Deferred    Pt Edu      BP/  PCP   Neuro Re-ed         TKE with TB    RTB 5\"x10 BTB 5\"x10    Leg Press 60# 20x  60#, 20x 60#, 20x 60#, 20x    Sidestepping with TB Blue 4 laps RTB 4 laps RTB 4 laps RTB 4 laps BTB 4 laps    LAQ      5\"x20   HS curl      RTB 10x   Ther Activity         minisquat 20x 10x2 10x2 10x2 10x2 10x2   Step up  L1 20x  L2, 10x2 L2, 10x2 L2, 10x2    Step up and overs   L1 10x       HR on step  L1 20x L2, 20x L1, 20x L1, 20x    Romberg balance on foam         Modified tandem balance  30\" each 30\" each 30\"x each     STS         Gait Training                           Modalities         Ice MHP 10 MHP 10' MHP 10' MHP " 10' RUST 10'

## 2024-05-03 NOTE — TELEPHONE ENCOUNTER
Patients GI provider:  Dr. Chacko    Number to return call: [phone]     Reason for call: Pt calling, because she's has an up coming Colonoscopy & EGD procedure. Pt would like to know when to phase on the Elliq[employer].     Scheduled procedure/appointment date if applicable:   05.23.2024

## 2024-05-06 ENCOUNTER — HOSPITAL ENCOUNTER (OUTPATIENT)
Dept: NEUROLOGY | Facility: CLINIC | Age: 69
Discharge: HOME | End: 2024-05-06
Payer: MEDICARE

## 2024-05-06 ENCOUNTER — OFFICE VISIT (OUTPATIENT)
Dept: VASCULAR SURGERY | Facility: CLINIC | Age: 69
End: 2024-05-06
Payer: MEDICARE

## 2024-05-06 VITALS
WEIGHT: 223 LBS | SYSTOLIC BLOOD PRESSURE: 134 MMHG | HEART RATE: 79 BPM | BODY MASS INDEX: 39.51 KG/M2 | OXYGEN SATURATION: 94 % | DIASTOLIC BLOOD PRESSURE: 76 MMHG | RESPIRATION RATE: 16 BRPM | HEIGHT: 63 IN

## 2024-05-06 DIAGNOSIS — E78.5 DYSLIPIDEMIA: ICD-10-CM

## 2024-05-06 DIAGNOSIS — I82.551 CHRONIC DEEP VEIN THROMBOSIS (DVT) OF RIGHT PERONEAL VEIN (HCC): ICD-10-CM

## 2024-05-06 DIAGNOSIS — I87.2 EDEMA OF BOTH LOWER EXTREMITIES DUE TO PERIPHERAL VENOUS INSUFFICIENCY: ICD-10-CM

## 2024-05-06 DIAGNOSIS — E11.65 TYPE 2 DIABETES MELLITUS WITH HYPERGLYCEMIA, WITHOUT LONG-TERM CURRENT USE OF INSULIN (HCC): ICD-10-CM

## 2024-05-06 DIAGNOSIS — I82.552 CHRONIC DEEP VEIN THROMBOSIS (DVT) OF LEFT PERONEAL VEIN (HCC): Primary | ICD-10-CM

## 2024-05-06 DIAGNOSIS — E66.01 CLASS 2 SEVERE OBESITY WITH SERIOUS COMORBIDITY AND BODY MASS INDEX (BMI) OF 35.0 TO 35.9 IN ADULT, UNSPECIFIED OBESITY TYPE (HCC): ICD-10-CM

## 2024-05-06 DIAGNOSIS — I87.2 VENOUS INSUFFICIENCY OF LEFT LOWER EXTREMITY: ICD-10-CM

## 2024-05-06 DIAGNOSIS — G56.03 BILATERAL CARPAL TUNNEL SYNDROME: ICD-10-CM

## 2024-05-06 PROCEDURE — 95912 NRV CNDJ TEST 11-12 STUDIES: CPT | Performed by: PSYCHIATRY & NEUROLOGY

## 2024-05-06 PROCEDURE — 99213 OFFICE O/P EST LOW 20 MIN: CPT | Performed by: PHYSICIAN ASSISTANT

## 2024-05-06 RX ORDER — CLINDAMYCIN HYDROCHLORIDE 300 MG/1
300 CAPSULE ORAL 3 TIMES DAILY
Qty: 9 CAPSULE | Refills: 0 | Status: SHIPPED | OUTPATIENT
Start: 2024-05-06 | End: 2024-05-09

## 2024-05-06 NOTE — PROGRESS NOTES
Assessment/Plan:    Edema of both lower extremities due to peripheral venous insufficiency  -     clindamycin (CLEOCIN) 300 MG capsule; Take 1 capsule (300 mg total) by mouth 3 (three) times a day for 3 days  -     Compression Stocking    -Recent increased LLE pain and redness placed on ATB    Plan:  -Continue with medical compression stockings daily  -Add lymph pumps 60 minutes daily at night  -Continue with compression, periodic elevation, low Na, exercise and skin care  -She remains on apixaban 2.5 BID due to DVT but has not followed up with hematology; she seems to prefer low dose a/c   -DVT likely due to venous insufficiency  -Follow up in 3 months or sooner, if needed       Additional diagnoses and all orders for this visit:  Chronic deep vein thrombosis (DVT) of left peroneal vein (HCC)    Venous insufficiency of left lower extremity    Class 2 severe obesity with serious comorbidity and body mass index (BMI) of 35.0 to 35.9 in adult, unspecified obesity type (HCC)    Chronic deep vein thrombosis (DVT) of right peroneal vein (HCC)    Type 2 diabetes mellitus with hyperglycemia, without long-term current use of insulin (HCC)    Dyslipidemia      Subjective:      Patient ID: Coretta Hines is a 68 y.o. female.  Patient presents for a 2 week follow up for LLE redness and swelling. Pt has mild redness in the LLE. Pt has been using compression stockings daily.  Pt does use lymphedema pumps daily. Pt was put on clindamycin for 7 days.     HPI   Ms. Coretta Hines 68yoF obesity (BMP 39), COPD, Hx R TKR, L knee OA, hypertension, DM, L calf DVT 3/2023 who returns for vascular follow up.     She was initially seen in 2023 after L DVT in 1 of 2 peroneal veins on duplex discovered at [facility]. She was placed on full anticoagulation. After DVT, she developed increased leg/calf pain, cramping, edema and redness.  She has been recommended for conservative measures with medical compression stockings, periodic elevation, low sodium  and exercise. Additionally we were able to get her lymphedema pumps. A venous reflux study performed 8/2023 is significant for deep and superficial reflux. No history of malignancy or rheumatologic disorders.  She has no large, bulky varicose veins.     She is on DVT prophylaxis with apixaban 2.5 twice daily with plans to follow-up with hematology.      2/5/24: Ms. Hines comes in for follow-up of venous disease and leg measurements. She is doing well. She is wearing medical knee-high compression stockings with good effect.  Her leg swelling is much improved.  She still has leg heaviness and edema at the end of the day, but much less so.  She tries not to sit or stand for too long since that worsens edema and the way her legs feel.  Also, as the day goes no, she develops flat redness to the left shin even with the stockings which fortunately resolves by morning. She has no wounds or drainage. She working with physical therapy on her left knee and hoping to have a knee replacement at some point.  She is motivated to exercise and lose weight.  She is working at conservative measures over taking Lasix to help control swelling, but still feels additional treatment is necessary.  We again reviewed her left leg venous reflux study from last year which shows significant deep and superficial reflux but does not appear to meet criteria for surgical intervention.  Alternatively, she may benefit from lymphedema pumps. We discussed that going forward with daily compression stockings, lymphedema pumps, low-sodium diet and weight loss, lateral leg edema should be much improved.  If after all of these measures she continues to have edema and leg cramping, at some point, we can reassess her superficial reflux though she also has deep reflux.     5/6/24: Since she was last seen by me, she was seen by other provider on 4/25 for development of cellulitis and was treated with course of clindamycin. Patient returns for 2 week follow up.      Left leg feels much better. There is still very mild pink discoloration but no heat. No tenderness on palpation. No drainage. No fevers. However, evidence of mild inflammation, blanching. We will have her complete 10 days of antibiotics therapy. She should continue with daily compression stockings. She also now has lymphedema pumps. She can safely go back to using them but should call if any recurrent or new symptoms.     We reviewed her prior vascular testing and we reviewed the treatment for venous disease.       LEV (left leg) 3/21/24  LEFT LOWER LIMB:  No evidence of acute deep vein thrombosis.  Evidence of chronic thrombus noted in (one) peroneal vein mid calf consistent  with prior history of DVT.  No evidence of superficial thrombophlebitis noted.  Doppler evaluation shows a normal response to augmentation maneuvers.  Popliteal, posterior tibial and anterior tibial arterial Doppler waveforms are  triphasic.     In comparison to the study on 11/26/2023, there is no significant change in the  disease process.      Reflux study 8/28/24  CLINICAL:  Indications:  Patient presents with history of bilateral lower extremity edema left > right  with recent DVT. Patient reports pain, edema and erythema. Patient has been  wearing therapeutic compression stockings x 4 months.  Operative History:  No prior cardiovascular surgeries  Risk Factors  The patient has history of Obesity, HTN, Diabetes, DVT and COPD.     FINDINGS:     Unilateral              AP (mm)    Right AA GSV Mid Thigh      3.9       Right                  AP (mm)    Left AASV - Mid Thigh      2.8       Left                   Thrombus  AP (mm)  Reflux  Valve Closure Time    GSV Inguinal                         7.4  Reflux                2.70    GSV Prox Thigh                       4.1  Reflux                3.00    CFV                                       Reflux                2.90    GSV Mid Thigh                        2.9  Reflux                2.70     GSV Dist Thigh                       3.1  Reflux                3.10    FV Prox                                   Reflux                2.90    GSV Knee                             2.9  Reflux                3.70    GSV Prox Calf                        2.7  Reflux                3.60    PFV                                       Reflux                2.20    GSV Mid Calf                         2.0  Reflux                2.60    GSV Dist Calf                        2.1  Reflux                2.00    GSV Ankle                            1.9  Reflux                2.00    SSV Knee                             4.6  Reflux                1.90    Peroneal               Chronic                                          SSV Mid Calf                         2.0  Reflux                3.20    SSV Ankle                            0.8  Reflux                1.60             CONCLUSION:     Impression:     LEFT LIMB:  Deep venous incompetence is noted in the common femoral, proximal femoral and  deep femoral veins.  The great saphenous vein is incompetent.  The great saphenous vein remains within the saphenous compartment in the thigh.  The small saphenous vein is incompetent and communicates with the popliteal  vein.  There is no evidence of incompetent perforators in the thigh or calf.  There is no evidence of deep vein thrombosis in the CFV, the proximal PFV, the  femoral vein and the popliteal vein.        The following portions of the patient's history were reviewed and updated as appropriate: allergies, current medications, past family history, past medical history, past social history, past surgical history, and problem list.    Review of Systems   Constitutional: Negative.    HENT: Negative.     Eyes: Negative.    Respiratory:  Positive for cough.    Cardiovascular:  Positive for leg swelling.   Gastrointestinal: Negative.    Endocrine: Negative.    Genitourinary: Negative.    Musculoskeletal:  Positive for arthralgias  "and back pain.   Skin:  Positive for color change.   Allergic/Immunologic: Negative.    Neurological: Negative.    Hematological: Negative.    Psychiatric/Behavioral:  The patient is nervous/anxious.          Objective:      /76 (BP Location: Right arm, Patient Position: Sitting, Cuff Size: Standard)   Pulse 79   Resp 16   Ht 5' 3\" (1.6 m)   Wt 101 kg (223 lb)   SpO2 94%   BMI 39.50 kg/m²     Mild LE edema. L calf with flat erythema. No heat. No open wounds or drainage.      Physical Exam  Vitals and nursing note reviewed.   Constitutional:       Appearance: She is well-developed.   HENT:      Head: Normocephalic and atraumatic.   Eyes:      Pupils: Pupils are equal, round, and reactive to light.   Neck:      Vascular: No JVD.      Trachea: Trachea normal.   Cardiovascular:      Rate and Rhythm: Normal rate and regular rhythm.      Pulses:           Radial pulses are 2+ on the right side and 2+ on the left side.        Dorsalis pedis pulses are 2+ on the right side and 2+ on the left side.      Heart sounds: Normal heart sounds, S1 normal and S2 normal. No murmur heard.     No friction rub. No gallop.   Pulmonary:      Effort: Pulmonary effort is normal. No accessory muscle usage or respiratory distress.      Breath sounds: Normal breath sounds. No wheezing or rales.   Abdominal:      General: Bowel sounds are normal. There is no distension.      Palpations: Abdomen is soft.      Tenderness: There is no abdominal tenderness.   Musculoskeletal:         General: No deformity. Normal range of motion.      Right lower leg: Edema present.      Left lower leg: Edema present.   Skin:     General: Skin is warm and dry.      Findings: No lesion or rash.      Nails: There is no clubbing.   Neurological:      Mental Status: She is alert and oriented to person, place, and time.      Comments: Grossly normal    Psychiatric:         Behavior: Behavior is cooperative.           I have reviewed and made appropriate " "changes to the review of systems input by the medical assistant.    Vitals:    24 1302   BP: 134/76   BP Location: Right arm   Patient Position: Sitting   Cuff Size: Standard   Pulse: 79   Resp: 16   SpO2: 94%   Weight: 101 kg (223 lb)   Height: 5' 3\" (1.6 m)       Patient Active Problem List   Diagnosis    Chronic deep vein thrombosis (DVT) of right peroneal vein (HCC)    Type 2 diabetes mellitus with hyperglycemia, without long-term current use of insulin (HCC)    Class 2 severe obesity with serious comorbidity in adult (HCC)    Left retinal disorder    Dyslipidemia    Primary hypertension    GERD (gastroesophageal reflux disease)    Moderate persistent asthma without complication    Obesity hypoventilation syndrome (HCC)    DEN (obstructive sleep apnea)    Osteoarthritis    Seasonal allergic rhinitis due to fungal spores    Edema of both lower extremities due to peripheral venous insufficiency    History of colon polyps    FH: total knee replacement    Venous insufficiency of left lower extremity    Chronic deep vein thrombosis (DVT) of left peroneal vein (HCC)    Sleep disturbance    Redness and swelling of lower leg    Bilateral carpal tunnel syndrome       Past Surgical History:   Procedure Laterality Date     SECTION  10/16/1990    Fibroids    COLONOSCOPY      EYE SURGERY      MULTIPLE    HYSTERECTOMY  2000    JOINT REPLACEMENT  2016    right  knee    REPLACEMENT TOTAL KNEE Right        Family History   Problem Relation Age of Onset    Dementia Mother     Arthritis Mother         Late in life    Hypertension Father         HBP    Heart disease Father         HBP    Diabetes Father         managed through diet    Hearing loss Father         Industrial/construction work with no hearing protection    Glaucoma Paternal Grandmother     Asthma Paternal Grandfather         Inhaler used       Social History     Socioeconomic History    Marital status: /Civil Union     Spouse name: Not on file "    Number of children: Not on file    Years of education: Not on file    Highest education level: Not on file   Occupational History    Not on file   Tobacco Use    Smoking status: Never    Smokeless tobacco: Never   Vaping Use    Vaping status: Never Used   Substance and Sexual Activity    Alcohol use: Yes     Alcohol/week: 2.0 - 3.0 standard drinks of alcohol     Types: 2 - 3 Standard drinks or equivalent per week     Comment: on occasion, 1 or 2 drinks with low sugar content    Drug use: Never    Sexual activity: Not on file   Other Topics Concern    Not on file   Social History Narrative    Not on file     Social Determinants of Health     Financial Resource Strain: Low Risk  (9/17/2023)    Overall Financial Resource Strain (CARDIA)     Difficulty of Paying Living Expenses: Not hard at all   Food Insecurity: No Food Insecurity (9/13/2022)    Received from [employer]    Food Insecurity     Within the past 12 months, you worried that your food would run out before you got the money to buy more.: Never true     Within the past 12 months, the food you bought just didn't last and you didn't have money to get more.: Never true     Within the past 12 months, you worried that your food would run out before you got the money to buy more.: Never true   Transportation Needs: No Transportation Needs (9/17/2023)    PRAPARE - Transportation     Lack of Transportation (Medical): No     Lack of Transportation (Non-Medical): No   Physical Activity: Not on file   Stress: Not on file   Social Connections: Not on file   Intimate Partner Violence: Not on file   Housing Stability: Not on file       Allergies   Allergen Reactions    Other Nausea Only, Vomiting and Cough     LOBSTER    Penicillins Seizures    Sulfa Antibiotics Rash         Current Outpatient Medications:     acetaminophen (Tylenol) 325 mg tablet, Take 650 mg by mouth every 6 (six) hours as needed for mild pain, Disp: , Rfl:     albuterol (2.5 mg/3 mL) 0.083 %  nebulizer solution, Take 3 mL (2.5 mg total) by nebulization every 6 (six) hours as needed for wheezing or shortness of breath, Disp: 180 mL, Rfl: 2    albuterol (PROVENTIL HFA,VENTOLIN HFA) 90 mcg/act inhaler, Inhale 2 puffs every 6 (six) hours as needed for wheezing, Disp: 8.5 g, Rfl: 2    apixaban (Eliquis) 2.5 mg, Take 1 tablet (2.5 mg total) by mouth 2 (two) times a day, Disp: 60 tablet, Rfl: 5    Azelastine HCl 137 MCG/SPRAY SOLN, SPRAY 2 SPRAYS INTO EACH NOSTRIL 2 TIMES A DAY USE IN EACH NOSTRIL AS DIRECTED, Disp: 30 mL, Rfl: 3    bimatoprost (LUMIGAN) 0.01 % ophthalmic drops, , Disp: , Rfl:     clindamycin (CLEOCIN) 300 MG capsule, Take 1 capsule (300 mg total) by mouth 3 (three) times a day for 3 days, Disp: 9 capsule, Rfl: 0    Dextromethorphan-GG-APAP (Coricidin HBP Cold/Cough/Flu) -325 MG/15ML LIQD, Take 30 mL by mouth 3 (three) times a day as needed (cough, congestion), Disp: 355 mL, Rfl: 0    diltiazem (CARDIZEM CD) 240 mg 24 hr capsule, Take 1 capsule (240 mg total) by mouth daily, Disp: 90 capsule, Rfl: 3    dulaglutide (Trulicity) 3 MG/0.5ML injection, Inject 0.5 mL (3 mg total) under the skin every 7 days, Disp: 6 mL, Rfl: 1    famotidine (PEPCID) 20 mg tablet, Take 20 mg by mouth, Disp: , Rfl:     Fluticasone-Salmeterol (Advair) 500-50 mcg/dose inhaler, Inhale 1 puff every 12 (twelve) hours (Patient taking differently: Inhale 1 puff every 12 (twelve) hours Has not been using recently), Disp: 180 blister, Rfl: 3    loratadine (CLARITIN) 10 mg tablet, Take 10 mg by mouth daily, Disp: , Rfl:     metFORMIN (GLUCOPHAGE) 500 mg tablet, 1 tablet each morning and 2 tablets each evening, Disp: 270 tablet, Rfl: 3    montelukast (SINGULAIR) 10 mg tablet, Take 1 tablet (10 mg total) by mouth daily at bedtime, Disp: 30 tablet, Rfl: 0    omeprazole (PriLOSEC) 40 MG capsule, Take 1 capsule (40 mg total) by mouth daily, Disp: 90 capsule, Rfl: 2    rosuvastatin (CRESTOR) 10 MG tablet, Take 1 tablet (10 mg  total) by mouth daily, Disp: 90 tablet, Rfl: 0    Timolol Maleate PF (Timolol Maleate Ocudose) 0.5 % SOLN, , Disp: , Rfl:     ketorolac (ACULAR) 0.4 % SOLN, INSTILL 1 DROP INTO LEFT EYE TWICE A DAY AS DIRECTED (Patient not taking: Reported on 5/6/2024), Disp: , Rfl:

## 2024-05-07 ENCOUNTER — OFFICE VISIT (OUTPATIENT)
Dept: PODIATRY | Facility: CLINIC | Age: 69
End: 2024-05-07
Payer: MEDICARE

## 2024-05-07 VITALS
SYSTOLIC BLOOD PRESSURE: 122 MMHG | WEIGHT: 223.4 LBS | BODY MASS INDEX: 39.58 KG/M2 | DIASTOLIC BLOOD PRESSURE: 81 MMHG | HEIGHT: 63 IN | HEART RATE: 75 BPM

## 2024-05-07 DIAGNOSIS — L60.0 INGROWN NAIL OF GREAT TOE OF LEFT FOOT: Primary | ICD-10-CM

## 2024-05-07 PROCEDURE — 11730 AVULSION NAIL PLATE SIMPLE 1: CPT | Performed by: PODIATRIST

## 2024-05-07 NOTE — PROGRESS NOTES
"Name: Coretta Hines      : 1955      MRN: [id number]  Encounter Provider: Didier Pollock DPM  Encounter Date: 2024   Encounter department: [facility] [place]    Assessment & Plan     1. Ingrown nail of great toe of left foot  -     Nail removal        Nail removal    Date/Time: 2024 9:15 AM    Performed by: Didier Pollock DPM  Authorized by: Didier Pollock DPM    Patient location:  Clinic  Indications / Diagnosis:  Ingrown nail  Universal Protocol:  Consent: Verbal consent obtained.  Risks and benefits: risks, benefits and alternatives were discussed  Consent given by: patient  Time out: Immediately prior to procedure a \"time out\" was called to verify the correct patient, procedure, equipment, support staff and site/side marked as required.  Patient understanding: patient states understanding of the procedure being performed    Location:     Foot:  L big toe  Pre-procedure details:     Skin preparation:  Betadine    Preparation: Patient was prepped and draped in the usual sterile fashion    Anesthesia (see MAR for exact dosages):     Anesthesia method:  Nerve block    Block needle gauge:  25 G    Block anesthetic:  Lidocaine 1% w/o epi    Block technique:  Digital Block    Block outcome:  Anesthesia achieved  Nail Removal:     Nail removed:  Partial    Nail bed sutured: no    Post-procedure details:     Dressing:  4x4 sterile gauze, antibiotic ointment, gauze roll and petrolatum-impregnated gauze    Patient tolerance of procedure:  Tolerated well, no immediate complications  Comments:      Discussion with patient was completed today regarding diagnosis and potential etiologies as well as treatment options for this ingrown nail diagnosis.  Discussed how to avoid recurrence and possible treatment options if recurrence does occur.    Antibiotic ointment applied to border with bandage dressing  Pt instructed to keep dressing intact for 24 hours.  After this time use " triple antibiotic ointment to the area and a dry dressing changed daily  Return to clinic in about 3 weeks for reevaluation.  If ingrown nail recurs can consider use of phenol at nail matrix.  If notice any redness, swelling, drainage, or excessive pain or signs of infection to notify the office sooner.  Procedure completed without incident.  Do not soak foot.    Procedure in detail: Once the toe was anesthetized, the area was scrubbed and prepped with sterile technique.  A hemostat was used to lift up the involved border of the great toe.  Care was taken to protect the underlying nail bed.  An English anvil was used to cut back corner to the base of the nail.  A hemostat was then used to remove the nail that was ingrown.  This was then checked that the entire ingrown portion was removed.  This was removed from the operative area.  Hemostasis was achieved and dressings were applied.         Left great toe medial nail ingrown nail removal.       Return in about 4 weeks (around 6/4/2024).    Subjective     Left great toe medial nail border ingrown nail pain with palpation.   Patient states that she has been having continued pain and discomfort to this area.  She denies any new acute injury to the area.      Review of Systems   Constitutional:  Negative for chills and fever.   Respiratory:  Negative for chest tightness and shortness of breath.    Gastrointestinal:  Negative for nausea and vomiting.       Current Outpatient Medications on File Prior to Visit   Medication Sig   • acetaminophen (Tylenol) 325 mg tablet Take 650 mg by mouth every 6 (six) hours as needed for mild pain   • albuterol (2.5 mg/3 mL) 0.083 % nebulizer solution Take 3 mL (2.5 mg total) by nebulization every 6 (six) hours as needed for wheezing or shortness of breath   • albuterol (PROVENTIL HFA,VENTOLIN HFA) 90 mcg/act inhaler Inhale 2 puffs every 6 (six) hours as needed for wheezing   • apixaban (Eliquis) 2.5 mg Take 1 tablet (2.5 mg total) by  "mouth 2 (two) times a day   • Azelastine HCl 137 MCG/SPRAY SOLN SPRAY 2 SPRAYS INTO EACH NOSTRIL 2 TIMES A DAY USE IN EACH NOSTRIL AS DIRECTED   • bimatoprost (LUMIGAN) 0.01 % ophthalmic drops    • clindamycin (CLEOCIN) 300 MG capsule Take 1 capsule (300 mg total) by mouth 3 (three) times a day for 3 days   • Dextromethorphan-GG-APAP (Coricidin HBP Cold/Cough/Flu) -325 MG/15ML LIQD Take 30 mL by mouth 3 (three) times a day as needed (cough, congestion)   • diltiazem (CARDIZEM CD) 240 mg 24 hr capsule Take 1 capsule (240 mg total) by mouth daily   • dulaglutide (Trulicity) 3 MG/0.5ML injection Inject 0.5 mL (3 mg total) under the skin every 7 days   • famotidine (PEPCID) 20 mg tablet Take 20 mg by mouth   • Fluticasone-Salmeterol (Advair) 500-50 mcg/dose inhaler Inhale 1 puff every 12 (twelve) hours (Patient taking differently: Inhale 1 puff every 12 (twelve) hours Has not been using recently)   • loratadine (CLARITIN) 10 mg tablet Take 10 mg by mouth daily   • metFORMIN (GLUCOPHAGE) 500 mg tablet 1 tablet each morning and 2 tablets each evening   • montelukast (SINGULAIR) 10 mg tablet Take 1 tablet (10 mg total) by mouth daily at bedtime   • omeprazole (PriLOSEC) 40 MG capsule Take 1 capsule (40 mg total) by mouth daily   • rosuvastatin (CRESTOR) 10 MG tablet Take 1 tablet (10 mg total) by mouth daily   • Timolol Maleate PF (Timolol Maleate Ocudose) 0.5 % SOLN    • ketorolac (ACULAR) 0.4 % SOLN INSTILL 1 DROP INTO LEFT EYE TWICE A DAY AS DIRECTED (Patient not taking: Reported on 5/6/2024)       Objective     /81   Pulse 75   Ht 5' 3\" (1.6 m)   Wt 101 kg (223 lb 6.4 oz)   BMI 39.57 kg/m²     Physical Exam  Constitutional:       Appearance: Normal appearance.   Feet:      Comments: Ingrowing nail noted to the medial border of the left great toe.  There is intact pedal pulses.  Neurological sensation is grossly intact distally.  Tenderness on palpation noted to the area.  There is what appears to be " previous silver nitrate that was applied to this area.  There is no significant necrosis.  No other areas of acute issues or discomfort at this time.  Neurological:      Mental Status: She is alert.

## 2024-05-09 ENCOUNTER — APPOINTMENT (OUTPATIENT)
Dept: PHYSICAL THERAPY | Facility: CLINIC | Age: 69
End: 2024-05-09
Payer: MEDICARE

## 2024-05-13 ENCOUNTER — VBI (OUTPATIENT)
Dept: ADMINISTRATIVE | Facility: OTHER | Age: 69
End: 2024-05-13

## 2024-05-13 NOTE — TELEPHONE ENCOUNTER
05/13/24 3:49 PM    The patient was called and the phone number was provided and/or the patient agreed to be transferred to the Central Scheduling department.    Thank you.  Jaye Cao  [facility]

## 2024-05-14 ENCOUNTER — OFFICE VISIT (OUTPATIENT)
Dept: OBGYN CLINIC | Facility: CLINIC | Age: 69
End: 2024-05-14
Payer: MEDICARE

## 2024-05-14 VITALS — WEIGHT: 223 LBS | BODY MASS INDEX: 39.51 KG/M2 | HEIGHT: 63 IN

## 2024-05-14 DIAGNOSIS — G56.03 CARPAL TUNNEL SYNDROME, BILATERAL: Primary | ICD-10-CM

## 2024-05-14 DIAGNOSIS — Z01.818 PREOP TESTING: ICD-10-CM

## 2024-05-14 DIAGNOSIS — M65.311 TRIGGER THUMB OF RIGHT HAND: ICD-10-CM

## 2024-05-14 PROCEDURE — 99214 OFFICE O/P EST MOD 30 MIN: CPT | Performed by: STUDENT IN AN ORGANIZED HEALTH CARE EDUCATION/TRAINING PROGRAM

## 2024-05-14 PROCEDURE — 20550 NJX 1 TENDON SHEATH/LIGAMENT: CPT | Performed by: STUDENT IN AN ORGANIZED HEALTH CARE EDUCATION/TRAINING PROGRAM

## 2024-05-14 RX ORDER — DEXAMETHASONE SODIUM PHOSPHATE 10 MG/ML
40 INJECTION, SOLUTION INTRAMUSCULAR; INTRAVENOUS
Status: COMPLETED | OUTPATIENT
Start: 2024-05-14 | End: 2024-05-14

## 2024-05-14 RX ORDER — CLINDAMYCIN HYDROCHLORIDE 150 MG/1
CAPSULE ORAL 3 TIMES DAILY
COMMUNITY
End: 2024-05-23

## 2024-05-14 RX ORDER — ROPIVACAINE HYDROCHLORIDE 5 MG/ML
1 INJECTION, SOLUTION EPIDURAL; INFILTRATION; PERINEURAL
Status: COMPLETED | OUTPATIENT
Start: 2024-05-14 | End: 2024-05-14

## 2024-05-14 RX ORDER — CHLORHEXIDINE GLUCONATE ORAL RINSE 1.2 MG/ML
15 SOLUTION DENTAL ONCE
OUTPATIENT
Start: 2024-05-14 | End: 2024-05-14

## 2024-05-14 RX ADMIN — DEXAMETHASONE SODIUM PHOSPHATE 40 MG: 10 INJECTION, SOLUTION INTRAMUSCULAR; INTRAVENOUS at 09:30

## 2024-05-14 RX ADMIN — ROPIVACAINE HYDROCHLORIDE 1 ML: 5 INJECTION, SOLUTION EPIDURAL; INFILTRATION; PERINEURAL at 09:30

## 2024-05-14 NOTE — PROGRESS NOTES
ORTHOPAEDIC HAND, WRIST, AND ELBOW OFFICE  VISIT      ASSESSMENT/PLAN:      Diagnoses and all orders for this visit:    Carpal tunnel syndrome, bilateral  -     Case request operating room: RELEASE CARPAL TUNNEL ENDOSCOPIC; Standing  -     Case request operating room: RELEASE CARPAL TUNNEL ENDOSCOPIC    Preop testing  -     Ambulatory referral to Family Practice; Future  -     Basic metabolic panel; Future  -     CBC and differential; Future  -     EKG 12 lead; Future    Trigger thumb of right hand  -     Hand/upper extremity injection: R thumb A1  -     ropivacaine (NAROPIN) 0.5 % injection 1 mL  -     dexamethasone (DECADRON) injection 40 mg    Other orders  -     clindamycin (CLEOCIN) 150 mg capsule; Take by mouth 3 (three) times a day  -     Diet NPO; Sips with meds; Standing  -     Nursing Communication Warmimg Interventions Implemented; Standing  -     Nursing Communication CHG bath, have staff wash entire body (neck down) per pre-op bathing protocol. Routine, evening prior to, and day of surgery.; Standing  -     Nursing Communication Swab both nares with Povidone-Iodine solution, EXCLUDE if patient has shellfish/Iodine allergy. Routine, day of surgery, on call to OR; Standing  -     chlorhexidine (PERIDEX) 0.12 % oral rinse 15 mL  -     Void on call to OR; Standing  -     Insert peripheral IV; Standing  -     clindamycin (CLEOCIN) 900 mg in sodium chloride 0.9 % 50 mL IVPB          68 y.o. female with moderate left CTS and mild right CTS with right thumb TF  Treatment options and expected outcomes were discussed.  The patient verbalized understanding of exam findings and treatment plan.   The patient was given the opportunity to ask questions.  Questions were answered to the patient's satisfaction.  The patient decided to move forward with scheduling left ECTR to be done after summer.  Today, she is interested in receiving 2nd steroid injx to the R thumb TF. We discussed potential for one more injx here or  to combine TFR with ECTR on the right in the future if needed.       Follow Up:  After surgery       To Do Next Visit:  Re-evaluation of current issue      Discussions:  Endoscopic Carpal Tunnel Release:   The anatomy and physiology of carpal tunnel syndrome was discussed with the patient today.  Increase pressure localized under the transverse carpal ligament can cause pain, numbness, tingling, or dysesthesias within the median nerve distribution as well as feelings of fatigue, clumsiness, or awkwardness.  These symptoms typically occur at night and worse in the morning upon waking.  Eventually, untreated carpal tunnel syndrome can result in weakness and permanent loss of muscle within the thenar compartment of the hand.  Treatment options were discussed with the patient.  Conservative treatment includes nocturnal resting splints to keep the nerve in a neutral position, ergonomic changes within the work or home environment, activity modification, and tendon gliding exercises.  Vitamin B6 one tablet daily over the counter may helpful to reduce symptoms.  Steroid injections within the carpal canal can help a majority of patients, however this is often self-limited in a majority of patients.  Surgical intervention to divide the transverse carpal ligament typically results in a long-lasting relief of the patient's complaints, with the recurrence rate of less than 1%. The patient has elected to undergo an endoscopic carpal tunnel release.  The single incision technique was discussed with the patient, which results in approximately a two-week recovery time less wound complications.  In the postoperative period, light activities are allowed immediately, driving is allowed when narcotic medication has stopped, and the bandages may be removed and incision may get wet after 2 days.  Heavy activities (lifting more than approximately 10 pounds) will be allowed after follow up appointment in 1-2 weeks.  While night symptoms  (waking from sleep, pain, and discomfort in the hands) generally improves rapidly, the numbness and tingling as well as the strength will slowly improve over weeks to months depending on the chronicity and severity of the carpal tunnel syndrome.  Pillar pain and scar discomfort were discussed with the patient which are self-limiting conditions.  The risks of bleeding and infection from the surgery are less than 1%.  Risk of recurrence is approximately 0.5%.  The risks of nerve injury or nerve damage or damage to the blood vessels is approximately 1 in 1200. The patient has an understanding of the above mentioned discussion.The risks and benefits of the procedure were explained to the patient, which include, but are not limited to: Bleeding, infection, recurrence, pain, scar, damage to tendons, damage to nerves, and damage to blood vessels, failure to give desired results and complications related to anesthesia.  These risks, along with alternative conservative treatment options, and postoperative protocols were voiced back and understood by the patient.  All questions were answered to the patient's satisfaction.  The patient agrees to comply with a standard postoperative protocol, and is willing to proceed.  Education was provided via written and auditory forms.  There were no barriers to learning. Written handouts regarding wound care, incision and scar care, and general preoperative information was provided to the patient.  Prior to surgery, the patient may be requested to stop all anti-inflammatory medications.  Prophylactic aspirin, Plavix, and Coumadin may be allowed to be continued.  Medications including vitamin E., ginkgo, & fish oil are requested to be stopped about one week.      Sarath Jalloh MD  Attending, Orthopaedic Surgery  Hand, Wrist, and Elbow Surgery  [facility]    ______________________________________________________________________________________________    CHIEF  COMPLAINT:  Chief Complaint   Patient presents with   • Right Thumb - Pain     Wants injection    • Follow-up     Review EMG        SUBJECTIVE:  Patient is a 68 y.o. RHD female who presents today for follow up of b/l CTS and right thumb. Pt was sent to get an EMG secondary to having atrophy on exam. She states no real changes with her n/t since her last appointment. Has this on both sides, worse on the left. Pt states her right trigger thumb has also returned a few days ago. Previous injx did work well for about 2 months.      Occupation: retired     I have personally reviewed all the relevant PMH, PSH, SH, FH, Medications and allergies      PAST MEDICAL HISTORY:  Past Medical History:   Diagnosis Date   • Allergic    • Allergic rhinitis Decades   • Arthritis    • Asthma    • Blindness of left eye    • COPD (chronic obstructive pulmonary disease) ([facility])    • Deep vein thrombosis ([facility]) March 3, 2023    cataract surgeries, detached retina surgeries leftveye   • Diabetes mellitus ([facility])    • GERD (gastroesophageal reflux disease)    • Hypertension    • Obesity    • Visual impairment        PAST SURGICAL HISTORY:  Past Surgical History:   Procedure Laterality Date   •  SECTION  10/16/1990    Fibroids   • COLONOSCOPY     • EYE SURGERY      MULTIPLE   • HYSTERECTOMY     • JOINT REPLACEMENT  2016    right  knee   • REPLACEMENT TOTAL KNEE Right        FAMILY HISTORY:  Family History   Problem Relation Age of Onset   • Dementia Mother    • Arthritis Mother         Late in life   • Hypertension Father         HBP   • Heart disease Father         HBP   • Diabetes Father         managed through diet   • Hearing loss Father         Industrial/construction work with no hearing protection   • Glaucoma Paternal Grandmother    • Asthma Paternal Grandfather         Inhaler used       SOCIAL HISTORY:  Social History     Tobacco Use   • Smoking status: Never   • Smokeless tobacco: Never   Vaping Use   • Vaping status: Never  Used   Substance Use Topics   • Alcohol use: Yes     Alcohol/week: 2.0 - 3.0 standard drinks of alcohol     Types: 2 - 3 Standard drinks or equivalent per week     Comment: on occasion, 1 or 2 drinks with low sugar content   • Drug use: Never       MEDICATIONS:    Current Outpatient Medications:   •  acetaminophen (Tylenol) 325 mg tablet, Take 650 mg by mouth every 6 (six) hours as needed for mild pain, Disp: , Rfl:   •  albuterol (2.5 mg/3 mL) 0.083 % nebulizer solution, Take 3 mL (2.5 mg total) by nebulization every 6 (six) hours as needed for wheezing or shortness of breath, Disp: 180 mL, Rfl: 2  •  albuterol (PROVENTIL HFA,VENTOLIN HFA) 90 mcg/act inhaler, Inhale 2 puffs every 6 (six) hours as needed for wheezing, Disp: 8.5 g, Rfl: 2  •  apixaban (Eliquis) 2.5 mg, Take 1 tablet (2.5 mg total) by mouth 2 (two) times a day, Disp: 60 tablet, Rfl: 5  •  Azelastine HCl 137 MCG/SPRAY SOLN, SPRAY 2 SPRAYS INTO EACH NOSTRIL 2 TIMES A DAY USE IN EACH NOSTRIL AS DIRECTED, Disp: 30 mL, Rfl: 3  •  bimatoprost (LUMIGAN) 0.01 % ophthalmic drops, , Disp: , Rfl:   •  clindamycin (CLEOCIN) 150 mg capsule, Take by mouth 3 (three) times a day, Disp: , Rfl:   •  Dextromethorphan-GG-APAP (Coricidin HBP Cold/Cough/Flu) -325 MG/15ML LIQD, Take 30 mL by mouth 3 (three) times a day as needed (cough, congestion), Disp: 355 mL, Rfl: 0  •  diltiazem (CARDIZEM CD) 240 mg 24 hr capsule, Take 1 capsule (240 mg total) by mouth daily, Disp: 90 capsule, Rfl: 3  •  dulaglutide (Trulicity) 3 MG/0.5ML injection, Inject 0.5 mL (3 mg total) under the skin every 7 days, Disp: 6 mL, Rfl: 1  •  famotidine (PEPCID) 20 mg tablet, Take 20 mg by mouth, Disp: , Rfl:   •  Fluticasone-Salmeterol (Advair) 500-50 mcg/dose inhaler, Inhale 1 puff every 12 (twelve) hours (Patient taking differently: Inhale 1 puff every 12 (twelve) hours Has not been using recently), Disp: 180 blister, Rfl: 3  •  ketorolac (ACULAR) 0.4 % SOLN, , Disp: , Rfl:   •  loratadine  (CLARITIN) 10 mg tablet, Take 10 mg by mouth daily, Disp: , Rfl:   •  metFORMIN (GLUCOPHAGE) 500 mg tablet, 1 tablet each morning and 2 tablets each evening, Disp: 270 tablet, Rfl: 3  •  montelukast (SINGULAIR) 10 mg tablet, Take 1 tablet (10 mg total) by mouth daily at bedtime, Disp: 30 tablet, Rfl: 0  •  omeprazole (PriLOSEC) 40 MG capsule, Take 1 capsule (40 mg total) by mouth daily, Disp: 90 capsule, Rfl: 2  •  rosuvastatin (CRESTOR) 10 MG tablet, Take 1 tablet (10 mg total) by mouth daily, Disp: 90 tablet, Rfl: 0  •  Timolol Maleate PF (Timolol Maleate Ocudose) 0.5 % SOLN, , Disp: , Rfl:   No current facility-administered medications for this visit.    ALLERGIES:  Allergies   Allergen Reactions   • Other Nausea Only, Vomiting and Cough     LOBSTER   • Penicillins Seizures   • Sulfa Antibiotics Rash           REVIEW OF SYSTEMS:  Musculoskeletal:        As noted in HPI.   All other systems reviewed and are negative.    VITALS:  There were no vitals filed for this visit.    LABS:  HgA1c:   Lab Results   Component Value Date    HGBA1C 7.5 (H) 03/21/2024     BMP:   Lab Results   Component Value Date    CALCIUM 8.9 03/21/2024    K 4.1 03/21/2024    CO2 30 03/21/2024     03/21/2024    BUN 11 03/21/2024    CREATININE 0.71 03/21/2024       _____________________________________________________  PHYSICAL EXAMINATION:  General: Well developed and well nourished, alert & oriented x 3, appears comfortable  Psychiatric: Normal  HEENT: Normocephalic, Atraumatic Trachea Midline, No torticollis  Pulmonary: No audible wheezing or respiratory distress   Abdomen/GI: Non tender, non distended   Cardiovascular: No pitting edema, 2+ radial pulse   Skin: No masses, erythema, lacerations, fluctation, ulcerations  Neurovascular: Sensation Intact to the Median, Ulnar, Radial Nerve, Motor Intact to the Median, Ulnar, Radial Nerve, and Pulses Intact  Musculoskeletal: Normal, except as noted in detailed exam and in  "HPI.      MUSCULOSKELETAL EXAMINATION:  BUE:  Moderate thenar atrophy  4/5 APB  5/5 Intrinsics  5/5 AIN  Negative tinel's at the carpal tunnel.       Right radial (mm) Right ulnar (mm) Left radial (mm) Left ulnar (mm)   Thumb 5 5 5 5   Index 5 5 5 5   Long 5 5 5 5   Ring 5 5 5 5   Small 5 5 5 5         Right Thumb:  + ttp A1 pulley  No active triggering today.    ___________________________________________________  STUDIES REVIEWED:  EMG was personally reviewed and independently interpreted by Dr. Jalloh and demonstrates evidence of moderate CTS on the left, mild CTS on the right. Pt unable to tolerate needle examination so cervical radiculopathy can not be ruled out.            PROCEDURES PERFORMED:  Hand/upper extremity injection: R thumb A1  Universal Protocol:  Consent: Verbal consent obtained.  Risks and benefits: risks, benefits and alternatives were discussed  Consent given by: patient  Time out: Immediately prior to procedure a \"time out\" was called to verify the correct patient, procedure, equipment, support staff and site/side marked as required.  Patient understanding: patient states understanding of the procedure being performed  Site marked: the operative site was marked  Patient identity confirmed: verbally with patient  Supporting Documentation  Indications: tendon swelling   Procedure Details  Condition:trigger finger Location: thumb - R thumb A1   Preparation: Patient was prepped and draped in the usual sterile fashion  Needle size: 25 G  Approach: volar  Medications administered: 1 mL ropivacaine 0.5 %; 40 mg dexamethasone 100 mg/10 mL  Patient tolerance: patient tolerated the procedure well with no immediate complications  Dressing:  Sterile dressing applied              _____________________________________________________      Scribe Attestation    I,:  Sonia Lora PA-C am acting as a scribe while in the presence of the attending physician.:       I,:  Sarath Jalloh MD personally " performed the services described in this documentation    as scribed in my presence.:

## 2024-05-16 ENCOUNTER — NURSE TRIAGE (OUTPATIENT)
Age: 69
End: 2024-05-16

## 2024-05-16 ENCOUNTER — OFFICE VISIT (OUTPATIENT)
Dept: PHYSICAL THERAPY | Facility: CLINIC | Age: 69
End: 2024-05-16
Payer: MEDICARE

## 2024-05-16 DIAGNOSIS — M25.562 LEFT KNEE PAIN, UNSPECIFIED CHRONICITY: Primary | ICD-10-CM

## 2024-05-16 DIAGNOSIS — M17.12 PRIMARY OSTEOARTHRITIS OF LEFT KNEE: ICD-10-CM

## 2024-05-16 DIAGNOSIS — R26.2 DIFFICULTY WALKING: ICD-10-CM

## 2024-05-16 PROCEDURE — 97112 NEUROMUSCULAR REEDUCATION: CPT

## 2024-05-16 PROCEDURE — 97110 THERAPEUTIC EXERCISES: CPT

## 2024-05-16 NOTE — TELEPHONE ENCOUNTER
Pt takes Trulicity once weekly on Thursday advised to hold the medication until after procedure according to protocol.

## 2024-05-16 NOTE — TELEPHONE ENCOUNTER
Regarding: holding diabetic medication  ----- Message from Rosa Maria NOLAN sent at 5/16/2024 11:47 AM EDT -----  Patient called in to verify if pt should take TRULICITY today or does patient hold medication until after procedure. Patient is scheduled for colonoscopy/EGD on 5/23/2024 with Dr. Chacko.

## 2024-05-16 NOTE — PROGRESS NOTES
"Daily Note     Today's date: 2024  Patient name: Coretta Hines  : 1955  MRN: [id number]  Referring provider: Nafisa Duckworth MD  Dx:   Encounter Diagnosis     ICD-10-CM    1. Left knee pain, unspecified chronicity  M25.562       2. Primary osteoarthritis of left knee  M17.12       3. Difficulty walking  R26.2           Start Time: 1700  Stop Time: 1738  Total time in clinic (min): 38 minutes    Subjective: Pt reported that her knee pain has been ranging between 1-5/10. Pt's pain increase with car transfers.       Objective: See treatment diary below      Assessment: Tolerated treatment well. Pt deferred that R. Bike. Pt was challenged with all exercises. Program focused on increasing LE strength to decrease stress on the knees, increasing knee PROM, and improve quality of stair negotiation. MHP was utilized post-treatment. Patient would benefit from continued PT      Plan: Continue per plan of care.      Goals: Patient's goal is to decrease pain with ADLs    Precautions: Previous LLE Blood Clot, L eye blindness  Dx: L Knee OA    Daily Treatment Diary        Manuals 5/3 5/16  4/18 4/23 4/25    L Knee PROM         L gastroc/HS str         L patella mobs                  Ther Ex         Quad Set         Gastroc Str Wedge :30/3 Wedge 30\"x4  Wedge  30\"x3 Wedge str   30\"x3    SLR         Heel Slide         Seated HS str     15\"x4 15\"x4   Standing hip abd/ext 3# 20 ea 3#, 20x each  3#, 30x each 3#, 20x each 3#, 20x each   Standing knee flexion  3# 20 3#, 20x  3#, 20x 3#, 20x 3# 20x   Seated HS str         Standing knee flexion str on step :10/10 10\"x10  5\"x10 5\"x10 10\"x10   R. Bike (rocking)    Deferred Deferred    Pt Edu      BP/  PCP   Neuro Re-ed         TKE with TB  BTB 5\"x10  RTB 5\"x10 BTB 5\"x10    Leg Press 60# 20x 60#, 20x  60#, 20x 60#, 20x    Sidestepping with TB Blue 4 laps BTB 4 laps  RTB 4 laps BTB 4 laps    LAQ  20x, 2#    5\"x20   HS curl      RTB 10x   Ther Activity         minisquat 20x 20x " " 10x2 10x2 10x2   Step up  L1 20x   L2, 10x2 L2, 10x2    Step up and overs   L1, 20x       HR on step  10x2, L1  L1, 20x L1, 20x    Romberg balance on foam         Modified tandem balance  30\" each  30\"x each     STS         Gait Training                           Modalities         Ice MHP 10 MHP 10'  MHP 10' MHP 10'                                        "

## 2024-05-17 ENCOUNTER — TELEPHONE (OUTPATIENT)
Age: 69
End: 2024-05-17

## 2024-05-17 NOTE — TELEPHONE ENCOUNTER
Patient contacted office with prep questions regarding Colonoscopy and EGD with [facility]. Explained in full detail. Patient expressed understanding.

## 2024-05-20 ENCOUNTER — APPOINTMENT (OUTPATIENT)
Dept: PHYSICAL THERAPY | Facility: CLINIC | Age: 69
End: 2024-05-20
Payer: MEDICARE

## 2024-05-22 ENCOUNTER — TELEPHONE (OUTPATIENT)
Dept: GASTROENTEROLOGY | Facility: HOSPITAL | Age: 69
End: 2024-05-22

## 2024-05-22 RX ORDER — SODIUM CHLORIDE 9 MG/ML
100 INJECTION, SOLUTION INTRAVENOUS CONTINUOUS
Status: CANCELLED | OUTPATIENT
Start: 2024-05-22

## 2024-05-23 ENCOUNTER — ANESTHESIA (OUTPATIENT)
Dept: GASTROENTEROLOGY | Facility: HOSPITAL | Age: 69
End: 2024-05-23

## 2024-05-23 ENCOUNTER — HOSPITAL ENCOUNTER (OUTPATIENT)
Dept: GASTROENTEROLOGY | Facility: HOSPITAL | Age: 69
Setting detail: OUTPATIENT SURGERY
End: 2024-05-23
Attending: INTERNAL MEDICINE
Payer: MEDICARE

## 2024-05-23 ENCOUNTER — ANESTHESIA EVENT (OUTPATIENT)
Dept: GASTROENTEROLOGY | Facility: HOSPITAL | Age: 69
End: 2024-05-23

## 2024-05-23 VITALS
WEIGHT: 220 LBS | BODY MASS INDEX: 38.98 KG/M2 | DIASTOLIC BLOOD PRESSURE: 56 MMHG | TEMPERATURE: 98 F | OXYGEN SATURATION: 97 % | HEART RATE: 58 BPM | SYSTOLIC BLOOD PRESSURE: 108 MMHG | HEIGHT: 63 IN | RESPIRATION RATE: 18 BRPM

## 2024-05-23 DIAGNOSIS — K21.9 GASTROESOPHAGEAL REFLUX DISEASE WITHOUT ESOPHAGITIS: ICD-10-CM

## 2024-05-23 DIAGNOSIS — J45.40 MODERATE PERSISTENT ASTHMA WITHOUT COMPLICATION: ICD-10-CM

## 2024-05-23 LAB — GLUCOSE SERPL-MCNC: 119 MG/DL (ref 65–140)

## 2024-05-23 PROCEDURE — 82948 REAGENT STRIP/BLOOD GLUCOSE: CPT

## 2024-05-23 PROCEDURE — 88305 TISSUE EXAM BY PATHOLOGIST: CPT | Performed by: PATHOLOGY

## 2024-05-23 PROCEDURE — 43239 EGD BIOPSY SINGLE/MULTIPLE: CPT | Performed by: INTERNAL MEDICINE

## 2024-05-23 PROCEDURE — G0121 COLON CA SCRN NOT HI RSK IND: HCPCS | Performed by: INTERNAL MEDICINE

## 2024-05-23 RX ORDER — PROPOFOL 10 MG/ML
INJECTION, EMULSION INTRAVENOUS AS NEEDED
Status: DISCONTINUED | OUTPATIENT
Start: 2024-05-23 | End: 2024-05-23

## 2024-05-23 RX ORDER — SODIUM CHLORIDE, SODIUM LACTATE, POTASSIUM CHLORIDE, CALCIUM CHLORIDE 600; 310; 30; 20 MG/100ML; MG/100ML; MG/100ML; MG/100ML
INJECTION, SOLUTION INTRAVENOUS CONTINUOUS PRN
Status: DISCONTINUED | OUTPATIENT
Start: 2024-05-23 | End: 2024-05-23

## 2024-05-23 RX ORDER — PROPOFOL 10 MG/ML
INJECTION, EMULSION INTRAVENOUS CONTINUOUS PRN
Status: DISCONTINUED | OUTPATIENT
Start: 2024-05-23 | End: 2024-05-23

## 2024-05-23 RX ORDER — SODIUM CHLORIDE 9 MG/ML
INJECTION, SOLUTION INTRAVENOUS CONTINUOUS PRN
Status: DISCONTINUED | OUTPATIENT
Start: 2024-05-23 | End: 2024-05-23

## 2024-05-23 RX ADMIN — PROPOFOL 150 MG: 10 INJECTION, EMULSION INTRAVENOUS at 09:45

## 2024-05-23 RX ADMIN — PROPOFOL 120 MCG/KG/MIN: 10 INJECTION, EMULSION INTRAVENOUS at 09:45

## 2024-05-23 RX ADMIN — SODIUM CHLORIDE: 0.9 INJECTION, SOLUTION INTRAVENOUS at 09:40

## 2024-05-23 NOTE — H&P
History and Physical -  [facility]  Coretta Hines 68 y.o. female MRN: [id number]                  HPI: Coretta Hines is a 68 y.o. year old female who presents for EGD/colonoscopy for longstanding history of reflux and colon cancer screening. Patient not currently on AP/AC.    REVIEW OF SYSTEMS: Per the HPI, and otherwise unremarkable.    Historical Information   Past Medical History:   Diagnosis Date    Allergic     Allergic rhinitis Decades    Arthritis     Asthma     Blindness of left eye     COPD (chronic obstructive pulmonary disease) (HCC)     Deep vein thrombosis (HCC) March 3, 2023    cataract surgeries, detached retina surgeries leftveye    Diabetes mellitus (HCC)     GERD (gastroesophageal reflux disease)     Hypertension     Murmur, heart     Obesity     Visual impairment      Past Surgical History:   Procedure Laterality Date     SECTION  10/16/1990    Fibroids    COLONOSCOPY      EYE SURGERY      MULTIPLE    HYSTERECTOMY      JOINT REPLACEMENT  2016    right  knee    REPLACEMENT TOTAL KNEE Right      Social History   Social History     Substance and Sexual Activity   Alcohol Use Yes    Alcohol/week: 2.0 - 3.0 standard drinks of alcohol    Types: 2 - 3 Standard drinks or equivalent per week    Comment: on occasion, 1 or 2 drinks with low sugar content     Social History     Substance and Sexual Activity   Drug Use Never     Social History     Tobacco Use   Smoking Status Never   Smokeless Tobacco Never     Family History   Problem Relation Age of Onset    Dementia Mother     Arthritis Mother         Late in life    Hypertension Father         HBP    Heart disease Father         HBP    Diabetes Father         managed through diet    Hearing loss Father         Industrial/construction work with no hearing protection    Glaucoma Paternal Grandmother     Asthma Paternal Grandfather         Inhaler used       Meds/Allergies       Current Outpatient Medications:      "acetaminophen (Tylenol) 325 mg tablet    albuterol (2.5 mg/3 mL) 0.083 % nebulizer solution    albuterol (PROVENTIL HFA,VENTOLIN HFA) 90 mcg/act inhaler    apixaban (Eliquis) 2.5 mg    Azelastine HCl 137 MCG/SPRAY SOLN    bimatoprost (LUMIGAN) 0.01 % ophthalmic drops    Dextromethorphan-GG-APAP (Coricidin HBP Cold/Cough/Flu) -325 MG/15ML LIQD    diltiazem (CARDIZEM CD) 240 mg 24 hr capsule    dulaglutide (Trulicity) 3 MG/0.5ML injection    famotidine (PEPCID) 20 mg tablet    Fluticasone-Salmeterol (Advair) 500-50 mcg/dose inhaler    loratadine (CLARITIN) 10 mg tablet    metFORMIN (GLUCOPHAGE) 500 mg tablet    omeprazole (PriLOSEC) 40 MG capsule    Timolol Maleate PF (Timolol Maleate Ocudose) 0.5 % SOLN    montelukast (SINGULAIR) 10 mg tablet    rosuvastatin (CRESTOR) 10 MG tablet    Allergies   Allergen Reactions    Other Nausea Only, Vomiting and Cough     LOBSTER    Penicillins Seizures    Sulfa Antibiotics Rash       Objective     /67   Pulse 77   Temp (!) 96.8 °F (36 °C) (Tympanic)   Resp 19   Ht 5' 3\" (1.6 m)   Wt 99.8 kg (220 lb)   SpO2 97%   BMI 38.97 kg/m²       PHYSICAL EXAM    Gen: NAD  Head: NCAT  CV: RRR  CHEST: Clear  ABD: soft, NT/ND  EXT: no edema      ASSESSMENT/PLAN:  This is a 68 y.o. year old female here for EGD/colonoscopy, and she is stable and optimized for her procedure.        "

## 2024-05-23 NOTE — ANESTHESIA PREPROCEDURE EVALUATION
Procedure:  EGD  COLONOSCOPY    Relevant Problems   ANESTHESIA (within normal limits)   (-) History of anesthesia complications      CARDIO   (+) Chronic deep vein thrombosis (DVT) of left peroneal vein (HCC)   (+) Chronic deep vein thrombosis (DVT) of right peroneal vein (HCC)   (+) Edema of both lower extremities due to peripheral venous insufficiency   (+) Primary hypertension      ENDO   (+) Type 2 diabetes mellitus with hyperglycemia, without long-term current use of insulin (HCC)      GI/HEPATIC   (+) GERD (gastroesophageal reflux disease)      /RENAL (within normal limits)      HEMATOLOGY (within normal limits)      MUSCULOSKELETAL   (+) Osteoarthritis      NEURO/PSYCH (within normal limits)      PULMONARY   (+) Moderate persistent asthma without complication   (+) DEN (obstructive sleep apnea)      Other   (+) Class 2 severe obesity with serious comorbidity in adult (HCC)   (+) Obesity hypoventilation syndrome (HCC)        Physical Exam    Airway    Mallampati score: II  TM Distance: >3 FB  Neck ROM: full     Dental       Cardiovascular  Rhythm: regular, Rate: normal, Cardiovascular exam normal    Pulmonary  Pulmonary exam normal Breath sounds clear to auscultation    Other Findings  post-pubertal.      Anesthesia Plan  ASA Score- 3     Anesthesia Type- IV sedation with anesthesia with ASA Monitors.         Additional Monitors:     Airway Plan:            Plan Factors-    Chart reviewed. EKG reviewed. Imaging results reviewed. Existing labs reviewed. Patient summary reviewed.    Patient is not a current smoker.  Patient did not smoke on day of surgery.            Induction- intravenous.    Postoperative Plan-     Perioperative Resuscitation Plan - Level 1 - Full Code.       Informed Consent- Anesthetic plan and risks discussed with patient and spouse.  I personally reviewed this patient with the CRNA. Discussed and agreed on the Anesthesia Plan with the CRNA..      NPO and allergies verified.   Pre-procedure glucose was 119 today.  Last dose of Trulicity was 5/9/24 and last dose of Eliquis was 5/20/24.    Plan:  IV sedation, GA as backup    Benefits and risks of sedation were discussed with the patient including possibility of recall under sedation and the potential for conversion to general anesthesia if necessary.  All questions were answered.  Anesthesia consent was obtained from the patient.

## 2024-05-23 NOTE — ANESTHESIA POSTPROCEDURE EVALUATION
Post-Op Assessment Note    CV Status:  Stable    Pain management: adequate       Mental Status:  Alert and sleepy   Hydration Status:  Euvolemic   PONV Controlled:  Controlled   Airway Patency:  Patent     Post Op Vitals Reviewed: Yes    No anethesia notable event occurred.    Staff: CRNA               /55 (05/23/24 1036)    Temp 98 °F (36.7 °C) (05/23/24 1036)    Pulse 63 (05/23/24 1036)   Resp 18 (05/23/24 1036)    SpO2 92 % (05/23/24 1036)

## 2024-05-24 ENCOUNTER — NURSE TRIAGE (OUTPATIENT)
Age: 69
End: 2024-05-24

## 2024-05-24 ENCOUNTER — PROCEDURE VISIT (OUTPATIENT)
Dept: OBGYN CLINIC | Facility: CLINIC | Age: 69
End: 2024-05-24
Payer: MEDICARE

## 2024-05-24 VITALS — HEIGHT: 63 IN | BODY MASS INDEX: 38.98 KG/M2 | WEIGHT: 220 LBS

## 2024-05-24 DIAGNOSIS — M17.12 PRIMARY OSTEOARTHRITIS OF LEFT KNEE: Primary | ICD-10-CM

## 2024-05-24 PROCEDURE — 20610 DRAIN/INJ JOINT/BURSA W/O US: CPT | Performed by: PHYSICIAN ASSISTANT

## 2024-05-24 RX ADMIN — Medication 20 MG: at 09:45

## 2024-05-24 NOTE — TELEPHONE ENCOUNTER
"SPOKE WITH PT, EGD/COLONOSCOPY 5/23/24. PT REPORTS WAKING THIS AM WITH MILD JAW DISCOMFORT AND SWELLING. PT DENIES FEVER, CHILLS, SOB. PT ABLE TO EAT AND DRINK NORMALLY. PT ADVISED THIS CAN HAPPEN DUE TO HEAD, JAW MANIPULATION DURING PROCEDURE. ADVISED HYDRATION, CAN TAKE TYLENOL, MONITOR, CALL BACK IF ANY WORSENING OR PERSISTENT SYMPTOMS. PT AGREEABLE AND APPRECIATIVE.           Answer Assessment - Initial Assessment Questions  1. SYMPTOM: \"What's the main symptom you're concerned about?\" (e.g., pain, fever, vomiting)      MILD RIGHT AND LEFT JAW DISCOMFORT, SWELLING  2. ONSET:       THIS AM  3. SURGERY: \"What surgery was performed?\"      EGD/COLONOSCOPY  4. DATE of SURGERY: \"When was surgery performed?\"       5/23/24  6. PAIN: \"Is there any pain?\" If Yes, ask: \"How bad is it?\"  (Scale 1-10; or mild, moderate, severe)      DISCOMFORT  7. FEVER: \"Do you have a fever?\" If Yes, ask: \"What is your temperature, how was it measured, and when did it start?\"      DENIES  8. VOMITING: \"Is there any vomiting?\" If yes, ask: \"How many times?\"      DENIES  9. BLEEDING: \"Is there any bleeding?\" If Yes, ask: \"How much?\" and \"Where?\"      DENIES  10. OTHER SYMPTOMS: \"Do you have any other symptoms?\" (e.g., drainage from wound, painful urination, constipation)        DENIES    Protocols used: Post-Op Symptoms and Questions-ADULT-[place]    "

## 2024-05-24 NOTE — PROGRESS NOTES
Orthopedics          Coretta Hines 68 y.o. female MRN: [id number]      Chief Complaint:   left knee pain    HPI:   68 y.o.female complaining of left knee pain.  She presents office today regarding left knee pain due to arthritis.  Patient is set up for 3 series Euflexxa injections she presents for first today.  States pain is aching nature worse hyper mildly relieved with rest rates her pain is an improvement 2-5 out of 10.  She states over-the-counter anti-inflammatory medications only give her mild pain relief                Review Of Systems:   Skin: Normal  Neuro: See HPI  Musculoskeletal: See HPI  All other systems reviewed and are negative    Past Medical History:   Past Medical History:   Diagnosis Date    Allergic     Allergic rhinitis Decades    Arthritis     Asthma     Blindness of left eye     COPD (chronic obstructive pulmonary disease) ([facility]ly [facility])     Deep vein thrombosis ([facility]) March 3, 2023    cataract surgeries, detached retina surgeries leftveye    Diabetes mellitus ([facility]ly [facility])     GERD (gastroesophageal reflux disease)     Hypertension     Murmur, heart     Obesity     Visual impairment        Past Surgical History:   Past Surgical History:   Procedure Laterality Date     SECTION  10/16/1990    Fibroids    COLONOSCOPY      EYE SURGERY      MULTIPLE    HYSTERECTOMY      JOINT REPLACEMENT  2016    right  knee    REPLACEMENT TOTAL KNEE Right        Family History:  Family history reviewed and non-contributory  Family History   Problem Relation Age of Onset    Dementia Mother     Arthritis Mother         Late in life    Hypertension Father         HBP    Heart disease Father         HBP    Diabetes Father         managed through diet    Hearing loss Father         Industrial/construction work with no hearing protection    Glaucoma Paternal Grandmother     Asthma Paternal Grandfather         Inhaler used         Social History:  Social History     Socioeconomic History    Marital status: /Civil  Union     Spouse name: None    Number of children: None    Years of education: None    Highest education level: None   Occupational History    None   Tobacco Use    Smoking status: Never    Smokeless tobacco: Never   Vaping Use    Vaping status: Never Used   Substance and Sexual Activity    Alcohol use: Yes     Alcohol/week: 2.0 - 3.0 standard drinks of alcohol     Types: 2 - 3 Standard drinks or equivalent per week     Comment: on occasion, 1 or 2 drinks with low sugar content    Drug use: Never    Sexual activity: None   Other Topics Concern    None   Social History Narrative    None     Social Determinants of Health     Financial Resource Strain: Low Risk  (9/17/2023)    Overall Financial Resource Strain (CARDIA)     Difficulty of Paying Living Expenses: Not hard at all   Food Insecurity: No Food Insecurity (9/13/2022)    [facility]    Food Insecurity     Within the past 12 months, you worried that your food would run out before you got the money to buy more.: Never true     Within the past 12 months, the food you bought just didn't last and you didn't have money to get more.: Never true     Within the past 12 months, you worried that your food would run out before you got the money to buy more.: Never true   Transportation Needs: No Transportation Needs (9/17/2023)    PRAPARE - Transportation     Lack of Transportation (Medical): No     Lack of Transportation (Non-Medical): No   Physical Activity: Not on file   Stress: Not on file   Social Connections: Not on file   Intimate Partner Violence: Not on file   Housing Stability: Not on file       Allergies:   Allergies   Allergen Reactions    Other Nausea Only, Vomiting and Cough     LOBSTER    Penicillins Seizures    Sulfa Antibiotics Rash       Labs:  0   Lab Value Date/Time    HCT 43.5 03/21/2024 0850    HCT 44.6 06/16/2023 1103    HCT 42.5 05/03/2023 1303    HGB 13.8 03/21/2024 0850    HGB 14.1 06/16/2023 1103    HGB 13.9  05/03/2023 1303    PT 12.2 03/30/2023 1428    INR 1.38 (H) 05/03/2023 1303    INR 0.9 03/30/2023 1428    WBC 7.52 03/21/2024 0850    WBC 8.36 06/16/2023 1103    WBC 9.70 05/03/2023 1303       Meds:    Current Outpatient Medications:     acetaminophen (Tylenol) 325 mg tablet, Take 650 mg by mouth every 6 (six) hours as needed for mild pain, Disp: , Rfl:     albuterol (2.5 mg/3 mL) 0.083 % nebulizer solution, Take 3 mL (2.5 mg total) by nebulization every 6 (six) hours as needed for wheezing or shortness of breath, Disp: 180 mL, Rfl: 2    albuterol (PROVENTIL HFA,VENTOLIN HFA) 90 mcg/act inhaler, Inhale 2 puffs every 6 (six) hours as needed for wheezing, Disp: 8.5 g, Rfl: 2    apixaban (Eliquis) 2.5 mg, Take 1 tablet (2.5 mg total) by mouth 2 (two) times a day, Disp: 60 tablet, Rfl: 5    Azelastine HCl 137 MCG/SPRAY SOLN, SPRAY 2 SPRAYS INTO EACH NOSTRIL 2 TIMES A DAY USE IN EACH NOSTRIL AS DIRECTED, Disp: 30 mL, Rfl: 3    bimatoprost (LUMIGAN) 0.01 % ophthalmic drops, , Disp: , Rfl:     Dextromethorphan-GG-APAP (Coricidin HBP Cold/Cough/Flu) -325 MG/15ML LIQD, Take 30 mL by mouth 3 (three) times a day as needed (cough, congestion), Disp: 355 mL, Rfl: 0    diltiazem (CARDIZEM CD) 240 mg 24 hr capsule, Take 1 capsule (240 mg total) by mouth daily, Disp: 90 capsule, Rfl: 3    dulaglutide (Trulicity) 3 MG/0.5ML injection, Inject 0.5 mL (3 mg total) under the skin every 7 days, Disp: 6 mL, Rfl: 1    famotidine (PEPCID) 20 mg tablet, Take 20 mg by mouth, Disp: , Rfl:     Fluticasone-Salmeterol (Advair) 500-50 mcg/dose inhaler, Inhale 1 puff every 12 (twelve) hours (Patient taking differently: Inhale 1 puff every 12 (twelve) hours Has not been using recently), Disp: 180 blister, Rfl: 3    loratadine (CLARITIN) 10 mg tablet, Take 10 mg by mouth daily, Disp: , Rfl:     metFORMIN (GLUCOPHAGE) 500 mg tablet, 1 tablet each morning and 2 tablets each evening, Disp: 270 tablet, Rfl: 3    montelukast (SINGULAIR) 10 mg tablet,  Take 1 tablet (10 mg total) by mouth daily at bedtime, Disp: 30 tablet, Rfl: 0    omeprazole (PriLOSEC) 40 MG capsule, Take 1 capsule (40 mg total) by mouth daily, Disp: 90 capsule, Rfl: 2    rosuvastatin (CRESTOR) 10 MG tablet, Take 1 tablet (10 mg total) by mouth daily, Disp: 90 tablet, Rfl: 0    Timolol Maleate PF (Timolol Maleate Ocudose) 0.5 % SOLN, , Disp: , Rfl:   No current facility-administered medications for this visit.    Body mass index is 38.97 kg/m².  Wt Readings from Last 3 Encounters:   05/24/24 99.8 kg (220 lb)   05/23/24 99.8 kg (220 lb)   05/14/24 101 kg (223 lb)     Physical Exam:   There were no vitals filed for this visit.    General Appearance:    Alert, cooperative, no distress, appears stated age   Head:    Normocephalic, without obvious abnormality, atraumatic   Eyes:    conjunctiva/corneas clear, both eyes        Nose:   Nares normal, septum midline, no drainage    Throat:   Lips normal; teeth and gums normal   Neck:    symmetrical, trachea midline, ;     thyroid:  no enlargement/   Back:     Symmetric, no curvature, ROM normal   Lungs:   No audible wheezing or labored breathing   Chest Wall:    No tenderness or deformity    Heart:    Regular rate and rhythm               Pulses:   2+ and symmetric all extremities   Skin:   Skin color, texture, turgor normal, no rashes or lesions   Neurologic:   normal strength, sensation and reflexes     throughout       Musculoskeletal: left lower extremity  On examination of the left knee there is no effusion, no erythema.  Range of motion to full active extension and flexion to greater than 120°.  Pain on palpation medial and lateral joint lines.  There is crepitus with range of motion, no warmth to palpation, bony enlargement noted. No pain on palpation pes anserine bursa region or distal iliotibial band.  Stable to varus and valgus stress without pain or gapping.  Negative anterior and posterior drawer testing.  Sensation intact distal pulses  present.        _*_*_*_*_*_*_*_*_*_*_*_*_*_*_*_*_*_*_*_*_*_*_*_*_*_*_*_*_*_*_*_*_*_*_*_*_*_*_*_*_*    Assessment:  68 y.o.female with left knee pain due to arthritis    Plan:   Weight bearing as tolerated  left lower extremity  Left knee intra-articular Euflexxa injection ministered first in the series of 3 as noted above  Advised no significant activity or increased ambulation over the next 24-48 hours and warm compresses to the knee no greater 20 minutes 3-4 times 24 hours following the injection.  Patient advised should they develop any increasing pain, redness, drainage, numbness, tingling or swelling surrounding the injection sight, they are to contact our office or present to the emergency department.  Follow up in 3 months or sooner if needed      Arturo Holland PA-C

## 2024-05-28 ENCOUNTER — APPOINTMENT (OUTPATIENT)
Dept: PHYSICAL THERAPY | Facility: CLINIC | Age: 69
End: 2024-05-28
Payer: MEDICARE

## 2024-05-28 DIAGNOSIS — T78.40XA ALLERGY, INITIAL ENCOUNTER: ICD-10-CM

## 2024-05-28 PROCEDURE — 88305 TISSUE EXAM BY PATHOLOGIST: CPT | Performed by: PATHOLOGY

## 2024-05-28 RX ORDER — HYALURONATE SODIUM 10 MG/ML
20 SYRINGE (ML) INTRAARTICULAR
Status: COMPLETED | OUTPATIENT
Start: 2024-05-24 | End: 2024-05-24

## 2024-05-28 RX ORDER — MONTELUKAST SODIUM 10 MG/1
10 TABLET ORAL
Qty: 30 TABLET | Refills: 5 | Status: SHIPPED | OUTPATIENT
Start: 2024-05-28

## 2024-05-30 ENCOUNTER — TELEPHONE (OUTPATIENT)
Age: 69
End: 2024-05-30

## 2024-05-30 ENCOUNTER — OFFICE VISIT (OUTPATIENT)
Dept: PHYSICAL THERAPY | Facility: CLINIC | Age: 69
End: 2024-05-30
Payer: MEDICARE

## 2024-05-30 DIAGNOSIS — M17.12 PRIMARY OSTEOARTHRITIS OF LEFT KNEE: ICD-10-CM

## 2024-05-30 DIAGNOSIS — R26.2 DIFFICULTY WALKING: ICD-10-CM

## 2024-05-30 DIAGNOSIS — M25.562 LEFT KNEE PAIN, UNSPECIFIED CHRONICITY: Primary | ICD-10-CM

## 2024-05-30 PROCEDURE — 97112 NEUROMUSCULAR REEDUCATION: CPT

## 2024-05-30 PROCEDURE — 97110 THERAPEUTIC EXERCISES: CPT

## 2024-05-30 NOTE — PROGRESS NOTES
"Daily Note     Today's date: 2024  Patient name: Coretta Hines  : 1955  MRN: [id number]  Referring provider: Nafisa Duckworth MD  Dx:   Encounter Diagnosis     ICD-10-CM    1. Left knee pain, unspecified chronicity  M25.562       2. Primary osteoarthritis of left knee  M17.12       3. Difficulty walking  R26.2           Start Time: 1130  Stop Time: 1210  Total time in clinic (min): 40 minutes    Subjective: Pt reported that she still felt comfortable being Dc'd NV. Pt noted no major changes.       Objective: See treatment diary below      Assessment: Tolerated treatment well. Program started with her standing exercises. Pt required VC in order to perform TKE with correct form. MHP was utilized post-treatment. Patient would benefit from continued PT      Plan: Continue per plan of care.      Goals: Patient's goal is to decrease pain with ADLs    Precautions: Previous LLE Blood Clot, L eye blindness  Dx: L Knee OA    Daily Treatment Diary        Manuals 5/3 5/16 5/30  4/23 4/25    L Knee PROM         L gastroc/HS str         L patella mobs                  Ther Ex         Quad Set         Gastroc Str Wedge :30/3 Wedge 30\"x4 Wedge  30\"x4  Wedge str   30\"x3    SLR         Heel Slide         Seated HS str     15\"x4 15\"x4   Standing hip abd/ext 3# 20 ea 3#, 20x each 3#, 20x each  3#, 20x each 3#, 20x each   Standing knee flexion  3# 20 3#, 20x 3#, 20x  3#, 20x 3# 20x   Seated HS str         Standing knee flexion str on step :10/10 10\"x10 10\"x10  5\"x10 10\"x10   R. Bike (rocking)     Deferred    Pt Edu      BP/  PCP   Neuro Re-ed         TKE with TB  BTB 5\"x10 BTB 5\"x10  BTB 5\"x10    Leg Press 60# 20x 60#, 20x 60# 20x  60#, 20x    Sidestepping with TB Blue 4 laps BTB 4 laps BTB 4 laps  BTB 4 laps    LAQ  20x, 2# 20x, 3#   5\"x20   HS curl      RTB 10x   Ther Activity         minisquat 20x 20x 20x  10x2 10x2   Step up  L1 20x    L2, 10x2    Step up and overs   L1, 20x L1,12x      HR on step  10x2, L1 10x2, L1 " " L1, 20x    Romberg balance on foam         Modified tandem balance  30\" each 30\" each      STS         Gait Training                           Modalities         Ice MHP 10 MHP 10' MHP 10'  MHP 10'                                                               "

## 2024-05-30 NOTE — TELEPHONE ENCOUNTER
Sent pictures reviewed. No acute concerns.  Would continue with lymphedema pumps.  Continue to monitor for increased redness or skin breakdown.  Did the sleeve rubbed her skin before??  Continue moisturization daily, compression, and leg elevation.

## 2024-05-30 NOTE — TELEPHONE ENCOUNTER
"See pt advice request below.  Pt states the redness LLE has improved this morning. She notes the redness is mostly in the front lower leg but does extend around the circumference of the lower leg.  She used lymphedema pump once yesterday for 30\".  She does not recall this occurring previously when using lymphedema pump.  She had stopped using for a few weeks s/p podiatric surgery.  She feels like the sleeve is rubbing on her skin but she was advised to not use anything between the sleeve and the skin. No blisters/wounds present, no fever/chills. The redness is not warm to touch.  It is slightly tender touch but this is not new.  There is always some redness but this is more than usual.  She is going to take a photo and send via my chart but it is not as red now as it was yesterday. She is going to send photo via my chart, will forward once received.       Coretta Hines   to  [facility] (supporting Nicole Burleson PA-C)         5/30/24  3:18 AM  oh and currently the left does not feel warm, however .     thanks,  Coretta Hines   to  [facility] (supporting Nicole Burleson PA-C)         5/30/24  3:11 AM  good morning! I had held off doing the leg compressions until my left big toe healed more from two procedures for ingrown toenail healed. they were very painful for awhile.     after the first use of the compression mechabism , my left leg on the  bottom was redder.  it had been awhile.    I has been told not to put like a pillowcase or anything over the leg in the mechanism.  It almost seems like the plastic was rubbing the leg too much.      What do others usually do?      I put my stockings on once I get up in the morning and put them on until I go to bed. It could be 12+ hours sometimes. Is there an amount of time that might be too wrong? I know I have asked this before.      I have been using hot compresses, but also, of course taking the Eloquis and trying to elevate " more. I have an upcoming appointment.     Just realizing the left lower leg changes from more to less red and vica versa.     Thanks,  Coretta

## 2024-05-30 NOTE — TELEPHONE ENCOUNTER
Pt states that she did have a blister once before that has healed. Gave recommendations from SCARLETT Butterfield. Pt verbalized understanding and will call back if redness worsens.

## 2024-05-31 ENCOUNTER — PROCEDURE VISIT (OUTPATIENT)
Dept: OBGYN CLINIC | Facility: CLINIC | Age: 69
End: 2024-05-31
Payer: MEDICARE

## 2024-05-31 VITALS
HEART RATE: 77 BPM | SYSTOLIC BLOOD PRESSURE: 131 MMHG | WEIGHT: 222 LBS | DIASTOLIC BLOOD PRESSURE: 72 MMHG | BODY MASS INDEX: 39.34 KG/M2 | HEIGHT: 63 IN

## 2024-05-31 DIAGNOSIS — M17.12 PRIMARY OSTEOARTHRITIS OF LEFT KNEE: Primary | ICD-10-CM

## 2024-05-31 PROCEDURE — 20610 DRAIN/INJ JOINT/BURSA W/O US: CPT | Performed by: PHYSICIAN ASSISTANT

## 2024-05-31 RX ORDER — HYALURONATE SODIUM 10 MG/ML
20 SYRINGE (ML) INTRAARTICULAR
Status: COMPLETED | OUTPATIENT
Start: 2024-05-31 | End: 2024-05-31

## 2024-05-31 RX ADMIN — Medication 20 MG: at 10:30

## 2024-05-31 NOTE — PROGRESS NOTES
Orthopedics          Coretta Hines 68 y.o. female MRN: [id number]      Chief Complaint:   left knee pain    HPI:   68 y.o.female complaining of left knee pain.  She has been diagnosed with left knee pain due to arthritis she rates her pain anywhere from a 2-5 out of 10.  She presents today for second series of 3 left knee intra-articular Euflexxa injections.  She denies any change numbness or issues with her first injection 1 week ago.                Review Of Systems:   Skin: Normal  Neuro: See HPI  Musculoskeletal: See HPI  All other systems reviewed and are negative    Past Medical History:   Past Medical History:   Diagnosis Date    Allergic     Allergic rhinitis Decades    Arthritis     Asthma     Blindness of left eye     COPD (chronic obstructive pulmonary disease) ([facility])     Deep vein thrombosis ([facility]) March 3, 2023    cataract surgeries, detached retina surgeries leftveye    Diabetes mellitus (HCC)     GERD (gastroesophageal reflux disease)     Hypertension     Murmur, heart     Obesity     Visual impairment        Past Surgical History:   Past Surgical History:   Procedure Laterality Date     SECTION  10/16/1990    Fibroids    COLONOSCOPY      EYE SURGERY      MULTIPLE    HYSTERECTOMY  2000    JOINT REPLACEMENT  2016    right  knee    REPLACEMENT TOTAL KNEE Right        Family History:  Family history reviewed and non-contributory  Family History   Problem Relation Age of Onset    Dementia Mother     Arthritis Mother         Late in life    Hypertension Father         HBP    Heart disease Father         HBP    Diabetes Father         managed through diet    Hearing loss Father         Industrial/construction work with no hearing protection    Glaucoma Paternal Grandmother     Asthma Paternal Grandfather         Inhaler used         Social History:  Social History     Socioeconomic History    Marital status: /Civil Union     Spouse name: None    Number of children: None    Years of  education: None    Highest education level: None   Occupational History    None   Tobacco Use    Smoking status: Never    Smokeless tobacco: Never   Vaping Use    Vaping status: Never Used   Substance and Sexual Activity    Alcohol use: Yes     Alcohol/week: 2.0 - 3.0 standard drinks of alcohol     Types: 2 - 3 Standard drinks or equivalent per week     Comment: on occasion, 1 or 2 drinks with low sugar content    Drug use: Never    Sexual activity: None   Other Topics Concern    None   Social History Narrative    None     Social Determinants of Health     Financial Resource Strain: Low Risk  (9/17/2023)    Overall Financial Resource Strain (CARDIA)     Difficulty of Paying Living Expenses: Not hard at all   Food Insecurity: No Food Insecurity (9/13/2022)    [facility] [employer] [employer]    Food Insecurity     Within the past 12 months, you worried that your food would run out before you got the money to buy more.: Never true     Within the past 12 months, the food you bought just didn't last and you didn't have money to get more.: Never true     Within the past 12 months, you worried that your food would run out before you got the money to buy more.: Never true   Transportation Needs: No Transportation Needs (9/17/2023)    PRAPARE - Transportation     Lack of Transportation (Medical): No     Lack of Transportation (Non-Medical): No   Physical Activity: Not on file   Stress: Not on file   Social Connections: Not on file   Intimate Partner Violence: Not on file   Housing Stability: Not on file       Allergies:   Allergies   Allergen Reactions    Other Nausea Only, Vomiting and Cough     LOBSTER    Penicillins Seizures    Sulfa Antibiotics Rash       Labs:  0   Lab Value Date/Time    HCT 43.5 03/21/2024 0850    HCT 44.6 06/16/2023 1103    HCT 42.5 05/03/2023 1303    HGB 13.8 03/21/2024 0850    HGB 14.1 06/16/2023 1103    HGB 13.9 05/03/2023 1303    PT 12.2 03/30/2023 1428    INR 1.38 (H) 05/03/2023 1303     INR 0.9 03/30/2023 1428    WBC 7.52 03/21/2024 0850    WBC 8.36 06/16/2023 1103    WBC 9.70 05/03/2023 1303       Meds:    Current Outpatient Medications:     acetaminophen (Tylenol) 325 mg tablet, Take 650 mg by mouth every 6 (six) hours as needed for mild pain, Disp: , Rfl:     albuterol (2.5 mg/3 mL) 0.083 % nebulizer solution, Take 3 mL (2.5 mg total) by nebulization every 6 (six) hours as needed for wheezing or shortness of breath, Disp: 180 mL, Rfl: 2    albuterol (PROVENTIL HFA,VENTOLIN HFA) 90 mcg/act inhaler, Inhale 2 puffs every 6 (six) hours as needed for wheezing, Disp: 8.5 g, Rfl: 2    apixaban (Eliquis) 2.5 mg, Take 1 tablet (2.5 mg total) by mouth 2 (two) times a day, Disp: 60 tablet, Rfl: 5    Azelastine HCl 137 MCG/SPRAY SOLN, SPRAY 2 SPRAYS INTO EACH NOSTRIL 2 TIMES A DAY USE IN EACH NOSTRIL AS DIRECTED, Disp: 30 mL, Rfl: 3    bimatoprost (LUMIGAN) 0.01 % ophthalmic drops, , Disp: , Rfl:     Dextromethorphan-GG-APAP (Coricidin HBP Cold/Cough/Flu) -325 MG/15ML LIQD, Take 30 mL by mouth 3 (three) times a day as needed (cough, congestion), Disp: 355 mL, Rfl: 0    diltiazem (CARDIZEM CD) 240 mg 24 hr capsule, Take 1 capsule (240 mg total) by mouth daily, Disp: 90 capsule, Rfl: 3    dulaglutide (Trulicity) 3 MG/0.5ML injection, Inject 0.5 mL (3 mg total) under the skin every 7 days, Disp: 6 mL, Rfl: 1    famotidine (PEPCID) 20 mg tablet, Take 20 mg by mouth, Disp: , Rfl:     Fluticasone-Salmeterol (Advair) 500-50 mcg/dose inhaler, Inhale 1 puff every 12 (twelve) hours (Patient taking differently: Inhale 1 puff every 12 (twelve) hours Has not been using recently), Disp: 180 blister, Rfl: 3    loratadine (CLARITIN) 10 mg tablet, Take 10 mg by mouth daily, Disp: , Rfl:     metFORMIN (GLUCOPHAGE) 500 mg tablet, 1 tablet each morning and 2 tablets each evening, Disp: 270 tablet, Rfl: 3    montelukast (SINGULAIR) 10 mg tablet, TAKE 1 TABLET(10 MG) BY MOUTH DAILY AT BEDTIME, Disp: 30 tablet, Rfl: 5     omeprazole (PriLOSEC) 40 MG capsule, Take 1 capsule (40 mg total) by mouth daily, Disp: 90 capsule, Rfl: 2    rosuvastatin (CRESTOR) 10 MG tablet, Take 1 tablet (10 mg total) by mouth daily, Disp: 90 tablet, Rfl: 0    Timolol Maleate PF (Timolol Maleate Ocudose) 0.5 % SOLN, , Disp: , Rfl:     Body mass index is 39.33 kg/m².  Wt Readings from Last 3 Encounters:   05/31/24 101 kg (222 lb)   05/24/24 99.8 kg (220 lb)   05/23/24 99.8 kg (220 lb)     Physical Exam:   Vitals:    05/31/24 1111   BP: 131/72   Pulse: 77       General Appearance:    Alert, cooperative, no distress, appears stated age   Head:    Normocephalic, without obvious abnormality, atraumatic   Eyes:    conjunctiva/corneas clear, both eyes        Nose:   Nares normal, septum midline, no drainage    Throat:   Lips normal; teeth and gums normal   Neck:    symmetrical, trachea midline, ;     thyroid:  no enlargement/   Back:     Symmetric, no curvature, ROM normal   Lungs:   No audible wheezing or labored breathing   Chest Wall:    No tenderness or deformity    Heart:    Regular rate and rhythm               Pulses:   2+ and symmetric all extremities   Skin:   Skin color, texture, turgor normal, no rashes or lesions   Neurologic:   normal strength, sensation and reflexes     throughout       Musculoskeletal: left lower extremity  On examination of the left knee there is no effusion, no erythema.  Range of motion to full active extension and flexion to greater than 120°.  Pain on palpation medial and lateral joint lines.  There is crepitus with range of motion, no warmth to palpation, bony enlargement noted. No pain on palpation pes anserine bursa region or distal iliotibial band.  Stable to varus and valgus stress without pain or gapping.  Negative anterior and posterior drawer testing.  Sensation intact distal pulses present.    Large joint arthrocentesis: L knee  Universal Protocol:  Consent: Verbal consent obtained.  Consent given by: patient  Patient  understanding: patient states understanding of the procedure being performed  Site marked: the operative site was marked  Patient identity confirmed: verbally with patient  Supporting Documentation  Indications: pain   Procedure Details  Location: knee - L knee  Needle size: 22 G  Approach: superior  Medications administered: 20 mg Sodium Hyaluronate (Viscosup) 20 MG/2ML    Patient tolerance: patient tolerated the procedure well with no immediate complications         _*_*_*_*_*_*_*_*_*_*_*_*_*_*_*_*_*_*_*_*_*_*_*_*_*_*_*_*_*_*_*_*_*_*_*_*_*_*_*_*_*    Assessment:  68 y.o.female with left knee pain due to arthritis    Plan:   Weight bearing as tolerated  left lower extremity  Left knee intra-articular 2 and a series of 3 Euflexxa injections administered as noted above  Advised no significant activity or increased ambulation over the next 24-48 hours and warm compresses to the knee no greater 20 minutes 3-4 times 24 hours following the injection.  Patient advised should they develop any increasing pain, redness, drainage, numbness, tingling or swelling surrounding the injection sight, they are to contact our office or present to the emergency department.  Follow-up in 1 to 2 weeks time for repeat final left knee intra-articular Euflexxa injection        Arturo Holland PA-C                    .

## 2024-06-05 ENCOUNTER — TELEPHONE (OUTPATIENT)
Age: 69
End: 2024-06-05

## 2024-06-05 NOTE — TELEPHONE ENCOUNTER
I spoke with Coretta, she stated she is doing fine. She is going to keep the appt as this is her first time having a nail procedure and she double backed on her thought of cancelling. We'll see her tomorrow.

## 2024-06-05 NOTE — TELEPHONE ENCOUNTER
Caller: Patient     Doctor: Maris     Reason for call: Patient would like to know if she needs to keep her scheduled follow up for tomorrow 6/6/2024 @ 9AM ?     Please advise     Call back#: [phone]

## 2024-06-06 ENCOUNTER — OFFICE VISIT (OUTPATIENT)
Dept: PODIATRY | Facility: CLINIC | Age: 69
End: 2024-06-06
Payer: MEDICARE

## 2024-06-06 ENCOUNTER — OFFICE VISIT (OUTPATIENT)
Dept: PHYSICAL THERAPY | Facility: CLINIC | Age: 69
End: 2024-06-06
Payer: MEDICARE

## 2024-06-06 VITALS
DIASTOLIC BLOOD PRESSURE: 73 MMHG | HEART RATE: 75 BPM | HEIGHT: 63 IN | BODY MASS INDEX: 39.33 KG/M2 | SYSTOLIC BLOOD PRESSURE: 122 MMHG

## 2024-06-06 DIAGNOSIS — R26.2 DIFFICULTY WALKING: ICD-10-CM

## 2024-06-06 DIAGNOSIS — M25.562 LEFT KNEE PAIN, UNSPECIFIED CHRONICITY: Primary | ICD-10-CM

## 2024-06-06 DIAGNOSIS — L60.0 INGROWN NAIL OF GREAT TOE OF LEFT FOOT: Primary | ICD-10-CM

## 2024-06-06 DIAGNOSIS — B35.1 ONYCHOMYCOSIS: ICD-10-CM

## 2024-06-06 DIAGNOSIS — M17.12 PRIMARY OSTEOARTHRITIS OF LEFT KNEE: ICD-10-CM

## 2024-06-06 PROCEDURE — 97110 THERAPEUTIC EXERCISES: CPT

## 2024-06-06 PROCEDURE — 97112 NEUROMUSCULAR REEDUCATION: CPT

## 2024-06-06 PROCEDURE — 99212 OFFICE O/P EST SF 10 MIN: CPT | Performed by: PODIATRIST

## 2024-06-06 NOTE — PROGRESS NOTES
Name: Coretta Hines      : 1955      MRN: [id number]  Encounter Provider: Didier Pollock DPM  Encounter Date: 2024   Encounter department: [facility] PODIATRY [place]    Assessment & Plan     1. Ingrown nail of great toe of left foot  2. Onychomycosis      Patient good relief from the ingrown nail removal left great toe medial border.    If this recurs could consider a permanent phenol matrixectomy.    Return for reevaluation for routine foot checks as needed.    Return for As scheduled.    Subjective     Patient returns for follow-up on left great toe ingrowing nail medial border.  Patient is doing well at this point does not have any significant discomfort some mild pain to this area.  Denies any drainage or signs of infection.      Review of Systems   Constitutional:  Negative for chills and fever.   Respiratory:  Negative for chest tightness and shortness of breath.    Gastrointestinal:  Negative for nausea and vomiting.       Current Outpatient Medications on File Prior to Visit   Medication Sig   • acetaminophen (Tylenol) 325 mg tablet Take 650 mg by mouth every 6 (six) hours as needed for mild pain   • albuterol (2.5 mg/3 mL) 0.083 % nebulizer solution Take 3 mL (2.5 mg total) by nebulization every 6 (six) hours as needed for wheezing or shortness of breath   • albuterol (PROVENTIL HFA,VENTOLIN HFA) 90 mcg/act inhaler Inhale 2 puffs every 6 (six) hours as needed for wheezing   • apixaban (Eliquis) 2.5 mg Take 1 tablet (2.5 mg total) by mouth 2 (two) times a day   • Azelastine HCl 137 MCG/SPRAY SOLN SPRAY 2 SPRAYS INTO EACH NOSTRIL 2 TIMES A DAY USE IN EACH NOSTRIL AS DIRECTED   • bimatoprost (LUMIGAN) 0.01 % ophthalmic drops    • Dextromethorphan-GG-APAP (Coricidin HBP Cold/Cough/Flu) -325 MG/15ML LIQD Take 30 mL by mouth 3 (three) times a day as needed (cough, congestion)   • diltiazem (CARDIZEM CD) 240 mg 24 hr capsule Take 1 capsule (240 mg total) by mouth daily   •  "dulaglutide (Trulicity) 3 MG/0.5ML injection Inject 0.5 mL (3 mg total) under the skin every 7 days   • famotidine (PEPCID) 20 mg tablet Take 20 mg by mouth   • Fluticasone-Salmeterol (Advair) 500-50 mcg/dose inhaler Inhale 1 puff every 12 (twelve) hours (Patient taking differently: Inhale 1 puff every 12 (twelve) hours Has not been using recently)   • loratadine (CLARITIN) 10 mg tablet Take 10 mg by mouth daily   • metFORMIN (GLUCOPHAGE) 500 mg tablet 1 tablet each morning and 2 tablets each evening   • montelukast (SINGULAIR) 10 mg tablet TAKE 1 TABLET(10 MG) BY MOUTH DAILY AT BEDTIME   • omeprazole (PriLOSEC) 40 MG capsule Take 1 capsule (40 mg total) by mouth daily   • rosuvastatin (CRESTOR) 10 MG tablet Take 1 tablet (10 mg total) by mouth daily   • Timolol Maleate PF (Timolol Maleate Ocudose) 0.5 % SOLN        Objective     /73   Pulse 75   Ht 5' 3\" (1.6 m)   BMI 39.33 kg/m²     Physical Exam  Constitutional:       Appearance: Normal appearance.   Skin:     Comments: Left great toe ingrowing nail noted to the medial border has resolved.  There is some mild discomfort that remains however improvement from previous evaluation.  No ascending erythema noted neurovascular status is at baseline.   Neurological:      Mental Status: She is alert.       "

## 2024-06-06 NOTE — PROGRESS NOTES
Discharge     Today's date: 2024  Patient name: Coretta Hines  : 1955  MRN: [id number]  Referring provider: Nafisa Duckworth MD  Dx:   Encounter Diagnosis     ICD-10-CM    1. Left knee pain, unspecified chronicity  M25.562       2. Primary osteoarthritis of left knee  M17.12       3. Difficulty walking  R26.2           Start Time: 1130  Stop Time: 1200  Total time in clinic (min): 30 minutes    History of Present Illness  : Pt mentioned that her sx range between 1-3/10 and also reported a subjective improvement percentage of 75%. Pt reported that she felt comfortable being Dc'd today.     IE: Patient presents with c/o L knee pain. Pt reported that her sx started years ago. Pt reported having imaging showing L knee OA. Pt reported that her sx increase with walking and going up/down the steps. Pt also reported that her sx decrease with injections. Pt has a PMH of L retina surgeries, L knee arthroscopic surgery, and a R TKA. Pt also mention that she had a fall in , which led her to fx 2 ribs and her R orbital bone. Pt also mentioned that she was dx'd with a DVT on 2023. Pt has been taking medication and was cleared to perform skilled PT.      Occupation: retired     Pain  At best pain ratin/10  At worst pain rating: 3/10  Location: L knee    Social Support  Lives with her  in a 1 story with 1 FF with railings and a walk-in shower with seat.       Patient Goals  Patient goals for therapy: decrease pain with ADLs    STGs  1. Decrease pain by 20% in 2-4 weeks to improve standing tolerance.-Met  2. Improve L knee ROM by 10 degrees in 2-4 weeks.-Met  3. (I) with HEP within 2 weeks in order to improve PT outcomes.-Met     LTGs  1. Decrease pain by 60% in 6-8 weeks.-Partially Met  2. Improve walking tolerance to >30 minutes in 6-8 weeks to perform community ambulation. (Partially Met, 30 min with stops)  3. Increase L knee AROM WNL and LLE MMT 5/5 within 6-8 weeks. -Partially  "Met  4. Improve stairs tolerance to 1 flight  in 8 weeks. - Partially Met  5. Improve FOTO to greater than or equal to 48. -Met      Objective Measurements:    Lumbar ROM R L Strength knee R L   Flex    Flex 4+ 4+   Ext   Ext 5 4+   SB        Rot                Hip A/PROM        Flex  /  / Flex 5 4+   ER at 90   ER     IR at 90   IR     Abd   Abd     Ext   Ext             Knee A/PROM   Ankle     Flex - 120 deg DF     Ext - -2 deg PF       TU sec with SPC    Assessment:    Coretta Hines is a pleasant 68 y.o. female who has attended 53 visits of skilled PT for L knee AROM. Pt demonstrated fair progress toward goals since the start of skilled PT and reported a subjective improvement percentage of 75%. Pt demonstrated improvements in L knee AROM and LE strength. Pt scored a 56 on FOTO, demonstrating progress. Program was modified due to measurements being taken. Pt is being Dc'd at this time due to plateau in progress. Pt was educated on [facility], importance of performing HEP, and on contacting [facility] with any further questions.     Thank you for the referral!    Plan  Patient would benefit from:Skilled physical therapy  Planned therapy interventions: manual therapy, neuromuscular re-education, stretching, strengthening, therapeutic activities, therapeutic exercise, patient education, home exercise program, modalities to decrease pain, and activity modification.    DC  Treatment plan discussed with: patient  Precautions: Previous LLE Blood Clot, L eye blindness  Dx: L Knee OA    Daily Treatment Diary        Manuals 5/3 5/16 5/30 6/6  4/25    L Knee PROM         L gastroc/HS str         L patella mobs                  Ther Ex         Quad Set         Gastroc Str Wedge :30/3 Wedge 30\"x4 Wedge  30\"x4      SLR         Heel Slide         Seated HS str      15\"x4   Standing hip abd/ext 3# 20 ea 3#, 20x each 3#, 20x each 3#, 20x  3#, 20x each   Standing knee flexion  3# 20 3#, 20x 3#, 20x 3#, 20x  3# 20x   Seated HS str  " "       Standing knee flexion str on step :10/10 10\"x10 10\"x10   10\"x10   R. Bike (rocking)         Pt Edu    [facility], progress,   HEP  BP/  PCP   Neuro Re-ed         TKE with TB  BTB 5\"x10 BTB 5\"x10      Leg Press 60# 20x 60#, 20x 60# 20x 60#, 20x       Sidestepping with TB Blue 4 laps BTB 4 laps BTB 4 laps BTB 4 laps     LAQ  20x, 2# 20x, 3#   5\"x20   HS curl      RTB 10x   Ther Activity         minisquat 20x 20x 20x 20x  10x2   Step up  L1 20x        Step up and overs   L1, 20x L1,12x      HR on step  10x2, L1 10x2, L1      Romberg balance on foam         Modified tandem balance  30\" each 30\" each      STS         Gait Training                           Modalities         Ice MHP 10 MHP 10' MHP 10' NT                                             "

## 2024-06-10 ENCOUNTER — OFFICE VISIT (OUTPATIENT)
Dept: VASCULAR SURGERY | Facility: CLINIC | Age: 69
End: 2024-06-10
Payer: MEDICARE

## 2024-06-10 VITALS
BODY MASS INDEX: 40.04 KG/M2 | OXYGEN SATURATION: 95 % | DIASTOLIC BLOOD PRESSURE: 76 MMHG | RESPIRATION RATE: 18 BRPM | HEIGHT: 63 IN | WEIGHT: 226 LBS | HEART RATE: 77 BPM | SYSTOLIC BLOOD PRESSURE: 142 MMHG

## 2024-06-10 DIAGNOSIS — E66.01 CLASS 2 SEVERE OBESITY WITH SERIOUS COMORBIDITY AND BODY MASS INDEX (BMI) OF 35.0 TO 35.9 IN ADULT, UNSPECIFIED OBESITY TYPE (HCC): ICD-10-CM

## 2024-06-10 DIAGNOSIS — I87.2 VENOUS INSUFFICIENCY OF LEFT LOWER EXTREMITY: ICD-10-CM

## 2024-06-10 DIAGNOSIS — I87.2 EDEMA OF BOTH LOWER EXTREMITIES DUE TO PERIPHERAL VENOUS INSUFFICIENCY: Primary | ICD-10-CM

## 2024-06-10 DIAGNOSIS — I82.551 CHRONIC DEEP VEIN THROMBOSIS (DVT) OF RIGHT PERONEAL VEIN (HCC): ICD-10-CM

## 2024-06-10 DIAGNOSIS — E11.65 TYPE 2 DIABETES MELLITUS WITH HYPERGLYCEMIA, WITHOUT LONG-TERM CURRENT USE OF INSULIN (HCC): ICD-10-CM

## 2024-06-10 DIAGNOSIS — I82.552 CHRONIC DEEP VEIN THROMBOSIS (DVT) OF LEFT PERONEAL VEIN (HCC): ICD-10-CM

## 2024-06-10 PROCEDURE — 99213 OFFICE O/P EST LOW 20 MIN: CPT | Performed by: PHYSICIAN ASSISTANT

## 2024-06-10 NOTE — PROGRESS NOTES
Ambulatory Visit  Name: Coretta Hines      : 1955      MRN: [id number]  Encounter Provider: Nicole Burleson PA-C  Encounter Date: 6/10/2024   Encounter department: [facility] [place]    Assessment & Plan   1. Edema of both lower extremities due to peripheral venous insufficiency  Assessment & Plan:  -Follow up for venous insufficiency/ secondary lymphedema/ LE cellulitis  -Doing well; edema better controlled; no infection, drainage or wounds  -Compliant with compression stockings and using her lymph pumps 60' /d  -Exam: Trace RLE and mild LLE edema; mild LLE inflammatory changes; no heat, redness, weeping or evidence of infection    Plan:  -Overall stable B LE edema 2° venous insufficiency, Hx DVT (?due to leg swelling) and obesity  -Continue with medical compression stockings daily  -Continue with lymph pumps 60 minutes daily at night  -Continue with compression, periodic elevation, low Na, exercise, wt loss and skin care  -Thrombosis panel negative; f/u hematology  -She remains on apixaban 2.5 BID due to Hx DVT; prefers low dose a/c; f/u with hematology ? Apixaban 2.5 BID v asa 81  -Evidence of deep and superficial incompetence; GSV with 3-4 second reflux, but < 5 mm  -Consider repeat venous insufficiency study in the future if symptomatic  -Patient education regarding venous insufficieny provided  -Follow up in 1 year, but discussed reason to be seen sooner (increased edema, infection, etc)   2. Venous insufficiency of left lower extremity  3. Class 2 severe obesity with serious comorbidity and body mass index (BMI) of 35.0 to 35.9 in adult, unspecified obesity type (HCC)  4. Type 2 diabetes mellitus with hyperglycemia, without long-term current use of insulin (HCC)  5. Chronic deep vein thrombosis (DVT) of right peroneal vein (HCC)  6. Chronic deep vein thrombosis (DVT) of left peroneal vein (HCC)      History of Present Illness     Patient presents to the office for a 3 month check on  "lymphedema. Pt does use compression pumps for 30 minutes/ day. Pt does use compression stockings and elevates her legs.     Coretta Hines is a 68 y.o. female obesity (BMP 39), COPD, Hx R TKR, L knee OA, hypertension, DM, L calf DVT 3/2023 who returns for vascular follow up.      She was initially seen in 2023 after L DVT in 1 of 2 peroneal veins on duplex discovered at [facility]. She was placed on full anticoagulation. After DVT, she developed increased leg/calf pain, cramping, edema and redness.  She has been recommended for conservative measures with medical compression stockings, periodic elevation, low sodium and exercise. Additionally we were able to get her lymphedema pumps. A venous reflux study performed 8/2023 is significant for deep and superficial reflux. No history of malignancy or rheumatologic disorders.  She has no large, bulky varicose veins.      She is on DVT prophylaxis with apixaban 2.5 twice daily with plans to follow-up with hematology.         6/10/24:  Doing well. No issues. Compliant with compression stockings and pumps. No recurrent infection. Motivated to walk, exercise and work on A1c (7.5) since she would like to have L TKR.     Reviewed history, prior imaging and patient education regarding venous insufficiency provided.  She should follow up with hematology.       Review of Systems   Constitutional: Negative.    HENT: Negative.     Eyes: Negative.    Respiratory: Negative.     Cardiovascular:  Positive for leg swelling.   Endocrine: Negative.    Genitourinary: Negative.    Musculoskeletal:  Positive for arthralgias and gait problem.   Skin:  Positive for color change.   Allergic/Immunologic: Negative.    Neurological:  Negative for weakness and numbness.   Hematological: Negative.    Psychiatric/Behavioral: Negative.       Objective     /76 (BP Location: Left arm, Patient Position: Sitting)   Pulse 77   Resp 18   Ht 5' 3\" (1.6 m)   Wt 103 kg (226 lb)   SpO2 95%   BMI 40.03 kg/m² " "    Trace to Mild B LE edema L>R with mild inflammatory changes  No ankle or pedal edema.  No evidence of infection      Physical Exam  Vitals and nursing note reviewed.   Constitutional:       Appearance: She is well-developed. She is obese.   HENT:      Head: Normocephalic and atraumatic.   Eyes:      Pupils: Pupils are equal, round, and reactive to light.   Neck:      Vascular: No JVD.   Cardiovascular:      Rate and Rhythm: Normal rate and regular rhythm.      Pulses:           Carotid pulses are 2+ on the right side and 2+ on the left side.       Radial pulses are 2+ on the right side and 2+ on the left side.        Dorsalis pedis pulses are 1+ on the right side and 1+ on the left side.      Heart sounds: No murmur heard.  Pulmonary:      Effort: Pulmonary effort is normal. No accessory muscle usage or respiratory distress.   Abdominal:      General: Bowel sounds are normal. There is no distension.      Palpations: Abdomen is soft.      Tenderness: There is no abdominal tenderness.   Musculoskeletal:         General: No deformity. Normal range of motion.      Right lower leg: Edema present.      Left lower leg: Edema present.   Skin:     General: Skin is warm and dry.      Findings: No lesion or rash.      Nails: There is no clubbing.   Neurological:      Mental Status: She is alert and oriented to person, place, and time.      Comments: Grossly normal    Psychiatric:         Behavior: Behavior is cooperative.         Administrative Statements         I have reviewed and made appropriate changes to the review of systems input by the medical assistant.    Vitals:    06/10/24 0826   BP: 142/76   BP Location: Left arm   Patient Position: Sitting   Pulse: 77   Resp: 18   SpO2: 95%   Weight: 103 kg (226 lb)   Height: 5' 3\" (1.6 m)       Patient Active Problem List   Diagnosis    Chronic deep vein thrombosis (DVT) of right peroneal vein (HCC)    Type 2 diabetes mellitus with hyperglycemia, without long-term current " use of insulin ([facility])    Class 2 severe obesity with serious comorbidity in adult (HCC)    Left retinal disorder    Dyslipidemia    Primary hypertension    GERD (gastroesophageal reflux disease)    Moderate persistent asthma without complication    Obesity hypoventilation syndrome (HCC)    DEN (obstructive sleep apnea)    Osteoarthritis    Seasonal allergic rhinitis due to fungal spores    Edema of both lower extremities due to peripheral venous insufficiency    History of colon polyps    FH: total knee replacement    Venous insufficiency of left lower extremity    Chronic deep vein thrombosis (DVT) of left peroneal vein ([facility])    Sleep disturbance    Redness and swelling of lower leg    Bilateral carpal tunnel syndrome       Past Surgical History:   Procedure Laterality Date     SECTION  10/16/1990    Fibroids    COLONOSCOPY      EYE SURGERY      MULTIPLE    HYSTERECTOMY  2000    JOINT REPLACEMENT  2016    right  knee    REPLACEMENT TOTAL KNEE Right        Family History   Problem Relation Age of Onset    Dementia Mother     Arthritis Mother         Late in life    Hypertension Father         HBP    Heart disease Father         HBP    Diabetes Father         managed through diet    Hearing loss Father         Industrial/construction work with no hearing protection    Glaucoma Paternal Grandmother     Asthma Paternal Grandfather         Inhaler used       Social History     Socioeconomic History    Marital status: /Civil Union     Spouse name: Not on file    Number of children: Not on file    Years of education: Not on file    Highest education level: Not on file   Occupational History    Not on file   Tobacco Use    Smoking status: Never    Smokeless tobacco: Never   Vaping Use    Vaping status: Never Used   Substance and Sexual Activity    Alcohol use: Yes     Alcohol/week: 2.0 - 3.0 standard drinks of alcohol     Types: 2 - 3 Standard drinks or equivalent per week     Comment: on occasion, 1 or 2  drinks with low sugar content    Drug use: Never    Sexual activity: Not on file   Other Topics Concern    Not on file   Social History Narrative    Not on file     Social Determinants of Health     Financial Resource Strain: Low Risk  (9/17/2023)    Overall Financial Resource Strain (CARDIA)     Difficulty of Paying Living Expenses: Not hard at all   Food Insecurity: No Food Insecurity (9/13/2022)    [facility]    Food Insecurity     Within the past 12 months, you worried that your food would run out before you got the money to buy more.: Never true     Within the past 12 months, the food you bought just didn't last and you didn't have money to get more.: Never true     Within the past 12 months, you worried that your food would run out before you got the money to buy more.: Never true   Transportation Needs: No Transportation Needs (9/17/2023)    PRAPARE - Transportation     Lack of Transportation (Medical): No     Lack of Transportation (Non-Medical): No   Physical Activity: Not on file   Stress: Not on file   Social Connections: Not on file   Intimate Partner Violence: Not on file   Housing Stability: Not on file       Allergies   Allergen Reactions    Other Nausea Only, Vomiting and Cough     LOBSTER    Penicillins Seizures    Sulfa Antibiotics Rash         Current Outpatient Medications:     acetaminophen (Tylenol) 325 mg tablet, Take 650 mg by mouth every 6 (six) hours as needed for mild pain, Disp: , Rfl:     albuterol (2.5 mg/3 mL) 0.083 % nebulizer solution, Take 3 mL (2.5 mg total) by nebulization every 6 (six) hours as needed for wheezing or shortness of breath, Disp: 180 mL, Rfl: 2    albuterol (PROVENTIL HFA,VENTOLIN HFA) 90 mcg/act inhaler, Inhale 2 puffs every 6 (six) hours as needed for wheezing, Disp: 8.5 g, Rfl: 2    apixaban (Eliquis) 2.5 mg, Take 1 tablet (2.5 mg total) by mouth 2 (two) times a day, Disp: 60 tablet, Rfl: 5    Azelastine HCl 137 MCG/SPRAY SOLN,  SPRAY 2 SPRAYS INTO EACH NOSTRIL 2 TIMES A DAY USE IN EACH NOSTRIL AS DIRECTED, Disp: 30 mL, Rfl: 3    bimatoprost (LUMIGAN) 0.01 % ophthalmic drops, , Disp: , Rfl:     Dextromethorphan-GG-APAP (Coricidin HBP Cold/Cough/Flu) -325 MG/15ML LIQD, Take 30 mL by mouth 3 (three) times a day as needed (cough, congestion), Disp: 355 mL, Rfl: 0    diltiazem (CARDIZEM CD) 240 mg 24 hr capsule, Take 1 capsule (240 mg total) by mouth daily, Disp: 90 capsule, Rfl: 3    dulaglutide (Trulicity) 3 MG/0.5ML injection, Inject 0.5 mL (3 mg total) under the skin every 7 days, Disp: 6 mL, Rfl: 1    famotidine (PEPCID) 20 mg tablet, Take 20 mg by mouth, Disp: , Rfl:     Fluticasone-Salmeterol (Advair) 500-50 mcg/dose inhaler, Inhale 1 puff every 12 (twelve) hours (Patient taking differently: Inhale 1 puff every 12 (twelve) hours Has not been using recently), Disp: 180 blister, Rfl: 3    loratadine (CLARITIN) 10 mg tablet, Take 10 mg by mouth daily, Disp: , Rfl:     metFORMIN (GLUCOPHAGE) 500 mg tablet, 1 tablet each morning and 2 tablets each evening, Disp: 270 tablet, Rfl: 3    montelukast (SINGULAIR) 10 mg tablet, TAKE 1 TABLET(10 MG) BY MOUTH DAILY AT BEDTIME, Disp: 30 tablet, Rfl: 5    omeprazole (PriLOSEC) 40 MG capsule, Take 1 capsule (40 mg total) by mouth daily, Disp: 90 capsule, Rfl: 2    rosuvastatin (CRESTOR) 10 MG tablet, Take 1 tablet (10 mg total) by mouth daily, Disp: 90 tablet, Rfl: 0    Timolol Maleate PF (Timolol Maleate Ocudose) 0.5 % SOLN, , Disp: , Rfl:

## 2024-06-10 NOTE — ASSESSMENT & PLAN NOTE
-Follow up for venous insufficiency/ secondary lymphedema/ LE cellulitis  -Doing well; edema better controlled; no infection, drainage or wounds  -Compliant with compression stockings and using her lymph pumps 60' /d  -Exam: Trace RLE and mild LLE edema; mild LLE inflammatory changes; no heat, redness, weeping or evidence of infection    Plan:  -Overall stable B LE edema 2° venous insufficiency, Hx DVT (?due to leg swelling) and obesity  -Continue with medical compression stockings daily  -Continue with lymph pumps 60 minutes daily at night  -Continue with compression, periodic elevation, low Na, exercise, wt loss and skin care  -Thrombosis panel negative; f/u hematology  -She remains on apixaban 2.5 BID due to Hx DVT; prefers low dose a/c; f/u with hematology ? Apixaban 2.5 BID v asa 81  -Evidence of deep and superficial incompetence; GSV with 3-4 second reflux, but < 5 mm  -Consider repeat venous insufficiency study in the future if symptomatic  -Patient education regarding venous insufficieny provided  -Follow up in 1 year, but discussed reason to be seen sooner (increased edema, infection, etc)   
 used

## 2024-06-14 ENCOUNTER — PROCEDURE VISIT (OUTPATIENT)
Dept: OBGYN CLINIC | Facility: CLINIC | Age: 69
End: 2024-06-14
Payer: MEDICARE

## 2024-06-14 VITALS — WEIGHT: 226 LBS | HEIGHT: 63 IN | BODY MASS INDEX: 40.04 KG/M2

## 2024-06-14 DIAGNOSIS — M17.12 PRIMARY OSTEOARTHRITIS OF LEFT KNEE: Primary | ICD-10-CM

## 2024-06-14 PROCEDURE — 20610 DRAIN/INJ JOINT/BURSA W/O US: CPT | Performed by: PHYSICIAN ASSISTANT

## 2024-06-14 RX ORDER — HYALURONATE SODIUM 10 MG/ML
20 SYRINGE (ML) INTRAARTICULAR
Status: COMPLETED | OUTPATIENT
Start: 2024-06-14 | End: 2024-06-14

## 2024-06-14 RX ADMIN — Medication 20 MG: at 11:00

## 2024-06-14 NOTE — PROGRESS NOTES
Orthopedics          Coretta Hines 68 y.o. female MRN: [id number]      Chief Complaint:   left knee pain    HPI:   68 y.o.female complaining of left knee pain.  She presents off today regarding left knee pain due to arthritis.  She presents for her third and final Euflexxa injection left knee.  Denies any issues regarding her left knee following her previous injections ambulates with assistance of a cane states pain is aching nature localized her left knee worse weightbearing mildly relieved with rest.                Review Of Systems:   Skin: Normal  Neuro: See HPI  Musculoskeletal: See HPI  All other systems reviewed and are negative    Past Medical History:   Past Medical History:   Diagnosis Date    Allergic     Allergic rhinitis Decades    Arthritis     Asthma     Blindness of left eye     COPD (chronic obstructive pulmonary disease) ([facility])     Deep vein thrombosis ([facility]) March 3, 2023    cataract surgeries, detached retina surgeries leftveye    Diabetes mellitus (HCC)     GERD (gastroesophageal reflux disease)     Hypertension     Murmur, heart     Obesity     Visual impairment        Past Surgical History:   Past Surgical History:   Procedure Laterality Date     SECTION  10/16/1990    Fibroids    COLONOSCOPY      EYE SURGERY      MULTIPLE    HYSTERECTOMY      JOINT REPLACEMENT  2016    right  knee    REPLACEMENT TOTAL KNEE Right        Family History:  Family history reviewed and non-contributory  Family History   Problem Relation Age of Onset    Dementia Mother     Arthritis Mother         Late in life    Hypertension Father         HBP    Heart disease Father         HBP    Diabetes Father         managed through diet    Hearing loss Father         Industrial/construction work with no hearing protection    Glaucoma Paternal Grandmother     Asthma Paternal Grandfather         Inhaler used         Social History:  Social History     Socioeconomic History    Marital status: /Civil Union      Spouse name: None    Number of children: None    Years of education: None    Highest education level: None   Occupational History    None   Tobacco Use    Smoking status: Never    Smokeless tobacco: Never   Vaping Use    Vaping status: Never Used   Substance and Sexual Activity    Alcohol use: Yes     Alcohol/week: 2.0 - 3.0 standard drinks of alcohol     Types: 2 - 3 Standard drinks or equivalent per week     Comment: on occasion, 1 or 2 drinks with low sugar content    Drug use: Never    Sexual activity: None   Other Topics Concern    None   Social History Narrative    None     Social Determinants of Health     Financial Resource Strain: Low Risk  (9/17/2023)    Overall Financial Resource Strain (CARDIA)     Difficulty of Paying Living Expenses: Not hard at all   Food Insecurity: No Food Insecurity (9/13/2022)    [facility]    Food Insecurity     Within the past 12 months, you worried that your food would run out before you got the money to buy more.: Never true     Within the past 12 months, the food you bought just didn't last and you didn't have money to get more.: Never true     Within the past 12 months, you worried that your food would run out before you got the money to buy more.: Never true   Transportation Needs: No Transportation Needs (9/17/2023)    PRAPARE - Transportation     Lack of Transportation (Medical): No     Lack of Transportation (Non-Medical): No   Physical Activity: Not on file   Stress: Not on file   Social Connections: Not on file   Intimate Partner Violence: Not on file   Housing Stability: Not on file       Allergies:   Allergies   Allergen Reactions    Other Nausea Only, Vomiting and Cough     LOBSTER    Penicillins Seizures    Sulfa Antibiotics Rash       Labs:  0   Lab Value Date/Time    HCT 43.5 03/21/2024 0850    HCT 44.6 06/16/2023 1103    HCT 42.5 05/03/2023 1303    HGB 13.8 03/21/2024 0850    HGB 14.1 06/16/2023 1103    HGB 13.9 05/03/2023 1303     PT 12.2 03/30/2023 1428    INR 1.38 (H) 05/03/2023 1303    INR 0.9 03/30/2023 1428    WBC 7.52 03/21/2024 0850    WBC 8.36 06/16/2023 1103    WBC 9.70 05/03/2023 1303       Meds:    Current Outpatient Medications:     acetaminophen (Tylenol) 325 mg tablet, Take 650 mg by mouth every 6 (six) hours as needed for mild pain, Disp: , Rfl:     albuterol (2.5 mg/3 mL) 0.083 % nebulizer solution, Take 3 mL (2.5 mg total) by nebulization every 6 (six) hours as needed for wheezing or shortness of breath, Disp: 180 mL, Rfl: 2    albuterol (PROVENTIL HFA,VENTOLIN HFA) 90 mcg/act inhaler, Inhale 2 puffs every 6 (six) hours as needed for wheezing, Disp: 8.5 g, Rfl: 2    apixaban (Eliquis) 2.5 mg, Take 1 tablet (2.5 mg total) by mouth 2 (two) times a day, Disp: 60 tablet, Rfl: 5    Azelastine HCl 137 MCG/SPRAY SOLN, SPRAY 2 SPRAYS INTO EACH NOSTRIL 2 TIMES A DAY USE IN EACH NOSTRIL AS DIRECTED, Disp: 30 mL, Rfl: 3    bimatoprost (LUMIGAN) 0.01 % ophthalmic drops, , Disp: , Rfl:     Dextromethorphan-GG-APAP (Coricidin HBP Cold/Cough/Flu) -325 MG/15ML LIQD, Take 30 mL by mouth 3 (three) times a day as needed (cough, congestion), Disp: 355 mL, Rfl: 0    diltiazem (CARDIZEM CD) 240 mg 24 hr capsule, Take 1 capsule (240 mg total) by mouth daily, Disp: 90 capsule, Rfl: 3    dulaglutide (Trulicity) 3 MG/0.5ML injection, Inject 0.5 mL (3 mg total) under the skin every 7 days, Disp: 6 mL, Rfl: 1    famotidine (PEPCID) 20 mg tablet, Take 20 mg by mouth, Disp: , Rfl:     Fluticasone-Salmeterol (Advair) 500-50 mcg/dose inhaler, Inhale 1 puff every 12 (twelve) hours (Patient taking differently: Inhale 1 puff every 12 (twelve) hours Has not been using recently), Disp: 180 blister, Rfl: 3    loratadine (CLARITIN) 10 mg tablet, Take 10 mg by mouth daily, Disp: , Rfl:     metFORMIN (GLUCOPHAGE) 500 mg tablet, 1 tablet each morning and 2 tablets each evening, Disp: 270 tablet, Rfl: 3    montelukast (SINGULAIR) 10 mg tablet, TAKE 1 TABLET(10  MG) BY MOUTH DAILY AT BEDTIME, Disp: 30 tablet, Rfl: 5    omeprazole (PriLOSEC) 40 MG capsule, Take 1 capsule (40 mg total) by mouth daily, Disp: 90 capsule, Rfl: 2    rosuvastatin (CRESTOR) 10 MG tablet, Take 1 tablet (10 mg total) by mouth daily, Disp: 90 tablet, Rfl: 0    Timolol Maleate PF (Timolol Maleate Ocudose) 0.5 % SOLN, , Disp: , Rfl:     Body mass index is 40.03 kg/m².  Wt Readings from Last 3 Encounters:   06/14/24 103 kg (226 lb)   06/10/24 103 kg (226 lb)   05/31/24 101 kg (222 lb)     Physical Exam:   There were no vitals filed for this visit.    General Appearance:    Alert, cooperative, no distress, appears stated age   Head:    Normocephalic, without obvious abnormality, atraumatic   Eyes:    conjunctiva/corneas clear, both eyes        Nose:   Nares normal, septum midline, no drainage    Throat:   Lips normal; teeth and gums normal   Neck:    symmetrical, trachea midline, ;     thyroid:  no enlargement/   Back:     Symmetric, no curvature, ROM normal   Lungs:   No audible wheezing or labored breathing   Chest Wall:    No tenderness or deformity    Heart:    Regular rate and rhythm               Pulses:   2+ and symmetric all extremities   Skin:   Skin color, texture, turgor normal, no rashes or lesions   Neurologic:   normal strength, sensation and reflexes     throughout       Musculoskeletal: left lower extremity  On examination of the left knee there is no effusion, no erythema.  Range of motion to full active extension and flexion to greater than 120°.  Pain on palpation medial and lateral joint lines.  There is crepitus with range of motion, no warmth to palpation, bony enlargement noted. No pain on palpation pes anserine bursa region or distal iliotibial band.  Stable to varus and valgus stress without pain or gapping.  Negative anterior and posterior drawer testing.  Sensation intact distal pulses present.    Large joint arthrocentesis: L knee  Universal Protocol:  Consent: Verbal consent  obtained.  Consent given by: patient  Patient understanding: patient states understanding of the procedure being performed  Site marked: the operative site was marked  Patient identity confirmed: verbally with patient  Supporting Documentation  Indications: pain   Procedure Details  Location: knee - L knee  Needle size: 22 G  Approach: superior  Medications administered: 20 mg Sodium Hyaluronate (Viscosup) 20 MG/2ML    Patient tolerance: patient tolerated the procedure well with no immediate complications        _*_*_*_*_*_*_*_*_*_*_*_*_*_*_*_*_*_*_*_*_*_*_*_*_*_*_*_*_*_*_*_*_*_*_*_*_*_*_*_*_*    Assessment:  68 y.o.female with left knee pain due to arthritis    Plan:   Weight bearing as tolerated  left lower extremity  Left knee intra-articular Euflexxa 3 and a series of 3 administered as noted above  Advised no significant activity or increased ambulation over the next 24-48 hours and warm compresses to the knee no greater 20 minutes 3-4 times 24 hours following the injection.  Patient advised should they develop any increasing pain, redness, drainage, numbness, tingling or swelling surrounding the injection sight, they are to contact our office or present to the emergency department.  Follow up in 3 months or sooner if needed      Arturo Holland PA-C                    .

## 2024-06-15 DIAGNOSIS — E78.5 DYSLIPIDEMIA: ICD-10-CM

## 2024-06-15 RX ORDER — ROSUVASTATIN CALCIUM 10 MG/1
10 TABLET, COATED ORAL DAILY
Qty: 90 TABLET | Refills: 1 | Status: SHIPPED | OUTPATIENT
Start: 2024-06-15

## 2024-06-17 ENCOUNTER — OFFICE VISIT (OUTPATIENT)
Dept: OBGYN CLINIC | Facility: CLINIC | Age: 69
End: 2024-06-17
Payer: MEDICARE

## 2024-06-17 VITALS — HEIGHT: 63 IN | WEIGHT: 226 LBS | BODY MASS INDEX: 40.04 KG/M2

## 2024-06-17 DIAGNOSIS — M65.331 TRIGGER FINGER, RIGHT MIDDLE FINGER: ICD-10-CM

## 2024-06-17 DIAGNOSIS — M65.311 TRIGGER THUMB OF RIGHT HAND: Primary | ICD-10-CM

## 2024-06-17 PROCEDURE — 99214 OFFICE O/P EST MOD 30 MIN: CPT | Performed by: STUDENT IN AN ORGANIZED HEALTH CARE EDUCATION/TRAINING PROGRAM

## 2024-06-17 PROCEDURE — 20550 NJX 1 TENDON SHEATH/LIGAMENT: CPT | Performed by: STUDENT IN AN ORGANIZED HEALTH CARE EDUCATION/TRAINING PROGRAM

## 2024-06-17 RX ORDER — BETAMETHASONE SODIUM PHOSPHATE AND BETAMETHASONE ACETATE 3; 3 MG/ML; MG/ML
6 INJECTION, SUSPENSION INTRA-ARTICULAR; INTRALESIONAL; INTRAMUSCULAR; SOFT TISSUE
Status: COMPLETED | OUTPATIENT
Start: 2024-06-17 | End: 2024-06-17

## 2024-06-17 RX ORDER — ROPIVACAINE HYDROCHLORIDE 5 MG/ML
1 INJECTION, SOLUTION EPIDURAL; INFILTRATION; PERINEURAL
Status: COMPLETED | OUTPATIENT
Start: 2024-06-17 | End: 2024-06-17

## 2024-06-17 RX ADMIN — BETAMETHASONE SODIUM PHOSPHATE AND BETAMETHASONE ACETATE 6 MG: 3; 3 INJECTION, SUSPENSION INTRA-ARTICULAR; INTRALESIONAL; INTRAMUSCULAR; SOFT TISSUE at 08:45

## 2024-06-17 RX ADMIN — ROPIVACAINE HYDROCHLORIDE 1 ML: 5 INJECTION, SOLUTION EPIDURAL; INFILTRATION; PERINEURAL at 08:45

## 2024-06-17 NOTE — PROGRESS NOTES
ORTHOPAEDIC HAND, WRIST, AND ELBOW OFFICE  VISIT      ASSESSMENT/PLAN:      Diagnoses and all orders for this visit:    Trigger thumb of right hand  -     Hand/upper extremity injection: R thumb A1  -     ropivacaine (NAROPIN) 0.5 % injection 1 mL  -     betamethasone acetate-betamethasone sodium phosphate (CELESTONE) injection 6 mg    Trigger finger, right middle finger  -     Hand/upper extremity injection: R long A1  -     ropivacaine (NAROPIN) 0.5 % injection 1 mL  -     betamethasone acetate-betamethasone sodium phosphate (CELESTONE) injection 6 mg          68 y.o. female with right thumb and long TF    Continued non operative versus surgical intervention was discussed. The patient elected to proceed with non operative treatment options. The patient consented and underwent a 3rd right thumb and 2nd right long finger trigger finger injections in the office today without any complications. We did discuss possible surgical intervention if needed in the future. The patient is scheduled to undergo left carpal tunnel release in September.       Follow Up:        To Do Next Visit:             Sarath Jalloh MD  Attending, Orthopaedic Surgery  Hand, Wrist, and Elbow Surgery  [facility]    ______________________________________________________________________________________________    CHIEF COMPLAINT:  Chief Complaint   Patient presents with   • Right Thumb - Follow-up       SUBJECTIVE:  Patient is a 68 y.o. RHD female who presents today for follow up of right trigger thumb. The patient underwent a 2nd steroid injection at her last visit on 5/14/24 which she states did not provide her with much relief. She notes increasing pain. She notes difficulty with ADLs. She also states her right long finger is starting to lock. We did inject the long finger once previously. The patient is scheduled to undergo left carpal tunnel release on 9/12/24.     Occupation: retired       I have personally reviewed  all the relevant PMH, PSH, SH, FH, Medications and allergies      PAST MEDICAL HISTORY:  Past Medical History:   Diagnosis Date   • Allergic    • Allergic rhinitis Decades   • Arthritis    • Asthma    • Blindness of left eye    • COPD (chronic obstructive pulmonary disease) ([facility])    • Deep vein thrombosis ([facility]) March 3, 2023    cataract surgeries, detached retina surgeries leftveye   • Diabetes mellitus ([facility])    • GERD (gastroesophageal reflux disease)    • Hypertension    • Murmur, heart    • Obesity    • Visual impairment        PAST SURGICAL HISTORY:  Past Surgical History:   Procedure Laterality Date   •  SECTION  10/16/1990    Fibroids   • COLONOSCOPY     • EYE SURGERY      MULTIPLE   • HYSTERECTOMY     • JOINT REPLACEMENT  2016    right  knee   • REPLACEMENT TOTAL KNEE Right        FAMILY HISTORY:  Family History   Problem Relation Age of Onset   • Dementia Mother    • Arthritis Mother         Late in life   • Hypertension Father         HBP   • Heart disease Father         HBP   • Diabetes Father         managed through diet   • Hearing loss Father         Industrial/construction work with no hearing protection   • Glaucoma Paternal Grandmother    • Asthma Paternal Grandfather         Inhaler used       SOCIAL HISTORY:  Social History     Tobacco Use   • Smoking status: Never   • Smokeless tobacco: Never   Vaping Use   • Vaping status: Never Used   Substance Use Topics   • Alcohol use: Yes     Alcohol/week: 2.0 - 3.0 standard drinks of alcohol     Types: 2 - 3 Standard drinks or equivalent per week     Comment: on occasion, 1 or 2 drinks with low sugar content   • Drug use: Never       MEDICATIONS:    Current Outpatient Medications:   •  acetaminophen (Tylenol) 325 mg tablet, Take 650 mg by mouth every 6 (six) hours as needed for mild pain, Disp: , Rfl:   •  albuterol (2.5 mg/3 mL) 0.083 % nebulizer solution, Take 3 mL (2.5 mg total) by nebulization every 6 (six) hours as needed for wheezing or  shortness of breath, Disp: 180 mL, Rfl: 2  •  albuterol (PROVENTIL HFA,VENTOLIN HFA) 90 mcg/act inhaler, Inhale 2 puffs every 6 (six) hours as needed for wheezing, Disp: 8.5 g, Rfl: 2  •  apixaban (Eliquis) 2.5 mg, Take 1 tablet (2.5 mg total) by mouth 2 (two) times a day, Disp: 60 tablet, Rfl: 5  •  Azelastine HCl 137 MCG/SPRAY SOLN, SPRAY 2 SPRAYS INTO EACH NOSTRIL 2 TIMES A DAY USE IN EACH NOSTRIL AS DIRECTED, Disp: 30 mL, Rfl: 3  •  bimatoprost (LUMIGAN) 0.01 % ophthalmic drops, , Disp: , Rfl:   •  Dextromethorphan-GG-APAP (Coricidin HBP Cold/Cough/Flu) -325 MG/15ML LIQD, Take 30 mL by mouth 3 (three) times a day as needed (cough, congestion), Disp: 355 mL, Rfl: 0  •  diltiazem (CARDIZEM CD) 240 mg 24 hr capsule, Take 1 capsule (240 mg total) by mouth daily, Disp: 90 capsule, Rfl: 3  •  dulaglutide (Trulicity) 3 MG/0.5ML injection, Inject 0.5 mL (3 mg total) under the skin every 7 days, Disp: 6 mL, Rfl: 1  •  famotidine (PEPCID) 20 mg tablet, Take 20 mg by mouth, Disp: , Rfl:   •  Fluticasone-Salmeterol (Advair) 500-50 mcg/dose inhaler, Inhale 1 puff every 12 (twelve) hours (Patient taking differently: Inhale 1 puff every 12 (twelve) hours Has not been using recently), Disp: 180 blister, Rfl: 3  •  loratadine (CLARITIN) 10 mg tablet, Take 10 mg by mouth daily, Disp: , Rfl:   •  metFORMIN (GLUCOPHAGE) 500 mg tablet, 1 tablet each morning and 2 tablets each evening, Disp: 270 tablet, Rfl: 3  •  montelukast (SINGULAIR) 10 mg tablet, TAKE 1 TABLET(10 MG) BY MOUTH DAILY AT BEDTIME, Disp: 30 tablet, Rfl: 5  •  omeprazole (PriLOSEC) 40 MG capsule, Take 1 capsule (40 mg total) by mouth daily, Disp: 90 capsule, Rfl: 2  •  rosuvastatin (CRESTOR) 10 MG tablet, TAKE 1 TABLET(10 MG) BY MOUTH DAILY, Disp: 90 tablet, Rfl: 1  •  Timolol Maleate PF (Timolol Maleate Ocudose) 0.5 % SOLN, , Disp: , Rfl:   No current facility-administered medications for this visit.    ALLERGIES:  Allergies   Allergen Reactions   • Other  "Nausea Only, Vomiting and Cough     LOBSTER   • Penicillins Seizures   • Sulfa Antibiotics Rash           REVIEW OF SYSTEMS:  Musculoskeletal:        As noted in HPI.   All other systems reviewed and are negative.    VITALS:  There were no vitals filed for this visit.    LABS:  HgA1c:   Lab Results   Component Value Date    HGBA1C 7.5 (H) 03/21/2024     BMP:   Lab Results   Component Value Date    CALCIUM 8.9 03/21/2024    K 4.1 03/21/2024    CO2 30 03/21/2024     03/21/2024    BUN 11 03/21/2024    CREATININE 0.71 03/21/2024       _____________________________________________________  PHYSICAL EXAMINATION:  General: Well developed and well nourished, alert & oriented x 3, appears comfortable  Psychiatric: Normal  HEENT: Normocephalic, Atraumatic Trachea Midline, No torticollis  Pulmonary: No audible wheezing or respiratory distress   Abdomen/GI: Non tender, non distended   Cardiovascular: No pitting edema, 2+ radial pulse   Skin: No masses, erythema, lacerations, fluctation, ulcerations  Neurovascular: Sensation Intact to the Median, Ulnar, Radial Nerve, Motor Intact to the Median, Ulnar, Radial Nerve, and Pulses Intact  Musculoskeletal: Normal, except as noted in detailed exam and in HPI.      MUSCULOSKELETAL EXAMINATION:  Right hand  + click thumb and long fingers  TTP A1 pulley's  Full fist  Full finger extension     ___________________________________________________  STUDIES REVIEWED:  No new studies to review         PROCEDURES PERFORMED:  Hand/upper extremity injection: R thumb A1  Universal Protocol:  Consent: Verbal consent obtained.  Risks and benefits: risks, benefits and alternatives were discussed  Consent given by: patient  Time out: Immediately prior to procedure a \"time out\" was called to verify the correct patient, procedure, equipment, support staff and site/side marked as required.  Patient understanding: patient states understanding of the procedure being performed  Site marked: the " "operative site was marked  Patient identity confirmed: verbally with patient  Supporting Documentation  Indications: tendon swelling   Procedure Details  Condition:trigger finger Location: thumb - R thumb A1   Preparation: Patient was prepped and draped in the usual sterile fashion  Needle size: 25 G  Approach: volar  Medications administered: 6 mg betamethasone acetate-betamethasone sodium phosphate 6 (3-3) mg/mL; 1 mL ropivacaine 0.5 %  Patient tolerance: patient tolerated the procedure well with no immediate complications  Dressing:  Sterile dressing applied       Hand/upper extremity injection: R long A1  Universal Protocol:  Consent: Verbal consent obtained.  Risks and benefits: risks, benefits and alternatives were discussed  Consent given by: patient  Time out: Immediately prior to procedure a \"time out\" was called to verify the correct patient, procedure, equipment, support staff and site/side marked as required.  Patient understanding: patient states understanding of the procedure being performed  Site marked: the operative site was marked  Patient identity confirmed: verbally with patient  Supporting Documentation  Indications: tendon swelling   Procedure Details  Condition:trigger finger Location: long finger - R long A1   Preparation: Patient was prepped and draped in the usual sterile fashion  Needle size: 25 G  Approach: volar  Medications administered: 6 mg betamethasone acetate-betamethasone sodium phosphate 6 (3-3) mg/mL; 1 mL ropivacaine 0.5 %  Patient tolerance: patient tolerated the procedure well with no immediate complications  Dressing:  Sterile dressing applied              _____________________________________________________      Scribe Attestation    I,:  Deepa Marcus MA am acting as a scribe while in the presence of the attending physician.:       I,:  Sarath Jalloh MD personally performed the services described in this documentation    as scribed in my presence.:         "

## 2024-06-19 ENCOUNTER — VBI (OUTPATIENT)
Dept: ADMINISTRATIVE | Facility: OTHER | Age: 69
End: 2024-06-19

## 2024-06-19 NOTE — TELEPHONE ENCOUNTER
06/19/24 1:15 PM    The patient was called and the phone number was provided and/or the patient agreed to be transferred to the Central Scheduling department.    Thank you.  Jaye Cao  [facility]

## 2024-06-20 ENCOUNTER — OFFICE VISIT (OUTPATIENT)
Dept: PODIATRY | Facility: CLINIC | Age: 69
End: 2024-06-20
Payer: MEDICARE

## 2024-06-20 ENCOUNTER — TELEPHONE (OUTPATIENT)
Dept: PULMONOLOGY | Facility: CLINIC | Age: 69
End: 2024-06-20

## 2024-06-20 VITALS
HEART RATE: 73 BPM | BODY MASS INDEX: 40.04 KG/M2 | WEIGHT: 226 LBS | HEIGHT: 63 IN | SYSTOLIC BLOOD PRESSURE: 150 MMHG | DIASTOLIC BLOOD PRESSURE: 75 MMHG

## 2024-06-20 DIAGNOSIS — B35.1 ONYCHOMYCOSIS: ICD-10-CM

## 2024-06-20 DIAGNOSIS — Z79.01 CHRONIC ANTICOAGULATION: ICD-10-CM

## 2024-06-20 DIAGNOSIS — L60.0 INGROWN NAIL OF GREAT TOE OF LEFT FOOT: Primary | ICD-10-CM

## 2024-06-20 DIAGNOSIS — E11.9 CONTROLLED TYPE 2 DIABETES MELLITUS WITHOUT COMPLICATION, WITHOUT LONG-TERM CURRENT USE OF INSULIN (HCC): ICD-10-CM

## 2024-06-20 PROCEDURE — 11720 DEBRIDE NAIL 1-5: CPT | Performed by: PODIATRIST

## 2024-06-20 PROCEDURE — 99212 OFFICE O/P EST SF 10 MIN: CPT | Performed by: PODIATRIST

## 2024-06-20 NOTE — PROGRESS NOTES
Name: Coretta Hines      : 1955      MRN: [id number]  Encounter Provider: Didier Pollock DPM  Encounter Date: 2024   Encounter department: [facility] PODIATRY [place]    Assessment & Plan     1. Ingrown nail of great toe of left foot  -     Nail removal  2. Onychomycosis  -     Nail removal  3. Controlled type 2 diabetes mellitus without complication, without long-term current use of insulin (HCC)  -     Nail removal  4. Chronic anticoagulation  -     Nail removal      Nail debridement great toes bilaterally.  Nails 2 through 5 trimmed in length.    No open lesions or ulcerations.  Her ingrown nail issue is resolved at this time.    Will have her follow-up for routine footcare in 3 months.    Nail removal    Date/Time: 2024 9:30 AM    Performed by: Didier Pollock DPM  Authorized by: Didier Pollock DPM    Nails trimmed:     Number of nails trimmed:  8        Return in about 3 months (around 2024) for Routine foot care.    Subjective     Patient returns for follow-up on routine footcare as well as ingrown nail.  She has had good success with treatment of the ingrown nail.  She denies any new acute changes.  She is going to be going to a wedding tomorrow and she has not noticed any significant issues with her feet.  She denies any fevers chills nausea vomiting or other issues at this time.      Review of Systems   Constitutional:  Negative for chills and fever.   Respiratory:  Negative for chest tightness and shortness of breath.    Gastrointestinal:  Negative for nausea and vomiting.       Current Outpatient Medications on File Prior to Visit   Medication Sig   • acetaminophen (Tylenol) 325 mg tablet Take 650 mg by mouth every 6 (six) hours as needed for mild pain   • albuterol (2.5 mg/3 mL) 0.083 % nebulizer solution Take 3 mL (2.5 mg total) by nebulization every 6 (six) hours as needed for wheezing or shortness of breath   • albuterol (PROVENTIL HFA,VENTOLIN HFA)  "90 mcg/act inhaler Inhale 2 puffs every 6 (six) hours as needed for wheezing   • apixaban (Eliquis) 2.5 mg Take 1 tablet (2.5 mg total) by mouth 2 (two) times a day   • Azelastine HCl 137 MCG/SPRAY SOLN SPRAY 2 SPRAYS INTO EACH NOSTRIL 2 TIMES A DAY USE IN EACH NOSTRIL AS DIRECTED   • bimatoprost (LUMIGAN) 0.01 % ophthalmic drops    • Dextromethorphan-GG-APAP (Coricidin HBP Cold/Cough/Flu) -325 MG/15ML LIQD Take 30 mL by mouth 3 (three) times a day as needed (cough, congestion)   • diltiazem (CARDIZEM CD) 240 mg 24 hr capsule Take 1 capsule (240 mg total) by mouth daily   • dulaglutide (Trulicity) 3 MG/0.5ML injection Inject 0.5 mL (3 mg total) under the skin every 7 days   • famotidine (PEPCID) 20 mg tablet Take 20 mg by mouth   • Fluticasone-Salmeterol (Advair) 500-50 mcg/dose inhaler Inhale 1 puff every 12 (twelve) hours (Patient taking differently: Inhale 1 puff every 12 (twelve) hours Has not been using recently)   • loratadine (CLARITIN) 10 mg tablet Take 10 mg by mouth daily   • metFORMIN (GLUCOPHAGE) 500 mg tablet 1 tablet each morning and 2 tablets each evening   • montelukast (SINGULAIR) 10 mg tablet TAKE 1 TABLET(10 MG) BY MOUTH DAILY AT BEDTIME   • omeprazole (PriLOSEC) 40 MG capsule Take 1 capsule (40 mg total) by mouth daily   • rosuvastatin (CRESTOR) 10 MG tablet TAKE 1 TABLET(10 MG) BY MOUTH DAILY   • Timolol Maleate PF (Timolol Maleate Ocudose) 0.5 % SOLN        Objective     /75   Pulse 73   Ht 5' 3\" (1.6 m)   Wt 103 kg (226 lb) Comment: verbal  BMI 40.03 kg/m²     Physical Exam  Constitutional:       Appearance: Normal appearance.   Feet:      Comments: Vascular: Intact pedal pulses bilateral DP and PT.  Neurological: Gross protective sensation intact bilateral  Musculoskeletal: Muscle strength bilateral intact with dorsiflexion, inversion, eversion and plantarflexion.  Dermatological: No open lesions or ulcerations noted bilateral.  Nails 1 are elongated bilaterally with " dystrophic subungual debris.  There is also elongated not dystrophic 2 through 5.  No other areas of acute issues at this time.    Neurological:      Mental Status: She is alert.

## 2024-06-20 NOTE — TELEPHONE ENCOUNTER
Spoke to patient to remind patient to complete pft testing prior to next visit gave patient scheduling number call completed patient has upcoming appt in June 25 with pulmonary

## 2024-06-24 DIAGNOSIS — Z12.31 ENCOUNTER FOR SCREENING MAMMOGRAM FOR MALIGNANT NEOPLASM OF BREAST: Primary | ICD-10-CM

## 2024-06-27 ENCOUNTER — OFFICE VISIT (OUTPATIENT)
Dept: URGENT CARE | Age: 69
End: 2024-06-27
Payer: MEDICARE

## 2024-06-27 VITALS
HEART RATE: 100 BPM | OXYGEN SATURATION: 98 % | RESPIRATION RATE: 18 BRPM | TEMPERATURE: 100.1 F | SYSTOLIC BLOOD PRESSURE: 176 MMHG | DIASTOLIC BLOOD PRESSURE: 96 MMHG

## 2024-06-27 DIAGNOSIS — U07.1 COVID: Primary | ICD-10-CM

## 2024-06-27 PROCEDURE — 99214 OFFICE O/P EST MOD 30 MIN: CPT | Performed by: PHYSICIAN ASSISTANT

## 2024-06-27 PROCEDURE — G0463 HOSPITAL OUTPT CLINIC VISIT: HCPCS | Performed by: PHYSICIAN ASSISTANT

## 2024-06-27 RX ORDER — NIRMATRELVIR AND RITONAVIR 300-100 MG
3 KIT ORAL 2 TIMES DAILY
Qty: 30 TABLET | Refills: 0 | Status: SHIPPED | OUTPATIENT
Start: 2024-06-27 | End: 2024-07-02

## 2024-06-27 RX ORDER — MOLNUPIRAVIR 200 MG/1
800 CAPSULE ORAL EVERY 12 HOURS
Qty: 40 CAPSULE | Refills: 0 | Status: SHIPPED | OUTPATIENT
Start: 2024-06-27 | End: 2024-07-02

## 2024-06-27 NOTE — PROGRESS NOTES
[facility]        NAME: Coretta Hines is a 68 y.o. female  : 1955    MRN: [id number]  DATE: 2024  TIME: 10:15 AM    Assessment and Plan   COVID [U07.1]  1. COVID  molnupiravir (Lagevrio) 200 mg capsule        Presents with COVID.  Patient does have multiple comorbidities and would be a candidate for Paxlovid however she is on Eliquis so she will placed on molnupiravir to treat instead.  Recommend over-the-counter cough and cold medications as needed along with Tylenol as needed.  Quarantine if fever until fever free for 24 hours.  Mask until symptoms resolve.    Patient Instructions     Patient Instructions   Take Molnupiriavir as prescribed.   Coricidin HBP as needed for cough and congestion (over-the-counter medication).  Follow-up with PCP in 3-5 days.   If symptoms worsen or you develop new symptoms such as chest pain, shortness of breath, or fever > 103 or not improved with Tylenol, report to the emergency department.     Follow up with PCP in 3-5 days.  Proceed to  ER if symptoms worsen.    If tests have been performed at [facility], our office will contact you with results if changes need to be made to the care plan discussed with you at the visit. [facility]     Chief Complaint     Chief Complaint   Patient presents with    Cough    Sore Throat    Cold Like Symptoms    Generalized Body Aches     Patient tested positive for  COVID yesterday- she is having nasal congestion , cough, generalized body aches and sore throat- she is concerned about her cough because is has asthma         History of Present Illness       68-year-old female presents with complaint of runny nose, sinus congestion, sore throat, cough, and myalgias along with elevated temperatures.  Patient notes that she tested positive for COVID yesterday.  She states her symptoms began yesterday as well.  She states she was at a wedding over the weekend and when the other attendees  informed her that they tested positive for COVID and her significant other has also tested positive for COVID.  Has a history of asthma and chronic DVT.    Cough  Associated symptoms include chills, a fever, myalgias, postnasal drip, rhinorrhea and a sore throat. Pertinent negatives include no chest pain or shortness of breath.   Sore Throat   Associated symptoms include congestion and coughing. Pertinent negatives include no shortness of breath.   Generalized Body Aches  Associated symptoms include congestion, rhinorrhea, a sore throat, fatigue, a fever and coughing. Pertinent negatives include no chest pain or shortness of breath.       Review of Systems   Review of Systems   Constitutional:  Positive for chills, fatigue and fever.   HENT:  Positive for congestion, postnasal drip, rhinorrhea and sore throat.    Respiratory:  Positive for cough. Negative for shortness of breath.    Cardiovascular:  Negative for chest pain.   Musculoskeletal:  Positive for myalgias.         Current Medications       Current Outpatient Medications:     molnupiravir (Lagevrio) 200 mg capsule, Take 4 capsules (800 mg total) by mouth every 12 (twelve) hours for 5 days, Disp: 40 capsule, Rfl: 0    acetaminophen (Tylenol) 325 mg tablet, Take 650 mg by mouth every 6 (six) hours as needed for mild pain, Disp: , Rfl:     albuterol (2.5 mg/3 mL) 0.083 % nebulizer solution, Take 3 mL (2.5 mg total) by nebulization every 6 (six) hours as needed for wheezing or shortness of breath, Disp: 180 mL, Rfl: 2    albuterol (PROVENTIL HFA,VENTOLIN HFA) 90 mcg/act inhaler, Inhale 2 puffs every 6 (six) hours as needed for wheezing, Disp: 8.5 g, Rfl: 2    apixaban (Eliquis) 2.5 mg, Take 1 tablet (2.5 mg total) by mouth 2 (two) times a day, Disp: 60 tablet, Rfl: 5    Azelastine HCl 137 MCG/SPRAY SOLN, SPRAY 2 SPRAYS INTO EACH NOSTRIL 2 TIMES A DAY USE IN EACH NOSTRIL AS DIRECTED, Disp: 30 mL, Rfl: 3    bimatoprost (LUMIGAN) 0.01 % ophthalmic drops, , Disp: ,  Rfl:     Dextromethorphan-GG-APAP (Coricidin HBP Cold/Cough/Flu) -325 MG/15ML LIQD, Take 30 mL by mouth 3 (three) times a day as needed (cough, congestion), Disp: 355 mL, Rfl: 0    diltiazem (CARDIZEM CD) 240 mg 24 hr capsule, Take 1 capsule (240 mg total) by mouth daily, Disp: 90 capsule, Rfl: 3    dulaglutide (Trulicity) 3 MG/0.5ML injection, Inject 0.5 mL (3 mg total) under the skin every 7 days, Disp: 6 mL, Rfl: 1    famotidine (PEPCID) 20 mg tablet, Take 20 mg by mouth, Disp: , Rfl:     Fluticasone-Salmeterol (Advair) 500-50 mcg/dose inhaler, Inhale 1 puff every 12 (twelve) hours (Patient taking differently: Inhale 1 puff every 12 (twelve) hours Has not been using recently), Disp: 180 blister, Rfl: 3    loratadine (CLARITIN) 10 mg tablet, Take 10 mg by mouth daily, Disp: , Rfl:     metFORMIN (GLUCOPHAGE) 500 mg tablet, 1 tablet each morning and 2 tablets each evening, Disp: 270 tablet, Rfl: 3    montelukast (SINGULAIR) 10 mg tablet, TAKE 1 TABLET(10 MG) BY MOUTH DAILY AT BEDTIME, Disp: 30 tablet, Rfl: 5    omeprazole (PriLOSEC) 40 MG capsule, Take 1 capsule (40 mg total) by mouth daily, Disp: 90 capsule, Rfl: 2    rosuvastatin (CRESTOR) 10 MG tablet, TAKE 1 TABLET(10 MG) BY MOUTH DAILY, Disp: 90 tablet, Rfl: 1    Timolol Maleate PF (Timolol Maleate Ocudose) 0.5 % SOLN, , Disp: , Rfl:     Current Allergies     Allergies as of 06/27/2024 - Reviewed 06/27/2024   Allergen Reaction Noted    Other Nausea Only, Vomiting, and Cough 01/01/1984    Penicillins Seizures 05/03/2023    Sulfa antibiotics Rash 05/03/2023            The following portions of the patient's history were reviewed and updated as appropriate: allergies, current medications, past family history, past medical history, past social history, past surgical history and problem list.     Past Medical History:   Diagnosis Date    Allergic     Allergic rhinitis Decades    Arthritis     Asthma     Blindness of left eye     COPD (chronic obstructive  pulmonary disease) (HCC)     Deep vein thrombosis (HCC) March 3, 2023    cataract surgeries, detached retina surgeries leftveye    Diabetes mellitus (HCC)     GERD (gastroesophageal reflux disease)     Hypertension     Murmur, heart     Obesity     Visual impairment        Past Surgical History:   Procedure Laterality Date     SECTION  10/16/1990    Fibroids    COLONOSCOPY      EYE SURGERY      MULTIPLE    HYSTERECTOMY  2000    JOINT REPLACEMENT  2016    right  knee    REPLACEMENT TOTAL KNEE Right        Family History   Problem Relation Age of Onset    Dementia Mother     Arthritis Mother         Late in life    Hypertension Father         HBP    Heart disease Father         HBP    Diabetes Father         managed through diet    Hearing loss Father         Industrial/construction work with no hearing protection    Glaucoma Paternal Grandmother     Asthma Paternal Grandfather         Inhaler used         Medications have been verified.        Objective   BP (!) 176/96 (BP Location: Right arm, Patient Position: Sitting, Cuff Size: Standard)   Pulse 100   Temp 100.1 °F (37.8 °C)   Resp 18   SpO2 98%   No LMP recorded. Patient has had a hysterectomy.       Physical Exam     Physical Exam  Vitals and nursing note reviewed.   Constitutional:       General: She is not in acute distress.     Appearance: Normal appearance. She is not ill-appearing or toxic-appearing.   HENT:      Head: Normocephalic and atraumatic.      Jaw: No trismus.      Right Ear: Hearing, tympanic membrane, ear canal and external ear normal. There is no impacted cerumen. No foreign body.      Left Ear: Hearing, tympanic membrane, ear canal and external ear normal. There is no impacted cerumen. No foreign body.      Nose: Mucosal edema, congestion and rhinorrhea present. No nasal deformity. Rhinorrhea is clear.      Right Nostril: No foreign body, epistaxis or occlusion.      Left Nostril: No foreign body, epistaxis or occlusion.       "Right Turbinates: Not enlarged, swollen or pale.      Left Turbinates: Not enlarged, swollen or pale.      Mouth/Throat:      Lips: Pink. No lesions.      Mouth: Mucous membranes are moist. No injury, oral lesions or angioedema.      Dentition: Normal dentition.      Tongue: No lesions. Tongue does not deviate from midline.      Palate: No mass and lesions.      Pharynx: Uvula midline. No pharyngeal swelling, oropharyngeal exudate, posterior oropharyngeal erythema or uvula swelling.      Tonsils: No tonsillar exudate or tonsillar abscesses.   Eyes:      General: Lids are normal. Gaze aligned appropriately. No allergic shiner.     Extraocular Movements: Extraocular movements intact.   Cardiovascular:      Rate and Rhythm: Normal rate and regular rhythm.      Heart sounds: Normal heart sounds, S1 normal and S2 normal. Heart sounds not distant. No murmur heard.     No friction rub. No gallop.   Pulmonary:      Effort: Pulmonary effort is normal.      Breath sounds: Normal breath sounds.      Comments: Patient speaking in full sentences with no increased respiratory effort.   No audible wheezing or stridor.   Lymphadenopathy:      Cervical: No cervical adenopathy.   Skin:     General: Skin is warm and dry.   Neurological:      Mental Status: She is alert and oriented to person, place, and time.      Coordination: Coordination is intact.      Gait: Gait is intact.   Psychiatric:         Attention and Perception: Attention and perception normal.         Mood and Affect: Mood and affect normal.         Speech: Speech normal.         Behavior: Behavior is cooperative.               Note: Portions of this record may have been created with voice recognition software. Occasional wrong word or \"sound a like\" substitutions may have occurred due to the inherent limitations of voice recognition software. Please read the chart carefully and recognize, using context, where substitutions have occurred.*      "

## 2024-06-27 NOTE — PATIENT INSTRUCTIONS
Take Molnupiriavir as prescribed.   Coricidin HBP as needed for cough and congestion (over-the-counter medication).  Follow-up with PCP in 3-5 days.   If symptoms worsen or you develop new symptoms such as chest pain, shortness of breath, or fever > 103 or not improved with Tylenol, report to the emergency department.

## 2024-07-15 ENCOUNTER — OFFICE VISIT (OUTPATIENT)
Dept: INTERNAL MEDICINE CLINIC | Facility: CLINIC | Age: 69
End: 2024-07-15
Payer: MEDICARE

## 2024-07-15 VITALS
DIASTOLIC BLOOD PRESSURE: 70 MMHG | SYSTOLIC BLOOD PRESSURE: 138 MMHG | TEMPERATURE: 97.9 F | BODY MASS INDEX: 39.34 KG/M2 | WEIGHT: 222 LBS | HEIGHT: 63 IN | HEART RATE: 77 BPM | OXYGEN SATURATION: 99 %

## 2024-07-15 DIAGNOSIS — E78.5 DYSLIPIDEMIA: ICD-10-CM

## 2024-07-15 DIAGNOSIS — E11.65 TYPE 2 DIABETES MELLITUS WITH HYPERGLYCEMIA, WITHOUT LONG-TERM CURRENT USE OF INSULIN (HCC): Primary | ICD-10-CM

## 2024-07-15 DIAGNOSIS — J45.40 MODERATE PERSISTENT ASTHMA WITHOUT COMPLICATION: ICD-10-CM

## 2024-07-15 DIAGNOSIS — I10 PRIMARY HYPERTENSION: ICD-10-CM

## 2024-07-15 PROCEDURE — G2211 COMPLEX E/M VISIT ADD ON: HCPCS | Performed by: INTERNAL MEDICINE

## 2024-07-15 PROCEDURE — 99213 OFFICE O/P EST LOW 20 MIN: CPT | Performed by: INTERNAL MEDICINE

## 2024-07-15 RX ORDER — ALBUTEROL SULFATE 2.5 MG/3ML
2.5 SOLUTION RESPIRATORY (INHALATION) EVERY 6 HOURS PRN
Qty: 180 ML | Refills: 2 | Status: SHIPPED | OUTPATIENT
Start: 2024-07-15 | End: 2024-10-13

## 2024-07-15 NOTE — PROGRESS NOTES
INTERNAL MEDICINE OFFICE VISIT       NAME: Coretta Hines  AGE: 69 y.o. SEX: female       : 1955        MRN: [id number]    DATE: 7/15/2024  TIME: 10:51 AM    Assessment and Plan   1. Type 2 diabetes mellitus with hyperglycemia, without long-term current use of insulin (HCC)  -     metFORMIN (GLUCOPHAGE) 500 mg tablet; 1 tablet each morning and 2 tablets each evening  2. Primary hypertension  3. Dyslipidemia  4. Moderate persistent asthma without complication  -     albuterol (2.5 mg/3 mL) 0.083 % nebulizer solution; Take 3 mL (2.5 mg total) by nebulization every 6 (six) hours as needed for wheezing or shortness of breath       Follow-up in 4 months for Medicare annual wellness exam.        Chief Complaint   Follow-up    History of Present Illness   Coretta Hines is a 69 y.o.-year-old female who returns with her  for follow-up visit.    Since her last visit in , Coretta underwent surveillance colonoscopy which is overall benign and surveillance EGD for GERD showing gastric polyps which appear benign.  Active surveillance will continue through GI.    Coretta was also seen for COVID through our urgent care department on .  She responded to molnupiravir.  She has some residual fatigue and dry cough.    Coretta also was evaluated sequentially on  and May 6 by vascular surgery status post DVT, on long-term Eliquis, with chronic venous insufficiency and chronic DVT left peroneal vein with erythema of the right leg with presumed cellulitis which responded to antibiotic therapy.  3 and is wearing her support stockings regularly.  She reports that edema is controlled.    Coretta has chronic left carpal tunnel syndrome symptoms and is scheduled for release procedure on .  She had questions about risk for surgery.  I advised proceeding and she is most likely going to proceed with surgery.    Ongoing follow-up continues with pulmonary medicine for asthma, obesity hypoventilation  syndrome.    Type 2 diabetes: Coretta is not checking her sugars regularly and admits that her diet is suboptimal.  I suggested checking fasting blood sugars regularly, and making dietary adjustments.    Lab work was ordered on March 26 and is pending at this time.  During will be stopping by the lab soon.  I will review and then follow-up with her about this.  This will include reassessment of hyperlipidemia.    Hypertension is adequately controlled at this time.    I will also enter lab work after reviewing upcoming lab work.  This follow-up lab work to be done 1 week prior to next visit in 4 months which will also be Medicare wellness exam.    Review of Systems   Review of Systems as above    Active Problem List     Patient Active Problem List   Diagnosis    Chronic deep vein thrombosis (DVT) of right peroneal vein (HCC)    Type 2 diabetes mellitus with hyperglycemia, without long-term current use of insulin (HCC)    Class 2 severe obesity with serious comorbidity in adult (HCC)    Left retinal disorder    Dyslipidemia    Primary hypertension    GERD (gastroesophageal reflux disease)    Moderate persistent asthma without complication    Obesity hypoventilation syndrome (HCC)    DEN (obstructive sleep apnea)    Osteoarthritis    Seasonal allergic rhinitis due to fungal spores    Edema of both lower extremities due to peripheral venous insufficiency    History of colon polyps    FH: total knee replacement    Venous insufficiency of left lower extremity    Chronic deep vein thrombosis (DVT) of left peroneal vein (HCC)    Sleep disturbance    Bilateral carpal tunnel syndrome       The following portions of the patient's history were reviewed and updated as appropriate: allergies, current medications, past family history, past medical history, past social history, past surgical history, and problem list.    Objective     Vitals:    07/15/24 1017   BP: 138/70   Pulse: 77   Temp: 97.9 °F (36.6 °C)   SpO2: 99%     Wt  Readings from Last 3 Encounters:   07/15/24 101 kg (222 lb)   06/20/24 103 kg (226 lb)   06/17/24 103 kg (226 lb)       Physical Exam    Vital signs stable, alert and oriented in no distress.  Pulse regular.  Lungs clear and the cardiac exam is normal.  There is no JVD.  There are knee-high support stockings in place with well compensated edema.  No calf tenderness.  Peripheral circulation intact.  Tinel's sign is positive left wrist, without evidence of muscular weakness or atrophy.    Pertinent Laboratory/Diagnostic Studies:        No orders of the defined types were placed in this encounter.      ALLERGIES:  Allergies   Allergen Reactions    Other Nausea Only, Vomiting and Cough     LOBSTER    Penicillins Seizures    Sulfa Antibiotics Rash       Current Medications     Current Outpatient Medications   Medication Sig Dispense Refill    acetaminophen (Tylenol) 325 mg tablet Take 650 mg by mouth every 6 (six) hours as needed for mild pain      albuterol (2.5 mg/3 mL) 0.083 % nebulizer solution Take 3 mL (2.5 mg total) by nebulization every 6 (six) hours as needed for wheezing or shortness of breath 180 mL 2    albuterol (PROVENTIL HFA,VENTOLIN HFA) 90 mcg/act inhaler Inhale 2 puffs every 6 (six) hours as needed for wheezing 8.5 g 2    apixaban (Eliquis) 2.5 mg Take 1 tablet (2.5 mg total) by mouth 2 (two) times a day 60 tablet 5    Azelastine HCl 137 MCG/SPRAY SOLN SPRAY 2 SPRAYS INTO EACH NOSTRIL 2 TIMES A DAY USE IN EACH NOSTRIL AS DIRECTED 30 mL 3    bimatoprost (LUMIGAN) 0.01 % ophthalmic drops       Dextromethorphan-GG-APAP (Coricidin HBP Cold/Cough/Flu) -325 MG/15ML LIQD Take 30 mL by mouth 3 (three) times a day as needed (cough, congestion) 355 mL 0    diltiazem (CARDIZEM CD) 240 mg 24 hr capsule Take 1 capsule (240 mg total) by mouth daily 90 capsule 3    dulaglutide (Trulicity) 3 MG/0.5ML injection Inject 0.5 mL (3 mg total) under the skin every 7 days 6 mL 1    famotidine (PEPCID) 20 mg tablet Take  20 mg by mouth      Fluticasone-Salmeterol (Advair) 500-50 mcg/dose inhaler Inhale 1 puff every 12 (twelve) hours (Patient taking differently: Inhale 1 puff every 12 (twelve) hours Has not been using recently) 180 blister 3    loratadine (CLARITIN) 10 mg tablet Take 10 mg by mouth daily      metFORMIN (GLUCOPHAGE) 500 mg tablet 1 tablet each morning and 2 tablets each evening 270 tablet 3    montelukast (SINGULAIR) 10 mg tablet TAKE 1 TABLET(10 MG) BY MOUTH DAILY AT BEDTIME 30 tablet 5    omeprazole (PriLOSEC) 40 MG capsule Take 1 capsule (40 mg total) by mouth daily 90 capsule 2    rosuvastatin (CRESTOR) 10 MG tablet TAKE 1 TABLET(10 MG) BY MOUTH DAILY 90 tablet 1    Timolol Maleate PF (Timolol Maleate Ocudose) 0.5 % SOLN        No current facility-administered medications for this visit.         Health Maintenance        Sunday Robbins MD

## 2024-07-23 ENCOUNTER — APPOINTMENT (OUTPATIENT)
Dept: LAB | Facility: CLINIC | Age: 69
End: 2024-07-23
Payer: MEDICARE

## 2024-07-23 LAB
ANION GAP SERPL CALCULATED.3IONS-SCNC: 5 MMOL/L (ref 4–13)
BUN SERPL-MCNC: 9 MG/DL (ref 5–25)
CALCIUM SERPL-MCNC: 9 MG/DL (ref 8.4–10.2)
CHLORIDE SERPL-SCNC: 105 MMOL/L (ref 96–108)
CHOLEST SERPL-MCNC: 218 MG/DL
CO2 SERPL-SCNC: 31 MMOL/L (ref 21–32)
CREAT SERPL-MCNC: 0.77 MG/DL (ref 0.6–1.3)
EST. AVERAGE GLUCOSE BLD GHB EST-MCNC: 174 MG/DL
GFR SERPL CREATININE-BSD FRML MDRD: 80 ML/MIN/1.73SQ M
GLUCOSE P FAST SERPL-MCNC: 134 MG/DL (ref 65–99)
HBA1C MFR BLD: 7.7 %
HDLC SERPL-MCNC: 39 MG/DL
LDLC SERPL CALC-MCNC: 128 MG/DL (ref 0–100)
NONHDLC SERPL-MCNC: 179 MG/DL
POTASSIUM SERPL-SCNC: 4.1 MMOL/L (ref 3.5–5.3)
SODIUM SERPL-SCNC: 141 MMOL/L (ref 135–147)
TRIGL SERPL-MCNC: 256 MG/DL

## 2024-07-31 ENCOUNTER — OFFICE VISIT (OUTPATIENT)
Dept: INTERNAL MEDICINE CLINIC | Facility: CLINIC | Age: 69
End: 2024-07-31
Payer: MEDICARE

## 2024-07-31 VITALS — BODY MASS INDEX: 39.87 KG/M2 | WEIGHT: 225 LBS | HEIGHT: 63 IN

## 2024-07-31 DIAGNOSIS — E11.65 TYPE 2 DIABETES MELLITUS WITH HYPERGLYCEMIA, WITHOUT LONG-TERM CURRENT USE OF INSULIN (HCC): Primary | ICD-10-CM

## 2024-07-31 PROCEDURE — 99213 OFFICE O/P EST LOW 20 MIN: CPT | Performed by: INTERNAL MEDICINE

## 2024-07-31 PROCEDURE — G2211 COMPLEX E/M VISIT ADD ON: HCPCS | Performed by: INTERNAL MEDICINE

## 2024-07-31 NOTE — PROGRESS NOTES
"INTERNAL MEDICINE OFFICE VISIT       NAME: Coretta Hines  AGE: 69 y.o. SEX: female       : 1955        MRN: [id number]    DATE: 2024  TIME: 10:45 AM    Assessment and Plan   1. Type 2 diabetes mellitus with hyperglycemia, without long-term current use of insulin (HCC)  -     CBC and differential  -     Comprehensive metabolic panel  -     Lipid panel  -     Hemoglobin A1C  -     Albumin / creatinine urine ratio; Future       Next visit is scheduled for 24        Chief Complaint   Follow up for uncontrolled diabetes    History of Present Illness   Coretta Hines is a 69 y.o.-year-old female who was recently seen for follow up for multiple medical conditions, including DM2 (see notes of 24).    Labs were ordered and occurred on 24. I reviewed them and asked Coretta to come back to discuss her rising HbA1c trend and uncontrolled status.     The 24 A1c of 7.7 increased from 7.5 on 3/21/24 and 6/9 on 23. There have been no recent changes in medications:     Metformin 500 mg, 1 with breakfast and 2 with dinner  Trulicity 3 mg sq weekly.     Coretta does not wish to monitor her glucose, so there is no running data.     Coretta declines Wt Management consultation.    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     She skips breakfast and then skips her AM Metformin dose with it. She \"grazes\" throughout the day and into the late evening as she likes to stay up late and work late. Often she forgets to take her PM insulin.     Her  is a pharmacist and inject Trulicity for her, but she forgets to tell him and often goes 10-14 days between injections.     We discussed changing her management as follows:   Coretta lost 55 lbs several years ago on the Optiva diet. She is not ready to restart this diet and promises to start taking her medication regularly.         Review of Systems   Review of Systems   Endocrine: Positive for " polydipsia and polyphagia. Negative for polyuria.   Skin:  Positive for rash.        Heat rash of posterior distal left upper leg. Plan is trial of Eucerin cream.        Active Problem List     Patient Active Problem List   Diagnosis    Chronic deep vein thrombosis (DVT) of right peroneal vein (HCC)    Type 2 diabetes mellitus with hyperglycemia, without long-term current use of insulin (HCC)    Class 2 severe obesity with serious comorbidity in adult (HCC)    Left retinal disorder    Dyslipidemia    Primary hypertension    GERD (gastroesophageal reflux disease)    Moderate persistent asthma without complication    Obesity hypoventilation syndrome (HCC)    DEN (obstructive sleep apnea)    Osteoarthritis    Seasonal allergic rhinitis due to fungal spores    Edema of both lower extremities due to peripheral venous insufficiency    History of colon polyps    FH: total knee replacement    Venous insufficiency of left lower extremity    Chronic deep vein thrombosis (DVT) of left peroneal vein (HCC)    Sleep disturbance    Bilateral carpal tunnel syndrome       The following portions of the patient's history were reviewed and updated as appropriate: allergies, current medications, past family history, past medical history, past social history, past surgical history, and problem list.    Objective   There were no vitals filed for this visit.  Wt Readings from Last 3 Encounters:   07/31/24 102 kg (225 lb)   07/15/24 101 kg (222 lb)   06/20/24 103 kg (226 lb)       Physical Exam    Alert, oriented in NAD, here today with her . VSS, pulse regular. Heart: RRR, nl S1 and S2, no M, no S3; Lungs: clear.         Pertinent Laboratory/Diagnostic Studies:  I have reviewed pertinent labs:  BMP:   Lab Results   Component Value Date    SODIUM 141 07/23/2024    K 4.1 07/23/2024     07/23/2024    CO2 31 07/23/2024    AGAP 5 07/23/2024    BUN 9 07/23/2024    CREATININE 0.77 07/23/2024    GLUC 128 05/03/2023    GLUF 134 (H)  07/23/2024    CALCIUM 9.0 07/23/2024    EGFR 80 07/23/2024     Lipid Profile:   Lab Results   Component Value Date    CHOLESTEROL 218 (H) 07/23/2024    HDL 39 (L) 07/23/2024    TRIG 256 (H) 07/23/2024    LDLCALC 128 (H) 07/23/2024    NONHDLC 179 07/23/2024     Hemoglobin A1C/EST AVG Glucose   Lab Results   Component Value Date    HGBA1C 7.7 (H) 07/23/2024     07/23/2024         Orders Placed This Encounter   Procedures    CBC and differential    Comprehensive metabolic panel    Lipid panel    Hemoglobin A1C    Albumin / creatinine urine ratio       ALLERGIES:  Allergies   Allergen Reactions    Other Nausea Only, Vomiting and Cough     LOBSTER    Penicillins Seizures    Sulfa Antibiotics Rash       Current Medications     Current Outpatient Medications   Medication Sig Dispense Refill    acetaminophen (Tylenol) 325 mg tablet Take 650 mg by mouth every 6 (six) hours as needed for mild pain      albuterol (2.5 mg/3 mL) 0.083 % nebulizer solution Take 3 mL (2.5 mg total) by nebulization every 6 (six) hours as needed for wheezing or shortness of breath 180 mL 2    albuterol (PROVENTIL HFA,VENTOLIN HFA) 90 mcg/act inhaler Inhale 2 puffs every 6 (six) hours as needed for wheezing 8.5 g 2    apixaban (Eliquis) 2.5 mg Take 1 tablet (2.5 mg total) by mouth 2 (two) times a day 60 tablet 5    Azelastine HCl 137 MCG/SPRAY SOLN SPRAY 2 SPRAYS INTO EACH NOSTRIL 2 TIMES A DAY USE IN EACH NOSTRIL AS DIRECTED 30 mL 3    bimatoprost (LUMIGAN) 0.01 % ophthalmic drops       Dextromethorphan-GG-APAP (Coricidin HBP Cold/Cough/Flu) -325 MG/15ML LIQD Take 30 mL by mouth 3 (three) times a day as needed (cough, congestion) 355 mL 0    diltiazem (CARDIZEM CD) 240 mg 24 hr capsule Take 1 capsule (240 mg total) by mouth daily 90 capsule 3    famotidine (PEPCID) 20 mg tablet Take 20 mg by mouth      Fluticasone-Salmeterol (Advair) 500-50 mcg/dose inhaler Inhale 1 puff every 12 (twelve) hours (Patient taking differently: Inhale 1 puff  every 12 (twelve) hours Has not been using recently) 180 blister 3    loratadine (CLARITIN) 10 mg tablet Take 10 mg by mouth daily      metFORMIN (GLUCOPHAGE) 500 mg tablet 1 tablet each morning and 2 tablets each evening 270 tablet 3    montelukast (SINGULAIR) 10 mg tablet TAKE 1 TABLET(10 MG) BY MOUTH DAILY AT BEDTIME 30 tablet 5    omeprazole (PriLOSEC) 40 MG capsule Take 1 capsule (40 mg total) by mouth daily 90 capsule 2    rosuvastatin (CRESTOR) 10 MG tablet TAKE 1 TABLET(10 MG) BY MOUTH DAILY 90 tablet 1    Timolol Maleate PF (Timolol Maleate Ocudose) 0.5 % SOLN       dulaglutide (Trulicity) 3 MG/0.5ML injection Inject 0.5 mL (3 mg total) under the skin every 7 days 6 mL 1     No current facility-administered medications for this visit.         Health Maintenance        Sunday Robbins MD

## 2024-08-04 ENCOUNTER — OFFICE VISIT (OUTPATIENT)
Dept: URGENT CARE | Facility: MEDICAL CENTER | Age: 69
End: 2024-08-04
Payer: MEDICARE

## 2024-08-04 ENCOUNTER — PATIENT MESSAGE (OUTPATIENT)
Dept: VASCULAR SURGERY | Facility: CLINIC | Age: 69
End: 2024-08-04

## 2024-08-04 VITALS
BODY MASS INDEX: 39.87 KG/M2 | TEMPERATURE: 98.3 F | HEIGHT: 63 IN | HEART RATE: 90 BPM | RESPIRATION RATE: 18 BRPM | DIASTOLIC BLOOD PRESSURE: 88 MMHG | OXYGEN SATURATION: 98 % | SYSTOLIC BLOOD PRESSURE: 148 MMHG | WEIGHT: 225 LBS

## 2024-08-04 DIAGNOSIS — R39.15 URINARY URGENCY: ICD-10-CM

## 2024-08-04 DIAGNOSIS — M54.50 PAIN IN RIGHT LUMBAR REGION OF BACK: Primary | ICD-10-CM

## 2024-08-04 DIAGNOSIS — L03.116 CELLULITIS OF LEFT LOWER EXTREMITY: ICD-10-CM

## 2024-08-04 LAB
SL AMB  POCT GLUCOSE, UA: ABNORMAL
SL AMB LEUKOCYTE ESTERASE,UA: ABNORMAL
SL AMB POCT BILIRUBIN,UA: ABNORMAL
SL AMB POCT BLOOD,UA: ABNORMAL
SL AMB POCT CLARITY,UA: CLEAR
SL AMB POCT COLOR,UA: ABNORMAL
SL AMB POCT KETONES,UA: ABNORMAL
SL AMB POCT NITRITE,UA: ABNORMAL
SL AMB POCT PH,UA: 7
SL AMB POCT SPECIFIC GRAVITY,UA: 1.02
SL AMB POCT URINE PROTEIN: ABNORMAL
SL AMB POCT UROBILINOGEN: 0.2

## 2024-08-04 PROCEDURE — 87086 URINE CULTURE/COLONY COUNT: CPT

## 2024-08-04 PROCEDURE — 99213 OFFICE O/P EST LOW 20 MIN: CPT

## 2024-08-04 PROCEDURE — G0463 HOSPITAL OUTPT CLINIC VISIT: HCPCS

## 2024-08-04 PROCEDURE — 81002 URINALYSIS NONAUTO W/O SCOPE: CPT

## 2024-08-04 RX ORDER — CEPHALEXIN 500 MG/1
500 CAPSULE ORAL EVERY 8 HOURS SCHEDULED
Qty: 21 CAPSULE | Refills: 0 | Status: SHIPPED | OUTPATIENT
Start: 2024-08-04 | End: 2024-08-11

## 2024-08-04 RX ORDER — METHYLPREDNISOLONE 4 MG
TABLET, DOSE PACK ORAL
Qty: 1 EACH | Refills: 0 | Status: SHIPPED | OUTPATIENT
Start: 2024-08-04

## 2024-08-04 NOTE — PROGRESS NOTES
[facility]        NAME: Coretta Hines is a 69 y.o. female  : 1955    MRN: [id number]  DATE: 2024  TIME: 4:50 PM    Assessment and Plan   Pain in right lumbar region of back [M54.50]  1. Pain in right lumbar region of back  POCT urine dip    methylPREDNISolone 4 MG tablet therapy pack    Urine culture      2. Cellulitis of left lower extremity  cephalexin (KEFLEX) 500 mg capsule      3. Urinary urgency  Urine culture        POCT urine dip:  Color: Straw  UA, Clarity: Clear  Glucose: Negative  Bilirubin: Negative  Ketones: Trace  Specific gravity: 1.020  Blood: Negative  pH: 7  Protein: Negative  Urobilinogen: 0.2  Nitrates: Negative  Leukocytes: Negative    Will send urine culture. Depending on urine culture results and sensitivity, will either keep the same antibiotics or will change to a different antibiotic. Will contact patient if antibiotic is being changed. Check results in 1 to 2 days.  If there is less than 100,000 found in your urine, you may discontinue the prescribed antibiotic. Follow-up with our office if any questions/concerns.    Patient Instructions   Take antibiotics as prescribed.   Take entire course of antibiotics.      Eat yogurt with live and active cultures and/or take a probiotic at least 3 hours before or after antibiotic dose.   Monitor stool for diarrhea and/or blood. If this occurs, contact primary care doctor ASAP.      Good hand hygiene  Avoid scratching area  Keep area clean and dry    Monitor site for signs of infection including but not limited to increased redness, swelling, discharge, drainage, streaking, or if you develop any fever, body ache, chills, new joint pain or swelling, headache or dizziness, please proceed to the ER.       Take steroids as prescribed.   Recommend to take them in the morning and with food  Do not take Ibuprofen while on steroids.   May take Acetaminophen for pain.  Discussed that steroids may elevated blood glucose levels and  that pt should monitor blood sugars closely.      Pt and  agreeable to try Keflex, pt has Benadryl and an Epi-Pen at home.     If you develop any facial swelling, shortness of breath, difficulty breathing or any new or concerning symptoms please return or proceed ER.      Follow up with PCP in 3-5 days.   Proceed to ER if symptoms worsen.    If tests are performed, our office will contact you with results only if changes need to made to the care plan discussed with you at the visit. You can review your full results on [facility].    Chief Complaint     Chief Complaint   Patient presents with   • Flank Pain     Right sided flank pain; radiating towards the groin    • Rash     Red itchy rash to left medial leg; ongoing for over 3 weeks; was told it was a heat rash and to use Eucerin from PCP; rash persists; unsure if she was bite by something      History of Present Illness       Rash  This is a new problem. The current episode started 1 to 4 weeks ago (x3 weeks). The problem has been gradually worsening since onset. The affected locations include the left lower leg.   Back Pain  This is a new problem.       Review of Systems   Review of Systems   Musculoskeletal:  Positive for back pain.   Skin:  Positive for rash.         Current Medications       Current Outpatient Medications:   •  cephalexin (KEFLEX) 500 mg capsule, Take 1 capsule (500 mg total) by mouth every 8 (eight) hours for 7 days, Disp: 21 capsule, Rfl: 0  •  methylPREDNISolone 4 MG tablet therapy pack, Use as directed on package, Disp: 1 each, Rfl: 0  •  acetaminophen (Tylenol) 325 mg tablet, Take 650 mg by mouth every 6 (six) hours as needed for mild pain, Disp: , Rfl:   •  albuterol (2.5 mg/3 mL) 0.083 % nebulizer solution, Take 3 mL (2.5 mg total) by nebulization every 6 (six) hours as needed for wheezing or shortness of breath, Disp: 180 mL, Rfl: 2  •  albuterol (PROVENTIL HFA,VENTOLIN HFA) 90 mcg/act inhaler, Inhale 2 puffs every 6  (six) hours as needed for wheezing, Disp: 8.5 g, Rfl: 2  •  apixaban (Eliquis) 2.5 mg, Take 1 tablet (2.5 mg total) by mouth 2 (two) times a day, Disp: 60 tablet, Rfl: 5  •  Azelastine HCl 137 MCG/SPRAY SOLN, SPRAY 2 SPRAYS INTO EACH NOSTRIL 2 TIMES A DAY USE IN EACH NOSTRIL AS DIRECTED, Disp: 30 mL, Rfl: 3  •  bimatoprost (LUMIGAN) 0.01 % ophthalmic drops, , Disp: , Rfl:   •  Dextromethorphan-GG-APAP (Coricidin HBP Cold/Cough/Flu) -325 MG/15ML LIQD, Take 30 mL by mouth 3 (three) times a day as needed (cough, congestion), Disp: 355 mL, Rfl: 0  •  diltiazem (CARDIZEM CD) 240 mg 24 hr capsule, Take 1 capsule (240 mg total) by mouth daily, Disp: 90 capsule, Rfl: 3  •  dulaglutide (Trulicity) 3 MG/0.5ML injection, Inject 0.5 mL (3 mg total) under the skin every 7 days, Disp: 6 mL, Rfl: 1  •  famotidine (PEPCID) 20 mg tablet, Take 20 mg by mouth, Disp: , Rfl:   •  Fluticasone-Salmeterol (Advair) 500-50 mcg/dose inhaler, Inhale 1 puff every 12 (twelve) hours (Patient taking differently: Inhale 1 puff every 12 (twelve) hours Has not been using recently), Disp: 180 blister, Rfl: 3  •  loratadine (CLARITIN) 10 mg tablet, Take 10 mg by mouth daily, Disp: , Rfl:   •  metFORMIN (GLUCOPHAGE) 500 mg tablet, 1 tablet each morning and 2 tablets each evening, Disp: 270 tablet, Rfl: 3  •  montelukast (SINGULAIR) 10 mg tablet, TAKE 1 TABLET(10 MG) BY MOUTH DAILY AT BEDTIME, Disp: 30 tablet, Rfl: 5  •  omeprazole (PriLOSEC) 40 MG capsule, Take 1 capsule (40 mg total) by mouth daily, Disp: 90 capsule, Rfl: 2  •  rosuvastatin (CRESTOR) 10 MG tablet, TAKE 1 TABLET(10 MG) BY MOUTH DAILY, Disp: 90 tablet, Rfl: 1  •  Timolol Maleate PF (Timolol Maleate Ocudose) 0.5 % SOLN, , Disp: , Rfl:     Current Allergies     Allergies as of 08/04/2024 - Reviewed 08/04/2024   Allergen Reaction Noted   • Other Nausea Only, Vomiting, and Cough 01/01/1984   • Penicillins Seizures 05/03/2023   • Sulfa antibiotics Rash 05/03/2023            The  "following portions of the patient's history were reviewed and updated as appropriate: allergies, current medications, past family history, past medical history, past social history, past surgical history and problem list.     Past Medical History:   Diagnosis Date   • Allergic    • Allergic rhinitis Decades   • Arthritis    • Asthma    • Blindness of left eye    • COPD (chronic obstructive pulmonary disease) ([facility])    • Deep vein thrombosis ([facility]) March 3, 2023    cataract surgeries, detached retina surgeries leftveye   • Diabetes mellitus ([facility])    • GERD (gastroesophageal reflux disease)    • Hypertension    • Murmur, heart    • Obesity    • Visual impairment        Past Surgical History:   Procedure Laterality Date   •  SECTION  10/16/1990    Fibroids   • COLONOSCOPY     • EYE SURGERY      MULTIPLE   • HYSTERECTOMY     • JOINT REPLACEMENT  2016    right  knee   • REPLACEMENT TOTAL KNEE Right        Family History   Problem Relation Age of Onset   • Dementia Mother    • Arthritis Mother         Late in life   • Hypertension Father         HBP   • Heart disease Father         HBP   • Diabetes Father         managed through diet   • Hearing loss Father         Industrial/construction work with no hearing protection   • Glaucoma Paternal Grandmother    • Asthma Paternal Grandfather         Inhaler used         Medications have been verified.        Objective   /88   Pulse 90   Temp 98.3 °F (36.8 °C)   Resp 18   Ht 5' 3\" (1.6 m)   Wt 102 kg (225 lb)   SpO2 98%   BMI 39.86 kg/m²        Physical Exam     Physical Exam  Vitals and nursing note reviewed.   Constitutional:       General: She is not in acute distress.     Appearance: Normal appearance. She is not ill-appearing, toxic-appearing or diaphoretic.   HENT:      Head: Normocephalic.      Right Ear: Tympanic membrane, ear canal and external ear normal. There is no impacted cerumen.      Left Ear: Tympanic membrane, ear canal and external ear " normal. There is no impacted cerumen.      Nose: Nose normal. No congestion or rhinorrhea.      Mouth/Throat:      Mouth: Mucous membranes are moist.      Pharynx: No posterior oropharyngeal erythema.   Cardiovascular:      Rate and Rhythm: Normal rate and regular rhythm.      Pulses: Normal pulses.      Heart sounds: Normal heart sounds. No murmur heard.  Pulmonary:      Effort: Pulmonary effort is normal. No respiratory distress.      Breath sounds: Normal breath sounds. No stridor. No wheezing, rhonchi or rales.   Chest:      Chest wall: No tenderness.   Abdominal:      Tenderness: There is no right CVA tenderness or left CVA tenderness.   Musculoskeletal:         General: Tenderness present.      Lumbar back: Tenderness present. No swelling, deformity, lacerations, spasms or bony tenderness. Normal range of motion. Negative right straight leg raise test and negative left straight leg raise test.   Skin:     General: Skin is warm.      Findings: Erythema and rash present.   Neurological:      Mental Status: She is alert.                    Musculoskeletal:         General: Swelling and tenderness present.      Lumbar back: Tenderness present. No swelling, deformity, lacerations, spasms or bony tenderness. Normal range of motion. Negative right straight leg raise test and negative left straight leg raise test.        Back:    Skin:     General: Skin is warm.      Findings: Erythema present. No bruising.          Neurological:      Mental Status: She is alert.

## 2024-08-04 NOTE — PATIENT INSTRUCTIONS
Take antibiotics as prescribed.   Take entire course of antibiotics.      Eat yogurt with live and active cultures and/or take a probiotic at least 3 hours before or after antibiotic dose.   Monitor stool for diarrhea and/or blood. If this occurs, contact primary care doctor ASAP.      Good hand hygiene  Avoid scratching area  Keep area clean and dry    Monitor site for signs of infection including but not limited to increased redness, swelling, discharge, drainage, streaking, or if you develop any fever, body ache, chills, new joint pain or swelling, headache or dizziness, please proceed to the ER.       Take steroids as prescribed.   Recommend to take them in the morning and with food  Do not take Ibuprofen while on steroids.   May take Acetaminophen for pain.  Discussed that steroids may elevated blood glucose levels and that pt should monitor blood sugars closely.      Pt and  agreeable to try Keflex, pt has Benadryl and an Epi-Pen at home.     If you develop any facial swelling, shortness of breath, difficulty breathing or any new or concerning symptoms please return or proceed ER.      Follow up with PCP in 3-5 days.   Proceed to ER if symptoms worsen.    If tests are performed, our office will contact you with results only if changes need to made to the care plan discussed with you at the visit. You can review your full results on [facility].

## 2024-08-05 ENCOUNTER — NURSE TRIAGE (OUTPATIENT)
Dept: VASCULAR SURGERY | Facility: CLINIC | Age: 69
End: 2024-08-05

## 2024-08-05 ENCOUNTER — TELEPHONE (OUTPATIENT)
Age: 69
End: 2024-08-05

## 2024-08-05 NOTE — TELEPHONE ENCOUNTER
"Answer Assessment - Initial Assessment Questions  1. APPEARANCE of RASH: \"Describe the rash.\"       Itching red rash   2. LOCATION: \"Where is the rash located?\"       Medial left leg knee and extending into thigh  3. NUMBER: \"How many spots are there?\"       Moderate area  4. SIZE: \"How big are the spots?\" (Inches, centimeters or compare to size of a coin)       Rashy area and itching   5. ONSET: \"When did the rash start?\"       4 days ago  6. ITCHING: \"Does the rash itch?\" If Yes, ask: \"How bad is the itch?\"  (Scale 1-10; or mild, moderate, severe)      Yes , moderate  7. PAIN: \"Does the rash hurt?\" If Yes, ask: \"How bad is the pain?\"  (Scale 1-10; or mild, moderate, severe)      no  8. OTHER SYMPTOMS: \"Do you have any other symptoms?\" (e.g., fever)      Some swelling left leg    Protocols used: Rash or Redness - Localized-ADULT-[place]  Approximately 4 days ago , she started with a red itchy rash medial left knee , now extending into medial left thigh.  She went to urgent care yesterday and was given prednisone and keflex 500 mgm q 8 hours for cellulitis.  She did not start yet because pharmacy was closed.   She will begin today.  She is concerned because the CRNP at urgent care mentioned during a venous scan if rash continues for a couple  more days.   However , patient is scheduled to leave for a car trip to [place] tomorrow and is concerned with long drive.  She had history of left DVT and is still on Eliquis 2.5 mgm and did not miss any doses.  She does wear stockings also.  She said they will stop frequently for bathroom breaks and she will do heel pumps in the car.  Her question for vascular is should she have the scan before she goes and is it recommended that she can go on trip.   Sent to vascular triage to advise.   "

## 2024-08-05 NOTE — TELEPHONE ENCOUNTER
Pt msg RD, she was at urgent care yesterday for cellulitis and is taking Keflex, states dr there wanted her to have a scan done since DVT is in that leg. Pt is leaving tomorrow for vacation and will be gone 10days. Should she have scan today or after trip. Connected patient with Aaliyah to better assist pt.

## 2024-08-05 NOTE — TELEPHONE ENCOUNTER
Chart reviewed.    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  Given that she is on AC and has not missed doses, low concern for DVT.    Would begin medications as prescribed.    Advise continued daily compression, ambulation and lymphedema pumps as tolerated.    Advise to seek medical attention with new or worsening LLE pain, swelling, CP/SOB.  Review travel recommendations with frequent stops, ambulation, ankle exercises, and compression.

## 2024-08-06 DIAGNOSIS — I82.452 ACUTE DEEP VEIN THROMBOSIS (DVT) OF LEFT PERONEAL VEIN (HCC): ICD-10-CM

## 2024-08-06 LAB — BACTERIA UR CULT: NORMAL

## 2024-08-11 RX ORDER — APIXABAN 2.5 MG/1
TABLET, FILM COATED ORAL
Qty: 60 TABLET | Refills: 5 | Status: SHIPPED | OUTPATIENT
Start: 2024-08-11

## 2024-08-16 ENCOUNTER — PATIENT MESSAGE (OUTPATIENT)
Dept: VASCULAR SURGERY | Facility: CLINIC | Age: 69
End: 2024-08-16

## 2024-08-19 ENCOUNTER — HOSPITAL ENCOUNTER (OUTPATIENT)
Dept: PULMONOLOGY | Facility: HOSPITAL | Age: 69
Discharge: HOME | End: 2024-08-19
Attending: INTERNAL MEDICINE
Payer: MEDICARE

## 2024-08-19 DIAGNOSIS — J45.40 MODERATE PERSISTENT ASTHMA WITHOUT COMPLICATION: ICD-10-CM

## 2024-08-19 PROCEDURE — 94729 DIFFUSING CAPACITY: CPT

## 2024-08-19 PROCEDURE — 94726 PLETHYSMOGRAPHY LUNG VOLUMES: CPT | Performed by: INTERNAL MEDICINE

## 2024-08-19 PROCEDURE — 94060 EVALUATION OF WHEEZING: CPT

## 2024-08-19 PROCEDURE — 94060 EVALUATION OF WHEEZING: CPT | Performed by: INTERNAL MEDICINE

## 2024-08-19 PROCEDURE — 94729 DIFFUSING CAPACITY: CPT | Performed by: INTERNAL MEDICINE

## 2024-08-19 PROCEDURE — 94760 N-INVAS EAR/PLS OXIMETRY 1: CPT

## 2024-08-19 PROCEDURE — 94726 PLETHYSMOGRAPHY LUNG VOLUMES: CPT

## 2024-08-19 RX ORDER — ALBUTEROL SULFATE 0.83 MG/ML
2.5 SOLUTION RESPIRATORY (INHALATION) ONCE
Status: COMPLETED | OUTPATIENT
Start: 2024-08-19 | End: 2024-08-19

## 2024-08-19 RX ADMIN — ALBUTEROL SULFATE 2.5 MG: 2.5 SOLUTION RESPIRATORY (INHALATION) at 08:46

## 2024-08-20 ENCOUNTER — TELEPHONE (OUTPATIENT)
Dept: OBGYN CLINIC | Facility: CLINIC | Age: 69
End: 2024-08-20

## 2024-08-21 NOTE — TELEPHONE ENCOUNTER
Called patient LMOM she can call me back & I can bring her in to discuss options, or she can call me to schedule sx when she is comfortable because her sugar is w/in our limits, but we want her to feel comfortable

## 2024-08-21 NOTE — PATIENT COMMUNICATION
Caller: Coretta Hines    Doctor: Nicole Burleson    Reason for call: Pt looking for a sooner appt than her scheduled 9/30 appt. Informed her it was the soonest appt available and that we would add her to our wait list for any cancellations.    Call back#: [phone]

## 2024-08-21 NOTE — TELEPHONE ENCOUNTER
Called and spoke to pt and relayed JUSTICE Lora's message. She states she has a follow up appt in Nov with her PCP. She will get her A1C checked at that time and then she will contact us to schedule surgery. She doesn't feel like its an emergency and would rather have her A1C in a better place.

## 2024-08-21 NOTE — TELEPHONE ENCOUNTER
Caller: patient     Doctor: Yeimi    Reason for call: asking what the next step is regarding her A1C and scheduling sx?  Working on getting that lower with her PCP    Call back#: [phone]

## 2024-08-21 NOTE — TELEPHONE ENCOUNTER
Please let pt know that our cutoff for surgery is an A1c < 8, but we perfectly understand her wanting to get it lower prior to proceeding. Would advise she follow her PCP's advice in regards to when they want to repeat the A1c and we can tentatively schedule her for a little bit after that. In regards to things she can do... a carpal tunnel or cock up wrist brace to be worn at night or steroid injection would be another option. The downside to steroid injx is that it does have the possibility to raise her sugars temporarily and we also would want to wait to perform surgery for about 3 months after the injx.

## 2024-08-22 ENCOUNTER — OFFICE VISIT (OUTPATIENT)
Dept: URGENT CARE | Age: 69
End: 2024-08-22
Payer: MEDICARE

## 2024-08-22 VITALS
TEMPERATURE: 98 F | DIASTOLIC BLOOD PRESSURE: 72 MMHG | OXYGEN SATURATION: 96 % | SYSTOLIC BLOOD PRESSURE: 139 MMHG | RESPIRATION RATE: 18 BRPM | HEART RATE: 75 BPM

## 2024-08-22 DIAGNOSIS — N39.0 URINARY TRACT INFECTION WITH HEMATURIA, SITE UNSPECIFIED: Primary | ICD-10-CM

## 2024-08-22 DIAGNOSIS — R31.9 URINARY TRACT INFECTION WITH HEMATURIA, SITE UNSPECIFIED: Primary | ICD-10-CM

## 2024-08-22 LAB
SL AMB  POCT GLUCOSE, UA: 500
SL AMB LEUKOCYTE ESTERASE,UA: ABNORMAL
SL AMB POCT BILIRUBIN,UA: ABNORMAL
SL AMB POCT BLOOD,UA: ABNORMAL
SL AMB POCT CLARITY,UA: YELLOW
SL AMB POCT COLOR,UA: ABNORMAL
SL AMB POCT KETONES,UA: ABNORMAL
SL AMB POCT NITRITE,UA: ABNORMAL
SL AMB POCT PH,UA: 6
SL AMB POCT SPECIFIC GRAVITY,UA: 1.03
SL AMB POCT URINE PROTEIN: ABNORMAL
SL AMB POCT UROBILINOGEN: 0.2

## 2024-08-22 PROCEDURE — 87186 SC STD MICRODIL/AGAR DIL: CPT | Performed by: PHYSICIAN ASSISTANT

## 2024-08-22 PROCEDURE — 81002 URINALYSIS NONAUTO W/O SCOPE: CPT | Performed by: PHYSICIAN ASSISTANT

## 2024-08-22 PROCEDURE — 87086 URINE CULTURE/COLONY COUNT: CPT | Performed by: PHYSICIAN ASSISTANT

## 2024-08-22 PROCEDURE — 87077 CULTURE AEROBIC IDENTIFY: CPT | Performed by: PHYSICIAN ASSISTANT

## 2024-08-22 PROCEDURE — G0463 HOSPITAL OUTPT CLINIC VISIT: HCPCS | Performed by: PHYSICIAN ASSISTANT

## 2024-08-22 PROCEDURE — 99214 OFFICE O/P EST MOD 30 MIN: CPT | Performed by: PHYSICIAN ASSISTANT

## 2024-08-22 RX ORDER — NITROFURANTOIN 25; 75 MG/1; MG/1
100 CAPSULE ORAL 2 TIMES DAILY
Qty: 10 CAPSULE | Refills: 0 | Status: SHIPPED | OUTPATIENT
Start: 2024-08-22 | End: 2024-08-27

## 2024-08-22 NOTE — PROGRESS NOTES
[facility]        NAME: Coretta Hines is a 69 y.o. female  : 1955    MRN: [id number]  DATE: 2024  TIME: 2:31 PM    Assessment and Plan   Urinary tract infection with hematuria, site unspecified [N39.0, R31.9]  1. Urinary tract infection with hematuria, site unspecified  POCT urine dip    Urine culture    nitrofurantoin (MACROBID) 100 mg capsule      Patient presents with symptoms of UTI.  Exam slightly concerning for possible diverticulitis.  UA demonstrates signs of urinary tract infection.  Patient be started on Macrobid to treat.  Strict ER precautions given should left lower quadrant pain worsen.      Patient Instructions     Patient Instructions   Take antibiotic as prescribed.   If symptoms are not improved in 2-3 days, follow-up with PCP.   If symptoms worsen or new symptoms develop, report to the emergency department immediately.     Follow up with PCP in 3-5 days.  Proceed to  ER if symptoms worsen.    If tests have been performed at [facility], our office will contact you with results if changes need to be made to the care plan discussed with you at the visit. You can review your full results on [facility].     Chief Complaint     Chief Complaint   Patient presents with    Urinary Frequency     Urinary frequency, pressure some time urine does not come out since yesterday.    Abdominal Pain     Patient states she is having pain abdominal on LLQ it started today.         History of Present Illness       69-year-old female presents with complaint of increased urgency, frequency, and dysuria.  She also notes some left lower abdominal pain.  Patient reports that symptoms of urinary tract infection began yesterday.  She states that her left lower quadrant abdominal pain began today.  She denies fever, chills, back pain, nausea, vomiting, and diarrhea.  Patient reports her last BM was yesterday she states it was slightly loose.  Patient notes history of diverticulosis on most  recent colonoscopy.    Urinary Frequency   Associated symptoms include frequency and urgency. Pertinent negatives include no chills, flank pain, nausea or vomiting.   Abdominal Pain  Associated symptoms include dysuria and frequency. Pertinent negatives include no constipation, diarrhea, fever, nausea or vomiting.       Review of Systems   Review of Systems   Constitutional:  Negative for chills and fever.   Gastrointestinal:  Positive for abdominal pain. Negative for abdominal distention, anal bleeding, blood in stool, constipation, diarrhea, nausea and vomiting.   Genitourinary:  Positive for dysuria, frequency and urgency. Negative for flank pain.   Musculoskeletal:  Negative for back pain.         Current Medications       Current Outpatient Medications:     nitrofurantoin (MACROBID) 100 mg capsule, Take 1 capsule (100 mg total) by mouth 2 (two) times a day for 5 days, Disp: 10 capsule, Rfl: 0    acetaminophen (Tylenol) 325 mg tablet, Take 650 mg by mouth every 6 (six) hours as needed for mild pain, Disp: , Rfl:     albuterol (2.5 mg/3 mL) 0.083 % nebulizer solution, Take 3 mL (2.5 mg total) by nebulization every 6 (six) hours as needed for wheezing or shortness of breath, Disp: 180 mL, Rfl: 2    albuterol (PROVENTIL HFA,VENTOLIN HFA) 90 mcg/act inhaler, Inhale 2 puffs every 6 (six) hours as needed for wheezing, Disp: 8.5 g, Rfl: 2    Azelastine HCl 137 MCG/SPRAY SOLN, SPRAY 2 SPRAYS INTO EACH NOSTRIL 2 TIMES A DAY USE IN EACH NOSTRIL AS DIRECTED, Disp: 30 mL, Rfl: 3    bimatoprost (LUMIGAN) 0.01 % ophthalmic drops, , Disp: , Rfl:     Dextromethorphan-GG-APAP (Coricidin HBP Cold/Cough/Flu) -325 MG/15ML LIQD, Take 30 mL by mouth 3 (three) times a day as needed (cough, congestion), Disp: 355 mL, Rfl: 0    diltiazem (CARDIZEM CD) 240 mg 24 hr capsule, Take 1 capsule (240 mg total) by mouth daily, Disp: 90 capsule, Rfl: 3    dulaglutide (Trulicity) 3 MG/0.5ML injection, Inject 0.5 mL (3 mg total) under the  skin every 7 days, Disp: 6 mL, Rfl: 1    Eliquis 2.5 MG, TAKE 1 TABLET(2.5 MG) BY MOUTH TWICE DAILY, Disp: 60 tablet, Rfl: 5    famotidine (PEPCID) 20 mg tablet, Take 20 mg by mouth, Disp: , Rfl:     Fluticasone-Salmeterol (Advair) 500-50 mcg/dose inhaler, Inhale 1 puff every 12 (twelve) hours (Patient taking differently: Inhale 1 puff every 12 (twelve) hours Has not been using recently), Disp: 180 blister, Rfl: 3    loratadine (CLARITIN) 10 mg tablet, Take 10 mg by mouth daily, Disp: , Rfl:     metFORMIN (GLUCOPHAGE) 500 mg tablet, 1 tablet each morning and 2 tablets each evening, Disp: 270 tablet, Rfl: 3    methylPREDNISolone 4 MG tablet therapy pack, Use as directed on package, Disp: 1 each, Rfl: 0    montelukast (SINGULAIR) 10 mg tablet, TAKE 1 TABLET(10 MG) BY MOUTH DAILY AT BEDTIME, Disp: 30 tablet, Rfl: 5    omeprazole (PriLOSEC) 40 MG capsule, Take 1 capsule (40 mg total) by mouth daily, Disp: 90 capsule, Rfl: 2    rosuvastatin (CRESTOR) 10 MG tablet, TAKE 1 TABLET(10 MG) BY MOUTH DAILY, Disp: 90 tablet, Rfl: 1    Timolol Maleate PF (Timolol Maleate Ocudose) 0.5 % SOLN, , Disp: , Rfl:     Current Allergies     Allergies as of 08/22/2024 - Reviewed 08/22/2024   Allergen Reaction Noted    Other Nausea Only, Vomiting, and Cough 01/01/1984    Penicillins Seizures 05/03/2023    Sulfa antibiotics Rash 05/03/2023            The following portions of the patient's history were reviewed and updated as appropriate: allergies, current medications, past family history, past medical history, past social history, past surgical history and problem list.     Past Medical History:   Diagnosis Date    Allergic     Allergic rhinitis Decades    Arthritis     Asthma     Blindness of left eye     COPD (chronic obstructive pulmonary disease) ([facility])     Deep vein thrombosis ([facility]) March 3, 2023    cataract surgeries, detached retina surgeries leftveye    Diabetes mellitus ([facility])     GERD (gastroesophageal reflux disease)      Hypertension     Murmur, heart     Obesity     Visual impairment        Past Surgical History:   Procedure Laterality Date     SECTION  10/16/1990    Fibroids    COLONOSCOPY      EYE SURGERY      MULTIPLE    HYSTERECTOMY  2000    JOINT REPLACEMENT  2016    right  knee    REPLACEMENT TOTAL KNEE Right        Family History   Problem Relation Age of Onset    Dementia Mother     Arthritis Mother         Late in life    Hypertension Father         HBP    Heart disease Father         HBP    Diabetes Father         managed through diet    Hearing loss Father         Industrial/construction work with no hearing protection    Glaucoma Paternal Grandmother     Asthma Paternal Grandfather         Inhaler used         Medications have been verified.        Objective   /72   Pulse 75   Temp 98 °F (36.7 °C) (Tympanic)   Resp 18   SpO2 96%   No LMP recorded. Patient has had a hysterectomy.       Physical Exam     Physical Exam  Vitals and nursing note reviewed.   Constitutional:       General: She is awake. She is not in acute distress.     Appearance: Normal appearance. She is well-developed and well-groomed. She is not ill-appearing, toxic-appearing or diaphoretic.   HENT:      Head: Normocephalic and atraumatic.      Right Ear: Hearing and external ear normal.      Left Ear: Hearing and external ear normal.   Eyes:      General: Lids are normal. Vision grossly intact. Gaze aligned appropriately.   Cardiovascular:      Rate and Rhythm: Normal rate.   Pulmonary:      Effort: Pulmonary effort is normal.   Abdominal:      General: Abdomen is flat. Bowel sounds are normal.      Palpations: Abdomen is soft.      Tenderness: There is abdominal tenderness in the suprapubic area and left lower quadrant. There is no right CVA tenderness or left CVA tenderness.   Musculoskeletal:      Cervical back: Normal range of motion.   Skin:     General: Skin is warm and dry.   Neurological:      Mental Status: She is alert and  "oriented to person, place, and time.      Coordination: Coordination is intact.      Gait: Gait is intact.   Psychiatric:         Attention and Perception: Attention and perception normal.         Mood and Affect: Mood and affect normal.         Speech: Speech normal.         Behavior: Behavior normal. Behavior is cooperative.               Note: Portions of this record may have been created with voice recognition software. Occasional wrong word or \"sound a like\" substitutions may have occurred due to the inherent limitations of voice recognition software. Please read the chart carefully and recognize, using context, where substitutions have occurred.*      "

## 2024-08-22 NOTE — PATIENT INSTRUCTIONS
Take antibiotic as prescribed.   If symptoms are not improved in 2-3 days, follow-up with PCP.   If symptoms worsen or new symptoms develop, report to the emergency department immediately.

## 2024-08-24 LAB
BACTERIA UR CULT: ABNORMAL
BACTERIA UR CULT: ABNORMAL

## 2024-08-29 ENCOUNTER — TELEPHONE (OUTPATIENT)
Age: 69
End: 2024-08-29

## 2024-08-29 NOTE — TELEPHONE ENCOUNTER
Caller: Coretta     Doctor: Sydnee    Reason for call: Would like to start the order for Euflexx inj's last series last inj was 6/14/2024    Call back#: [phone]

## 2024-08-30 DIAGNOSIS — M17.12 PRIMARY OSTEOARTHRITIS OF LEFT KNEE: Primary | ICD-10-CM

## 2024-08-30 NOTE — TELEPHONE ENCOUNTER
Patient completed a series of Euflexxa injections for her left knee on 6/14/2024.  These injections must be spaced out every 6 months.  She is eligible again for these injections on 12/14/2024.  I did place the order for this injection but patient is not eligible to undergo these again until 12/14/2024.

## 2024-09-06 ENCOUNTER — OFFICE VISIT (OUTPATIENT)
Dept: PULMONOLOGY | Facility: CLINIC | Age: 69
End: 2024-09-06
Payer: MEDICARE

## 2024-09-06 VITALS
HEART RATE: 73 BPM | BODY MASS INDEX: 39.55 KG/M2 | HEIGHT: 63 IN | SYSTOLIC BLOOD PRESSURE: 114 MMHG | OXYGEN SATURATION: 98 % | DIASTOLIC BLOOD PRESSURE: 60 MMHG | TEMPERATURE: 97.6 F | WEIGHT: 223.2 LBS

## 2024-09-06 DIAGNOSIS — J45.40 MODERATE PERSISTENT ASTHMA WITHOUT COMPLICATION: ICD-10-CM

## 2024-09-06 DIAGNOSIS — R05.3 CHRONIC COUGH: Primary | ICD-10-CM

## 2024-09-06 PROCEDURE — 99214 OFFICE O/P EST MOD 30 MIN: CPT | Performed by: INTERNAL MEDICINE

## 2024-09-06 PROCEDURE — G2211 COMPLEX E/M VISIT ADD ON: HCPCS | Performed by: INTERNAL MEDICINE

## 2024-09-06 RX ORDER — ALBUTEROL SULFATE 0.83 MG/ML
2.5 SOLUTION RESPIRATORY (INHALATION) EVERY 6 HOURS PRN
Qty: 180 ML | Refills: 3 | Status: SHIPPED | OUTPATIENT
Start: 2024-09-06 | End: 2024-12-05

## 2024-09-06 RX ORDER — FLUTICASONE PROPIONATE AND SALMETEROL 250; 50 UG/1; UG/1
1 POWDER RESPIRATORY (INHALATION) 2 TIMES DAILY
Qty: 180 BLISTER | Refills: 1 | Status: SHIPPED | OUTPATIENT
Start: 2024-09-06 | End: 2025-03-05

## 2024-09-06 NOTE — PROGRESS NOTES
Follow Up - Pulmonary Medicine   Coretta Hines 69 y.o. female MRN: [id number]      Coretta Hines is a 69 y.o. female hx DVT on AC, HTN, DEN, asthma, allergies, right eye vision loss, GERD, Dm2, obesity, OHS, who presents for follow up of asthma.    # Asthma, mod persistent, cough variant  # Allergies  # Chronic cough  # Hx vocal cord disease  Several months of cough on and off, improvwes somewhat with steroid courses  Hx asthma, had restarted her ICS/LABA recently. Recent CXR normal. Overall cough slightly improving but still daily. Likely due to combination of asthma and upper airway cough syndrome. Also with hx sinusitis and vocal cord disease sp injections, so this can also be playing a role  Commodities include obesity, DEN, GERD  Eos < 200  Pfts with no obstruction, no BD response, borderline/mild restriction, normal gas transfer    Cough overall improved until this wek when triggered by changes in weather. Has restarted advair this week and cough improving. No exacerbations of asthma requiring prednisone    - Ent referral given hx chronic sinus issues and vocal cord disease (not scheduled yet)  - continue Asteline nasal spray, claritin, singulair  - continue Advair, change to medium dose albuterol prn  - continue PPI    # DEN  # Obesity  Bicarb elevated at 30, BMI 40. Previously diagnosed used CPAP then it broke., Reportedly repeated study (while upright) was negative for DEN, likely because not supine  - sleeps in recliner      Follow up 4-5 months    Vaccines    Immunization History   Administered Date(s) Administered    COVID-19 PFIZER VACCINE 0.3 ML IM 03/12/2021, 04/02/2021    Tdap 07/05/2023        I have spent a total time of 55 minutes on 09/06/24 in caring for this patient including Diagnostic results, Prognosis, Risks and benefits of tx options, Instructions for management, Patient and family education, Importance of tx compliance, Risk factor reductions, Impressions, Counseling / Coordination  of care, Documenting in the medical record, Reviewing / ordering tests, medicine, procedures  , and Obtaining or reviewing history  .   ______________________________________________________________    Interval Hx:    Had been doing we ll with minimal cough and only taking occasional inhalers  A few days ago started having cough again, triggered by cold weather    Took medrol dose rosemary for rash and sinus, no asthma exacerbations    HPI:    Coretta Hines is a 69 y.o. female hx DVT, HTN, DEN, asthma, allergies, GERD, Dm2, obesity, OHS, who presents for evaluation of asthma.    Nonsmoker  Asthma diagnosed as adult  Hayfever symptoms, worked on farm as teenaged  Allergic to pollen, flowers, mold  Over last 10-15 yrs getting sick in Nov and Feb with change in seasons    COVID Jan 2021, hospitalized with hypoxia, discharged with oxygen for 2 months    Not cough on again for 2 months  Got 2 course of prednisone without much success. Also one course abx  Feels like a lot of post nasal drip, GERD and upper airway congestion  Taking astelin      Occupational/Exposure history:    Dogs and cats at home    Review of Systems:  Review of Systems  Aside from what is mentioned in the HPI, the review of systems otherwise negative.    Current Medications:    Current Outpatient Medications:     acetaminophen (Tylenol) 325 mg tablet, Take 650 mg by mouth every 6 (six) hours as needed for mild pain, Disp: , Rfl:     albuterol (2.5 mg/3 mL) 0.083 % nebulizer solution, Take 3 mL (2.5 mg total) by nebulization every 6 (six) hours as needed for wheezing or shortness of breath, Disp: 180 mL, Rfl: 2    albuterol (PROVENTIL HFA,VENTOLIN HFA) 90 mcg/act inhaler, Inhale 2 puffs every 6 (six) hours as needed for wheezing, Disp: 8.5 g, Rfl: 2    Azelastine HCl 137 MCG/SPRAY SOLN, SPRAY 2 SPRAYS INTO EACH NOSTRIL 2 TIMES A DAY USE IN EACH NOSTRIL AS DIRECTED, Disp: 30 mL, Rfl: 3    bimatoprost (LUMIGAN) 0.01 % ophthalmic drops, , Disp: ,  Rfl:     Dextromethorphan-GG-APAP (Coricidin HBP Cold/Cough/Flu) -325 MG/15ML LIQD, Take 30 mL by mouth 3 (three) times a day as needed (cough, congestion), Disp: 355 mL, Rfl: 0    diltiazem (CARDIZEM CD) 240 mg 24 hr capsule, Take 1 capsule (240 mg total) by mouth daily, Disp: 90 capsule, Rfl: 3    dulaglutide (Trulicity) 3 MG/0.5ML injection, Inject 0.5 mL (3 mg total) under the skin every 7 days, Disp: 6 mL, Rfl: 1    Eliquis 2.5 MG, TAKE 1 TABLET(2.5 MG) BY MOUTH TWICE DAILY, Disp: 60 tablet, Rfl: 5    famotidine (PEPCID) 20 mg tablet, Take 20 mg by mouth, Disp: , Rfl:     Fluticasone-Salmeterol (Advair) 500-50 mcg/dose inhaler, Inhale 1 puff every 12 (twelve) hours (Patient taking differently: Inhale 1 puff every 12 (twelve) hours Has not been using recently), Disp: 180 blister, Rfl: 3    loratadine (CLARITIN) 10 mg tablet, Take 10 mg by mouth daily, Disp: , Rfl:     metFORMIN (GLUCOPHAGE) 500 mg tablet, 1 tablet each morning and 2 tablets each evening, Disp: 270 tablet, Rfl: 3    methylPREDNISolone 4 MG tablet therapy pack, Use as directed on package, Disp: 1 each, Rfl: 0    montelukast (SINGULAIR) 10 mg tablet, TAKE 1 TABLET(10 MG) BY MOUTH DAILY AT BEDTIME, Disp: 30 tablet, Rfl: 5    omeprazole (PriLOSEC) 40 MG capsule, Take 1 capsule (40 mg total) by mouth daily, Disp: 90 capsule, Rfl: 2    rosuvastatin (CRESTOR) 10 MG tablet, TAKE 1 TABLET(10 MG) BY MOUTH DAILY, Disp: 90 tablet, Rfl: 1    Timolol Maleate PF (Timolol Maleate Ocudose) 0.5 % SOLN, , Disp: , Rfl:     Historical Information   Past Medical History:   Diagnosis Date    Allergic     Allergic rhinitis Decades    Arthritis     Asthma     Blindness of left eye     COPD (chronic obstructive pulmonary disease) (HCC)     Deep vein thrombosis (HCC) March 3, 2023    cataract surgeries, detached retina surgeries leftveye    Diabetes mellitus (HCC)     GERD (gastroesophageal reflux disease)     Hypertension     Murmur, heart     Obesity     Visual  "impairment      Past Surgical History:   Procedure Laterality Date     SECTION  10/16/1990    Fibroids    COLONOSCOPY      EYE SURGERY      MULTIPLE    HYSTERECTOMY  2000    JOINT REPLACEMENT  2016    right  knee    REPLACEMENT TOTAL KNEE Right      Social History   Social History     Tobacco Use   Smoking Status Never   Smokeless Tobacco Never       Family History:   Family History   Problem Relation Age of Onset    Dementia Mother     Arthritis Mother         Late in life    Hypertension Father         HBP    Heart disease Father         HBP    Diabetes Father         managed through diet    Hearing loss Father         Industrial/construction work with no hearing protection    Glaucoma Paternal Grandmother     Asthma Paternal Grandfather         Inhaler used         PhysicalExamination:  Vitals:   Pulse 73   Temp 97.6 °F (36.4 °C) (Tympanic)   Ht 5' 3\" (1.6 m)   Wt 101 kg (223 lb 3.2 oz)   SpO2 98%   BMI 39.54 kg/m²     Appearance -- NAD, speaking full sentences  HEENT -- anicteric sclera, clear OP, MMM  Neck -- no JVD  Heart -- RRR, no murmurs  Lungs -- CTAB  Abdomen -- soft, NTND, +bs  Extremities -- WWP, no LE edema  Skin -- no rash  Neuro -- A&Ox3, wnl  Psych -- no obvious depression or hallucination        Diagnostic Data:  Labs:  I personally reviewed the most recent laboratory data pertinent to today's visit    Lab Results   Component Value Date    WBC 7.52 2024    HGB 13.8 2024    HCT 43.5 2024    MCV 87 2024     2024     Lab Results   Component Value Date    CALCIUM 9.0 2024    K 4.1 2024    CO2 31 2024     2024    BUN 9 2024    CREATININE 0.77 2024     No results found for: \"IGE\"  Lab Results   Component Value Date    ALT 10 2024    AST 10 (L) 2024    ALKPHOS 97 2024       PFT results:  The most recent pulmonary function tests were reviewed.  2024: Pfts with no obstruction, no BD response, " borderline/mild restriction, normal gas transfer    Imaging:  I personally reviewed the images on the PAC system pertinent to today's visit  CXR 3/2024: normal          Morena Goodrich MD  [facility]

## 2024-09-09 ENCOUNTER — OFFICE VISIT (OUTPATIENT)
Dept: VASCULAR SURGERY | Facility: CLINIC | Age: 69
End: 2024-09-09
Payer: MEDICARE

## 2024-09-09 VITALS
DIASTOLIC BLOOD PRESSURE: 88 MMHG | HEART RATE: 77 BPM | WEIGHT: 224 LBS | SYSTOLIC BLOOD PRESSURE: 152 MMHG | RESPIRATION RATE: 18 BRPM | HEIGHT: 63 IN | BODY MASS INDEX: 39.69 KG/M2 | OXYGEN SATURATION: 98 %

## 2024-09-09 DIAGNOSIS — I82.552 CHRONIC DEEP VEIN THROMBOSIS (DVT) OF LEFT PERONEAL VEIN (HCC): ICD-10-CM

## 2024-09-09 DIAGNOSIS — I82.551 CHRONIC DEEP VEIN THROMBOSIS (DVT) OF RIGHT PERONEAL VEIN (HCC): ICD-10-CM

## 2024-09-09 DIAGNOSIS — I87.2 VENOUS INSUFFICIENCY OF LEFT LOWER EXTREMITY: Primary | ICD-10-CM

## 2024-09-09 DIAGNOSIS — I87.2 EDEMA OF BOTH LOWER EXTREMITIES DUE TO PERIPHERAL VENOUS INSUFFICIENCY: ICD-10-CM

## 2024-09-09 PROCEDURE — 99213 OFFICE O/P EST LOW 20 MIN: CPT | Performed by: PHYSICIAN ASSISTANT

## 2024-09-09 NOTE — PROGRESS NOTES
Ambulatory Visit  Name: Coretta Hines      : 1955      MRN: [id number]  Encounter Provider: Nicole Burleson PA-C  Encounter Date: 2024   Encounter department: [facility]    Assessment & Plan   1. Venous insufficiency of left lower extremity  -     Compression Stocking  2. Edema of both lower extremities due to peripheral venous insufficiency  Assessment & Plan:  -Follow-up left lower extremity venous insufficiency  -Mild LLE edema  -Fully compliant with compression and overall feels the legs are stable  -Would like to get left total knee replacement at some point    Exam:  -Very mild LLE edema; chronic flat erythema over the L shin; no drainage, weeping, wounds or infectin    Plan:  -Stable L >R LE edema with underlying venous insufficiency, Hx DVT (?due to leg swelling) and obesity  -LLE with evidence of deep and superficial incompetence on reflux study  -Continue with medical compression stockings daily  -Continue with lymph pumps 60 minutes daily at night  -Continue with compression, periodic elevation, low Na, exercise, wt loss and skin care  -Thrombosis panel negative; f/u hematology regarding a/c issues; patient prefers apixaban 2.5 BID after DVT  -Patient education regarding venous insufficiency provided  -Follow-up in 1 year, but discussed reasons to be seen sooner  3. Chronic deep vein thrombosis (DVT) of right peroneal vein (HCC)  -     Compression Stocking  4. Chronic deep vein thrombosis (DVT) of left peroneal vein (HCC)  -     Compression Stocking      History of Present Illness     CC: edema.  Pt states that her legs have improved over all.     Coretta Hines is a 69 y.o. female obesity (BMP 39), COPD, Hx R TKR, L knee OA, hypertension, DM, L calf DVT 3/2023 who returns for vascular follow up.      She was initially seen in  after L DVT in 1 of 2 peroneal veins on duplex discovered at [facility]. She was placed on full anticoagulation. After DVT, she developed increased  leg/calf pain, cramping, edema and redness.  She has been recommended for conservative measures with medical compression stockings, periodic elevation, low sodium and exercise. Additionally we were able to get her lymphedema pumps. A venous reflux study performed 8/2023 is significant for deep and superficial reflux. No history of malignancy or rheumatologic disorders.  She has no large, bulky varicose veins.      She is on DVT prophylaxis with apixaban 2.5 twice daily with plans to follow-up with hematology.          6/10/24:  Doing well. No issues. Compliant with compression stockings and pumps. No recurrent infection. Motivated to walk, exercise and work on A1c (7.5) since she would like to have L TKR.      Reviewed history, prior imaging and patient education regarding venous insufficiency provided.  She should follow up with hematology.        9/9/24: Patient reports that she is doing well.  She is overall improved.  She is compliant with compression stockings which she wears during the day and removed at night.  She continues to elevate and take care of her legs with moisturizer.  She is using her lymph pumps but not every day.    She continues to have mild, flat erythema over the left lower extremity.  She tells me that they are all the time.  It does not seem to worsen.  She has had very little lower extremity edema.  She is quite pleased with the way the leg is feeling.  She is much more aware of what she needs to do in terms of conservative measures.  When she does have to sit at work, she does get up and move around and tries to elevate.    She has had no drainage, weeping or infection.    She is hoping to have a left total knee replacement.  She is working on her diabetes care.    We could consider evaluation for surgical intervention in the future if she develops worsening symptoms or edema. Since her legs are stable, we can continue to monitor.  Recommend exercise, weight loss and continued  "compression.    A1c 7.7          Review of Systems   Constitutional: Negative.    HENT: Negative.     Eyes: Negative.    Respiratory:  Positive for cough.    Cardiovascular:  Positive for leg swelling.   Gastrointestinal: Negative.    Endocrine: Negative.    Genitourinary: Negative.    Musculoskeletal:  Positive for gait problem.   Skin:  Positive for color change.   Allergic/Immunologic: Negative.    Neurological:  Negative for weakness and numbness.   Hematological: Negative.    Psychiatric/Behavioral: Negative.         Objective     /88 (BP Location: Left arm, Patient Position: Sitting)   Pulse 77   Resp 18   Ht 5' 3\" (1.6 m)   Wt 102 kg (224 lb)   SpO2 98%   BMI 39.68 kg/m²       Mild LLE edema and flat erythema      Physical Exam  Vitals and nursing note reviewed.   Constitutional:       Appearance: She is well-developed.   HENT:      Head: Normocephalic and atraumatic.   Eyes:      Pupils: Pupils are equal, round, and reactive to light.   Neck:      Thyroid: No thyromegaly.      Vascular: No JVD.      Trachea: Trachea normal.   Cardiovascular:      Rate and Rhythm: Normal rate and regular rhythm.      Pulses:           Radial pulses are 2+ on the right side and 2+ on the left side.        Dorsalis pedis pulses are 2+ on the right side and 2+ on the left side.      Heart sounds: S1 normal and S2 normal. Murmur heard.      Crescendo systolic murmur is present with a grade of 2/6.      No friction rub. No gallop.   Pulmonary:      Effort: Pulmonary effort is normal. No accessory muscle usage or respiratory distress.      Breath sounds: Normal breath sounds. No wheezing or rales.   Abdominal:      General: Bowel sounds are normal. There is no distension.      Palpations: Abdomen is soft.      Tenderness: There is no abdominal tenderness.   Musculoskeletal:         General: No deformity. Normal range of motion.      Cervical back: Neck supple.      Left lower leg: Edema present.   Skin:     " "General: Skin is warm and dry.      Findings: No lesion or rash.      Nails: There is no clubbing.   Neurological:      Mental Status: She is alert and oriented to person, place, and time.      Comments: Grossly normal    Psychiatric:         Behavior: Behavior is cooperative.         I have reviewed and made appropriate changes to the review of systems input by the medical assistant.    Vitals:    24 1041   BP: 152/88   BP Location: Left arm   Patient Position: Sitting   Pulse: 77   Resp: 18   SpO2: 98%   Weight: 102 kg (224 lb)   Height: 5' 3\" (1.6 m)       Patient Active Problem List   Diagnosis    Chronic deep vein thrombosis (DVT) of right peroneal vein (HCC)    Type 2 diabetes mellitus with hyperglycemia, without long-term current use of insulin (HCC)    Class 2 severe obesity with serious comorbidity in adult (HCC)    Left retinal disorder    Dyslipidemia    Primary hypertension    GERD (gastroesophageal reflux disease)    Moderate persistent asthma without complication    Obesity hypoventilation syndrome (HCC)    DEN (obstructive sleep apnea)    Osteoarthritis    Seasonal allergic rhinitis due to fungal spores    Edema of both lower extremities due to peripheral venous insufficiency    History of colon polyps    FH: total knee replacement    Venous insufficiency of left lower extremity    Chronic deep vein thrombosis (DVT) of left peroneal vein (HCC)    Sleep disturbance    Bilateral carpal tunnel syndrome       Past Surgical History:   Procedure Laterality Date     SECTION  10/16/1990    Fibroids    COLONOSCOPY      EYE SURGERY      MULTIPLE    HYSTERECTOMY  2000    JOINT REPLACEMENT  2016    right  knee    REPLACEMENT TOTAL KNEE Right        Family History   Problem Relation Age of Onset    Dementia Mother     Arthritis Mother         Late in life    Hypertension Father         HBP    Heart disease Father         HBP    Diabetes Father         managed through diet    Hearing loss Father     "     Industrial/construction work with no hearing protection    Glaucoma Paternal Grandmother     Asthma Paternal Grandfather         Inhaler used       Social History     Socioeconomic History    Marital status: /Civil Union     Spouse name: Not on file    Number of children: Not on file    Years of education: Not on file    Highest education level: Not on file   Occupational History    Not on file   Tobacco Use    Smoking status: Never    Smokeless tobacco: Never   Vaping Use    Vaping status: Never Used   Substance and Sexual Activity    Alcohol use: Yes     Alcohol/week: 1.0 standard drink of alcohol     Types: 1 Standard drinks or equivalent per week     Comment: on occasion, 1 drink with low sugar content    Drug use: Never    Sexual activity: Not on file   Other Topics Concern    Not on file   Social History Narrative    Not on file     Social Determinants of Health     Financial Resource Strain: Low Risk  (9/17/2023)    Overall Financial Resource Strain (CARDIA)     Difficulty of Paying Living Expenses: Not hard at all   Food Insecurity: No Food Insecurity (9/13/2022)    [facility]    Food Insecurity     Within the past 12 months, you worried that your food would run out before you got the money to buy more.: Never true     Within the past 12 months, the food you bought just didn't last and you didn't have money to get more.: Never true     Within the past 12 months, you worried that your food would run out before you got the money to buy more.: Never true   Transportation Needs: No Transportation Needs (9/17/2023)    PRAPARE - Transportation     Lack of Transportation (Medical): No     Lack of Transportation (Non-Medical): No   Physical Activity: Not on file   Stress: Not on file   Social Connections: Not on file   Intimate Partner Violence: Not on file   Housing Stability: Not on file       Allergies   Allergen Reactions    Other Nausea Only, Vomiting and Cough      LOBSTER    Penicillins Seizures    Sulfa Antibiotics Rash         Current Outpatient Medications:     acetaminophen (Tylenol) 325 mg tablet, Take 650 mg by mouth every 6 (six) hours as needed for mild pain, Disp: , Rfl:     albuterol (2.5 mg/3 mL) 0.083 % nebulizer solution, Take 3 mL (2.5 mg total) by nebulization every 6 (six) hours as needed for wheezing or shortness of breath, Disp: 180 mL, Rfl: 3    albuterol (PROVENTIL HFA,VENTOLIN HFA) 90 mcg/act inhaler, Inhale 2 puffs every 6 (six) hours as needed for wheezing, Disp: 8.5 g, Rfl: 2    Azelastine HCl 137 MCG/SPRAY SOLN, SPRAY 2 SPRAYS INTO EACH NOSTRIL 2 TIMES A DAY USE IN EACH NOSTRIL AS DIRECTED, Disp: 30 mL, Rfl: 3    bimatoprost (LUMIGAN) 0.01 % ophthalmic drops, , Disp: , Rfl:     Dextromethorphan-GG-APAP (Coricidin HBP Cold/Cough/Flu) -325 MG/15ML LIQD, Take 30 mL by mouth 3 (three) times a day as needed (cough, congestion), Disp: 355 mL, Rfl: 0    diltiazem (CARDIZEM CD) 240 mg 24 hr capsule, Take 1 capsule (240 mg total) by mouth daily, Disp: 90 capsule, Rfl: 3    dulaglutide (Trulicity) 3 MG/0.5ML injection, Inject 0.5 mL (3 mg total) under the skin every 7 days, Disp: 6 mL, Rfl: 1    Eliquis 2.5 MG, TAKE 1 TABLET(2.5 MG) BY MOUTH TWICE DAILY, Disp: 60 tablet, Rfl: 5    famotidine (PEPCID) 20 mg tablet, Take 20 mg by mouth, Disp: , Rfl:     Fluticasone-Salmeterol (Advair Diskus) 250-50 mcg/dose inhaler, Inhale 1 puff 2 (two) times a day Rinse mouth after use., Disp: 180 blister, Rfl: 1    loratadine (CLARITIN) 10 mg tablet, Take 10 mg by mouth daily, Disp: , Rfl:     metFORMIN (GLUCOPHAGE) 500 mg tablet, 1 tablet each morning and 2 tablets each evening, Disp: 270 tablet, Rfl: 3    montelukast (SINGULAIR) 10 mg tablet, TAKE 1 TABLET(10 MG) BY MOUTH DAILY AT BEDTIME, Disp: 30 tablet, Rfl: 5    omeprazole (PriLOSEC) 40 MG capsule, Take 1 capsule (40 mg total) by mouth daily, Disp: 90 capsule, Rfl: 2    rosuvastatin (CRESTOR) 10 MG tablet, TAKE 1  TABLET(10 MG) BY MOUTH DAILY, Disp: 90 tablet, Rfl: 1    Timolol Maleate PF (Timolol Maleate Ocudose) 0.5 % SOLN, , Disp: , Rfl:

## 2024-09-16 ENCOUNTER — OFFICE VISIT (OUTPATIENT)
Dept: PODIATRY | Facility: CLINIC | Age: 69
End: 2024-09-16
Payer: MEDICARE

## 2024-09-16 VITALS — BODY MASS INDEX: 40.21 KG/M2 | WEIGHT: 227 LBS

## 2024-09-16 DIAGNOSIS — E11.9 CONTROLLED TYPE 2 DIABETES MELLITUS WITHOUT COMPLICATION, WITHOUT LONG-TERM CURRENT USE OF INSULIN (HCC): Primary | ICD-10-CM

## 2024-09-16 DIAGNOSIS — Z79.01 CHRONIC ANTICOAGULATION: ICD-10-CM

## 2024-09-16 DIAGNOSIS — L84 CALLUS: ICD-10-CM

## 2024-09-16 PROCEDURE — RECHECK: Performed by: PODIATRIST

## 2024-09-16 PROCEDURE — G0127 TRIM NAIL(S): HCPCS | Performed by: PODIATRIST

## 2024-09-16 PROCEDURE — 11055 PARING/CUTG B9 HYPRKER LES 1: CPT | Performed by: PODIATRIST

## 2024-09-16 NOTE — PROGRESS NOTES
Established patient with class findings presents for nail and lesion care.  Patient has a DVT history and swelling in the left lower extremity.  She takes a blood thinner on a regular basis.  Vascular exam:  DP  2/4 bilateral; PT  0 4 bilateral ; absent hair growth skin is thin and shiny  Dermatological exam:  Each toenail is thick and  dystrophic.  Pinch callus right great toe  Diagnosis:  Diabetes mellitus; pinch callus right great toe  Treatment: Trimmed multiple dystrophic toenails.  Trimmed pinch callus right great toe.    Nail removal    Date/Time: 9/16/2024 8:30 AM    Performed by: Antelmo Robin DPM  Authorized by: Antelmo Robin DPM    Nails trimmed:     Number of nails trimmed:  10  Lesion Destruction    Date/Time: 9/16/2024 8:30 AM    Performed by: Antelmo Robin DPM  Authorized by: Antelmo Robin DPM    Procedure Details - Lesion Destruction:     Number of Lesions:  1  Lesion 1:     Body area:  Lower extremity    Lower extremity location:  R big toe    Malignancy: benign hyperkeratotic lesion      Destruction method: scissors used for extraction

## 2024-10-22 ENCOUNTER — OFFICE VISIT (OUTPATIENT)
Dept: URGENT CARE | Age: 69
End: 2024-10-22
Payer: MEDICARE

## 2024-10-22 VITALS
TEMPERATURE: 97.2 F | RESPIRATION RATE: 18 BRPM | SYSTOLIC BLOOD PRESSURE: 138 MMHG | DIASTOLIC BLOOD PRESSURE: 70 MMHG | HEART RATE: 81 BPM | OXYGEN SATURATION: 98 %

## 2024-10-22 DIAGNOSIS — H61.21 IMPACTED CERUMEN OF RIGHT EAR: ICD-10-CM

## 2024-10-22 DIAGNOSIS — J40 BRONCHITIS: Primary | ICD-10-CM

## 2024-10-22 DIAGNOSIS — J06.9 UPPER RESPIRATORY TRACT INFECTION, UNSPECIFIED TYPE: ICD-10-CM

## 2024-10-22 LAB
S PYO AG THROAT QL: NEGATIVE
SARS-COV-2 AG UPPER RESP QL IA: NEGATIVE
VALID CONTROL: NORMAL

## 2024-10-22 PROCEDURE — 87880 STREP A ASSAY W/OPTIC: CPT

## 2024-10-22 PROCEDURE — G0463 HOSPITAL OUTPT CLINIC VISIT: HCPCS

## 2024-10-22 PROCEDURE — 87811 SARS-COV-2 COVID19 W/OPTIC: CPT

## 2024-10-22 PROCEDURE — 99214 OFFICE O/P EST MOD 30 MIN: CPT

## 2024-10-22 PROCEDURE — 69210 REMOVE IMPACTED EAR WAX UNI: CPT

## 2024-10-22 RX ORDER — AZITHROMYCIN 250 MG/1
TABLET, FILM COATED ORAL
Qty: 6 TABLET | Refills: 0 | Status: SHIPPED | OUTPATIENT
Start: 2024-10-22 | End: 2024-10-26

## 2024-10-22 RX ORDER — BENZONATATE 100 MG/1
100 CAPSULE ORAL 3 TIMES DAILY PRN
Qty: 20 CAPSULE | Refills: 0 | Status: SHIPPED | OUTPATIENT
Start: 2024-10-22

## 2024-10-22 RX ORDER — FLUTICASONE PROPIONATE 50 MCG
1 SPRAY, SUSPENSION (ML) NASAL DAILY
Qty: 9.9 ML | Refills: 0 | Status: SHIPPED | OUTPATIENT
Start: 2024-10-22

## 2024-10-22 RX ORDER — GUAIFENESIN 600 MG/1
1200 TABLET, EXTENDED RELEASE ORAL EVERY 12 HOURS SCHEDULED
Qty: 30 TABLET | Refills: 0 | Status: SHIPPED | OUTPATIENT
Start: 2024-10-22

## 2024-10-22 RX ORDER — PREDNISONE 10 MG/1
TABLET ORAL
Qty: 21 TABLET | Refills: 0 | Status: SHIPPED | OUTPATIENT
Start: 2024-10-22

## 2024-10-22 NOTE — PROGRESS NOTES
[facility]        NAME: Coretta Hines is a 69 y.o. female  : 1955    MRN: [id number]  DATE: 2024  TIME: 11:05 AM    Assessment and Plan   Bronchitis [J40]  1. Bronchitis        2. Upper respiratory tract infection, unspecified type  azithromycin (ZITHROMAX) 250 mg tablet    predniSONE 10 mg tablet    guaiFENesin (MUCINEX) 600 mg 12 hr tablet    benzonatate (TESSALON PERLES) 100 mg capsule    fluticasone (FLONASE) 50 mcg/act nasal spray    Poct Covid 19 Rapid Antigen Test    POCT rapid ANTIGEN strepA      3. Impacted cerumen of right ear        Concern for recent exposure to covid and strep due to traveling via air and attending her granddaughter's 1st birthday party with multiple sick contacts.  In office covid negative  In office strep negative.  Will treat for acute bronchitis given length of symptoms.  Breathing is well controlled with pt's routine use of albuterol inhaler.  Lungs clear bilaterally.  Discuss due of prednisone in DM2, pt verbalized understanding.  Most recetn A1C 7.7.  Pt will follow up with PCP for recheck in 7-10 days if  no improvement.  Strict ER precautions.     Upon physical exam, noted white fluffy appearing FB to right ear. Pt reports routine use of Qtips. Flaky yellow cerumen removed easily from right ear without difficulty.    Ear cerumen removal    Date/Time: 10/22/2024 10:00 AM    Performed by: SCARLETT James  Authorized by: SCARLETT James  Universal Protocol:  Consent: Verbal consent obtained. Written consent obtained.  Consent given by: patient  Patient understanding: patient states understanding of the procedure being performed  Patient identity confirmed: verbally with patient    Patient location:  Clinic  Procedure details:     Local anesthetic:  None    Location:  R ear    Procedure type: curette      Approach:  Natural orifice    Visualization (free text):  Yellow sofft curled wax    Equipment used:  Allegator forceps  Post-procedure  details:     Complication:  None    Hearing quality:  Improved    Patient tolerance of procedure:  Tolerated well, no immediate complications        Patient Instructions       Follow up with PCP in 3-5 days.  Proceed to  ER if symptoms worsen.    If tests have been performed at [facility], our office will contact you with results if changes need to be made to the care plan discussed with you at the visit.  You can review your full results on [facility].    Chief Complaint     Chief Complaint   Patient presents with    Cough    Cold Like Symptoms    Sore Throat     Patient having a sore throat for 2-3 weeks. Coughing and congestion she stays has been ongoing for weeks.         History of Present Illness       Pt is a 69 year old female presenting with worsening sore throat over the last 2 days.  States she has been sick with cough, congestion, runny nose, and increased use of albuterol nebulizer over the past 3 weeks. Recent air travel and exposure to may children over the last 5 days.  She has been using cough drops and gargling without relief. She denies CP, SOB, difficulty breathing, She denies taking any covid tests at home.     Cough  Associated symptoms include a sore throat and wheezing. Pertinent negatives include no chest pain or shortness of breath.   Sore Throat   Associated symptoms include congestion and coughing. Pertinent negatives include no shortness of breath.       Review of Systems   Review of Systems   HENT:  Positive for congestion and sore throat.    Respiratory:  Positive for cough and wheezing. Negative for choking, chest tightness and shortness of breath.    Cardiovascular:  Negative for chest pain and leg swelling.         Current Medications       Current Outpatient Medications:     acetaminophen (Tylenol) 325 mg tablet, Take 650 mg by mouth every 6 (six) hours as needed for mild pain, Disp: , Rfl:     albuterol (2.5 mg/3 mL) 0.083 % nebulizer solution, Take 3 mL (2.5 mg total) by  nebulization every 6 (six) hours as needed for wheezing or shortness of breath, Disp: 180 mL, Rfl: 3    albuterol (PROVENTIL HFA,VENTOLIN HFA) 90 mcg/act inhaler, Inhale 2 puffs every 6 (six) hours as needed for wheezing, Disp: 8.5 g, Rfl: 2    Azelastine HCl 137 MCG/SPRAY SOLN, SPRAY 2 SPRAYS INTO EACH NOSTRIL 2 TIMES A DAY USE IN EACH NOSTRIL AS DIRECTED, Disp: 30 mL, Rfl: 3    azithromycin (ZITHROMAX) 250 mg tablet, Take 2 tablets today then 1 tablet daily x 4 days, Disp: 6 tablet, Rfl: 0    benzonatate (TESSALON PERLES) 100 mg capsule, Take 1 capsule (100 mg total) by mouth 3 (three) times a day as needed for cough, Disp: 20 capsule, Rfl: 0    bimatoprost (LUMIGAN) 0.01 % ophthalmic drops, , Disp: , Rfl:     Dextromethorphan-GG-APAP (Coricidin HBP Cold/Cough/Flu) -325 MG/15ML LIQD, Take 30 mL by mouth 3 (three) times a day as needed (cough, congestion), Disp: 355 mL, Rfl: 0    diltiazem (CARDIZEM CD) 240 mg 24 hr capsule, Take 1 capsule (240 mg total) by mouth daily, Disp: 90 capsule, Rfl: 3    dulaglutide (Trulicity) 3 MG/0.5ML injection, Inject 0.5 mL (3 mg total) under the skin every 7 days, Disp: 6 mL, Rfl: 1    Eliquis 2.5 MG, TAKE 1 TABLET(2.5 MG) BY MOUTH TWICE DAILY, Disp: 60 tablet, Rfl: 5    famotidine (PEPCID) 20 mg tablet, Take 20 mg by mouth, Disp: , Rfl:     fluticasone (FLONASE) 50 mcg/act nasal spray, 1 spray into each nostril daily, Disp: 9.9 mL, Rfl: 0    Fluticasone-Salmeterol (Advair Diskus) 250-50 mcg/dose inhaler, Inhale 1 puff 2 (two) times a day Rinse mouth after use., Disp: 180 blister, Rfl: 1    guaiFENesin (MUCINEX) 600 mg 12 hr tablet, Take 2 tablets (1,200 mg total) by mouth every 12 (twelve) hours, Disp: 30 tablet, Rfl: 0    loratadine (CLARITIN) 10 mg tablet, Take 10 mg by mouth daily, Disp: , Rfl:     metFORMIN (GLUCOPHAGE) 500 mg tablet, 1 tablet each morning and 2 tablets each evening, Disp: 270 tablet, Rfl: 3    montelukast (SINGULAIR) 10 mg tablet, TAKE 1 TABLET(10 MG)  BY MOUTH DAILY AT BEDTIME, Disp: 30 tablet, Rfl: 5    omeprazole (PriLOSEC) 40 MG capsule, Take 1 capsule (40 mg total) by mouth daily, Disp: 90 capsule, Rfl: 2    predniSONE 10 mg tablet, 6 Tabs day 1, 5 tabs day 2, 4 tabs day 3, 3 tabs day 4, 2 tabs day 5, 1 tab day 6, Disp: 21 tablet, Rfl: 0    rosuvastatin (CRESTOR) 10 MG tablet, TAKE 1 TABLET(10 MG) BY MOUTH DAILY, Disp: 90 tablet, Rfl: 1    Timolol Maleate PF (Timolol Maleate Ocudose) 0.5 % SOLN, , Disp: , Rfl:     Current Allergies     Allergies as of 10/22/2024 - Reviewed 10/22/2024   Allergen Reaction Noted    Other Nausea Only, Vomiting, and Cough 1984    Penicillins Seizures 2023    Sulfa antibiotics Rash 2023            The following portions of the patient's history were reviewed and updated as appropriate: allergies, current medications, past family history, past medical history, past social history, past surgical history and problem list.     Past Medical History:   Diagnosis Date    Allergic     Allergic rhinitis Decades    Arthritis     Asthma     Blindness of left eye     COPD (chronic obstructive pulmonary disease) (HCC)     Deep vein thrombosis (HCC) March 3, 2023    cataract surgeries, detached retina surgeries leftveye    Diabetes mellitus (HCC)     GERD (gastroesophageal reflux disease)     Hypertension     Murmur, heart     Obesity     Visual impairment        Past Surgical History:   Procedure Laterality Date     SECTION  10/16/1990    Fibroids    COLONOSCOPY      EYE SURGERY      MULTIPLE    HYSTERECTOMY  2000    JOINT REPLACEMENT  2016    right  knee    REPLACEMENT TOTAL KNEE Right        Family History   Problem Relation Age of Onset    Dementia Mother     Arthritis Mother         Late in life    Hypertension Father         HBP    Heart disease Father         HBP    Diabetes Father         managed through diet    Hearing loss Father         Industrial/construction work with no hearing protection    Glaucoma  Paternal Grandmother     Asthma Paternal Grandfather         Inhaler used         Medications have been verified.        Objective   /70   Pulse 81   Temp (!) 97.2 °F (36.2 °C)   Resp 18   SpO2 98%   No LMP recorded. Patient has had a hysterectomy.       Physical Exam     Physical Exam  Vitals and nursing note reviewed.   Constitutional:       General: She is not in acute distress.     Appearance: She is well-developed. She is obese. She is ill-appearing.   HENT:      Head: Normocephalic.      Right Ear: There is impacted cerumen.      Left Ear: Tympanic membrane normal.      Nose: No congestion or rhinorrhea.      Mouth/Throat:      Mouth: Mucous membranes are moist.      Pharynx: Posterior oropharyngeal erythema present.      Tonsils: No tonsillar exudate. 1+ on the right. 1+ on the left.   Cardiovascular:      Rate and Rhythm: Normal rate.   Pulmonary:      Effort: Pulmonary effort is normal. No respiratory distress.      Breath sounds: No stridor. No wheezing, rhonchi or rales.   Chest:      Chest wall: No tenderness.   Abdominal:      Palpations: Abdomen is soft.      Tenderness: There is no abdominal tenderness.   Musculoskeletal:      Cervical back: Normal range of motion.   Lymphadenopathy:      Cervical: No cervical adenopathy.   Skin:     General: Skin is warm.   Neurological:      Mental Status: She is alert.

## 2024-10-22 NOTE — PATIENT INSTRUCTIONS
Take antibiotic- Zithromax as directed.  Recommend daily probiotic while on antibiotic or eat yogurt with live cultures daily while on antibiotic.       You may take Tylenol or Motrin for pain and fever.    Albuterol inhaler for wheezing and shortness of breath.    Mucinex for cough during the day.    Tessalon Pearls for cough at night.    Flonase- one spray each nostril daily for nasal congestions.    Rest, increase fluids, good hand washing.  Please follow up with your family doctor if no improvement in 7-10 days.     I was able to remove a small amount of earwax from your right ear today.  Continue to use Debrox as directed.     Seek immediate care if:   You feel dizzy.    You have discharge or blood coming out of your ear.    Your ear pain does not go away or gets worse.    Call your doctor if:   You have a fever.     You have trouble hearing or hear ringing noises.    You have questions or concerns about your condition or care.

## 2024-11-05 ENCOUNTER — OFFICE VISIT (OUTPATIENT)
Dept: OBGYN CLINIC | Facility: CLINIC | Age: 69
End: 2024-11-05
Payer: MEDICARE

## 2024-11-05 VITALS — HEART RATE: 93 BPM | WEIGHT: 227 LBS | BODY MASS INDEX: 40.22 KG/M2 | OXYGEN SATURATION: 99 % | HEIGHT: 63 IN

## 2024-11-05 DIAGNOSIS — M17.12 PRIMARY OSTEOARTHRITIS OF LEFT KNEE: Primary | ICD-10-CM

## 2024-11-05 PROCEDURE — 99213 OFFICE O/P EST LOW 20 MIN: CPT | Performed by: PHYSICIAN ASSISTANT

## 2024-11-05 PROCEDURE — 20610 DRAIN/INJ JOINT/BURSA W/O US: CPT | Performed by: PHYSICIAN ASSISTANT

## 2024-11-05 RX ORDER — KETOROLAC TROMETHAMINE 30 MG/ML
30 INJECTION, SOLUTION INTRAMUSCULAR; INTRAVENOUS
Status: COMPLETED | OUTPATIENT
Start: 2024-11-05 | End: 2024-11-05

## 2024-11-05 RX ORDER — METHYLPREDNISOLONE ACETATE 40 MG/ML
2 INJECTION, SUSPENSION INTRA-ARTICULAR; INTRALESIONAL; INTRAMUSCULAR; SOFT TISSUE
Status: COMPLETED | OUTPATIENT
Start: 2024-11-05 | End: 2024-11-05

## 2024-11-05 RX ORDER — BUPIVACAINE HYDROCHLORIDE 2.5 MG/ML
2 INJECTION, SOLUTION INFILTRATION; PERINEURAL
Status: COMPLETED | OUTPATIENT
Start: 2024-11-05 | End: 2024-11-05

## 2024-11-05 RX ADMIN — METHYLPREDNISOLONE ACETATE 2 ML: 40 INJECTION, SUSPENSION INTRA-ARTICULAR; INTRALESIONAL; INTRAMUSCULAR; SOFT TISSUE at 09:00

## 2024-11-05 RX ADMIN — KETOROLAC TROMETHAMINE 30 MG: 30 INJECTION, SOLUTION INTRAMUSCULAR; INTRAVENOUS at 09:00

## 2024-11-05 RX ADMIN — BUPIVACAINE HYDROCHLORIDE 2 ML: 2.5 INJECTION, SOLUTION INFILTRATION; PERINEURAL at 09:00

## 2024-11-05 NOTE — PROGRESS NOTES
Orthopedics          Coretta Hines 69 y.o. female MRN: [id number]      Chief Complaint:   left knee pain    HPI:   69 y.o.female complaining of left knee pain.  She presents office today regarding left knee pain due to arthritis.  States the pain is aching nature worse weightbearing mildly relieved with rest.  She denies any numbness or tingling states the pain is aching nature worse weightbearing mildly relieved with rest.  She had viscosupplementation injections several months ago with significant pain relief.  She is scheduled to get repeat viscosupplementation injections however state her pain has worsened she is inquired about a corticosteroid injection in her left knee.  She is unable to take anti-inflammatory medications due to being on Eliquis.                Review Of Systems:   Skin: Normal  Neuro: See HPI  Musculoskeletal: See HPI  All other systems reviewed and are negative    Past Medical History:   Past Medical History:   Diagnosis Date    Allergic     Allergic rhinitis Decades    Arthritis     Asthma     Blindness of left eye     COPD (chronic obstructive pulmonary disease) ([facility])     Deep vein thrombosis ([facility]) March 3, 2023    cataract surgeries, detached retina surgeries leftveye    Diabetes mellitus (HCC)     GERD (gastroesophageal reflux disease)     Hypertension     Murmur, heart     Obesity     Visual impairment        Past Surgical History:   Past Surgical History:   Procedure Laterality Date     SECTION  10/16/1990    Fibroids    COLONOSCOPY      EYE SURGERY      MULTIPLE    HYSTERECTOMY  2000    JOINT REPLACEMENT  2016    right  knee    REPLACEMENT TOTAL KNEE Right        Family History:  Family history reviewed and non-contributory  Family History   Problem Relation Age of Onset    Dementia Mother     Arthritis Mother         Late in life    Hypertension Father         HBP    Heart disease Father         HBP    Diabetes Father         managed through diet    Hearing loss Father          Industrial/construction work with no hearing protection    Glaucoma Paternal Grandmother     Asthma Paternal Grandfather         Inhaler used         Social History:  Social History     Socioeconomic History    Marital status: /Civil Union     Spouse name: None    Number of children: None    Years of education: None    Highest education level: None   Occupational History    None   Tobacco Use    Smoking status: Never    Smokeless tobacco: Never   Vaping Use    Vaping status: Never Used   Substance and Sexual Activity    Alcohol use: Yes     Alcohol/week: 1.0 standard drink of alcohol     Types: 1 Standard drinks or equivalent per week     Comment: on occasion, 1 drink with low sugar content    Drug use: Never    Sexual activity: None   Other Topics Concern    None   Social History Narrative    None     Social Determinants of Health     Financial Resource Strain: Low Risk  (9/17/2023)    Overall Financial Resource Strain (CARDIA)     Difficulty of Paying Living Expenses: Not hard at all   Food Insecurity: No Food Insecurity (9/13/2022)    [facility] [employer]    Food Insecurity     Within the past 12 months, you worried that your food would run out before you got the money to buy more.: Never true     Within the past 12 months, the food you bought just didn't last and you didn't have money to get more.: Never true     Within the past 12 months, you worried that your food would run out before you got the money to buy more.: Never true   Transportation Needs: No Transportation Needs (9/17/2023)    PRAPARE - Transportation     Lack of Transportation (Medical): No     Lack of Transportation (Non-Medical): No   Physical Activity: Not on file   Stress: Not on file   Social Connections: Not on file   Intimate Partner Violence: Not on file   Housing Stability: Not on file       Allergies:   Allergies   Allergen Reactions    Other Nausea Only, Vomiting and Cough     LOBSTER    Penicillins  Seizures    Sulfa Antibiotics Rash       Labs:  0   Lab Value Date/Time    HCT 43.5 03/21/2024 0850    HCT 44.6 06/16/2023 1103    HCT 42.5 05/03/2023 1303    HGB 13.8 03/21/2024 0850    HGB 14.1 06/16/2023 1103    HGB 13.9 05/03/2023 1303    PT 12.2 03/30/2023 1428    INR 1.38 (H) 05/03/2023 1303    INR 0.9 03/30/2023 1428    WBC 7.52 03/21/2024 0850    WBC 8.36 06/16/2023 1103    WBC 9.70 05/03/2023 1303       Meds:    Current Outpatient Medications:     acetaminophen (Tylenol) 325 mg tablet, Take 650 mg by mouth every 6 (six) hours as needed for mild pain, Disp: , Rfl:     albuterol (2.5 mg/3 mL) 0.083 % nebulizer solution, Take 3 mL (2.5 mg total) by nebulization every 6 (six) hours as needed for wheezing or shortness of breath, Disp: 180 mL, Rfl: 3    albuterol (PROVENTIL HFA,VENTOLIN HFA) 90 mcg/act inhaler, Inhale 2 puffs every 6 (six) hours as needed for wheezing, Disp: 8.5 g, Rfl: 2    Azelastine HCl 137 MCG/SPRAY SOLN, SPRAY 2 SPRAYS INTO EACH NOSTRIL 2 TIMES A DAY USE IN EACH NOSTRIL AS DIRECTED, Disp: 30 mL, Rfl: 3    benzonatate (TESSALON PERLES) 100 mg capsule, Take 1 capsule (100 mg total) by mouth 3 (three) times a day as needed for cough, Disp: 20 capsule, Rfl: 0    bimatoprost (LUMIGAN) 0.01 % ophthalmic drops, , Disp: , Rfl:     Dextromethorphan-GG-APAP (Coricidin HBP Cold/Cough/Flu) -325 MG/15ML LIQD, Take 30 mL by mouth 3 (three) times a day as needed (cough, congestion), Disp: 355 mL, Rfl: 0    diltiazem (CARDIZEM CD) 240 mg 24 hr capsule, Take 1 capsule (240 mg total) by mouth daily, Disp: 90 capsule, Rfl: 3    dulaglutide (Trulicity) 3 MG/0.5ML injection, Inject 0.5 mL (3 mg total) under the skin every 7 days, Disp: 6 mL, Rfl: 1    Eliquis 2.5 MG, TAKE 1 TABLET(2.5 MG) BY MOUTH TWICE DAILY, Disp: 60 tablet, Rfl: 5    famotidine (PEPCID) 20 mg tablet, Take 20 mg by mouth, Disp: , Rfl:     fluticasone (FLONASE) 50 mcg/act nasal spray, 1 spray into each nostril daily, Disp: 9.9 mL, Rfl:  0    Fluticasone-Salmeterol (Advair Diskus) 250-50 mcg/dose inhaler, Inhale 1 puff 2 (two) times a day Rinse mouth after use., Disp: 180 blister, Rfl: 1    guaiFENesin (MUCINEX) 600 mg 12 hr tablet, Take 2 tablets (1,200 mg total) by mouth every 12 (twelve) hours, Disp: 30 tablet, Rfl: 0    loratadine (CLARITIN) 10 mg tablet, Take 10 mg by mouth daily, Disp: , Rfl:     metFORMIN (GLUCOPHAGE) 500 mg tablet, 1 tablet each morning and 2 tablets each evening, Disp: 270 tablet, Rfl: 3    montelukast (SINGULAIR) 10 mg tablet, TAKE 1 TABLET(10 MG) BY MOUTH DAILY AT BEDTIME, Disp: 30 tablet, Rfl: 5    omeprazole (PriLOSEC) 40 MG capsule, Take 1 capsule (40 mg total) by mouth daily, Disp: 90 capsule, Rfl: 2    predniSONE 10 mg tablet, 6 Tabs day 1, 5 tabs day 2, 4 tabs day 3, 3 tabs day 4, 2 tabs day 5, 1 tab day 6, Disp: 21 tablet, Rfl: 0    rosuvastatin (CRESTOR) 10 MG tablet, TAKE 1 TABLET(10 MG) BY MOUTH DAILY, Disp: 90 tablet, Rfl: 1    Timolol Maleate PF (Timolol Maleate Ocudose) 0.5 % SOLN, , Disp: , Rfl:     Body mass index is 40.21 kg/m².  Wt Readings from Last 3 Encounters:   11/05/24 103 kg (227 lb)   09/16/24 103 kg (227 lb)   09/09/24 102 kg (224 lb)     Physical Exam:   Vitals:    11/05/24 0902   Pulse: 93   SpO2: 99%       General Appearance:    Alert, cooperative, no distress, appears stated age   Head:    Normocephalic, without obvious abnormality, atraumatic   Eyes:    conjunctiva/corneas clear, both eyes        Nose:   Nares normal, septum midline, no drainage    Throat:   Lips normal; teeth and gums normal   Neck:    symmetrical, trachea midline, ;     thyroid:  no enlargement/   Back:     Symmetric, no curvature, ROM normal   Lungs:   No audible wheezing or labored breathing   Chest Wall:    No tenderness or deformity    Heart:    Regular rate and rhythm               Pulses:   2+ and symmetric all extremities   Skin:   Skin color, texture, turgor normal, no rashes or lesions   Neurologic:   normal  strength, sensation and reflexes     throughout       Musculoskeletal: left lower extremity  On examination of the left knee there is no effusion, no erythema.  Range of motion to full active extension and flexion to greater than 120°.  Pain on palpation medial and lateral joint lines.  There is crepitus with range of motion, no warmth to palpation, bony enlargement noted. No pain on palpation pes anserine bursa region or distal iliotibial band.  Stable to varus and valgus stress without pain or gapping.  Negative anterior and posterior drawer testing.  Sensation intact distal pulses present.    Large joint arthrocentesis: L knee  Universal Protocol:  Consent: Verbal consent obtained.  Consent given by: patient  Patient understanding: patient states understanding of the procedure being performed  Site marked: the operative site was marked  Patient identity confirmed: verbally with patient  Supporting Documentation  Indications: pain   Procedure Details  Location: knee - L knee  Needle size: 22 G  Approach: superior  Medications administered: 2 mL bupivacaine 0.25 %; 2 mL methylPREDNISolone acetate 40 mg/mL; 30 mg ketorolac 30 mg/mL    Patient tolerance: patient tolerated the procedure well with no immediate complications        _*_*_*_*_*_*_*_*_*_*_*_*_*_*_*_*_*_*_*_*_*_*_*_*_*_*_*_*_*_*_*_*_*_*_*_*_*_*_*_*_*    Assessment:  69 y.o.female with left knee pain due to arthritis    Plan:   Weight bearing as tolerated  left lower extremity  Left knee intra-articular corticosteroid and Toradol injection ministered as noted above  Patient advised should they develop any increasing pain, redness, drainage, numbness, tingling or swelling surrounding the injection site, they are to contact our office or present to the emergency department.  Prior authorization for left knee Orthovisc ordered today  Follow up in 3 months or sooner if needed      Arturo Holland PA-C                    .

## 2024-11-11 ENCOUNTER — HOSPITAL ENCOUNTER (OUTPATIENT)
Dept: MAMMOGRAPHY | Facility: HOSPITAL | Age: 69
Discharge: HOME | End: 2024-11-11
Payer: MEDICARE

## 2024-11-11 VITALS — HEIGHT: 63 IN | BODY MASS INDEX: 38.27 KG/M2 | WEIGHT: 216 LBS

## 2024-11-11 DIAGNOSIS — Z12.31 ENCOUNTER FOR SCREENING MAMMOGRAM FOR MALIGNANT NEOPLASM OF BREAST: ICD-10-CM

## 2024-11-11 PROCEDURE — 77067 SCR MAMMO BI INCL CAD: CPT

## 2024-11-11 PROCEDURE — 77063 BREAST TOMOSYNTHESIS BI: CPT

## 2024-11-14 ENCOUNTER — RA CDI HCC (OUTPATIENT)
Dept: OTHER | Facility: HOSPITAL | Age: 69
End: 2024-11-14

## 2024-11-19 ENCOUNTER — APPOINTMENT (OUTPATIENT)
Dept: LAB | Facility: CLINIC | Age: 69
End: 2024-11-19
Payer: MEDICARE

## 2024-11-19 DIAGNOSIS — E11.65 TYPE 2 DIABETES MELLITUS WITH HYPERGLYCEMIA, WITHOUT LONG-TERM CURRENT USE OF INSULIN (HCC): ICD-10-CM

## 2024-11-19 LAB
ALBUMIN SERPL BCG-MCNC: 3.9 G/DL (ref 3.5–5)
ALP SERPL-CCNC: 98 U/L (ref 34–104)
ALT SERPL W P-5'-P-CCNC: 12 U/L (ref 7–52)
ANION GAP SERPL CALCULATED.3IONS-SCNC: 5 MMOL/L (ref 4–13)
AST SERPL W P-5'-P-CCNC: 12 U/L (ref 13–39)
BASOPHILS # BLD AUTO: 0.05 THOUSANDS/ÂΜL (ref 0–0.1)
BASOPHILS NFR BLD AUTO: 1 % (ref 0–1)
BILIRUB SERPL-MCNC: 0.71 MG/DL (ref 0.2–1)
BUN SERPL-MCNC: 17 MG/DL (ref 5–25)
CALCIUM SERPL-MCNC: 9 MG/DL (ref 8.4–10.2)
CHLORIDE SERPL-SCNC: 105 MMOL/L (ref 96–108)
CHOLEST SERPL-MCNC: 226 MG/DL (ref ?–200)
CO2 SERPL-SCNC: 30 MMOL/L (ref 21–32)
CREAT SERPL-MCNC: 0.69 MG/DL (ref 0.6–1.3)
CREAT UR-MCNC: 125.8 MG/DL
EOSINOPHIL # BLD AUTO: 0.18 THOUSAND/ÂΜL (ref 0–0.61)
EOSINOPHIL NFR BLD AUTO: 2 % (ref 0–6)
ERYTHROCYTE [DISTWIDTH] IN BLOOD BY AUTOMATED COUNT: 12.2 % (ref 11.6–15.1)
EST. AVERAGE GLUCOSE BLD GHB EST-MCNC: 186 MG/DL
GFR SERPL CREATININE-BSD FRML MDRD: 90 ML/MIN/1.73SQ M
GLUCOSE P FAST SERPL-MCNC: 133 MG/DL (ref 65–99)
HBA1C MFR BLD: 8.1 %
HCT VFR BLD AUTO: 43.5 % (ref 34.8–46.1)
HDLC SERPL-MCNC: 41 MG/DL
HGB BLD-MCNC: 14 G/DL (ref 11.5–15.4)
IMM GRANULOCYTES # BLD AUTO: 0.03 THOUSAND/UL (ref 0–0.2)
IMM GRANULOCYTES NFR BLD AUTO: 0 % (ref 0–2)
LDLC SERPL CALC-MCNC: 140 MG/DL (ref 0–100)
LYMPHOCYTES # BLD AUTO: 2.13 THOUSANDS/ÂΜL (ref 0.6–4.47)
LYMPHOCYTES NFR BLD AUTO: 25 % (ref 14–44)
MCH RBC QN AUTO: 27.7 PG (ref 26.8–34.3)
MCHC RBC AUTO-ENTMCNC: 32.2 G/DL (ref 31.4–37.4)
MCV RBC AUTO: 86 FL (ref 82–98)
MICROALBUMIN UR-MCNC: <7 MG/L
MONOCYTES # BLD AUTO: 0.76 THOUSAND/ÂΜL (ref 0.17–1.22)
MONOCYTES NFR BLD AUTO: 9 % (ref 4–12)
NEUTROPHILS # BLD AUTO: 5.28 THOUSANDS/ÂΜL (ref 1.85–7.62)
NEUTS SEG NFR BLD AUTO: 63 % (ref 43–75)
NONHDLC SERPL-MCNC: 185 MG/DL
NRBC BLD AUTO-RTO: 0 /100 WBCS
PLATELET # BLD AUTO: 288 THOUSANDS/UL (ref 149–390)
PMV BLD AUTO: 11.3 FL (ref 8.9–12.7)
POTASSIUM SERPL-SCNC: 3.9 MMOL/L (ref 3.5–5.3)
PROT SERPL-MCNC: 6.5 G/DL (ref 6.4–8.4)
RBC # BLD AUTO: 5.06 MILLION/UL (ref 3.81–5.12)
SODIUM SERPL-SCNC: 140 MMOL/L (ref 135–147)
TRIGL SERPL-MCNC: 224 MG/DL (ref ?–150)
WBC # BLD AUTO: 8.43 THOUSAND/UL (ref 4.31–10.16)

## 2024-11-19 PROCEDURE — 82043 UR ALBUMIN QUANTITATIVE: CPT

## 2024-11-19 PROCEDURE — 82570 ASSAY OF URINE CREATININE: CPT

## 2024-11-24 ENCOUNTER — TELEPHONE (OUTPATIENT)
Dept: OTHER | Facility: OTHER | Age: 69
End: 2024-11-24

## 2024-11-25 ENCOUNTER — OFFICE VISIT (OUTPATIENT)
Age: 69
End: 2024-11-25
Payer: MEDICARE

## 2024-11-25 VITALS
OXYGEN SATURATION: 99 % | SYSTOLIC BLOOD PRESSURE: 132 MMHG | BODY MASS INDEX: 38.2 KG/M2 | DIASTOLIC BLOOD PRESSURE: 78 MMHG | HEART RATE: 69 BPM | WEIGHT: 215.6 LBS | HEIGHT: 63 IN | RESPIRATION RATE: 18 BRPM | TEMPERATURE: 98.3 F

## 2024-11-25 DIAGNOSIS — Z00.00 MEDICARE ANNUAL WELLNESS VISIT, SUBSEQUENT: Primary | ICD-10-CM

## 2024-11-25 DIAGNOSIS — R05.3 CHRONIC COUGH: ICD-10-CM

## 2024-11-25 DIAGNOSIS — J30.2 SEASONAL ALLERGIC RHINITIS DUE TO FUNGAL SPORES: ICD-10-CM

## 2024-11-25 DIAGNOSIS — E66.01 CLASS 2 SEVERE OBESITY WITH SERIOUS COMORBIDITY AND BODY MASS INDEX (BMI) OF 35.0 TO 35.9 IN ADULT, UNSPECIFIED OBESITY TYPE (HCC): ICD-10-CM

## 2024-11-25 DIAGNOSIS — J45.40 MODERATE PERSISTENT ASTHMA WITHOUT COMPLICATION: ICD-10-CM

## 2024-11-25 DIAGNOSIS — I10 PRIMARY HYPERTENSION: ICD-10-CM

## 2024-11-25 DIAGNOSIS — K21.9 GASTROESOPHAGEAL REFLUX DISEASE WITHOUT ESOPHAGITIS: ICD-10-CM

## 2024-11-25 DIAGNOSIS — G47.33 OSA (OBSTRUCTIVE SLEEP APNEA): ICD-10-CM

## 2024-11-25 DIAGNOSIS — E11.65 TYPE 2 DIABETES MELLITUS WITH HYPERGLYCEMIA, WITHOUT LONG-TERM CURRENT USE OF INSULIN (HCC): ICD-10-CM

## 2024-11-25 DIAGNOSIS — E66.2 OBESITY HYPOVENTILATION SYNDROME (HCC): ICD-10-CM

## 2024-11-25 DIAGNOSIS — I87.2 EDEMA OF BOTH LOWER EXTREMITIES DUE TO PERIPHERAL VENOUS INSUFFICIENCY: ICD-10-CM

## 2024-11-25 DIAGNOSIS — E66.812 CLASS 2 SEVERE OBESITY WITH SERIOUS COMORBIDITY AND BODY MASS INDEX (BMI) OF 35.0 TO 35.9 IN ADULT, UNSPECIFIED OBESITY TYPE (HCC): ICD-10-CM

## 2024-11-25 DIAGNOSIS — Z78.0 POSTMENOPAUSE: ICD-10-CM

## 2024-11-25 DIAGNOSIS — E78.2 MIXED HYPERLIPIDEMIA: ICD-10-CM

## 2024-11-25 PROBLEM — I82.551 CHRONIC DEEP VEIN THROMBOSIS (DVT) OF RIGHT PERONEAL VEIN (HCC): Status: RESOLVED | Noted: 2023-05-24 | Resolved: 2024-11-25

## 2024-11-25 PROBLEM — I82.552 CHRONIC DEEP VEIN THROMBOSIS (DVT) OF LEFT PERONEAL VEIN (HCC): Status: RESOLVED | Noted: 2024-01-08 | Resolved: 2024-11-25

## 2024-11-25 PROCEDURE — 99214 OFFICE O/P EST MOD 30 MIN: CPT | Performed by: INTERNAL MEDICINE

## 2024-11-25 PROCEDURE — G0438 PPPS, INITIAL VISIT: HCPCS | Performed by: INTERNAL MEDICINE

## 2024-11-25 RX ORDER — OMEPRAZOLE 40 MG/1
40 CAPSULE, DELAYED RELEASE ORAL DAILY
Qty: 90 CAPSULE | Refills: 2 | Status: SHIPPED | OUTPATIENT
Start: 2024-11-25

## 2024-11-25 RX ORDER — ROSUVASTATIN CALCIUM 20 MG/1
20 TABLET, COATED ORAL DAILY
Qty: 100 TABLET | Refills: 3 | Status: SHIPPED | OUTPATIENT
Start: 2024-11-25

## 2024-11-25 RX ORDER — FLUTICASONE PROPIONATE AND SALMETEROL 250; 50 UG/1; UG/1
1 POWDER RESPIRATORY (INHALATION) 2 TIMES DAILY
Qty: 180 BLISTER | Refills: 1 | Status: SHIPPED | OUTPATIENT
Start: 2024-11-25 | End: 2025-05-24

## 2024-11-25 RX ORDER — DULAGLUTIDE 4.5 MG/.5ML
4.5 INJECTION, SOLUTION SUBCUTANEOUS WEEKLY
Qty: 2 ML | Refills: 5 | Status: SHIPPED | OUTPATIENT
Start: 2024-11-25

## 2024-11-25 NOTE — PROGRESS NOTES
INTERNAL MEDICINE OFFICE VISIT       NAME: Coretta Hines  AGE: 69 y.o. SEX: female       : 1955        MRN: [id number]    DATE: 2024  TIME: 1:55 PM    Assessment and Plan   1. Medicare annual wellness visit, subsequent (Primary)  - Regular dental, vision, and dermatologic care  - Declined flu vaccine    2. Type 2 diabetes mellitus with hyperglycemia, without long-term current use of insulin (HCC)  - She has struggled to be compliant with her metformin 500 mg twice daily, and typically takes it once a day. She usually remembers to take her Trulicity weekly, but is sometimes off by a few days  - She is open to a nutrition consult and fingerstick blood glucose monitoring  - Increase Trulicity to 4.5 mg injection  - Comprehensive metabolic panel  - Hemoglobin A1C    3. Primary hypertension  - BP well-controlled with diltiazem 240 mg 24hr, with BP reading today of 132/78  - Comprehensive metabolic panel    4. Mixed hyperlipidemia  - Most recent lipid panel showed worsening cholesterol of 226 (from 218) and LDL of 140 (from 128)  - Notes she has been noncompliant with taking her rosuvastatin, but will try to improve  - Increase dose of rosuvastatin 10 mg to 20 mg  - Lipid panel    5. Class 2 severe obesity with serious comorbidity and body mass index (BMI) of 35.0 to 35.9 in adult, unspecified obesity type (HCC)  - Pt is currently following a weight-loss program and lost 12 pounds    6. Seasonal allergic rhinitis due to fungal spores  - Continue loratidine 10 mg, mucinex 600 mg, and albuterol inhalers as needed    7. DEN (obstructive sleep apnea)  - Per sleep study in 2024, pt no longer requires CPAP  - CBC and differential    8. Obesity hypoventilation syndrome (HCC)  - CBC and differential    9. Moderate persistent asthma without complication  - Albuterol inhaler as needed    10. Edema of both lower extremities due to peripheral venous insufficiency  - Continue conservative treatment with  compression stockings    11. Postmenopause  - DXA bone density spine hip and pelvis; Future    12. Gastroesophageal reflux disease without esophagitis  - Omeprazole 40 mg daily    13. Chronic cough  - Albuterol inhaler as needed       Patient Instructions   Medicare Preventive Visit Patient Instructions  Thank you for completing your Welcome to Medicare Visit or Medicare Annual Wellness Visit today. Your next wellness visit will be due in one year (11/26/2025).  The screening/preventive services that you may require over the next 5-10 years are detailed below. Some tests may not apply to you based off risk factors and/or age. Screening tests ordered at today's visit but not completed yet may show as past due. Also, please note that scanned in results may not display below.  Preventive Screenings:  Service Recommendations Previous Testing/Comments   Colorectal Cancer Screening  * Colonoscopy    * Fecal Occult Blood Test (FOBT)/Fecal Immunochemical Test (FIT)  * Fecal DNA/Cologuard Test  * Flexible Sigmoidoscopy Age: 45-75 years old   Colonoscopy: every 10 years (may be performed more frequently if at higher risk)  OR  FOBT/FIT: every 1 year  OR  Cologuard: every 3 years  OR  Sigmoidoscopy: every 5 years  Screening may be recommended earlier than age 45 if at higher risk for colorectal cancer. Also, an individualized decision between you and your healthcare provider will decide whether screening between the ages of 76-85 would be appropriate. Colonoscopy: 05/23/2024  FOBT/FIT: Not on file  Cologuard: Not on file  Sigmoidoscopy: Not on file    Screening Current     Breast Cancer Screening Age: 40+ years old  Frequency: every 1-2 years  Not required if history of left and right mastectomy Mammogram: 11/11/2024    Screening Current   Cervical Cancer Screening Between the ages of 21-29, pap smear recommended once every 3 years.   Between the ages of 30-65, can perform pap smear with HPV co-testing every 5 years.    Recommendations may differ for women with a history of total hysterectomy, cervical cancer, or abnormal pap smears in past. Pap Smear: Not on file    Screening Not Indicated   Hepatitis C Screening Once for adults born between 1945 and 1965  More frequently in patients at high risk for Hepatitis C Hep C Antibody: Not on file        Diabetes Screening 1-2 times per year if you're at risk for diabetes or have pre-diabetes Fasting glucose: 133 mg/dL (11/19/2024)  A1C: 8.1 % (11/19/2024)  Screening Not Indicated  History Diabetes   Cholesterol Screening Once every 5 years if you don't have a lipid disorder. May order more often based on risk factors. Lipid panel: 11/19/2024    Screening Not Indicated  History Lipid Disorder     Other Preventive Screenings Covered by Medicare:  Abdominal Aortic Aneurysm (AAA) Screening: covered once if your at risk. You're considered to be at risk if you have a family history of AAA.  Lung Cancer Screening: covers low dose CT scan once per year if you meet all of the following conditions: (1) Age 55-77; (2) No signs or symptoms of lung cancer; (3) Current smoker or have quit smoking within the last 15 years; (4) You have a tobacco smoking history of at least 20 pack years (packs per day multiplied by number of years you smoked); (5) You get a written order from a healthcare provider.  Glaucoma Screening: covered annually if you're considered high risk: (1) You have diabetes OR (2) Family history of glaucoma OR (3)  aged 50 and older OR (4)  American aged 65 and older  Osteoporosis Screening: covered every 2 years if you meet one of the following conditions: (1) You're estrogen deficient and at risk for osteoporosis based off medical history and other findings; (2) Have a vertebral abnormality; (3) On glucocorticoid therapy for more than 3 months; (4) Have primary hyperparathyroidism; (5) On osteoporosis medications and need to assess response to drug therapy.    Last bone density test (DXA Scan): Not on file.  HIV Screening: covered annually if you're between the age of 15-65. Also covered annually if you are younger than 15 and older than 65 with risk factors for HIV infection. For pregnant patients, it is covered up to 3 times per pregnancy.    Immunizations:  Immunization Recommendations   Influenza Vaccine Annual influenza vaccination during flu season is recommended for all persons aged >= 6 months who do not have contraindications   Pneumococcal Vaccine   * Pneumococcal conjugate vaccine = PCV13 (Prevnar 13), PCV15 (Vaxneuvance), PCV20 (Prevnar 20)  * Pneumococcal polysaccharide vaccine = PPSV23 (Pneumovax) Adults 19-65 yo with certain risk factors or if 65+ yo  If never received any pneumonia vaccine: recommend Prevnar 20 (PCV20)  Give PCV20 if previously received 1 dose of PCV13 or PPSV23   Hepatitis B Vaccine 3 dose series if at intermediate or high risk (ex: diabetes, end stage renal disease, liver disease)   Respiratory syncytial virus (RSV) Vaccine - COVERED BY MEDICARE PART D  * RSVPreF3 (Arexvy) CDC recommends that adults 60 years of age and older may receive a single dose of RSV vaccine using shared clinical decision-making (SCDM)   Tetanus (Td) Vaccine - COST NOT COVERED BY MEDICARE PART B Following completion of primary series, a booster dose should be given every 10 years to maintain immunity against tetanus. Td may also be given as tetanus wound prophylaxis.   Tdap Vaccine - COST NOT COVERED BY MEDICARE PART B Recommended at least once for all adults. For pregnant patients, recommended with each pregnancy.   Shingles Vaccine (Shingrix) - COST NOT COVERED BY MEDICARE PART B  2 shot series recommended in those 19 years and older who have or will have weakened immune systems or those 50 years and older     Health Maintenance Due:      Topic Date Due    Hepatitis C Screening  Never done    Breast Cancer Screening: Mammogram  11/11/2025    Colorectal Cancer  Screening  05/21/2034     Immunizations Due:      Topic Date Due    Pneumococcal Vaccine: 65+ Years (1 of 2 - PCV) Never done    Influenza Vaccine (1) Never done    COVID-19 Vaccine (3 - 2024-25 season) 09/01/2024     Advance Directives   What are advance directives?  Advance directives are legal documents that state your wishes and plans for medical care. These plans are made ahead of time in case you lose your ability to make decisions for yourself. Advance directives can apply to any medical decision, such as the treatments you want, and if you want to donate organs.   What are the types of advance directives?  There are many types of advance directives, and each state has rules about how to use them. You may choose a combination of any of the following:  Living will:  This is a written record of the treatment you want. You can also choose which treatments you do not want, which to limit, and which to stop at a certain time. This includes surgery, medicine, IV fluid, and tube feedings.   Durable power of  for healthcare (DPAHC):  This is a written record that states who you want to make healthcare choices for you when you are unable to make them for yourself. This person, called a proxy, is usually a family member or a friend. You may choose more than 1 proxy.  Do not resuscitate (DNR) order:  A DNR order is used in case your heart stops beating or you stop breathing. It is a request not to have certain forms of treatment, such as CPR. A DNR order may be included in other types of advance directives.  Medical directive:  This covers the care that you want if you are in a coma, near death, or unable to make decisions for yourself. You can list the treatments you want for each condition. Treatment may include pain medicine, surgery, blood transfusions, dialysis, IV or tube feedings, and a ventilator (breathing machine).  Values history:  This document has questions about your views, beliefs, and how you  feel and think about life. This information can help others choose the care that you would choose.  Why are advance directives important?  An advance directive helps you control your care. Although spoken wishes may be used, it is better to have your wishes written down. Spoken wishes can be misunderstood, or not followed. Treatments may be given even if you do not want them. An advance directive may make it easier for your family to make difficult choices about your care.   Urinary Incontinence   Urinary incontinence (UI)  is when you lose control of your bladder. UI develops because your bladder cannot store or empty urine properly. The 3 most common types of UI are stress incontinence, urge incontinence, or both.  Medicines:   May be given to help strengthen your bladder control. Report any side effects of medication to your healthcare provider.  Do pelvic muscle exercises often:  Your pelvic muscles help you stop urinating. Squeeze these muscles tight for 5 seconds, then relax for 5 seconds. Gradually work up to squeezing for 10 seconds. Do 3 sets of 15 repetitions a day, or as directed. This will help strengthen your pelvic muscles and improve bladder control.  Train your bladder:  Go to the bathroom at set times, such as every 2 hours, even if you do not feel the urge to go. You can also try to hold your urine when you feel the urge to go. For example, hold your urine for 5 minutes when you feel the urge to go. As that becomes easier, hold your urine for 10 minutes.   Self-care:   Keep a UI record.  Write down how often you leak urine and how much you leak. Make a note of what you were doing when you leaked urine.  Drink liquids as directed. You may need to limit the amount of liquid you drink to help control your urine leakage. Do not drink any liquid right before you go to bed. Limit or do not have drinks that contain caffeine or alcohol.   Prevent constipation.  Eat a variety of high-fiber foods. Good  examples are high-fiber cereals, beans, vegetables, and whole-grain breads. Walking is the best way to trigger your intestines to have a bowel movement.  Exercise regularly and maintain a healthy weight.  Weight loss and exercise will decrease pressure on your bladder and help you control your leakage.   Use a catheter as directed  to help empty your bladder. A catheter is a tiny, plastic tube that is put into your bladder to drain your urine.   Go to behavior therapy as directed.  Behavior therapy may be used to help you learn to control your urge to urinate.    Weight Management   Why it is important to manage your weight:  Being overweight increases your risk of health conditions such as heart disease, high blood pressure, type 2 diabetes, and certain types of cancer. It can also increase your risk for osteoarthritis, sleep apnea, and other respiratory problems. Aim for a slow, steady weight loss. Even a small amount of weight loss can lower your risk of health problems.  How to lose weight safely:  A safe and healthy way to lose weight is to eat fewer calories and get regular exercise. You can lose up about 1 pound a week by decreasing the number of calories you eat by 500 calories each day.   Healthy meal plan for weight management:  A healthy meal plan includes a variety of foods, contains fewer calories, and helps you stay healthy. A healthy meal plan includes the following:  Eat whole-grain foods more often.  A healthy meal plan should contain fiber. Fiber is the part of grains, fruits, and vegetables that is not broken down by your body. Whole-grain foods are healthy and provide extra fiber in your diet. Some examples of whole-grain foods are whole-wheat breads and pastas, oatmeal, brown rice, and bulgur.  Eat a variety of vegetables every day.  Include dark, leafy greens such as spinach, kale, uday greens, and mustard greens. Eat yellow and orange vegetables such as carrots, sweet potatoes, and winter  squash.   Eat a variety of fruits every day.  Choose fresh or canned fruit (canned in its own juice or light syrup) instead of juice. Fruit juice has very little or no fiber.  Eat low-fat dairy foods.  Drink fat-free (skim) milk or 1% milk. Eat fat-free yogurt and low-fat cottage cheese. Try low-fat cheeses such as mozzarella and other reduced-fat cheeses.  Choose meat and other protein foods that are low in fat.  Choose beans or other legumes such as split peas or lentils. Choose fish, skinless poultry (chicken or turkey), or lean cuts of red meat (beef or pork). Before you cook meat or poultry, cut off any visible fat.   Use less fat and oil.  Try baking foods instead of frying them. Add less fat, such as margarine, sour cream, regular salad dressing and mayonnaise to foods. Eat fewer high-fat foods. Some examples of high-fat foods include french fries, doughnuts, ice cream, and cakes.  Eat fewer sweets.  Limit foods and drinks that are high in sugar. This includes candy, cookies, regular soda, and sweetened drinks.  Exercise:  Exercise at least 30 minutes per day on most days of the week. Some examples of exercise include walking, biking, dancing, and swimming. You can also fit in more physical activity by taking the stairs instead of the elevator or parking farther away from stores. Ask your healthcare provider about the best exercise plan for you.      © Copyright [employer] 2018 Information is for End User's use only and may not be sold, redistributed or otherwise used for commercial purposes. All illustrations and images included in CareNotes® are the copyrighted property of [employer] or [employer]             Chief Complaint     Chief Complaint   Patient presents with    Medicare Wellness Visit       History of Present Illness   Coretta Hines is a 69 y.o.-year-old female who presents for a Medicare Annual Wellness visit. She acknowledged the worsening of her hemoglobin A1c and her lipid  panel numbers, explaining that she struggles to be compliant with her medication regimen. For example, she typically only remembers to take her metformin (prescribed as 500 mg BID) once daily, sometimes is a few days off from her scheduled Trulicity dose, and has not taken her statin in the last couple of months. She was encouraged to place her medications near her toothbrush, for example, so she can more easily incorporate her medications into her nighttime regimen.     She does note, however, that she has recently lost 12 pounds from a nutrition program ([employer]) that she is following, and that she has previously been able to lose 55 pounds from. Still, she notes that she struggles to follow the dietary regimen due to both poor choices and also a poor sleep schedule. She was re-tested for sleep apnea in March/April 2024, and was found to no longer need her CPAP machine. Regardless, she either sleeps for a few hours at night and naps during the day, or she sleeps for 12+ hours throughout the night into the daytime. She was encouraged to engage in more physical activity to help her re-adjust her sleep rhythm. Unfortunately, physical activity is further limited by left knee pain (for which she is receiving injections), poor vision, and unsafe outdoor walking conditions.     Otherwise, she recalls that her previous D[facility]A scan was in [facility] ~10 years ago, but would be open to getting another one for bone density evaluation and follow-up monitoring. She is also open to monitoring her blood glucose more closely at home and following up with a nutritionist to optimize her blood sugar in order to increase the safety of a potential future left knee replacement. When asked about the flu vaccine, she declined, explaining that she got sick from it previously; she was educated about the low likelihood that she got the infection from the vaccine itself, and that it was either a superimposed infection while her body was  mounting an immune response or that it was a transient immune-mediated response to the vaccine. She expressed understanding.         Physical Activity Assessment and Intervention:    Exercise capacity assessment reviewed      Emotional and Mental Well-being, Sleep, Connectedness Assessment and Intervention:    Sleep/stress assessment performed    Counseled regarding sleep hygiene and aspects of healthy sleep      Therapeutic Lifestyle Change Visit:     One-on-one comprehensive counseling, coaching, and health behavior change visit completed           Review of Systems   Review of Systems   Constitutional:  Positive for fatigue (chronic). Negative for activity change and unexpected weight change.   Respiratory:  Negative for cough, shortness of breath and wheezing.    Cardiovascular:  Positive for leg swelling (chronic). Negative for chest pain and palpitations.   Gastrointestinal:  Positive for nausea (acid reflux; noncompliant with omeprazole). Negative for abdominal distention, abdominal pain, constipation, diarrhea and vomiting.   Genitourinary:  Negative for dysuria, frequency, hematuria, pelvic pain and urgency.   Musculoskeletal:  Positive for arthralgias (primarily in left knee).   Neurological:  Negative for dizziness, tremors, weakness, light-headedness, numbness and headaches.       Active Problem List     Patient Active Problem List   Diagnosis    Type 2 diabetes mellitus with hyperglycemia, without long-term current use of insulin ([facility])    Class 2 severe obesity with serious comorbidity in adult ([facility])    Left retinal disorder    Primary hypertension    GERD (gastroesophageal reflux disease)    Moderate persistent asthma without complication    Obesity hypoventilation syndrome ([facility])    DEN (obstructive sleep apnea)    Osteoarthritis    Seasonal allergic rhinitis due to fungal spores    Edema of both lower extremities due to peripheral venous insufficiency    History of colon polyps    FH: total knee  replacement    Bilateral carpal tunnel syndrome    Mixed hyperlipidemia       The following portions of the patient's history were reviewed and updated as appropriate: allergies, current medications, past family history, past medical history, past social history, past surgical history, and problem list.    Objective     Vitals:    11/25/24 1303   BP: 132/78   Pulse: 69   Resp: 18   Temp: 98.3 °F (36.8 °C)   SpO2: 99%     Wt Readings from Last 3 Encounters:   11/25/24 97.8 kg (215 lb 9.6 oz)   11/11/24 98 kg (216 lb)   11/05/24 103 kg (227 lb)       Physical Exam  Vitals reviewed.   Constitutional:       General: She is not in acute distress.     Appearance: She is not ill-appearing.   HENT:      Head: Normocephalic and atraumatic.      Mouth/Throat:      Mouth: Mucous membranes are moist.      Pharynx: Oropharynx is clear.   Eyes:      General: No scleral icterus.        Right eye: No discharge.         Left eye: No discharge.      Extraocular Movements: Extraocular movements intact.      Conjunctiva/sclera: Conjunctivae normal.      Pupils: Pupils are equal, round, and reactive to light.   Cardiovascular:      Rate and Rhythm: Normal rate and regular rhythm.      Heart sounds: Normal heart sounds. No murmur heard.     No friction rub. No gallop.   Pulmonary:      Effort: Pulmonary effort is normal. No respiratory distress.      Breath sounds: Normal breath sounds. No stridor. No wheezing, rhonchi or rales.   Abdominal:      General: There is no distension.      Palpations: Abdomen is soft.      Tenderness: There is no abdominal tenderness. There is no guarding.   Musculoskeletal:      Left knee: Tenderness present over the medial joint line.      Right lower leg: Edema present.      Left lower leg: Edema present.   Skin:     General: Skin is warm and dry.   Neurological:      General: No focal deficit present.      Mental Status: She is alert and oriented to person, place, and time.      Motor: No weakness.    Psychiatric:         Mood and Affect: Mood normal.         Behavior: Behavior normal.         Pertinent Laboratory/Diagnostic Studies:  I have reviewed pertinent labs:  Most Recent Labs:   Lab Results   Component Value Date    WBC 8.43 11/19/2024    RBC 5.06 11/19/2024    HGB 14.0 11/19/2024    HCT 43.5 11/19/2024     11/19/2024    RDW 12.2 11/19/2024    NEUTROABS 5.28 11/19/2024    SODIUM 140 11/19/2024    K 3.9 11/19/2024     11/19/2024    CO2 30 11/19/2024    BUN 17 11/19/2024    CREATININE 0.69 11/19/2024    GLUC 128 05/03/2023    GLUF 133 (H) 11/19/2024    CALCIUM 9.0 11/19/2024    AST 12 (L) 11/19/2024    ALT 12 11/19/2024    ALKPHOS 98 11/19/2024    TP 6.5 11/19/2024    ALB 3.9 11/19/2024    TBILI 0.71 11/19/2024    CHOLESTEROL 226 (H) 11/19/2024    HDL 41 (L) 11/19/2024    TRIG 224 (H) 11/19/2024    LDLCALC 140 (H) 11/19/2024    NONHDLC 185 11/19/2024    HGBA1C 8.1 (H) 11/19/2024     11/19/2024         Orders Placed This Encounter   Procedures    DXA bone density spine hip and pelvis    CBC and differential    Comprehensive metabolic panel    Lipid panel    Hemoglobin A1C       ALLERGIES:  Allergies   Allergen Reactions    Other Nausea Only, Vomiting and Cough     LOBSTER    Penicillins Seizures    Sulfa Antibiotics Rash       Current Medications     Current Outpatient Medications   Medication Sig Dispense Refill    acetaminophen (Tylenol) 325 mg tablet Take 650 mg by mouth every 6 (six) hours as needed for mild pain      albuterol (2.5 mg/3 mL) 0.083 % nebulizer solution Take 3 mL (2.5 mg total) by nebulization every 6 (six) hours as needed for wheezing or shortness of breath 180 mL 3    albuterol (PROVENTIL HFA,VENTOLIN HFA) 90 mcg/act inhaler Inhale 2 puffs every 6 (six) hours as needed for wheezing 8.5 g 2    Azelastine HCl 137 MCG/SPRAY SOLN SPRAY 2 SPRAYS INTO EACH NOSTRIL 2 TIMES A DAY USE IN EACH NOSTRIL AS DIRECTED 30 mL 3    benzonatate (TESSALON PERLES) 100 mg capsule Take 1  capsule (100 mg total) by mouth 3 (three) times a day as needed for cough 20 capsule 0    bimatoprost (LUMIGAN) 0.01 % ophthalmic drops       Dextromethorphan-GG-APAP (Coricidin HBP Cold/Cough/Flu) -325 MG/15ML LIQD Take 30 mL by mouth 3 (three) times a day as needed (cough, congestion) 355 mL 0    diltiazem (CARDIZEM CD) 240 mg 24 hr capsule Take 1 capsule (240 mg total) by mouth daily 90 capsule 3    Eliquis 2.5 MG TAKE 1 TABLET(2.5 MG) BY MOUTH TWICE DAILY 60 tablet 5    famotidine (PEPCID) 20 mg tablet Take 20 mg by mouth      fluticasone (FLONASE) 50 mcg/act nasal spray 1 spray into each nostril daily 9.9 mL 0    Fluticasone-Salmeterol (Advair Diskus) 250-50 mcg/dose inhaler Inhale 1 puff 2 (two) times a day Rinse mouth after use. 180 blister 1    guaiFENesin (MUCINEX) 600 mg 12 hr tablet Take 2 tablets (1,200 mg total) by mouth every 12 (twelve) hours 30 tablet 0    loratadine (CLARITIN) 10 mg tablet Take 10 mg by mouth daily      metFORMIN (GLUCOPHAGE) 500 mg tablet 1 tablet each morning and 2 tablets each evening 270 tablet 3    montelukast (SINGULAIR) 10 mg tablet TAKE 1 TABLET(10 MG) BY MOUTH DAILY AT BEDTIME 30 tablet 5    omeprazole (PriLOSEC) 40 MG capsule Take 1 capsule (40 mg total) by mouth daily 90 capsule 2    predniSONE 10 mg tablet 6 Tabs day 1, 5 tabs day 2, 4 tabs day 3, 3 tabs day 4, 2 tabs day 5, 1 tab day 6 21 tablet 0    Timolol Maleate PF (Timolol Maleate Ocudose) 0.5 % SOLN        No current facility-administered medications for this visit.         Health Maintenance     Depression Screening and Follow-up Plan: Patient was screened for depression during today's encounter. They screened negative with a PHQ-2 score of 0.        Susu Choudhary DO

## 2024-11-25 NOTE — TELEPHONE ENCOUNTER
Patient calling to ask about an insurance question when she is checking in through her my chart. Went over insurance in epic and that is what she currently has. Unsure of how to guide her through the choco, advised her to hold off on the check in through my chart and check in with the office tomorrow. Patient verbalized understanding.

## 2024-11-25 NOTE — PROGRESS NOTES
Name: Coretta Hines      : 1955      MRN: [id number]  Encounter Provider: Sunday Robbins MD  Encounter Date: 2024   Encounter department: [facility]    Assessment & Plan       Preventive health issues were discussed with patient, and age appropriate screening tests were ordered as noted in patient's After Visit Summary. Personalized health advice and appropriate referrals for health education or preventive services given if needed, as noted in patient's After Visit Summary.    History of Present Illness     HPI   Patient Care Team:  Sunday Robbins MD as PCP - General (Internal Medicine)    Review of Systems  Medical History Reviewed by provider this encounter:       Annual Wellness Visit Questionnaire   Coretta is here for her Subsequent Wellness visit.     Health Risk Assessment:   Patient rates overall health as good. Patient feels that their physical health rating is same. Patient is satisfied with their life. Eyesight was rated as same. Hearing was rated as same. Patient feels that their emotional and mental health rating is same. Patients states they are sometimes angry. Patient states they are often unusually tired/fatigued. Pain experienced in the last 7 days has been some. Patient's pain rating has been 5/10. Patient states that she has experienced no weight loss or gain in last 6 months.     Depression Screening:   PHQ-2 Score: 0  PHQ-9 Score: 8      Fall Risk Screening:   In the past year, patient has experienced: no history of falling in past year      Urinary Incontinence Screening:   Patient has leaked urine accidently in the last six months.     Home Safety:  Patient has trouble with stairs inside or outside of their home. Patient has working smoke alarms and has working carbon monoxide detector. Home safety hazards include: none.     Nutrition:   Current diet is Diabetic and Frequent junk food.     Medications:   Patient is not currently taking any  over-the-counter supplements. Patient is able to manage medications.     Activities of Daily Living (ADLs)/Instrumental Activities of Daily Living (IADLs):   Walk and transfer into and out of bed and chair?: Yes  Dress and groom yourself?: Yes    Bathe or shower yourself?: Yes    Feed yourself? Yes  Do your laundry/housekeeping?: Yes  Manage your money, pay your bills and track your expenses?: Yes  Make your own meals?: Yes    Do your own shopping?: Yes    Previous Hospitalizations:   Any hospitalizations or ED visits within the last 12 months?: Yes      Advance Care Planning:   Living will: Yes    Durable POA for healthcare: Yes    Advanced directive: Yes      PREVENTIVE SCREENINGS      Cardiovascular Screening:    General: Screening Not Indicated and History Lipid Disorder      Diabetes Screening:     General: Screening Not Indicated and History Diabetes      Colorectal Cancer Screening:     General: Screening Current      Breast Cancer Screening:     General: Screening Current      Cervical Cancer Screening:    General: Screening Not Indicated      Osteoporosis Screening:    General: Screening Not Indicated and History Osteoporosis      Lung Cancer Screening:     General: Screening Not Indicated    Screening, Brief Intervention, and Referral to Treatment (SBIRT)    Screening  Typical number of drinks in a day: 0  Typical number of drinks in a week: 2  Interpretation: Low risk drinking behavior.    Single Item Drug Screening:  How often have you used an illegal drug (including marijuana) or a prescription medication for non-medical reasons in the past year? never    Single Item Drug Screen Score: 0  Interpretation: Negative screen for possible drug use disorder    Social Drivers of Health     Financial Resource Strain: Low Risk  (9/17/2023)    Overall Financial Resource Strain (CARDIA)     Difficulty of Paying Living Expenses: Not hard at all   Food Insecurity: No Food Insecurity (11/24/2024)    Hunger Vital Sign      Worried About Running Out of Food in the Last Year: Never true     Ran Out of Food in the Last Year: Never true   Transportation Needs: No Transportation Needs (11/24/2024)    PRAPARE - Transportation     Lack of Transportation (Medical): No     Lack of Transportation (Non-Medical): No   Housing Stability: Low Risk  (11/24/2024)    Housing Stability Vital Sign     Unable to Pay for Housing in the Last Year: No     Number of Times Moved in the Last Year: 0     Homeless in the Last Year: No   Utilities: Not At Risk (11/24/2024)    [facility] Utilities     Threatened with loss of utilities: No     No results found.    Objective   There were no vitals taken for this visit.    Physical Exam

## 2024-11-25 NOTE — PATIENT INSTRUCTIONS
Medicare Preventive Visit Patient Instructions  Thank you for completing your Welcome to Medicare Visit or Medicare Annual Wellness Visit today. Your next wellness visit will be due in one year (11/26/2025).  The screening/preventive services that you may require over the next 5-10 years are detailed below. Some tests may not apply to you based off risk factors and/or age. Screening tests ordered at today's visit but not completed yet may show as past due. Also, please note that scanned in results may not display below.  Preventive Screenings:  Service Recommendations Previous Testing/Comments   Colorectal Cancer Screening  * Colonoscopy    * Fecal Occult Blood Test (FOBT)/Fecal Immunochemical Test (FIT)  * Fecal DNA/Cologuard Test  * Flexible Sigmoidoscopy Age: 45-75 years old   Colonoscopy: every 10 years (may be performed more frequently if at higher risk)  OR  FOBT/FIT: every 1 year  OR  Cologuard: every 3 years  OR  Sigmoidoscopy: every 5 years  Screening may be recommended earlier than age 45 if at higher risk for colorectal cancer. Also, an individualized decision between you and your healthcare provider will decide whether screening between the ages of 76-85 would be appropriate. Colonoscopy: 05/23/2024  FOBT/FIT: Not on file  Cologuard: Not on file  Sigmoidoscopy: Not on file    Screening Current     Breast Cancer Screening Age: 40+ years old  Frequency: every 1-2 years  Not required if history of left and right mastectomy Mammogram: 11/11/2024    Screening Current   Cervical Cancer Screening Between the ages of 21-29, pap smear recommended once every 3 years.   Between the ages of 30-65, can perform pap smear with HPV co-testing every 5 years.   Recommendations may differ for women with a history of total hysterectomy, cervical cancer, or abnormal pap smears in past. Pap Smear: Not on file    Screening Not Indicated   Hepatitis C Screening Once for adults born between 1945 and 1965  More frequently in  patients at high risk for Hepatitis C Hep C Antibody: Not on file        Diabetes Screening 1-2 times per year if you're at risk for diabetes or have pre-diabetes Fasting glucose: 133 mg/dL (11/19/2024)  A1C: 8.1 % (11/19/2024)  Screening Not Indicated  History Diabetes   Cholesterol Screening Once every 5 years if you don't have a lipid disorder. May order more often based on risk factors. Lipid panel: 11/19/2024    Screening Not Indicated  History Lipid Disorder     Other Preventive Screenings Covered by Medicare:  Abdominal Aortic Aneurysm (AAA) Screening: covered once if your at risk. You're considered to be at risk if you have a family history of AAA.  Lung Cancer Screening: covers low dose CT scan once per year if you meet all of the following conditions: (1) Age 55-77; (2) No signs or symptoms of lung cancer; (3) Current smoker or have quit smoking within the last 15 years; (4) You have a tobacco smoking history of at least 20 pack years (packs per day multiplied by number of years you smoked); (5) You get a written order from a healthcare provider.  Glaucoma Screening: covered annually if you're considered high risk: (1) You have diabetes OR (2) Family history of glaucoma OR (3)  aged 50 and older OR (4)  American aged 65 and older  Osteoporosis Screening: covered every 2 years if you meet one of the following conditions: (1) You're estrogen deficient and at risk for osteoporosis based off medical history and other findings; (2) Have a vertebral abnormality; (3) On glucocorticoid therapy for more than 3 months; (4) Have primary hyperparathyroidism; (5) On osteoporosis medications and need to assess response to drug therapy.   Last bone density test (DXA Scan): Not on file.  HIV Screening: covered annually if you're between the age of 15-65. Also covered annually if you are younger than 15 and older than 65 with risk factors for HIV infection. For pregnant patients, it is covered up to  3 times per pregnancy.    Immunizations:  Immunization Recommendations   Influenza Vaccine Annual influenza vaccination during flu season is recommended for all persons aged >= 6 months who do not have contraindications   Pneumococcal Vaccine   * Pneumococcal conjugate vaccine = PCV13 (Prevnar 13), PCV15 (Vaxneuvance), PCV20 (Prevnar 20)  * Pneumococcal polysaccharide vaccine = PPSV23 (Pneumovax) Adults 19-65 yo with certain risk factors or if 65+ yo  If never received any pneumonia vaccine: recommend Prevnar 20 (PCV20)  Give PCV20 if previously received 1 dose of PCV13 or PPSV23   Hepatitis B Vaccine 3 dose series if at intermediate or high risk (ex: diabetes, end stage renal disease, liver disease)   Respiratory syncytial virus (RSV) Vaccine - COVERED BY MEDICARE PART D  * RSVPreF3 (Arexvy) CDC recommends that adults 60 years of age and older may receive a single dose of RSV vaccine using shared clinical decision-making (SCDM)   Tetanus (Td) Vaccine - COST NOT COVERED BY MEDICARE PART B Following completion of primary series, a booster dose should be given every 10 years to maintain immunity against tetanus. Td may also be given as tetanus wound prophylaxis.   Tdap Vaccine - COST NOT COVERED BY MEDICARE PART B Recommended at least once for all adults. For pregnant patients, recommended with each pregnancy.   Shingles Vaccine (Shingrix) - COST NOT COVERED BY MEDICARE PART B  2 shot series recommended in those 19 years and older who have or will have weakened immune systems or those 50 years and older     Health Maintenance Due:      Topic Date Due   • Hepatitis C Screening  Never done   • Breast Cancer Screening: Mammogram  11/11/2025   • Colorectal Cancer Screening  05/21/2034     Immunizations Due:      Topic Date Due   • Pneumococcal Vaccine: 65+ Years (1 of 2 - PCV) Never done   • Influenza Vaccine (1) Never done   • COVID-19 Vaccine (3 - 2024-25 season) 09/01/2024     Advance Directives   What are advance  directives?  Advance directives are legal documents that state your wishes and plans for medical care. These plans are made ahead of time in case you lose your ability to make decisions for yourself. Advance directives can apply to any medical decision, such as the treatments you want, and if you want to donate organs.   What are the types of advance directives?  There are many types of advance directives, and each state has rules about how to use them. You may choose a combination of any of the following:  Living will:  This is a written record of the treatment you want. You can also choose which treatments you do not want, which to limit, and which to stop at a certain time. This includes surgery, medicine, IV fluid, and tube feedings.   Durable power of  for healthcare (DPAHC):  This is a written record that states who you want to make healthcare choices for you when you are unable to make them for yourself. This person, called a proxy, is usually a family member or a friend. You may choose more than 1 proxy.  Do not resuscitate (DNR) order:  A DNR order is used in case your heart stops beating or you stop breathing. It is a request not to have certain forms of treatment, such as CPR. A DNR order may be included in other types of advance directives.  Medical directive:  This covers the care that you want if you are in a coma, near death, or unable to make decisions for yourself. You can list the treatments you want for each condition. Treatment may include pain medicine, surgery, blood transfusions, dialysis, IV or tube feedings, and a ventilator (breathing machine).  Values history:  This document has questions about your views, beliefs, and how you feel and think about life. This information can help others choose the care that you would choose.  Why are advance directives important?  An advance directive helps you control your care. Although spoken wishes may be used, it is better to have your wishes  written down. Spoken wishes can be misunderstood, or not followed. Treatments may be given even if you do not want them. An advance directive may make it easier for your family to make difficult choices about your care.   Urinary Incontinence   Urinary incontinence (UI)  is when you lose control of your bladder. UI develops because your bladder cannot store or empty urine properly. The 3 most common types of UI are stress incontinence, urge incontinence, or both.  Medicines:   May be given to help strengthen your bladder control. Report any side effects of medication to your healthcare provider.  Do pelvic muscle exercises often:  Your pelvic muscles help you stop urinating. Squeeze these muscles tight for 5 seconds, then relax for 5 seconds. Gradually work up to squeezing for 10 seconds. Do 3 sets of 15 repetitions a day, or as directed. This will help strengthen your pelvic muscles and improve bladder control.  Train your bladder:  Go to the bathroom at set times, such as every 2 hours, even if you do not feel the urge to go. You can also try to hold your urine when you feel the urge to go. For example, hold your urine for 5 minutes when you feel the urge to go. As that becomes easier, hold your urine for 10 minutes.   Self-care:   Keep a UI record.  Write down how often you leak urine and how much you leak. Make a note of what you were doing when you leaked urine.  Drink liquids as directed. You may need to limit the amount of liquid you drink to help control your urine leakage. Do not drink any liquid right before you go to bed. Limit or do not have drinks that contain caffeine or alcohol.   Prevent constipation.  Eat a variety of high-fiber foods. Good examples are high-fiber cereals, beans, vegetables, and whole-grain breads. Walking is the best way to trigger your intestines to have a bowel movement.  Exercise regularly and maintain a healthy weight.  Weight loss and exercise will decrease pressure on your  bladder and help you control your leakage.   Use a catheter as directed  to help empty your bladder. A catheter is a tiny, plastic tube that is put into your bladder to drain your urine.   Go to behavior therapy as directed.  Behavior therapy may be used to help you learn to control your urge to urinate.    Weight Management   Why it is important to manage your weight:  Being overweight increases your risk of health conditions such as heart disease, high blood pressure, type 2 diabetes, and certain types of cancer. It can also increase your risk for osteoarthritis, sleep apnea, and other respiratory problems. Aim for a slow, steady weight loss. Even a small amount of weight loss can lower your risk of health problems.  How to lose weight safely:  A safe and healthy way to lose weight is to eat fewer calories and get regular exercise. You can lose up about 1 pound a week by decreasing the number of calories you eat by 500 calories each day.   Healthy meal plan for weight management:  A healthy meal plan includes a variety of foods, contains fewer calories, and helps you stay healthy. A healthy meal plan includes the following:  Eat whole-grain foods more often.  A healthy meal plan should contain fiber. Fiber is the part of grains, fruits, and vegetables that is not broken down by your body. Whole-grain foods are healthy and provide extra fiber in your diet. Some examples of whole-grain foods are whole-wheat breads and pastas, oatmeal, brown rice, and bulgur.  Eat a variety of vegetables every day.  Include dark, leafy greens such as spinach, kale, uday greens, and mustard greens. Eat yellow and orange vegetables such as carrots, sweet potatoes, and winter squash.   Eat a variety of fruits every day.  Choose fresh or canned fruit (canned in its own juice or light syrup) instead of juice. Fruit juice has very little or no fiber.  Eat low-fat dairy foods.  Drink fat-free (skim) milk or 1% milk. Eat fat-free yogurt  and low-fat cottage cheese. Try low-fat cheeses such as mozzarella and other reduced-fat cheeses.  Choose meat and other protein foods that are low in fat.  Choose beans or other legumes such as split peas or lentils. Choose fish, skinless poultry (chicken or turkey), or lean cuts of red meat (beef or pork). Before you cook meat or poultry, cut off any visible fat.   Use less fat and oil.  Try baking foods instead of frying them. Add less fat, such as margarine, sour cream, regular salad dressing and mayonnaise to foods. Eat fewer high-fat foods. Some examples of high-fat foods include french fries, doughnuts, ice cream, and cakes.  Eat fewer sweets.  Limit foods and drinks that are high in sugar. This includes candy, cookies, regular soda, and sweetened drinks.  Exercise:  Exercise at least 30 minutes per day on most days of the week. Some examples of exercise include walking, biking, dancing, and swimming. You can also fit in more physical activity by taking the stairs instead of the elevator or parking farther away from stores. Ask your healthcare provider about the best exercise plan for you.      © Copyright [employer] 2018 Information is for End User's use only and may not be sold, redistributed or otherwise used for commercial purposes. All illustrations and images included in CareNotes® are the copyrighted property of [employer] or [employer]

## 2024-12-19 ENCOUNTER — OFFICE VISIT (OUTPATIENT)
Dept: PODIATRY | Facility: CLINIC | Age: 69
End: 2024-12-19
Payer: MEDICARE

## 2024-12-19 DIAGNOSIS — L84 CALLUS: ICD-10-CM

## 2024-12-19 DIAGNOSIS — E11.9 CONTROLLED TYPE 2 DIABETES MELLITUS WITHOUT COMPLICATION, WITHOUT LONG-TERM CURRENT USE OF INSULIN (HCC): Primary | ICD-10-CM

## 2024-12-19 PROCEDURE — 11719 TRIM NAIL(S) ANY NUMBER: CPT | Performed by: PODIATRIST

## 2024-12-19 PROCEDURE — RECHECK: Performed by: PODIATRIST

## 2024-12-19 PROCEDURE — 11055 PARING/CUTG B9 HYPRKER LES 1: CPT | Performed by: PODIATRIST

## 2024-12-19 NOTE — PROGRESS NOTES
Established patient with class findings presents for nail and lesion care.  Vascular exam:  DP  0/4 bilateral; PT  0 4 bilateral   Dermatological exam:  Each toenail is thick and  dystrophic.  Pinch callus right great toe  Diagnosis:  Diabetes mellitus; hyperkeratotic lesion right great toe.  Treatment: Trimmed multiple dystrophic toenails.  Trimmed hyperkeratotic lesion right great toe.    Nail removal    Date/Time: 12/19/2024 8:00 AM    Performed by: Antelmo Robin DPM  Authorized by: Antelmo Robin DPM    Nails trimmed:     Number of nails trimmed:  10  Lesion Destruction    Date/Time: 12/19/2024 8:00 AM    Performed by: Antelmo Robin DPM  Authorized by: Antelmo Robin DPM    Procedure Details - Lesion Destruction:     Number of Lesions:  1  Lesion 1:     Body area:  Lower extremity    Lower extremity location:  R big toe    Malignancy: benign hyperkeratotic lesion      Destruction method: scissors used for extraction

## 2024-12-20 ENCOUNTER — PROCEDURE VISIT (OUTPATIENT)
Dept: OBGYN CLINIC | Facility: CLINIC | Age: 69
End: 2024-12-20
Payer: MEDICARE

## 2024-12-20 VITALS — BODY MASS INDEX: 38.09 KG/M2 | HEIGHT: 63 IN | WEIGHT: 215 LBS

## 2024-12-20 DIAGNOSIS — M17.12 PRIMARY OSTEOARTHRITIS OF LEFT KNEE: Primary | ICD-10-CM

## 2024-12-20 PROCEDURE — 20610 DRAIN/INJ JOINT/BURSA W/O US: CPT | Performed by: PHYSICIAN ASSISTANT

## 2024-12-20 NOTE — PROGRESS NOTES
Orthopedics          Coretta Hines 69 y.o. female MRN: [id number]      Chief Complaint:   left knee pain    HPI:   69 y.o.female complaining of left knee pain.  She presents office regarding left knee pain.  She has been diagnosed left knee pain due to arthritis.  She presents for viscosupplementation injection of form of Orthovisc today.  Pain is aching nature worse with mildly relieved with rest.  She rates pain anywhere from a 2-5 out of 10.                Review Of Systems:   Skin: Normal  Neuro: See HPI  Musculoskeletal: See HPI  All other systems reviewed and are negative    Past Medical History:   Past Medical History:   Diagnosis Date    Allergic     Allergic rhinitis Decades    Arthritis     Asthma     Blindness of left eye     COPD (chronic obstructive pulmonary disease) ([facility])     Deep vein thrombosis ([facility]) March 3, 2023    cataract surgeries, detached retina surgeries leftveye    Diabetes mellitus (HCC)     GERD (gastroesophageal reflux disease)     Hypertension     Murmur, heart     Obesity     Visual impairment        Past Surgical History:   Past Surgical History:   Procedure Laterality Date     SECTION  10/16/1990    Fibroids    COLONOSCOPY      EYE SURGERY      MULTIPLE    HYSTERECTOMY      JOINT REPLACEMENT  2016    right  knee    REPLACEMENT TOTAL KNEE Right        Family History:  Family history reviewed and non-contributory  Family History   Problem Relation Age of Onset    Dementia Mother     Arthritis Mother         Late in life    Hypertension Father         HBP    Heart disease Father         HBP    Diabetes Father         managed through diet    Hearing loss Father         Industrial/construction work with no hearing protection    Prostate cancer Father     No Known Problems Daughter     No Known Problems Maternal Grandmother     No Known Problems Maternal Grandfather     Glaucoma Paternal Grandmother     Asthma Paternal Grandfather         Inhaler used    No Known Problems  Maternal Aunt          Social History:  Social History     Socioeconomic History    Marital status: /Civil Union     Spouse name: None    Number of children: None    Years of education: None    Highest education level: None   Occupational History    None   Tobacco Use    Smoking status: Never    Smokeless tobacco: Never   Vaping Use    Vaping status: Never Used   Substance and Sexual Activity    Alcohol use: Yes     Alcohol/week: 1.0 standard drink of alcohol     Types: 1 Standard drinks or equivalent per week     Comment: on occasion, 1 drink with low sugar content    Drug use: Never    Sexual activity: None   Other Topics Concern    None   Social History Narrative    None     Social Drivers of Health     Financial Resource Strain: Low Risk  (9/17/2023)    Overall Financial Resource Strain (CARDIA)     Difficulty of Paying Living Expenses: Not hard at all   Food Insecurity: No Food Insecurity (11/25/2024)    Hunger Vital Sign     Worried About Running Out of Food in the Last Year: Never true     Ran Out of Food in the Last Year: Never true   Transportation Needs: No Transportation Needs (11/25/2024)    PRAPARE - Transportation     Lack of Transportation (Medical): No     Lack of Transportation (Non-Medical): No   Physical Activity: Not on file   Stress: Not on file   Social Connections: Not on file   Intimate Partner Violence: Not on file   Housing Stability: Low Risk  (11/25/2024)    Housing Stability Vital Sign     Unable to Pay for Housing in the Last Year: No     Number of Times Moved in the Last Year: 0     Homeless in the Last Year: No       Allergies:   Allergies   Allergen Reactions    Other Nausea Only, Vomiting and Cough     LOBSTER    Penicillins Seizures    Sulfa Antibiotics Rash       Labs:  0   Lab Value Date/Time    HCT 43.5 11/19/2024 0951    HCT 43.5 03/21/2024 0850    HCT 44.6 06/16/2023 1103    HGB 14.0 11/19/2024 0951    HGB 13.8 03/21/2024 0850    HGB 14.1 06/16/2023 1103    PT 12.2  03/30/2023 1428    INR 1.38 (H) 05/03/2023 1303    INR 0.9 03/30/2023 1428    WBC 8.43 11/19/2024 0951    WBC 7.52 03/21/2024 0850    WBC 8.36 06/16/2023 1103       Meds:    Current Outpatient Medications:     acetaminophen (Tylenol) 325 mg tablet, Take 650 mg by mouth every 6 (six) hours as needed for mild pain, Disp: , Rfl:     albuterol (PROVENTIL HFA,VENTOLIN HFA) 90 mcg/act inhaler, Inhale 2 puffs every 6 (six) hours as needed for wheezing, Disp: 8.5 g, Rfl: 2    Azelastine HCl 137 MCG/SPRAY SOLN, SPRAY 2 SPRAYS INTO EACH NOSTRIL 2 TIMES A DAY USE IN EACH NOSTRIL AS DIRECTED, Disp: 30 mL, Rfl: 3    benzonatate (TESSALON PERLES) 100 mg capsule, Take 1 capsule (100 mg total) by mouth 3 (three) times a day as needed for cough, Disp: 20 capsule, Rfl: 0    bimatoprost (LUMIGAN) 0.01 % ophthalmic drops, , Disp: , Rfl:     Dextromethorphan-GG-APAP (Coricidin HBP Cold/Cough/Flu) -325 MG/15ML LIQD, Take 30 mL by mouth 3 (three) times a day as needed (cough, congestion), Disp: 355 mL, Rfl: 0    diltiazem (CARDIZEM CD) 240 mg 24 hr capsule, Take 1 capsule (240 mg total) by mouth daily, Disp: 90 capsule, Rfl: 3    Dulaglutide (Trulicity) 4.5 MG/0.5ML SOAJ, Inject 4.5 mg under the skin once a week, Disp: 2 mL, Rfl: 5    Eliquis 2.5 MG, TAKE 1 TABLET(2.5 MG) BY MOUTH TWICE DAILY, Disp: 60 tablet, Rfl: 5    famotidine (PEPCID) 20 mg tablet, Take 20 mg by mouth, Disp: , Rfl:     fluticasone (FLONASE) 50 mcg/act nasal spray, 1 spray into each nostril daily, Disp: 9.9 mL, Rfl: 0    Fluticasone-Salmeterol (Advair Diskus) 250-50 mcg/dose inhaler, Inhale 1 puff 2 (two) times a day Rinse mouth after use., Disp: 180 blister, Rfl: 1    guaiFENesin (MUCINEX) 600 mg 12 hr tablet, Take 2 tablets (1,200 mg total) by mouth every 12 (twelve) hours, Disp: 30 tablet, Rfl: 0    loratadine (CLARITIN) 10 mg tablet, Take 10 mg by mouth daily, Disp: , Rfl:     metFORMIN (GLUCOPHAGE) 500 mg tablet, 1 tablet each morning and 2 tablets each  evening, Disp: 270 tablet, Rfl: 3    montelukast (SINGULAIR) 10 mg tablet, TAKE 1 TABLET(10 MG) BY MOUTH DAILY AT BEDTIME, Disp: 30 tablet, Rfl: 5    omeprazole (PriLOSEC) 40 MG capsule, Take 1 capsule (40 mg total) by mouth daily, Disp: 90 capsule, Rfl: 2    predniSONE 10 mg tablet, 6 Tabs day 1, 5 tabs day 2, 4 tabs day 3, 3 tabs day 4, 2 tabs day 5, 1 tab day 6, Disp: 21 tablet, Rfl: 0    rosuvastatin (CRESTOR) 20 MG tablet, Take 1 tablet (20 mg total) by mouth daily, Disp: 100 tablet, Rfl: 3    Timolol Maleate PF (Timolol Maleate Ocudose) 0.5 % SOLN, , Disp: , Rfl:     Body mass index is 38.09 kg/m².  Wt Readings from Last 3 Encounters:   12/20/24 97.5 kg (215 lb)   11/25/24 97.8 kg (215 lb 9.6 oz)   11/11/24 98 kg (216 lb)     Physical Exam:   There were no vitals filed for this visit.    General Appearance:    Alert, cooperative, no distress, appears stated age   Head:    Normocephalic, without obvious abnormality, atraumatic   Eyes:    conjunctiva/corneas clear, both eyes        Nose:   Nares normal, septum midline, no drainage    Throat:   Lips normal; teeth and gums normal   Neck:    symmetrical, trachea midline, ;     thyroid:  no enlargement/   Back:     Symmetric, no curvature, ROM normal   Lungs:   No audible wheezing or labored breathing   Chest Wall:    No tenderness or deformity    Heart:    Regular rate and rhythm               Pulses:   2+ and symmetric all extremities   Skin:   Skin color, texture, turgor normal, no rashes or lesions   Neurologic:   normal strength, sensation and reflexes     throughout       Musculoskeletal: left lower extremity  On examination of the left knee there is no effusion, no erythema.  Range of motion to full active extension and flexion to greater than 120°.  Pain on palpation medial and lateral joint lines.  There is crepitus with range of motion, no warmth to palpation, bony enlargement noted. No pain on palpation pes anserine bursa region or distal iliotibial band.   Stable to varus and valgus stress without pain or gapping.  Negative anterior and posterior drawer testing.  Sensation intact distal pulses present.        Large joint arthrocentesis: L knee  Universal Protocol:  Consent: Verbal consent obtained.  Consent given by: patient  Patient understanding: patient states understanding of the procedure being performed  Site marked: the operative site was marked  Patient identity confirmed: verbally with patient  Supporting Documentation  Indications: pain   Procedure Details  Location: knee - L knee  Needle size: 22 G  Approach: anterolateral  Medications administered: 30 mg sodium hyaluronate 30 mg/2 mL    Patient tolerance: patient tolerated the procedure well with no immediate complications       _*_*_*_*_*_*_*_*_*_*_*_*_*_*_*_*_*_*_*_*_*_*_*_*_*_*_*_*_*_*_*_*_*_*_*_*_*_*_*_*_*    Assessment:  69 y.o.female with left knee pain due to arthritis    Plan:   Weight bearing as tolerated  left lower extremity  Left knee intra-articular Orthovisc 1 in the series of 3 administered as noted above  Advised no significant activity or increased ambulation over the next 24-48 hours and warm compresses to the knee no greater 20 minutes 3-4 times 24 hours following the injection.  Patient advised should they develop any increasing pain, redness, drainage, numbness, tingling or swelling surrounding the injection sight, they are to contact our office or present to the emergency department.  Follow up in the next 2 weeks for follow-up intra-articular left knee Orthovisc 2 and a series of 3 injections      Arturo Holland PA-C

## 2025-01-03 ENCOUNTER — PROCEDURE VISIT (OUTPATIENT)
Dept: OBGYN CLINIC | Facility: CLINIC | Age: 70
End: 2025-01-03
Payer: MEDICARE

## 2025-01-03 VITALS — HEIGHT: 63 IN | BODY MASS INDEX: 38.09 KG/M2 | WEIGHT: 215 LBS

## 2025-01-03 DIAGNOSIS — M17.12 PRIMARY OSTEOARTHRITIS OF LEFT KNEE: Primary | ICD-10-CM

## 2025-01-03 PROCEDURE — 20610 DRAIN/INJ JOINT/BURSA W/O US: CPT | Performed by: PHYSICIAN ASSISTANT

## 2025-01-03 NOTE — PROGRESS NOTES
Orthopedics          Coretta Hines 69 y.o. female MRN: [id number]      Chief Complaint:   left knee pain    HPI:   69 y.o.female complaining of left knee pain.  She presents office today regarding left knee pain due to arthritis.  She presents for left knee 2 in a series of 3 Orthovisc injections.  She states she had no issues regarding her last injection denies any numbness and tingling radiating pain.  She rates her pain anywhere from 2-5 out of 10.                Review Of Systems:   Skin: Normal  Neuro: See HPI  Musculoskeletal: See HPI  All other systems reviewed and are negative    Past Medical History:   Past Medical History:   Diagnosis Date    Allergic     Allergic rhinitis Decades    Arthritis     Asthma     Blindness of left eye     COPD (chronic obstructive pulmonary disease) ([facility])     Deep vein thrombosis (HCC) March 3, 2023    cataract surgeries, detached retina surgeries leftveye    Diabetes mellitus (HCC)     GERD (gastroesophageal reflux disease)     Hypertension     Murmur, heart     Obesity     Visual impairment        Past Surgical History:   Past Surgical History:   Procedure Laterality Date     SECTION  10/16/1990    Fibroids    COLONOSCOPY      EYE SURGERY      MULTIPLE    HYSTERECTOMY  2000    JOINT REPLACEMENT  2016    right  knee    REPLACEMENT TOTAL KNEE Right        Family History:  Family history reviewed and non-contributory  Family History   Problem Relation Age of Onset    Dementia Mother     Arthritis Mother         Late in life    Hypertension Father         HBP    Heart disease Father         HBP    Diabetes Father         managed through diet    Hearing loss Father         Industrial/construction work with no hearing protection    Prostate cancer Father     No Known Problems Daughter     No Known Problems Maternal Grandmother     No Known Problems Maternal Grandfather     Glaucoma Paternal Grandmother     Asthma Paternal Grandfather         Inhaler used    No Known  Problems Maternal Aunt          Social History:  Social History     Socioeconomic History    Marital status: /Civil Union     Spouse name: None    Number of children: None    Years of education: None    Highest education level: None   Occupational History    None   Tobacco Use    Smoking status: Never    Smokeless tobacco: Never   Vaping Use    Vaping status: Never Used   Substance and Sexual Activity    Alcohol use: Yes     Alcohol/week: 1.0 standard drink of alcohol     Types: 1 Standard drinks or equivalent per week     Comment: on occasion, 1 drink with low sugar content    Drug use: Never    Sexual activity: None   Other Topics Concern    None   Social History Narrative    None     Social Drivers of Health     Financial Resource Strain: Low Risk  (9/17/2023)    Overall Financial Resource Strain (CARDIA)     Difficulty of Paying Living Expenses: Not hard at all   Food Insecurity: No Food Insecurity (11/25/2024)    Hunger Vital Sign     Worried About Running Out of Food in the Last Year: Never true     Ran Out of Food in the Last Year: Never true   Transportation Needs: No Transportation Needs (11/25/2024)    PRAPARE - Transportation     Lack of Transportation (Medical): No     Lack of Transportation (Non-Medical): No   Physical Activity: Not on file   Stress: Not on file   Social Connections: Not on file   Intimate Partner Violence: Not on file   Housing Stability: Low Risk  (11/25/2024)    Housing Stability Vital Sign     Unable to Pay for Housing in the Last Year: No     Number of Times Moved in the Last Year: 0     Homeless in the Last Year: No       Allergies:   Allergies   Allergen Reactions    Other Nausea Only, Vomiting and Cough     LOBSTER    Penicillins Seizures    Sulfa Antibiotics Rash       Labs:  0   Lab Value Date/Time    HCT 43.5 11/19/2024 0951    HCT 43.5 03/21/2024 0850    HCT 44.6 06/16/2023 1103    HGB 14.0 11/19/2024 0951    HGB 13.8 03/21/2024 0850    HGB 14.1 06/16/2023 1103    PT  12.2 03/30/2023 1428    INR 1.38 (H) 05/03/2023 1303    INR 0.9 03/30/2023 1428    WBC 8.43 11/19/2024 0951    WBC 7.52 03/21/2024 0850    WBC 8.36 06/16/2023 1103       Meds:    Current Outpatient Medications:     acetaminophen (Tylenol) 325 mg tablet, Take 650 mg by mouth every 6 (six) hours as needed for mild pain, Disp: , Rfl:     albuterol (PROVENTIL HFA,VENTOLIN HFA) 90 mcg/act inhaler, Inhale 2 puffs every 6 (six) hours as needed for wheezing, Disp: 8.5 g, Rfl: 2    Azelastine HCl 137 MCG/SPRAY SOLN, SPRAY 2 SPRAYS INTO EACH NOSTRIL 2 TIMES A DAY USE IN EACH NOSTRIL AS DIRECTED, Disp: 30 mL, Rfl: 3    benzonatate (TESSALON PERLES) 100 mg capsule, Take 1 capsule (100 mg total) by mouth 3 (three) times a day as needed for cough, Disp: 20 capsule, Rfl: 0    bimatoprost (LUMIGAN) 0.01 % ophthalmic drops, , Disp: , Rfl:     Dextromethorphan-GG-APAP (Coricidin HBP Cold/Cough/Flu) -325 MG/15ML LIQD, Take 30 mL by mouth 3 (three) times a day as needed (cough, congestion), Disp: 355 mL, Rfl: 0    diltiazem (CARDIZEM CD) 240 mg 24 hr capsule, Take 1 capsule (240 mg total) by mouth daily, Disp: 90 capsule, Rfl: 3    Dulaglutide (Trulicity) 4.5 MG/0.5ML SOAJ, Inject 4.5 mg under the skin once a week, Disp: 2 mL, Rfl: 5    Eliquis 2.5 MG, TAKE 1 TABLET(2.5 MG) BY MOUTH TWICE DAILY, Disp: 60 tablet, Rfl: 5    famotidine (PEPCID) 20 mg tablet, Take 20 mg by mouth, Disp: , Rfl:     fluticasone (FLONASE) 50 mcg/act nasal spray, 1 spray into each nostril daily, Disp: 9.9 mL, Rfl: 0    Fluticasone-Salmeterol (Advair Diskus) 250-50 mcg/dose inhaler, Inhale 1 puff 2 (two) times a day Rinse mouth after use., Disp: 180 blister, Rfl: 1    guaiFENesin (MUCINEX) 600 mg 12 hr tablet, Take 2 tablets (1,200 mg total) by mouth every 12 (twelve) hours, Disp: 30 tablet, Rfl: 0    loratadine (CLARITIN) 10 mg tablet, Take 10 mg by mouth daily, Disp: , Rfl:     metFORMIN (GLUCOPHAGE) 500 mg tablet, 1 tablet each morning and 2 tablets  each evening, Disp: 270 tablet, Rfl: 3    montelukast (SINGULAIR) 10 mg tablet, TAKE 1 TABLET(10 MG) BY MOUTH DAILY AT BEDTIME, Disp: 30 tablet, Rfl: 5    omeprazole (PriLOSEC) 40 MG capsule, Take 1 capsule (40 mg total) by mouth daily, Disp: 90 capsule, Rfl: 2    predniSONE 10 mg tablet, 6 Tabs day 1, 5 tabs day 2, 4 tabs day 3, 3 tabs day 4, 2 tabs day 5, 1 tab day 6, Disp: 21 tablet, Rfl: 0    rosuvastatin (CRESTOR) 20 MG tablet, Take 1 tablet (20 mg total) by mouth daily, Disp: 100 tablet, Rfl: 3    Timolol Maleate PF (Timolol Maleate Ocudose) 0.5 % SOLN, , Disp: , Rfl:     Body mass index is 38.09 kg/m².  Wt Readings from Last 3 Encounters:   01/03/25 97.5 kg (215 lb)   12/20/24 97.5 kg (215 lb)   11/25/24 97.8 kg (215 lb 9.6 oz)     Physical Exam:   There were no vitals filed for this visit.    General Appearance:    Alert, cooperative, no distress, appears stated age   Head:    Normocephalic, without obvious abnormality, atraumatic   Eyes:    conjunctiva/corneas clear, both eyes        Nose:   Nares normal, septum midline, no drainage    Throat:   Lips normal; teeth and gums normal   Neck:    symmetrical, trachea midline, ;     thyroid:  no enlargement/   Back:     Symmetric, no curvature, ROM normal   Lungs:   No audible wheezing or labored breathing   Chest Wall:    No tenderness or deformity    Heart:    Regular rate and rhythm               Pulses:   2+ and symmetric all extremities   Skin:   Skin color, texture, turgor normal, no rashes or lesions   Neurologic:   normal strength, sensation and reflexes     throughout       Musculoskeletal: left lower extremity  On examination of the left knee there is no effusion, no erythema.  Range of motion to full active extension and flexion to greater than 120°.  Pain on palpation medial and lateral joint lines.  There is crepitus with range of motion, no warmth to palpation, bony enlargement noted. No pain on palpation pes anserine bursa region or distal  iliotibial band.  Stable to varus and valgus stress without pain or gapping.  Negative anterior and posterior drawer testing.  Sensation intact distal pulses present.    Large joint arthrocentesis: L knee  Universal Protocol:  Consent: Verbal consent obtained.  Consent given by: patient  Patient understanding: patient states understanding of the procedure being performed  Site marked: the operative site was marked  Patient identity confirmed: verbally with patient  Supporting Documentation  Indications: pain   Procedure Details  Location: knee - L knee  Needle size: 22 G  Approach: superior  Medications administered: 30 mg sodium hyaluronate 30 mg/2 mL    Patient tolerance: patient tolerated the procedure well with no immediate complications        _*_*_*_*_*_*_*_*_*_*_*_*_*_*_*_*_*_*_*_*_*_*_*_*_*_*_*_*_*_*_*_*_*_*_*_*_*_*_*_*_*    Assessment:  69 y.o.female with left knee pain due to arthritis    Plan:   Weight bearing as tolerated  left lower extremity  Left knee intra-articular to in a series of 3 Orthovisc injections administered as noted above  Advised no significant activity or increased ambulation over the next 24-48 hours and warm compresses to the knee no greater 20 minutes 3-4 times 24 hours following the injection.  Patient advised should they develop any increasing pain, redness, drainage, numbness, tingling or swelling surrounding the injection sight, they are to contact our office or present to the emergency department.  Follow up in 1 week for final left knee intra-articular Orthovisc injection      Arturo Holland PA-C                    .

## 2025-01-10 ENCOUNTER — PROCEDURE VISIT (OUTPATIENT)
Dept: OBGYN CLINIC | Facility: CLINIC | Age: 70
End: 2025-01-10
Payer: MEDICARE

## 2025-01-10 VITALS — HEIGHT: 63 IN | WEIGHT: 215 LBS | BODY MASS INDEX: 38.09 KG/M2

## 2025-01-10 DIAGNOSIS — M17.12 PRIMARY OSTEOARTHRITIS OF LEFT KNEE: Primary | ICD-10-CM

## 2025-01-10 PROCEDURE — 20610 DRAIN/INJ JOINT/BURSA W/O US: CPT | Performed by: PHYSICIAN ASSISTANT

## 2025-01-10 NOTE — PROGRESS NOTES
Orthopedics          Coretta Hines 69 y.o. female MRN: [id number]      Chief Complaint:   left knee pain    HPI:   69 y.o.female complaining of left knee pain.  She presents for third and final Orthovisc injection.  Pain is aching nature worse with mildly with rest.  She denies any issues with the previous injection and rates her pain anywhere from 2-5 out of 10.                Review Of Systems:   Skin: Normal  Neuro: See HPI  Musculoskeletal: See HPI  All other systems reviewed and are negative    Past Medical History:   Past Medical History:   Diagnosis Date    Allergic     Allergic rhinitis Decades    Arthritis     Asthma     Blindness of left eye     COPD (chronic obstructive pulmonary disease) ([facility])     Deep vein thrombosis ([facility]) March 3, 2023    cataract surgeries, detached retina surgeries leftveye    Diabetes mellitus (HCC)     GERD (gastroesophageal reflux disease)     Hypertension     Murmur, heart     Obesity     Visual impairment        Past Surgical History:   Past Surgical History:   Procedure Laterality Date     SECTION  10/16/1990    Fibroids    COLONOSCOPY      EYE SURGERY      MULTIPLE    HYSTERECTOMY  2000    JOINT REPLACEMENT  2016    right  knee    REPLACEMENT TOTAL KNEE Right        Family History:  Family history reviewed and non-contributory  Family History   Problem Relation Age of Onset    Dementia Mother     Arthritis Mother         Late in life    Hypertension Father         HBP    Heart disease Father         HBP    Diabetes Father         managed through diet    Hearing loss Father         Industrial/construction work with no hearing protection    Prostate cancer Father     No Known Problems Daughter     No Known Problems Maternal Grandmother     No Known Problems Maternal Grandfather     Glaucoma Paternal Grandmother     Asthma Paternal Grandfather         Inhaler used    No Known Problems Maternal Aunt          Social History:  Social History     Socioeconomic History     Marital status: /Civil Union     Spouse name: None    Number of children: None    Years of education: None    Highest education level: None   Occupational History    None   Tobacco Use    Smoking status: Never    Smokeless tobacco: Never   Vaping Use    Vaping status: Never Used   Substance and Sexual Activity    Alcohol use: Yes     Alcohol/week: 1.0 standard drink of alcohol     Types: 1 Standard drinks or equivalent per week     Comment: on occasion, 1 drink with low sugar content    Drug use: Never    Sexual activity: None   Other Topics Concern    None   Social History Narrative    None     Social Drivers of Health     Financial Resource Strain: Low Risk  (9/17/2023)    Overall Financial Resource Strain (CARDIA)     Difficulty of Paying Living Expenses: Not hard at all   Food Insecurity: No Food Insecurity (11/25/2024)    Hunger Vital Sign     Worried About Running Out of Food in the Last Year: Never true     Ran Out of Food in the Last Year: Never true   Transportation Needs: No Transportation Needs (11/25/2024)    PRAPARE - Transportation     Lack of Transportation (Medical): No     Lack of Transportation (Non-Medical): No   Physical Activity: Not on file   Stress: Not on file   Social Connections: Not on file   Intimate Partner Violence: Not on file   Housing Stability: Low Risk  (11/25/2024)    Housing Stability Vital Sign     Unable to Pay for Housing in the Last Year: No     Number of Times Moved in the Last Year: 0     Homeless in the Last Year: No       Allergies:   Allergies   Allergen Reactions    Other Nausea Only, Vomiting and Cough     LOBSTER    Penicillins Seizures    Sulfa Antibiotics Rash       Labs:  0   Lab Value Date/Time    HCT 43.5 11/19/2024 0951    HCT 43.5 03/21/2024 0850    HCT 44.6 06/16/2023 1103    HGB 14.0 11/19/2024 0951    HGB 13.8 03/21/2024 0850    HGB 14.1 06/16/2023 1103    PT 12.2 03/30/2023 1428    INR 1.38 (H) 05/03/2023 1303    INR 0.9 03/30/2023 1428    WBC 8.43  11/19/2024 0951    WBC 7.52 03/21/2024 0850    WBC 8.36 06/16/2023 1103       Meds:    Current Outpatient Medications:     acetaminophen (Tylenol) 325 mg tablet, Take 650 mg by mouth every 6 (six) hours as needed for mild pain, Disp: , Rfl:     albuterol (PROVENTIL HFA,VENTOLIN HFA) 90 mcg/act inhaler, Inhale 2 puffs every 6 (six) hours as needed for wheezing, Disp: 8.5 g, Rfl: 2    Azelastine HCl 137 MCG/SPRAY SOLN, SPRAY 2 SPRAYS INTO EACH NOSTRIL 2 TIMES A DAY USE IN EACH NOSTRIL AS DIRECTED, Disp: 30 mL, Rfl: 3    benzonatate (TESSALON PERLES) 100 mg capsule, Take 1 capsule (100 mg total) by mouth 3 (three) times a day as needed for cough, Disp: 20 capsule, Rfl: 0    bimatoprost (LUMIGAN) 0.01 % ophthalmic drops, , Disp: , Rfl:     Dextromethorphan-GG-APAP (Coricidin HBP Cold/Cough/Flu) -325 MG/15ML LIQD, Take 30 mL by mouth 3 (three) times a day as needed (cough, congestion), Disp: 355 mL, Rfl: 0    diltiazem (CARDIZEM CD) 240 mg 24 hr capsule, Take 1 capsule (240 mg total) by mouth daily, Disp: 90 capsule, Rfl: 3    Dulaglutide (Trulicity) 4.5 MG/0.5ML SOAJ, Inject 4.5 mg under the skin once a week, Disp: 2 mL, Rfl: 5    Eliquis 2.5 MG, TAKE 1 TABLET(2.5 MG) BY MOUTH TWICE DAILY, Disp: 60 tablet, Rfl: 5    famotidine (PEPCID) 20 mg tablet, Take 20 mg by mouth, Disp: , Rfl:     fluticasone (FLONASE) 50 mcg/act nasal spray, 1 spray into each nostril daily, Disp: 9.9 mL, Rfl: 0    Fluticasone-Salmeterol (Advair Diskus) 250-50 mcg/dose inhaler, Inhale 1 puff 2 (two) times a day Rinse mouth after use., Disp: 180 blister, Rfl: 1    guaiFENesin (MUCINEX) 600 mg 12 hr tablet, Take 2 tablets (1,200 mg total) by mouth every 12 (twelve) hours, Disp: 30 tablet, Rfl: 0    loratadine (CLARITIN) 10 mg tablet, Take 10 mg by mouth daily, Disp: , Rfl:     metFORMIN (GLUCOPHAGE) 500 mg tablet, 1 tablet each morning and 2 tablets each evening, Disp: 270 tablet, Rfl: 3    montelukast (SINGULAIR) 10 mg tablet, TAKE 1  TABLET(10 MG) BY MOUTH DAILY AT BEDTIME, Disp: 30 tablet, Rfl: 5    omeprazole (PriLOSEC) 40 MG capsule, Take 1 capsule (40 mg total) by mouth daily, Disp: 90 capsule, Rfl: 2    predniSONE 10 mg tablet, 6 Tabs day 1, 5 tabs day 2, 4 tabs day 3, 3 tabs day 4, 2 tabs day 5, 1 tab day 6, Disp: 21 tablet, Rfl: 0    rosuvastatin (CRESTOR) 20 MG tablet, Take 1 tablet (20 mg total) by mouth daily, Disp: 100 tablet, Rfl: 3    Timolol Maleate PF (Timolol Maleate Ocudose) 0.5 % SOLN, , Disp: , Rfl:     Body mass index is 38.09 kg/m².  Wt Readings from Last 3 Encounters:   01/10/25 97.5 kg (215 lb)   01/03/25 97.5 kg (215 lb)   12/20/24 97.5 kg (215 lb)     Physical Exam:   There were no vitals filed for this visit.    General Appearance:    Alert, cooperative, no distress, appears stated age   Head:    Normocephalic, without obvious abnormality, atraumatic   Eyes:    conjunctiva/corneas clear, both eyes        Nose:   Nares normal, septum midline, no drainage    Throat:   Lips normal; teeth and gums normal   Neck:    symmetrical, trachea midline, ;     thyroid:  no enlargement/   Back:     Symmetric, no curvature, ROM normal   Lungs:   No audible wheezing or labored breathing   Chest Wall:    No tenderness or deformity    Heart:    Regular rate and rhythm               Pulses:   2+ and symmetric all extremities   Skin:   Skin color, texture, turgor normal, no rashes or lesions   Neurologic:   normal strength, sensation and reflexes     throughout       Musculoskeletal: left lower extremity  On examination of the left knee there is no effusion, no erythema.  Range of motion to full active extension and flexion to greater than 120°.  Pain on palpation medial and lateral joint lines.  There is crepitus with range of motion, no warmth to palpation, bony enlargement noted. No pain on palpation pes anserine bursa region or distal iliotibial band.  Stable to varus and valgus stress without pain or gapping.  Negative anterior and  posterior drawer testing.  Sensation intact distal pulses present.    Large joint arthrocentesis: L knee  Universal Protocol:  Consent: Verbal consent obtained.  Consent given by: patient  Patient understanding: patient states understanding of the procedure being performed  Site marked: the operative site was marked  Patient identity confirmed: verbally with patient  Supporting Documentation  Indications: pain   Procedure Details  Location: knee - L knee  Needle size: 22 G  Approach: superior  Medications administered: 30 mg sodium hyaluronate 30 mg/2 mL    Patient tolerance: patient tolerated the procedure well with no immediate complications        _*_*_*_*_*_*_*_*_*_*_*_*_*_*_*_*_*_*_*_*_*_*_*_*_*_*_*_*_*_*_*_*_*_*_*_*_*_*_*_*_*    Assessment:  69 y.o.female with left knee pain due to arthritis    Plan:   Weight bearing as tolerated  left lower extremity  Left knee intra-articular Orthovisc injection 3 in a series of 3 administered as noted above  Advised no significant activity or increased ambulation over the next 24-48 hours and warm compresses to the knee no greater 20 minutes 3-4 times 24 hours following the injection.  Patient advised should they develop any increasing pain, redness, drainage, numbness, tingling or swelling surrounding the injection sight, they are to contact our office or present to the emergency department.  Follow up in 3 months or sooner if needed      Arturo Holland PA-C

## 2025-01-13 ENCOUNTER — HOSPITAL ENCOUNTER (OUTPATIENT)
Facility: HOSPITAL | Age: 70
Discharge: HOME | End: 2025-01-13
Payer: MEDICARE

## 2025-01-13 VITALS — WEIGHT: 218 LBS | BODY MASS INDEX: 41.16 KG/M2 | HEIGHT: 61 IN

## 2025-01-13 DIAGNOSIS — Z78.0 POSTMENOPAUSE: ICD-10-CM

## 2025-01-13 PROCEDURE — 77080 DXA BONE DENSITY AXIAL: CPT

## 2025-02-25 NOTE — PROGRESS NOTES
"FOR SURGERY ON: 3/17/25  Patient provided medication instructions per [facility]'s Guideline for Pre-Operative Medication Management. Preparing For Your Procedure packet reviewed with patient. Encouraged patient to increase oral intake the day prior to procedure including intake of electrolyte drinks such as Gatorade or electrolyte water and may have 16 oz of clear liquids up to 2 hours prior to surgery, unless otherwise directed by the surgeon. Patient advised to contact surgeon with any additional questions or concerns.     Patient verbalized understanding of their instructions.    Fall risk    Adverse reaction to anesthesia: \"Require twice the normal amount for local anesthesia, gets cold very easily\", requests a warm blanket due to tendency to wake up shivering    Patient reports he has a friend who will pick him up and take him home. Patient also given contact information for 3 different medical transport providers should he need them.   " 68 year-old female with hx of R DVT, HTN, asthma, DEN, GERD, T2 DM, OA, obesity, R eye blindness is a return visit for left leg redness and swelling.    Assessment/Plan:    Redness and swelling of lower leg  Pt returns to the office with new onset L mid calf erythremia and swelling.    -Redness of the left mid calf since last Thursday.  -Compliant with daily compression.  -Compliant with lymph pumps BID.  -Denies pain with walking.  -Compliant with Eliquis.      Plan  -Start taking Keflex daily as prescribed and return to the office in 7-10 day for re evaluation.   -Stop using lymph pumps for ~5 days or improvement of symptoms  -Continue with Compression use daily.  -Continue with Eliquis for hx of DVT.   -Call the office with any new or worsening symptoms.       Diagnoses and all orders for this visit:    Redness and swelling of lower leg  -     clindamycin (CLEOCIN) 300 MG capsule; Take 1 capsule (300 mg total) by mouth 3 (three) times a day for 7 days    Cellulitis of left lower extremity  -     clindamycin (CLEOCIN) 300 MG capsule; Take 1 capsule (300 mg total) by mouth 3 (three) times a day for 7 days          Subjective:      Patient ID: Coretta Hines is a 68 y.o. female.    Patient reports new onset of redness in L ankle x 1 week and presents today for evaluation. H/o LLE DVT, compliant with compression and anticoagulation. Using lymph pumps.     68 year-old female with hx of R DVT, HTN, asthma, DEN, GERD, T2 DM, OA, obesity is a return visit for left leg redness and swelling.  Recently traveled in a car 6 hours this past weekend. She is compliant with compression, stops frequently and does walks.   Reports she has left leg mid calf redness and swelling starting last Thursday night after she used lymph pumps. She admits that she has not cleaned her lymph pumps with each use.   Compliant with eliquis daily.        The following portions of the patient's history were reviewed and updated as appropriate:  "allergies, current medications, past family history, past medical history, past social history, past surgical history, and problem list.    Review of Systems   Constitutional: Negative.    HENT: Negative.     Eyes: Negative.    Respiratory: Negative.     Cardiovascular:  Positive for leg swelling.   Gastrointestinal: Negative.    Endocrine: Negative.    Genitourinary: Negative.    Musculoskeletal: Negative.    Skin:  Positive for color change.   Allergic/Immunologic: Positive for environmental allergies.   Neurological: Negative.    Hematological: Negative.    Psychiatric/Behavioral: Negative.           Objective:      /78 (BP Location: Left arm, Patient Position: Sitting)   Pulse 80   Ht 5' 3\" (1.6 m)   Wt 101 kg (222 lb)   BMI 39.33 kg/m²          Physical Exam  Constitutional:       Appearance: Normal appearance. She is obese.   HENT:      Head: Normocephalic and atraumatic.   Cardiovascular:      Rate and Rhythm: Normal rate and regular rhythm.      Pulses:           Dorsalis pedis pulses are 2+ on the left side.        Posterior tibial pulses are 2+ on the left side.      Heart sounds: Normal heart sounds. No murmur heard.  Pulmonary:      Effort: Pulmonary effort is normal. No respiratory distress.      Breath sounds: Normal breath sounds.   Musculoskeletal:         General: Tenderness (L mid calf) present.      Left lower leg: Edema present.   Skin:     General: Skin is warm and dry.   Neurological:      General: No focal deficit present.      Mental Status: She is alert and oriented to person, place, and time.   Psychiatric:         Mood and Affect: Mood normal.         Behavior: Behavior normal.         I have reviewed and made appropriate changes to the review of systems input by the medical assistant.    Vitals:    04/25/24 1431   BP: 136/78   BP Location: Left arm   Patient Position: Sitting   Pulse: 80   Weight: 101 kg (222 lb)   Height: 5' 3\" (1.6 m)       Patient Active Problem List "   Diagnosis    Chronic deep vein thrombosis (DVT) of right peroneal vein (HCC)    Type 2 diabetes mellitus with hyperglycemia, without long-term current use of insulin (HCC)    Class 2 severe obesity with serious comorbidity in adult (HCC)    Left retinal disorder    Dyslipidemia    Primary hypertension    GERD (gastroesophageal reflux disease)    Moderate persistent asthma without complication    Obesity hypoventilation syndrome (HCC)    DEN (obstructive sleep apnea)    Osteoarthritis    Seasonal allergic rhinitis due to fungal spores    Edema of both lower extremities due to peripheral venous insufficiency    History of colon polyps    FH: total knee replacement    Venous insufficiency of left lower extremity    Chronic deep vein thrombosis (DVT) of left peroneal vein (HCC)    Sleep disturbance    Redness and swelling of lower leg       Past Surgical History:   Procedure Laterality Date     SECTION  10/16/1990    Fibroids    COLONOSCOPY      EYE SURGERY      MULTIPLE    HYSTERECTOMY  2000    REPLACEMENT TOTAL KNEE Right        Family History   Problem Relation Age of Onset    Dementia Mother     Arthritis Mother         Late in life    Hypertension Father         HBP    Heart disease Father         HBP    Diabetes Father         managed through diet    Hearing loss Father         Industrial/construction work with no hearing protection    Glaucoma Paternal Grandmother        Social History     Socioeconomic History    Marital status: /Civil Union     Spouse name: Not on file    Number of children: Not on file    Years of education: Not on file    Highest education level: Not on file   Occupational History    Not on file   Tobacco Use    Smoking status: Never    Smokeless tobacco: Never   Vaping Use    Vaping status: Never Used   Substance and Sexual Activity    Alcohol use: Yes     Alcohol/week: 1.0 standard drink of alcohol     Types: 1 Standard drinks or equivalent per week     Comment: on  occasion, 1 drink with low sugar content    Drug use: Never    Sexual activity: Not on file   Other Topics Concern    Not on file   Social History Narrative    Not on file     Social Determinants of Health     Financial Resource Strain: Low Risk  (9/17/2023)    Overall Financial Resource Strain (CARDIA)     Difficulty of Paying Living Expenses: Not hard at all   Food Insecurity: No Food Insecurity (9/13/2022)    Received from [facility] [facility]    Food Insecurity     Within the past 12 months, you worried that your food would run out before you got the money to buy more.: Never true     Within the past 12 months, the food you bought just didn't last and you didn't have money to get more.: Never true     Within the past 12 months, you worried that your food would run out before you got the money to buy more.: Never true   Transportation Needs: No Transportation Needs (9/17/2023)    PRAPARE - Transportation     Lack of Transportation (Medical): No     Lack of Transportation (Non-Medical): No   Physical Activity: Not on file   Stress: Not on file   Social Connections: Not on file   Intimate Partner Violence: Not on file   Housing Stability: Not on file       Allergies   Allergen Reactions    Other Nausea Only, Vomiting and Cough     LOBSTER    Penicillins Seizures    Sulfa Antibiotics Rash         Current Outpatient Medications:     acetaminophen (Tylenol) 325 mg tablet, Take 650 mg by mouth every 6 (six) hours as needed for mild pain, Disp: , Rfl:     albuterol (PROVENTIL HFA,VENTOLIN HFA) 90 mcg/act inhaler, Inhale 2 puffs every 6 (six) hours as needed for wheezing, Disp: 8.5 g, Rfl: 2    apixaban (Eliquis) 2.5 mg, Take 1 tablet (2.5 mg total) by mouth 2 (two) times a day, Disp: 60 tablet, Rfl: 5    Azelastine HCl 137 MCG/SPRAY SOLN, SPRAY 2 SPRAYS INTO EACH NOSTRIL 2 TIMES A DAY USE IN EACH NOSTRIL AS DIRECTED, Disp: 30 mL, Rfl: 3    bimatoprost (LUMIGAN) 0.01 % ophthalmic drops, , Disp: , Rfl:     clindamycin (CLEOCIN)  300 MG capsule, Take 1 capsule (300 mg total) by mouth 3 (three) times a day for 7 days, Disp: 21 capsule, Rfl: 0    Dextromethorphan-GG-APAP (Coricidin HBP Cold/Cough/Flu) -325 MG/15ML LIQD, Take 30 mL by mouth 3 (three) times a day as needed (cough, congestion), Disp: 355 mL, Rfl: 0    diltiazem (CARDIZEM CD) 240 mg 24 hr capsule, Take 1 capsule (240 mg total) by mouth daily, Disp: 90 capsule, Rfl: 3    dulaglutide (Trulicity) 3 MG/0.5ML injection, Inject 0.5 mL (3 mg total) under the skin every 7 days, Disp: 6 mL, Rfl: 1    famotidine (PEPCID) 20 mg tablet, Take 20 mg by mouth, Disp: , Rfl:     Fluticasone-Salmeterol (Advair) 500-50 mcg/dose inhaler, Inhale 1 puff every 12 (twelve) hours (Patient taking differently: Inhale 1 puff every 12 (twelve) hours Has not been using recently), Disp: 180 blister, Rfl: 3    ketorolac (ACULAR) 0.4 % SOLN, INSTILL 1 DROP INTO LEFT EYE TWICE A DAY AS DIRECTED, Disp: , Rfl:     loratadine (CLARITIN) 10 mg tablet, Take 10 mg by mouth daily, Disp: , Rfl:     metFORMIN (GLUCOPHAGE) 500 mg tablet, 1 tablet each morning and 2 tablets each evening, Disp: 270 tablet, Rfl: 3    omeprazole (PriLOSEC) 40 MG capsule, Take 1 capsule (40 mg total) by mouth daily, Disp: 90 capsule, Rfl: 2    rosuvastatin (CRESTOR) 10 MG tablet, Take 1 tablet (10 mg total) by mouth daily, Disp: 90 tablet, Rfl: 0    Timolol Maleate PF (Timolol Maleate Ocudose) 0.5 % SOLN, , Disp: , Rfl:     benzonatate (TESSALON) 200 MG capsule, Take 1 capsule (200 mg total) by mouth 3 (three) times a day as needed for cough (Patient not taking: Reported on 4/25/2024), Disp: 30 capsule, Rfl: 0    fluticasone (FLONASE) 50 mcg/act nasal spray, 1 spray into each nostril (Patient not taking: Reported on 4/25/2024), Disp: , Rfl:     ipratropium (ATROVENT) 0.03 % nasal spray, SPRAY 2 SPRAYS INTO EACH NOSTRIL EVERY 12 HOURS (Patient not taking: Reported on 4/25/2024), Disp: 30 mL, Rfl: 1    predniSONE 10 mg tablet, Take 4 tablets  for 2 days, then 3 tablets for 2 days, then 2 tablets for 2 days, then 1 tablet for 2 days, then discontinue, Disp: 20 tablet, Rfl: 0  I have spent a total time of 30 minutes on 04/25/24 in caring for this patient including Diagnostic results, Risks and benefits of tx options, Instructions for management, Patient and family education, Importance of tx compliance, Risk factor reductions, Documenting in the medical record, Reviewing / ordering tests, medicine, procedures  , and Communicating with other healthcare professionals .

## 2025-02-28 DIAGNOSIS — I82.452 ACUTE DEEP VEIN THROMBOSIS (DVT) OF LEFT PERONEAL VEIN (HCC): ICD-10-CM

## 2025-02-28 RX ORDER — APIXABAN 2.5 MG/1
TABLET, FILM COATED ORAL
Qty: 60 TABLET | Refills: 5 | Status: SHIPPED | OUTPATIENT
Start: 2025-02-28

## 2025-03-03 ENCOUNTER — OFFICE VISIT (OUTPATIENT)
Dept: VASCULAR SURGERY | Facility: CLINIC | Age: 70
End: 2025-03-03
Payer: MEDICARE

## 2025-03-03 VITALS
DIASTOLIC BLOOD PRESSURE: 84 MMHG | SYSTOLIC BLOOD PRESSURE: 122 MMHG | OXYGEN SATURATION: 98 % | HEART RATE: 74 BPM | HEIGHT: 61 IN | RESPIRATION RATE: 18 BRPM | BODY MASS INDEX: 41.54 KG/M2 | WEIGHT: 220 LBS

## 2025-03-03 DIAGNOSIS — E11.9 CONTROLLED TYPE 2 DIABETES MELLITUS WITHOUT COMPLICATION, WITHOUT LONG-TERM CURRENT USE OF INSULIN (HCC): ICD-10-CM

## 2025-03-03 DIAGNOSIS — I87.2 EDEMA OF BOTH LOWER EXTREMITIES DUE TO PERIPHERAL VENOUS INSUFFICIENCY: Primary | ICD-10-CM

## 2025-03-03 DIAGNOSIS — E66.2 OBESITY HYPOVENTILATION SYNDROME (HCC): ICD-10-CM

## 2025-03-03 PROCEDURE — 99213 OFFICE O/P EST LOW 20 MIN: CPT | Performed by: PHYSICIAN ASSISTANT

## 2025-03-03 NOTE — ASSESSMENT & PLAN NOTE
Hx L calf DVT  Chronic B LE edema     -Doing well; overall controlled B LE venous insufficiency/ LE edema on conservative measures.     -Exam: Legs look good. Very mild edema along the tibia. No ankle or pedal edema. No large vv. 2+ DP pulses.     Plan:  -Overall stable and compensated from venous insufficiency standpoint with history of left calf DVT and obesity  -Left lower extremity with deep and superficial incompetence on a reflux study in the past  -Discussed pathophysiology and treatment options for venous insufficiency, including surgical interventions  -Since she is doing well, recommend continuation of conservative management  -Continue with daily compression stockings  -Continue with lymphedema pump therapy 60 minutes at night  -Recommend compression, periodic elevation, low-sodium, regular exercise, weight loss and skin care  -Patient education regarding venous insufficiency provided  -Continue with Eliquis 2.5 twice daily  -Follow-up in 1 year, but discussed reasons why she should be seen sooner  Orders:    Compression Stocking

## 2025-03-03 NOTE — PROGRESS NOTES
Name: Coretta Hines      : 1955      MRN: [id number]  Encounter Provider: Nicole Burleson PA-C  Encounter Date: 3/3/2025   Encounter department: [facility] [place]  :  Assessment & Plan  Edema of both lower extremities due to peripheral venous insufficiency  Hx L calf DVT  Chronic B LE edema     -Doing well; overall controlled B LE venous insufficiency/ LE edema on conservative measures.     -Exam: Legs look good. Very mild edema along the tibia. No ankle or pedal edema. No large vv. 2+ DP pulses.     Plan:  -Overall stable and compensated from venous insufficiency standpoint with history of left calf DVT and obesity  -Left lower extremity with deep and superficial incompetence on a reflux study in the past  -Discussed pathophysiology and treatment options for venous insufficiency, including surgical interventions  -Since she is doing well, recommend continuation of conservative management  -Continue with daily compression stockings  -Continue with lymphedema pump therapy 60 minutes at night  -Recommend compression, periodic elevation, low-sodium, regular exercise, weight loss and skin care  -Patient education regarding venous insufficiency provided  -Continue with Eliquis 2.5 twice daily  -Follow-up in 1 year, but discussed reasons why she should be seen sooner  Orders:    Compression Stocking    Obesity hypoventilation syndrome (HCC)         Controlled type 2 diabetes mellitus without complication, without long-term current use of insulin (HCC)    Lab Results   Component Value Date    HGBA1C 8.1 (H) 2024                History of Present Illness   Patient is here for a 6 month recheck for a DVT. Pt wears compression stocking and is on Eliquis.    HPI  Coretta Hines is a 69 y.o. female obesity (BMP 40), COPD, Hx R TKR, L knee OA, hypertension, DM, venous insufficiency and Hx L calf DVT 3/2023 who returns for vascular follow up.      Patient initially seen in  after L DVT in 1 of 2  peroneal veins on duplex discovered at [facility]. She was placed on full anticoagulation. After DVT, she developed increased leg/calf pain, cramping, edema and redness.  She has been recommended for conservative measures with medical compression stockings, periodic elevation, low sodium and exercise. Additionally we were able to get her lymphedema pumps. A venous reflux study performed 8/2023 is significant for deep and superficial reflux. No history of malignancy or rheumatologic disorders.  She has no large, bulky varicose veins.      She was seen by hematology 10/22/2023.  Thrombosis panel was negative, but she was recommended for DVT prophylaxis with apixaban 2.5 twice daily by hematology.     3/3/25: Patient reports that she is doing very well.  She is followed for venous insufficiency and history of left peroneal DVT in 2023.  Unclear etiology for DVT, but she was moving and doing a lot of traveling at the time.    She is fully compliant with compression stockings and utilizing her compression pumps 60 minutes at night sometimes twice a day.  Overall, she is pleased with the way her legs feel.  Occasionally she has some heaviness.  She denies swelling, though on examination, she does have some mild leg swelling.  She has had no overt infection/cellulitis.  No disability walking.    She is still hoping to have her left total knee replacement but needs to get diabetes under better control.    Patient remains on Eliquis 2.5 twice a day as per her discussion with hematology in 2023.  She has had no issues with low-dose Eliquis.    We had a long discussion regarding venous insufficiency and indications for different treatment options, including surgical intervention. In the future, possibly could evaluate for GSV ablation, but as she is overall doing well and compensated, recommend continued conservative management.  Follow-up in 1 year, but discussed reasons why she should be seen sooner.        A1c  "8.1    Review of Systems   Cardiovascular: Negative.    Musculoskeletal:  Positive for arthralgias and gait problem.   Skin: Negative.        Objective   /84 (BP Location: Left arm, Patient Position: Sitting)   Pulse 74   Resp 18   Ht 5' 1\" (1.549 m)   Wt 99.8 kg (220 lb)   SpO2 98%   BMI 41.57 kg/m²      Legs look good. Very mild edema along the tibia. No ankle or pedal edema. No large vv. 2+ DP pulses.     Physical Exam  Vitals and nursing note reviewed.   Constitutional:       Appearance: She is well-developed. She is obese.   HENT:      Head: Normocephalic and atraumatic.   Eyes:      Pupils: Pupils are equal, round, and reactive to light.   Neck:      Vascular: No JVD.   Cardiovascular:      Rate and Rhythm: Normal rate and regular rhythm.      Pulses:           Radial pulses are 2+ on the right side and 2+ on the left side.        Dorsalis pedis pulses are 2+ on the right side and 2+ on the left side.      Heart sounds: Normal heart sounds, S1 normal and S2 normal. No murmur heard.     No friction rub. No gallop.      Comments:     Pulmonary:      Effort: Pulmonary effort is normal. No accessory muscle usage or respiratory distress.      Breath sounds: Normal breath sounds. No wheezing or rales.   Abdominal:      General: Bowel sounds are normal. There is no distension.      Palpations: Abdomen is soft.      Tenderness: There is no abdominal tenderness.   Musculoskeletal:         General: No deformity. Normal range of motion.      Cervical back: Neck supple.      Right lower leg: Edema present.      Left lower leg: Edema present.   Skin:     General: Skin is warm and dry.      Findings: No lesion or rash.      Nails: There is no clubbing.   Neurological:      Mental Status: She is alert and oriented to person, place, and time.      Comments: Grossly normal    Psychiatric:         Behavior: Behavior is cooperative.               I have reviewed and made appropriate changes to the review of systems " "input by the medical assistant.    Vitals:    25 0836   BP: 122/84   BP Location: Left arm   Patient Position: Sitting   Pulse: 74   Resp: 18   SpO2: 98%   Weight: 99.8 kg (220 lb)   Height: 5' 1\" (1.549 m)       Patient Active Problem List   Diagnosis    Type 2 diabetes mellitus with hyperglycemia, without long-term current use of insulin ([facility])    Class 2 severe obesity with serious comorbidity in adult ([facility])    Left retinal disorder    Primary hypertension    GERD (gastroesophageal reflux disease)    Moderate persistent asthma without complication    Obesity hypoventilation syndrome ([facility])    DEN (obstructive sleep apnea)    Osteoarthritis    Seasonal allergic rhinitis due to fungal spores    Edema of both lower extremities due to peripheral venous insufficiency    History of colon polyps    FH: total knee replacement    Bilateral carpal tunnel syndrome    Mixed hyperlipidemia       Past Surgical History:   Procedure Laterality Date     SECTION  10/16/1990    Fibroids    COLONOSCOPY      EYE SURGERY      MULTIPLE    HYSTERECTOMY  2000    JOINT REPLACEMENT  2016    right  knee    REPLACEMENT TOTAL KNEE Right        Family History   Problem Relation Age of Onset    Dementia Mother     Arthritis Mother         Late in life    Hypertension Father         HBP    Heart disease Father         HBP    Diabetes Father         managed through diet    Hearing loss Father         Industrial/construction work with no hearing protection    Prostate cancer Father     No Known Problems Daughter     No Known Problems Maternal Grandmother     No Known Problems Maternal Grandfather     Glaucoma Paternal Grandmother     Asthma Paternal Grandfather         Inhaler used    No Known Problems Maternal Aunt        Social History     Socioeconomic History    Marital status: /Civil Union     Spouse name: Not on file    Number of children: Not on file    Years of education: Not on file    Highest education level: Not on " file   Occupational History    Not on file   Tobacco Use    Smoking status: Never    Smokeless tobacco: Never   Vaping Use    Vaping status: Never Used   Substance and Sexual Activity    Alcohol use: Yes     Alcohol/week: 1.0 standard drink of alcohol     Types: 1 Standard drinks or equivalent per week     Comment: on occasion, 1 drink with low sugar content    Drug use: Never    Sexual activity: Not on file   Other Topics Concern    Not on file   Social History Narrative    Not on file     Social Drivers of Health     Financial Resource Strain: Low Risk  (9/17/2023)    Overall Financial Resource Strain (CARDIA)     Difficulty of Paying Living Expenses: Not hard at all   Food Insecurity: No Food Insecurity (11/25/2024)    Hunger Vital Sign     Worried About Running Out of Food in the Last Year: Never true     Ran Out of Food in the Last Year: Never true   Transportation Needs: No Transportation Needs (11/25/2024)    PRAPARE - Transportation     Lack of Transportation (Medical): No     Lack of Transportation (Non-Medical): No   Physical Activity: Not on file   Stress: Not on file   Social Connections: Not on file   Intimate Partner Violence: Not on file   Housing Stability: Low Risk  (11/25/2024)    Housing Stability Vital Sign     Unable to Pay for Housing in the Last Year: No     Number of Times Moved in the Last Year: 0     Homeless in the Last Year: No       Allergies   Allergen Reactions    Other Nausea Only, Vomiting and Cough     LOBSTER    Penicillins Seizures    Sulfa Antibiotics Rash         Current Outpatient Medications:     acetaminophen (Tylenol) 325 mg tablet, Take 650 mg by mouth every 6 (six) hours as needed for mild pain, Disp: , Rfl:     albuterol (PROVENTIL HFA,VENTOLIN HFA) 90 mcg/act inhaler, Inhale 2 puffs every 6 (six) hours as needed for wheezing, Disp: 8.5 g, Rfl: 2    Azelastine HCl 137 MCG/SPRAY SOLN, SPRAY 2 SPRAYS INTO EACH NOSTRIL 2 TIMES A DAY USE IN EACH NOSTRIL AS DIRECTED, Disp: 30  mL, Rfl: 3    bimatoprost (LUMIGAN) 0.01 % ophthalmic drops, , Disp: , Rfl:     diltiazem (CARDIZEM CD) 240 mg 24 hr capsule, Take 1 capsule (240 mg total) by mouth daily, Disp: 90 capsule, Rfl: 3    Dulaglutide (Trulicity) 4.5 MG/0.5ML SOAJ, Inject 4.5 mg under the skin once a week, Disp: 2 mL, Rfl: 5    Eliquis 2.5 MG, TAKE 1 TABLET(2.5 MG) BY MOUTH TWICE DAILY, Disp: 60 tablet, Rfl: 5    famotidine (PEPCID) 20 mg tablet, Take 20 mg by mouth, Disp: , Rfl:     fluticasone (FLONASE) 50 mcg/act nasal spray, 1 spray into each nostril daily, Disp: 9.9 mL, Rfl: 0    Fluticasone-Salmeterol (Advair Diskus) 250-50 mcg/dose inhaler, Inhale 1 puff 2 (two) times a day Rinse mouth after use., Disp: 180 blister, Rfl: 1    loratadine (CLARITIN) 10 mg tablet, Take 10 mg by mouth daily, Disp: , Rfl:     montelukast (SINGULAIR) 10 mg tablet, TAKE 1 TABLET(10 MG) BY MOUTH DAILY AT BEDTIME, Disp: 30 tablet, Rfl: 5    omeprazole (PriLOSEC) 40 MG capsule, Take 1 capsule (40 mg total) by mouth daily, Disp: 90 capsule, Rfl: 2    predniSONE 10 mg tablet, 6 Tabs day 1, 5 tabs day 2, 4 tabs day 3, 3 tabs day 4, 2 tabs day 5, 1 tab day 6, Disp: 21 tablet, Rfl: 0    rosuvastatin (CRESTOR) 20 MG tablet, Take 1 tablet (20 mg total) by mouth daily, Disp: 100 tablet, Rfl: 3    Timolol Maleate PF (Timolol Maleate Ocudose) 0.5 % SOLN, , Disp: , Rfl:     benzonatate (TESSALON PERLES) 100 mg capsule, Take 1 capsule (100 mg total) by mouth 3 (three) times a day as needed for cough (Patient not taking: Reported on 3/3/2025), Disp: 20 capsule, Rfl: 0    Dextromethorphan-GG-APAP (Coricidin HBP Cold/Cough/Flu) -325 MG/15ML LIQD, Take 30 mL by mouth 3 (three) times a day as needed (cough, congestion) (Patient not taking: Reported on 3/3/2025), Disp: 355 mL, Rfl: 0    guaiFENesin (MUCINEX) 600 mg 12 hr tablet, Take 2 tablets (1,200 mg total) by mouth every 12 (twelve) hours (Patient not taking: Reported on 3/3/2025), Disp: 30 tablet, Rfl: 0     metFORMIN (GLUCOPHAGE) 500 mg tablet, 1 tablet each morning and 2 tablets each evening, Disp: 270 tablet, Rfl: 3

## 2025-03-04 ENCOUNTER — APPOINTMENT (OUTPATIENT)
Dept: LAB | Facility: CLINIC | Age: 70
End: 2025-03-04
Payer: MEDICARE

## 2025-03-04 LAB
ALBUMIN SERPL BCG-MCNC: 3.9 G/DL (ref 3.5–5)
ALP SERPL-CCNC: 105 U/L (ref 34–104)
ALT SERPL W P-5'-P-CCNC: 14 U/L (ref 7–52)
ANION GAP SERPL CALCULATED.3IONS-SCNC: 4 MMOL/L (ref 4–13)
AST SERPL W P-5'-P-CCNC: 15 U/L (ref 13–39)
BASOPHILS # BLD AUTO: 0.06 THOUSANDS/ÂΜL (ref 0–0.1)
BASOPHILS NFR BLD AUTO: 1 % (ref 0–1)
BILIRUB SERPL-MCNC: 0.85 MG/DL (ref 0.2–1)
BUN SERPL-MCNC: 14 MG/DL (ref 5–25)
CALCIUM SERPL-MCNC: 9.2 MG/DL (ref 8.4–10.2)
CHLORIDE SERPL-SCNC: 105 MMOL/L (ref 96–108)
CHOLEST SERPL-MCNC: 159 MG/DL (ref ?–200)
CO2 SERPL-SCNC: 31 MMOL/L (ref 21–32)
CREAT SERPL-MCNC: 0.72 MG/DL (ref 0.6–1.3)
EOSINOPHIL # BLD AUTO: 0.19 THOUSAND/ÂΜL (ref 0–0.61)
EOSINOPHIL NFR BLD AUTO: 3 % (ref 0–6)
ERYTHROCYTE [DISTWIDTH] IN BLOOD BY AUTOMATED COUNT: 12.3 % (ref 11.6–15.1)
EST. AVERAGE GLUCOSE BLD GHB EST-MCNC: 163 MG/DL
GFR SERPL CREATININE-BSD FRML MDRD: 86 ML/MIN/1.73SQ M
GLUCOSE P FAST SERPL-MCNC: 138 MG/DL (ref 65–99)
HBA1C MFR BLD: 7.3 %
HCT VFR BLD AUTO: 42 % (ref 34.8–46.1)
HDLC SERPL-MCNC: 41 MG/DL
HGB BLD-MCNC: 13.8 G/DL (ref 11.5–15.4)
IMM GRANULOCYTES # BLD AUTO: 0.03 THOUSAND/UL (ref 0–0.2)
IMM GRANULOCYTES NFR BLD AUTO: 0 % (ref 0–2)
LDLC SERPL CALC-MCNC: 90 MG/DL (ref 0–100)
LYMPHOCYTES # BLD AUTO: 1.85 THOUSANDS/ÂΜL (ref 0.6–4.47)
LYMPHOCYTES NFR BLD AUTO: 24 % (ref 14–44)
MCH RBC QN AUTO: 28.5 PG (ref 26.8–34.3)
MCHC RBC AUTO-ENTMCNC: 32.9 G/DL (ref 31.4–37.4)
MCV RBC AUTO: 87 FL (ref 82–98)
MONOCYTES # BLD AUTO: 0.77 THOUSAND/ÂΜL (ref 0.17–1.22)
MONOCYTES NFR BLD AUTO: 10 % (ref 4–12)
NEUTROPHILS # BLD AUTO: 4.7 THOUSANDS/ÂΜL (ref 1.85–7.62)
NEUTS SEG NFR BLD AUTO: 62 % (ref 43–75)
NONHDLC SERPL-MCNC: 118 MG/DL
NRBC BLD AUTO-RTO: 0 /100 WBCS
PLATELET # BLD AUTO: 233 THOUSANDS/UL (ref 149–390)
PMV BLD AUTO: 10.6 FL (ref 8.9–12.7)
POTASSIUM SERPL-SCNC: 4.3 MMOL/L (ref 3.5–5.3)
PROT SERPL-MCNC: 6.5 G/DL (ref 6.4–8.4)
RBC # BLD AUTO: 4.84 MILLION/UL (ref 3.81–5.12)
SODIUM SERPL-SCNC: 140 MMOL/L (ref 135–147)
TRIGL SERPL-MCNC: 142 MG/DL (ref ?–150)
WBC # BLD AUTO: 7.6 THOUSAND/UL (ref 4.31–10.16)

## 2025-03-10 ENCOUNTER — OFFICE VISIT (OUTPATIENT)
Age: 70
End: 2025-03-10
Payer: MEDICARE

## 2025-03-10 VITALS
WEIGHT: 222 LBS | OXYGEN SATURATION: 98 % | DIASTOLIC BLOOD PRESSURE: 86 MMHG | BODY MASS INDEX: 41.95 KG/M2 | HEART RATE: 88 BPM | SYSTOLIC BLOOD PRESSURE: 128 MMHG

## 2025-03-10 DIAGNOSIS — E66.812 CLASS 2 SEVERE OBESITY WITH SERIOUS COMORBIDITY AND BODY MASS INDEX (BMI) OF 35.0 TO 35.9 IN ADULT, UNSPECIFIED OBESITY TYPE (HCC): ICD-10-CM

## 2025-03-10 DIAGNOSIS — M81.8 OTHER OSTEOPOROSIS WITHOUT CURRENT PATHOLOGICAL FRACTURE: ICD-10-CM

## 2025-03-10 DIAGNOSIS — E78.2 MIXED HYPERLIPIDEMIA: ICD-10-CM

## 2025-03-10 DIAGNOSIS — E11.65 TYPE 2 DIABETES MELLITUS WITH HYPERGLYCEMIA, WITHOUT LONG-TERM CURRENT USE OF INSULIN (HCC): Primary | ICD-10-CM

## 2025-03-10 DIAGNOSIS — E66.01 CLASS 2 SEVERE OBESITY WITH SERIOUS COMORBIDITY AND BODY MASS INDEX (BMI) OF 35.0 TO 35.9 IN ADULT, UNSPECIFIED OBESITY TYPE (HCC): ICD-10-CM

## 2025-03-10 DIAGNOSIS — I10 PRIMARY HYPERTENSION: ICD-10-CM

## 2025-03-10 DIAGNOSIS — B37.0 ORAL THRUSH: ICD-10-CM

## 2025-03-10 PROCEDURE — G2211 COMPLEX E/M VISIT ADD ON: HCPCS | Performed by: INTERNAL MEDICINE

## 2025-03-10 PROCEDURE — 99214 OFFICE O/P EST MOD 30 MIN: CPT | Performed by: INTERNAL MEDICINE

## 2025-03-10 RX ORDER — TIMOLOL MALEATE 5 MG/ML
1 SOLUTION/ DROPS OPHTHALMIC 2 TIMES DAILY
COMMUNITY
Start: 2025-03-01

## 2025-03-10 RX ORDER — NYSTATIN 100000 [USP'U]/ML
500000 SUSPENSION ORAL 4 TIMES DAILY
Qty: 60 ML | Refills: 1 | Status: SHIPPED | OUTPATIENT
Start: 2025-03-10

## 2025-03-10 NOTE — PROGRESS NOTES
INTERNAL MEDICINE OFFICE VISIT       NAME: Coretta Hines  AGE: 69 y.o. SEX: female       : 1955        MRN: [id number]    DATE: 3/10/2025  TIME: 10:21 AM    Assessment and Plan   1. Type 2 diabetes mellitus with hyperglycemia, without long-term current use of insulin (HCC) (Primary)  Diabetic control continues to improve.  This is due to better compliance with medication.  Continue current treatment, and we discussed goal hemoglobin A1c of less than 7.0 by next visit in 3 months.  - Basic metabolic panel  - Hemoglobin A1C    2. Primary hypertension  Controlled and continue current treatment.  - Basic metabolic panel    3. Mixed hyperlipidemia  LDL has decreased significantly, currently at 90, with goal less than 70.  Continue improved compliance with medication and recheck labs in 3 months.  - Lipid panel    4. Other osteoporosis without current pathological fracture  We reviewed the results of her DEXA scan of  showing osteoporosis and I did recommend referral for treatment.  - Ambulatory Referral to Rheumatology; Future    5. Class 2 severe obesity with serious comorbidity and body mass index (BMI) of 35.0 to 35.9 in adult, unspecified obesity type (HCC)  Coretta is going to restart the [employer] weight loss program.  She lost 55 pounds on this program in the past.  She is also motivated to lose weight so as to improve functional status and improve hemoglobin A1c ahead of planned left knee replacement.    6. Oral thrush  Occasional thrush occurs with use of Advair discus despite rinsing.  This was renewed.  - nystatin (MYCOSTATIN) 500,000 units/5 mL suspension; Apply 5 mL (500,000 Units total) to the mouth or throat 4 (four) times a day  Dispense: 60 mL; Refill: 1      Follow-up in 3 months with lab work ordered today to be done just prior to this.    Chief Complaint   Follow-up    History of Present Illness   Coretta Hines is a 69 y.o.-year-old female who returns for follow-up visit.  Coretta is  adjusted to [employer].  She and her  are spending time volunteering at [employer].    We discussed recent lab work, medical conditions as above.    Review of Systems   Review of Systems   Constitutional:  Negative for unexpected weight change.   Gastrointestinal:         Occasional breakthrough heartburn on full medication.    Longstanding constipation is unchanged.  Plan is to increase fluid intake to 64 ounces per day and restart Metamucil.   Musculoskeletal:  Positive for arthralgias and gait problem.       Active Problem List     Patient Active Problem List   Diagnosis    Type 2 diabetes mellitus with hyperglycemia, without long-term current use of insulin ([facility])    Class 2 severe obesity with serious comorbidity in adult ([facility])    Left retinal disorder    Primary hypertension    GERD (gastroesophageal reflux disease)    Moderate persistent asthma without complication    Obesity hypoventilation syndrome ([facility])    DEN (obstructive sleep apnea)    Osteoarthritis    Seasonal allergic rhinitis due to fungal spores    Edema of both lower extremities due to peripheral venous insufficiency    History of colon polyps    Bilateral carpal tunnel syndrome    Mixed hyperlipidemia    Other osteoporosis without current pathological fracture       The following portions of the patient's history were reviewed and updated as appropriate: allergies, current medications, past family history, past medical history, past social history, past surgical history, and problem list.    Objective     Vitals:    03/10/25 0947   BP: 128/86   Pulse: 88   SpO2: 98%     Wt Readings from Last 3 Encounters:   03/10/25 101 kg (222 lb)   03/03/25 99.8 kg (220 lb)   01/13/25 98.9 kg (218 lb)       Physical Exam  Cardiovascular:      Pulses: no weak pulses.           Dorsalis pedis pulses are 2+ on the right side and 2+ on the left side.        Posterior tibial pulses are 2+ on the right side and 2+ on the left side.   Feet:      Right  foot:      Skin integrity: No ulcer, skin breakdown, erythema, warmth, callus or dry skin.      Left foot:      Skin integrity: No ulcer, skin breakdown, erythema, warmth, callus or dry skin.         Vital signs stable, alert and oriented no distress.  In good spirits.  Pulse regular.  No JVD.  Carotid upstroke consent normal without bruit.  Lungs clear including no wheezing.  Cardiac: Regular rate and rhythm, normal S1 and S2, no S3, no murmur or rub.  Abdomen: Nondistended with normal bowel sounds and nontender.  Extremities: Circulation intact.  No edema.    Patient's shoes and socks removed.    Right Foot/Ankle   Right Foot Inspection  Skin Exam: skin normal and skin intact. No dry skin, no warmth, no callus, no erythema, no maceration, no abnormal color, no pre-ulcer, no ulcer and no callus.     Toe Exam: ROM and strength within normal limits.     Sensory   Monofilament testing: intact    Vascular  Capillary refills: < 3 seconds  The right DP pulse is 2+. The right PT pulse is 2+.     Left Foot/Ankle  Left Foot Inspection  Skin Exam: skin normal and skin intact. No dry skin, no warmth, no erythema, no maceration, normal color, no pre-ulcer, no ulcer and no callus.     Toe Exam: ROM and strength within normal limits.     Sensory   Monofilament testing: intact    Vascular  Capillary refills: < 3 seconds  The left DP pulse is 2+. The left PT pulse is 2+.     Assign Risk Category  No deformity present  No loss of protective sensation  No weak pulses  Risk: 0      Pertinent Laboratory/Diagnostic Studies:  I have reviewed pertinent labs:  CBC:   Lab Results   Component Value Date    WBC 7.60 03/04/2025    RBC 4.84 03/04/2025    HGB 13.8 03/04/2025    HCT 42.0 03/04/2025    MCV 87 03/04/2025     03/04/2025    MCH 28.5 03/04/2025    MCHC 32.9 03/04/2025    RDW 12.3 03/04/2025    MPV 10.6 03/04/2025    NEUTROABS 4.70 03/04/2025     CMP:   Lab Results   Component Value Date    SODIUM 140 03/04/2025    K 4.3  03/04/2025     03/04/2025    CO2 31 03/04/2025    AGAP 4 03/04/2025    BUN 14 03/04/2025    CREATININE 0.72 03/04/2025    GLUC 128 05/03/2023    GLUF 138 (H) 03/04/2025    CALCIUM 9.2 03/04/2025    AST 15 03/04/2025    ALT 14 03/04/2025    ALKPHOS 105 (H) 03/04/2025    TP 6.5 03/04/2025    ALB 3.9 03/04/2025    TBILI 0.85 03/04/2025    EGFR 86 03/04/2025     Lipid Profile:   Lab Results   Component Value Date    CHOLESTEROL 159 03/04/2025    HDL 41 (L) 03/04/2025    TRIG 142 03/04/2025    LDLCALC 90 03/04/2025    NONHDLC 118 03/04/2025     Hemoglobin A1C/EST AVG Glucose   Lab Results   Component Value Date    HGBA1C 7.3 (H) 03/04/2025     03/04/2025         Orders Placed This Encounter   Procedures    Basic metabolic panel    Lipid panel    Hemoglobin A1C    Ambulatory Referral to Rheumatology       ALLERGIES:  Allergies   Allergen Reactions    Other Nausea Only, Vomiting and Cough     LOBSTER    Penicillins Seizures    Sulfa Antibiotics Rash       Current Medications     Current Outpatient Medications   Medication Sig Dispense Refill    acetaminophen (Tylenol) 325 mg tablet Take 650 mg by mouth every 6 (six) hours as needed for mild pain      albuterol (PROVENTIL HFA,VENTOLIN HFA) 90 mcg/act inhaler Inhale 2 puffs every 6 (six) hours as needed for wheezing 8.5 g 2    Azelastine HCl 137 MCG/SPRAY SOLN SPRAY 2 SPRAYS INTO EACH NOSTRIL 2 TIMES A DAY USE IN EACH NOSTRIL AS DIRECTED 30 mL 3    bimatoprost (LUMIGAN) 0.01 % ophthalmic drops       diltiazem (CARDIZEM CD) 240 mg 24 hr capsule Take 1 capsule (240 mg total) by mouth daily 90 capsule 3    Dulaglutide (Trulicity) 4.5 MG/0.5ML SOAJ Inject 4.5 mg under the skin once a week 2 mL 5    Eliquis 2.5 MG TAKE 1 TABLET(2.5 MG) BY MOUTH TWICE DAILY 60 tablet 5    famotidine (PEPCID) 20 mg tablet Take 20 mg by mouth      fluticasone (FLONASE) 50 mcg/act nasal spray 1 spray into each nostril daily 9.9 mL 0    Fluticasone-Salmeterol (Advair Diskus) 250-50  mcg/dose inhaler Inhale 1 puff 2 (two) times a day Rinse mouth after use. 180 blister 1    loratadine (CLARITIN) 10 mg tablet Take 10 mg by mouth daily      metFORMIN (GLUCOPHAGE) 500 mg tablet 1 tablet each morning and 2 tablets each evening 270 tablet 3    nystatin (MYCOSTATIN) 500,000 units/5 mL suspension Apply 5 mL (500,000 Units total) to the mouth or throat 4 (four) times a day 60 mL 1    omeprazole (PriLOSEC) 40 MG capsule Take 1 capsule (40 mg total) by mouth daily 90 capsule 2    predniSONE 10 mg tablet 6 Tabs day 1, 5 tabs day 2, 4 tabs day 3, 3 tabs day 4, 2 tabs day 5, 1 tab day 6 21 tablet 0    rosuvastatin (CRESTOR) 20 MG tablet Take 1 tablet (20 mg total) by mouth daily 100 tablet 3    timolol (TIMOPTIC) 0.5 % ophthalmic solution Administer 1 drop into the left eye 2 (two) times a day       No current facility-administered medications for this visit.         Health Maintenance        Sunday Robbins MD

## 2025-03-18 ENCOUNTER — OFFICE VISIT (OUTPATIENT)
Dept: CARDIOLOGY CLINIC | Facility: CLINIC | Age: 70
End: 2025-03-18
Payer: MEDICARE

## 2025-03-18 VITALS
BODY MASS INDEX: 41.72 KG/M2 | SYSTOLIC BLOOD PRESSURE: 126 MMHG | WEIGHT: 221 LBS | HEART RATE: 69 BPM | OXYGEN SATURATION: 98 % | HEIGHT: 61 IN | DIASTOLIC BLOOD PRESSURE: 78 MMHG | TEMPERATURE: 97.5 F

## 2025-03-18 DIAGNOSIS — I87.2 EDEMA OF BOTH LOWER EXTREMITIES DUE TO PERIPHERAL VENOUS INSUFFICIENCY: ICD-10-CM

## 2025-03-18 DIAGNOSIS — E78.2 MIXED HYPERLIPIDEMIA: ICD-10-CM

## 2025-03-18 DIAGNOSIS — R06.09 DOE (DYSPNEA ON EXERTION): ICD-10-CM

## 2025-03-18 DIAGNOSIS — I10 PRIMARY HYPERTENSION: ICD-10-CM

## 2025-03-18 PROCEDURE — 99214 OFFICE O/P EST MOD 30 MIN: CPT

## 2025-03-18 PROCEDURE — 93000 ELECTROCARDIOGRAM COMPLETE: CPT

## 2025-03-18 RX ORDER — CYCLOSPORINE 0.5 MG/ML
EMULSION OPHTHALMIC
COMMUNITY
Start: 2025-03-17

## 2025-03-18 NOTE — PROGRESS NOTES
Coretta Hines  1955  [id number]  [facility] [address] [phone]    1. Primary hypertension  POCT ECG      2. Edema of both lower extremities due to peripheral venous insufficiency        3. Mixed hyperlipidemia        4. ARNOLD (dyspnea on exertion)            Summary/Discussion:  Bilateral lower extremity edema/ARNOLD:  - noted during last office visit with her primary cardiologist in which a echocardiogram and NM rx stress test was recommended. Not yet completed  echocardiogram scheduled for 4/8/2025  encouraged her to schedule her stress test today after our visit  - echo (8/2021) through [facility]: normal LV, preserved EF normal RV, no significant valvular disease  - she reports improvement in her lower lower extremity edema with use of compression stockings. Follows with vascular surgery for her venous/peripheral insufficiency   - she continues to have intermittent shortness of breath on exertion which generally happens when she is exerting herself above her baseline.  She reports associated symptoms of intermittent mid chest wall pressure which she believes is musculoskeletal  EKG today demonstrating sinus rhythm first-degree AV block without any acute ischemic changes  plan for NM rx stress test as noted above    Murmur:  - suspected to be likely flow murmur   - follow up on echo     Hypertension:  - 126/78  - continue present medication regimen  - lifestyle modification   - close blood pressure monitoring     Dyslipidemia:  - Lipid Profile:    Latest Reference Range & Units 03/04/25 08:47   Cholesterol See Comment mg/dL 159   Triglycerides See Comment mg/dL 142   HDL >=50 mg/dL 41 (L)   Non-HDL Cholesterol mg/dl 118   LDL Calculated 0 - 100 mg/dL 90   - continue rosuvastatin 20 mg daily   - encouraged low cholesterol, mediterranean diet, and annual lipid follow up      Interval History: Coretta Hines is a 69 y.o. year old female  with history mentioned in problem list who presents to the office today for routine follow up.     Since her last office visit she reports overall feeling well from a cardiac standpoint. Her lower extremity edema has improved with the use of compression stockings.  She does report ongoing symptoms of intermittent shortness of breath on exertion which generally happens when she is exerting herself above her baseline.  She reports associated symptoms of intermittent mid chest wall pressure which she believes is musculoskeletal. She denies owrsening lower extremity edema, orthopnea, and PND. She denies lightheadedness, dizziness, and syncope. She denies palpitations.          She will RTO in 1 year with Dr. Ramírez or sooner if necessary. She will call with any concerns.       Medical Problems       Problem List       Type 2 diabetes mellitus with hyperglycemia, without long-term current use of insulin ([facility])      Lab Results   Component Value Date    HGBA1C 7.3 (H) 03/04/2025         Class 2 severe obesity with serious comorbidity in adult ([facility])    Left retinal disorder    Primary hypertension    GERD (gastroesophageal reflux disease)    Moderate persistent asthma without complication    Obesity hypoventilation syndrome (HCC)    DEN (obstructive sleep apnea)    Overview Signed 5/31/2023  6:41 AM by Sunday Robbins MD   Formatting of this note might be different from the original.  Formatting of this note might be different from the original.  Has not uses CPAP for a long time  Formatting of this note might be different from the original.  Has not uses CPAP for a long time         Osteoarthritis    Seasonal allergic rhinitis due to fungal spores    Edema of both lower extremities due to peripheral venous insufficiency    History of colon polyps    Bilateral carpal tunnel syndrome    Mixed hyperlipidemia    Other osteoporosis without current pathological fracture        Past Medical History:   Diagnosis Date    Allergic      Allergic rhinitis Decades    Arthritis     Asthma     Blindness of left eye     COPD (chronic obstructive pulmonary disease) (HCC)     Deep vein thrombosis (HCC) March 3, 2023    cataract surgeries, detached retina surgeries leftveye    Diabetes mellitus (HCC)     GERD (gastroesophageal reflux disease)     Hypertension     Murmur, heart     Obesity     Visual impairment      Social History     Socioeconomic History    Marital status: /Civil Union     Spouse name: Not on file    Number of children: Not on file    Years of education: Not on file    Highest education level: Not on file   Occupational History    Not on file   Tobacco Use    Smoking status: Never    Smokeless tobacco: Never   Vaping Use    Vaping status: Never Used   Substance and Sexual Activity    Alcohol use: Yes     Alcohol/week: 1.0 standard drink of alcohol     Types: 1 Standard drinks or equivalent per week     Comment: on occasion, 1 drink with low sugar content    Drug use: Never    Sexual activity: Not on file   Other Topics Concern    Not on file   Social History Narrative    Not on file     Social Drivers of Health     Financial Resource Strain: Low Risk  (9/17/2023)    Overall Financial Resource Strain (CARDIA)     Difficulty of Paying Living Expenses: Not hard at all   Food Insecurity: No Food Insecurity (11/25/2024)    Hunger Vital Sign     Worried About Running Out of Food in the Last Year: Never true     Ran Out of Food in the Last Year: Never true   Transportation Needs: No Transportation Needs (11/25/2024)    PRAPARE - Transportation     Lack of Transportation (Medical): No     Lack of Transportation (Non-Medical): No   Physical Activity: Not on file   Stress: Not on file   Social Connections: Not on file   Intimate Partner Violence: Not on file   Housing Stability: Low Risk  (11/25/2024)    Housing Stability Vital Sign     Unable to Pay for Housing in the Last Year: No     Number of Times Moved in the Last Year: 0     Homeless  in the Last Year: No      Family History   Problem Relation Age of Onset    Dementia Mother     Arthritis Mother         Late in life    Hypertension Father         HBP    Heart disease Father         HBP    Diabetes Father         managed through diet    Hearing loss Father         Industrial/construction work with no hearing protection    Prostate cancer Father     No Known Problems Daughter     No Known Problems Maternal Grandmother     No Known Problems Maternal Grandfather     Glaucoma Paternal Grandmother     Asthma Paternal Grandfather         Inhaler used    No Known Problems Maternal Aunt      Past Surgical History:   Procedure Laterality Date     SECTION  10/16/1990    Fibroids    COLONOSCOPY      EYE SURGERY      MULTIPLE    HYSTERECTOMY      JOINT REPLACEMENT  2016    right  knee    REPLACEMENT TOTAL KNEE Right        Current Outpatient Medications:     acetaminophen (Tylenol) 325 mg tablet, Take 650 mg by mouth every 6 (six) hours as needed for mild pain, Disp: , Rfl:     albuterol (PROVENTIL HFA,VENTOLIN HFA) 90 mcg/act inhaler, Inhale 2 puffs every 6 (six) hours as needed for wheezing, Disp: 8.5 g, Rfl: 2    Azelastine HCl 137 MCG/SPRAY SOLN, SPRAY 2 SPRAYS INTO EACH NOSTRIL 2 TIMES A DAY USE IN EACH NOSTRIL AS DIRECTED, Disp: 30 mL, Rfl: 3    bimatoprost (LUMIGAN) 0.01 % ophthalmic drops, , Disp: , Rfl:     cycloSPORINE (RESTASIS) 0.05 % ophthalmic emulsion, , Disp: , Rfl:     diltiazem (CARDIZEM CD) 240 mg 24 hr capsule, Take 1 capsule (240 mg total) by mouth daily, Disp: 90 capsule, Rfl: 3    Dulaglutide (Trulicity) 4.5 MG/0.5ML SOAJ, Inject 4.5 mg under the skin once a week, Disp: 2 mL, Rfl: 5    Eliquis 2.5 MG, TAKE 1 TABLET(2.5 MG) BY MOUTH TWICE DAILY, Disp: 60 tablet, Rfl: 5    famotidine (PEPCID) 20 mg tablet, Take 20 mg by mouth, Disp: , Rfl:     fluticasone (FLONASE) 50 mcg/act nasal spray, 1 spray into each nostril daily, Disp: 9.9 mL, Rfl: 0    Fluticasone-Salmeterol (Advair  "Diskus) 250-50 mcg/dose inhaler, Inhale 1 puff 2 (two) times a day Rinse mouth after use., Disp: 180 blister, Rfl: 1    loratadine (CLARITIN) 10 mg tablet, Take 10 mg by mouth PRN, Disp: , Rfl:     metFORMIN (GLUCOPHAGE) 500 mg tablet, 1 tablet each morning and 2 tablets each evening, Disp: 270 tablet, Rfl: 3    nystatin (MYCOSTATIN) 500,000 units/5 mL suspension, Apply 5 mL (500,000 Units total) to the mouth or throat 4 (four) times a day (Patient taking differently: Apply 500,000 Units to the mouth or throat 4 (four) times a day PRN), Disp: 60 mL, Rfl: 1    rosuvastatin (CRESTOR) 20 MG tablet, Take 1 tablet (20 mg total) by mouth daily, Disp: 100 tablet, Rfl: 3    timolol (TIMOPTIC) 0.5 % ophthalmic solution, Administer 1 drop into the left eye 2 (two) times a day, Disp: , Rfl:     omeprazole (PriLOSEC) 40 MG capsule, Take 1 capsule (40 mg total) by mouth daily (Patient not taking: Reported on 3/18/2025), Disp: 90 capsule, Rfl: 2    predniSONE 10 mg tablet, 6 Tabs day 1, 5 tabs day 2, 4 tabs day 3, 3 tabs day 4, 2 tabs day 5, 1 tab day 6 (Patient not taking: Reported on 3/18/2025), Disp: 21 tablet, Rfl: 0  Allergies   Allergen Reactions    Other Nausea Only, Vomiting and Cough     LOBSTER    Penicillins Seizures    Sulfa Antibiotics Rash       Labs:     Chemistry        Component Value Date/Time    K 4.3 03/04/2025 0847    K 3.8 03/30/2023 1428     03/04/2025 0847     03/30/2023 1428    CO2 31 03/04/2025 0847    CO2 29 03/30/2023 1428    BUN 14 03/04/2025 0847    BUN 15 03/30/2023 1428    CREATININE 0.72 03/04/2025 0847    CREATININE 0.78 03/30/2023 1428        Component Value Date/Time    CALCIUM 9.2 03/04/2025 0847    CALCIUM 9.0 03/30/2023 1428    ALKPHOS 105 (H) 03/04/2025 0847    ALKPHOS 102 03/30/2023 1428    AST 15 03/04/2025 0847    AST 15 03/30/2023 1428    ALT 14 03/04/2025 0847    ALT 14 03/30/2023 1428            No results found for: \"CHOL\"  Lab Results   Component Value Date    HDL 41 (L) " "03/04/2025    HDL 41 (L) 11/19/2024    HDL 39 (L) 07/23/2024     Lab Results   Component Value Date    LDLCALC 90 03/04/2025    LDLCALC 140 (H) 11/19/2024    LDLCALC 128 (H) 07/23/2024     Lab Results   Component Value Date    TRIG 142 03/04/2025    TRIG 224 (H) 11/19/2024    TRIG 256 (H) 07/23/2024     No results found for: \"CHOLHDL\"    Imaging: No results found.    ECG: Sinus rhythm first-degree AV block    Review of Systems   Cardiovascular:  Positive for chest pain (pressure/intermittent), dyspnea on exertion (intermittent/stable) and leg swelling (improved).   Musculoskeletal:  Positive for muscle weakness.   All other systems reviewed and are negative.      Vitals:    03/18/25 0935   BP: 126/78   Pulse: 69   Temp: 97.5 °F (36.4 °C)   SpO2: 98%     Vitals:    03/18/25 0935   Weight: 100 kg (221 lb)     Height: 5' 1\" (154.9 cm)   Body mass index is 41.76 kg/m².    Physical Exam  Vitals and nursing note reviewed.   Constitutional:       General: She is not in acute distress.     Appearance: Normal appearance. She is not ill-appearing.   HENT:      Head: Normocephalic.      Nose: Nose normal.      Mouth/Throat:      Mouth: Mucous membranes are moist.      Pharynx: Oropharynx is clear.   Cardiovascular:      Rate and Rhythm: Normal rate and regular rhythm.      Pulses: Normal pulses.      Heart sounds: Murmur heard.   Pulmonary:      Breath sounds: Decreased breath sounds present.   Musculoskeletal:         General: Normal range of motion.      Cervical back: Normal range of motion.      Right lower leg: No edema.      Left lower leg: No edema.   Skin:     General: Skin is warm and dry.   Neurological:      Mental Status: She is alert and oriented to person, place, and time.      Motor: Weakness present.   Psychiatric:         Mood and Affect: Mood normal.         Behavior: Behavior normal.        "

## 2025-03-20 ENCOUNTER — OFFICE VISIT (OUTPATIENT)
Dept: PODIATRY | Facility: CLINIC | Age: 70
End: 2025-03-20
Payer: MEDICARE

## 2025-03-20 DIAGNOSIS — L84 CALLUS: ICD-10-CM

## 2025-03-20 DIAGNOSIS — E11.9 CONTROLLED TYPE 2 DIABETES MELLITUS WITHOUT COMPLICATION, WITHOUT LONG-TERM CURRENT USE OF INSULIN (HCC): Primary | ICD-10-CM

## 2025-03-20 PROCEDURE — RECHECK: Performed by: PODIATRIST

## 2025-03-20 PROCEDURE — 11055 PARING/CUTG B9 HYPRKER LES 1: CPT | Performed by: PODIATRIST

## 2025-03-20 PROCEDURE — 11719 TRIM NAIL(S) ANY NUMBER: CPT | Performed by: PODIATRIST

## 2025-03-20 NOTE — PROGRESS NOTES
Established patient with class findings presents for nail and lesion care.  Vascular exam:  DP  0/4 bilateral; PT  0 4 bilateral   Dermatological exam:  Each toenail is thick and  dystrophic.  Pinch callus right hallux  Diagnosis:  Diabetes mellitus; pinch callus right hallux  Treatment: Trimmed multiple dystrophic toenails.  Trimmed pinch callus right hallux.  Bacitracin to left fourth nail due to bleeding.    Nail removal    Date/Time: 3/20/2025 8:00 AM    Performed by: Antelmo Robin DPM  Authorized by: Antelmo Robin DPM    Nails trimmed:     Number of nails trimmed:  10  Lesion Destruction    Date/Time: 3/20/2025 8:00 AM    Performed by: Antelmo Robin DPM  Authorized by: Antelmo Robin DPM    Procedure Details - Lesion Destruction:     Number of Lesions:  1  Lesion 1:     Body area:  Lower extremity    Lower extremity location:  R big toe    Malignancy: benign hyperkeratotic lesion      Destruction method: scissors used for extraction

## 2025-03-31 ENCOUNTER — TELEPHONE (OUTPATIENT)
Age: 70
End: 2025-03-31

## 2025-03-31 NOTE — TELEPHONE ENCOUNTER
"Patient is scheduled for dental cleaning in [place] this afternoon at 3pm, must leave by 12pm. Patient taking eliquis and wondering if she should have stopped this for a few days prior to cleaning? Checked with dentist and they referred to PCP. Patient admits her cleanings tend to be a little \"messy\". Please advise if patient will need to reschedule appointment, thank you  "

## 2025-03-31 NOTE — TELEPHONE ENCOUNTER
Per our conversation I advised emmanuel you went through her chart and said she doesn't need the Clindamycin for dental prophylaxis.  She mentioned she had knee surgery and the specialist recommended it.  I advised her if she had endocardit

## 2025-04-02 ENCOUNTER — TELEPHONE (OUTPATIENT)
Age: 70
End: 2025-04-02

## 2025-04-02 NOTE — TELEPHONE ENCOUNTER
Received call from patient regarding a nuclear stress test she has coming up. She wants to make sure this is a safe test to have done with regard to her venous insufficiency. Please advise.

## 2025-04-03 NOTE — PROGRESS NOTES
Name: Coretta Hines      : 1955      MRN: [id number]  Encounter Provider: Moise Esteban DO  Encounter Date: 2025   Encounter department: [facility]  :  Assessment & Plan  Age-related osteoporosis without current pathological fracture    69 year old post-menopausal female referred to rhematology for osteoporosis of left femoral neck and osteopenia L-spine and hip; no fractures.   2025 DEXA scan T-score -3.8 of left femoral neck.    Plan  Check vitamin D; most recent calcium 9.2  Start bisphosphonate  Encourage weight bearing activity       Osteopenia of multiple sites  Osteopenia of lumbar spine and left-hip  2025 DEXA scan T-score lumbar spine -1.5 and L hip -1.7.     See plan above.    Pertinent Medical History   Osteoporosis, obesity, T2DM, post-menopausal         History of Present Illness {?Quick Links Encounters * My Last Note * Last Note in Specialty * Snapshot * Since Last Visit * History :60515}  Coretta Hines is a 69 y.o. female who presents to rheumatology as a new patient to establish care for osteoporosis.        Review of Systems  {Select to insert medical history sections (Optional):95123}      Objective {?Quick Links Trend Vitals * Enter New Vitals * Results Review * Timeline (Adult) * Labs * Imaging * Cardiology * Procedures * Lung Cancer Screening * Surgical eConsent :02129}  There were no vitals taken for this visit.    Physical Exam    Recent labs:  Lab Results   Component Value Date/Time    SODIUM 140 2025 08:47 AM    SODIUM 139 2023 02:28 PM    K 4.3 2025 08:47 AM    K 3.8 2023 02:28 PM    BUN 14 2025 08:47 AM    BUN 15 2023 02:28 PM    CREATININE 0.72 2025 08:47 AM    CREATININE 0.78 2023 02:28 PM    GLUC 128 2023 01:03 PM    GLUC 123 (H) 2023 02:28 PM    CALCIUM 9.2 2025 08:47 AM    CALCIUM 9.0 2023 02:28 PM    AST 15 2025 08:47 AM    AST 15 2023 02:28 PM     "ALT 14 03/04/2025 08:47 AM    ALT 14 03/30/2023 02:28 PM    ALB 3.9 03/04/2025 08:47 AM    ALB 3.7 03/30/2023 02:28 PM    TP 6.5 03/04/2025 08:47 AM    TP 6.2 (L) 03/30/2023 02:28 PM    EGFR 86 03/04/2025 08:47 AM    EGFR 84 03/30/2023 02:28 PM     Lab Results   Component Value Date/Time    HGB 13.8 03/04/2025 08:47 AM    WBC 7.60 03/04/2025 08:47 AM     03/04/2025 08:47 AM    INR 1.38 (H) 05/03/2023 01:03 PM    INR 0.9 03/30/2023 02:28 PM    PTT 27 05/03/2023 01:03 PM     No results found for: \"[id number]\"  {Radiology Results Review (Optional):89240}    {Administrative / Billing Section (Optional):20882}  "

## 2025-04-04 ENCOUNTER — HOSPITAL ENCOUNTER (OUTPATIENT)
Dept: NON INVASIVE DIAGNOSTICS | Facility: CLINIC | Age: 70
Discharge: HOME | End: 2025-04-04
Payer: MEDICARE

## 2025-04-04 ENCOUNTER — CONSULT (OUTPATIENT)
Dept: RHEUMATOLOGY | Facility: CLINIC | Age: 70
End: 2025-04-04
Payer: MEDICARE

## 2025-04-04 VITALS
HEIGHT: 61 IN | SYSTOLIC BLOOD PRESSURE: 124 MMHG | BODY MASS INDEX: 41.54 KG/M2 | DIASTOLIC BLOOD PRESSURE: 70 MMHG | HEART RATE: 70 BPM | OXYGEN SATURATION: 92 % | WEIGHT: 220 LBS

## 2025-04-04 DIAGNOSIS — M81.0 AGE-RELATED OSTEOPOROSIS WITHOUT CURRENT PATHOLOGICAL FRACTURE: Primary | ICD-10-CM

## 2025-04-04 DIAGNOSIS — R06.09 DYSPNEA ON EXERTION: ICD-10-CM

## 2025-04-04 DIAGNOSIS — I10 PRIMARY HYPERTENSION: ICD-10-CM

## 2025-04-04 DIAGNOSIS — J30.2 SEASONAL ALLERGIC RHINITIS DUE TO FUNGAL SPORES: ICD-10-CM

## 2025-04-04 LAB
CHEST PAIN STATEMENT: NORMAL
MAX DIASTOLIC BP: 82 MMHG
MAX HR PERCENT: 62 %
MAX HR: 96 BPM
MAX PREDICTED HEART RATE: 153 BPM
PROTOCOL NAME: NORMAL
RATE PRESSURE PRODUCT: NORMAL
REASON FOR TERMINATION: NORMAL
SL CV REST NUCLEAR ISOTOPE DOSE: 10.5 MCI
SL CV STRESS NUCLEAR ISOTOPE DOSE: 32.3 MCI
SL CV STRESS RECOVERY BP: NORMAL MMHG
SL CV STRESS RECOVERY HR: 83 BPM
SL CV STRESS RECOVERY O2 SAT: 99 %
SPECT HRT GATED+EF W RNC IV: 80 %
STRESS ANGINA INDEX: 1
STRESS BASELINE BP: NORMAL MMHG
STRESS BASELINE HR: 70 BPM
STRESS O2 SAT REST: 98 %
STRESS PEAK HR: 96 BPM
STRESS POST ESTIMATED WORKLOAD: 1 METS
STRESS POST EXERCISE DUR MIN: 3 MIN
STRESS POST EXERCISE DUR SEC: 0 SEC
STRESS POST O2 SAT PEAK: 98 %
STRESS POST PEAK BP: 148 MMHG
STRESS POST PEAK HR: 96 BPM
STRESS POST PEAK SYSTOLIC BP: 148 MMHG
STRESS/REST PERFUSION RATIO: 1.05
TARGET HR FORMULA: NORMAL
TEST INDICATION: NORMAL

## 2025-04-04 PROCEDURE — 93018 CV STRESS TEST I&R ONLY: CPT | Performed by: INTERNAL MEDICINE

## 2025-04-04 PROCEDURE — 78452 HT MUSCLE IMAGE SPECT MULT: CPT | Performed by: INTERNAL MEDICINE

## 2025-04-04 PROCEDURE — 99204 OFFICE O/P NEW MOD 45 MIN: CPT | Performed by: STUDENT IN AN ORGANIZED HEALTH CARE EDUCATION/TRAINING PROGRAM

## 2025-04-04 PROCEDURE — 78452 HT MUSCLE IMAGE SPECT MULT: CPT

## 2025-04-04 PROCEDURE — 93016 CV STRESS TEST SUPVJ ONLY: CPT | Performed by: INTERNAL MEDICINE

## 2025-04-04 PROCEDURE — 93017 CV STRESS TEST TRACING ONLY: CPT

## 2025-04-04 PROCEDURE — G2211 COMPLEX E/M VISIT ADD ON: HCPCS | Performed by: STUDENT IN AN ORGANIZED HEALTH CARE EDUCATION/TRAINING PROGRAM

## 2025-04-04 PROCEDURE — A9502 TC99M TETROFOSMIN: HCPCS

## 2025-04-04 RX ORDER — REGADENOSON 0.08 MG/ML
0.4 INJECTION, SOLUTION INTRAVENOUS ONCE
Status: COMPLETED | OUTPATIENT
Start: 2025-04-04 | End: 2025-04-04

## 2025-04-04 RX ORDER — AZELASTINE HYDROCHLORIDE 137 UG/1
SPRAY, METERED NASAL
Qty: 30 ML | Refills: 3 | Status: SHIPPED | OUTPATIENT
Start: 2025-04-04

## 2025-04-04 RX ORDER — DILTIAZEM HYDROCHLORIDE 240 MG/1
240 CAPSULE, COATED, EXTENDED RELEASE ORAL DAILY
Qty: 90 CAPSULE | Refills: 1 | Status: SHIPPED | OUTPATIENT
Start: 2025-04-04

## 2025-04-04 RX ADMIN — REGADENOSON 0.4 MG: 0.08 INJECTION, SOLUTION INTRAVENOUS at 14:05

## 2025-04-04 NOTE — PROGRESS NOTES
"Name: Coretta Hines      : 1955      MRN: [id number]  Encounter Provider: Moise Esteban DO  Encounter Date: 2025   Encounter department: [facility]  :  Assessment & Plan  Age-related osteoporosis without current pathological fracture  Discussed risks:benefits romosozumab --> BP vs PTHanalogue --> BP, including jaw osteonecrosis risk. She would prefer romosozumab. No known cardiovascular or cerebrovascular disease; did have history venous thrombosis but is on AC so even if romosozumab theoretically would increase clotting risk (not borne out in studies), therapeutic AC should nullify that risk.    Given severity with T-score < -3.5, will do 2° workup as below. If PTH elevated, will refer to endocrinology and defer osteoporosis treatment to them    Never had VitD checked  Orders:    Ambulatory Referral to Rheumatology    Protein electrophoresis, serum; Future    PTH, intact; Future    Protein electrophoresis, urine; Future    Vitamin D 25 hydroxy; Future      Patient's rheumatologic disease(s) threaten long-term function if not appropriately managed.    History of Present Illness     Fell twice in Fall , second fall fractured ribs and the orbit of her skull.    Has multifocal OA, sp R TKA.    Mother had compression fractures in the spine.    Does have back pain intermittently, positional. Helped by heating pads and tylenol.    Occasional hip pain.    No cancer history, no radiation therapy history, no dysphagia with pills    Had DVTs in , unprovoked, on apixaban indefinitely. No known cardiovascular or cerebrovascular disease.    No history wrist fracture, proximal humerus fracture, ankle fracture, hip fracture, spine fracture including compression fracture     Objective   /70   Pulse 70   Ht 5' 1\" (1.549 m)   Wt 99.8 kg (220 lb)   SpO2 92%   BMI 41.57 kg/m²      General: Well appearing, in no distress.   Eyes: Sclera non-icteric. EOMI  Extremities: " Warm, well perfused, no edema.   Neuro: Alert and oriented. No gross focal neurological deficits.   Skin: No rashes.  MSK exam: mild tenderness to palpation shoulders, hips, knees, diffuse low back. No point tenderness to palpation spine.    Reviewed results of DXA, most recent CBC and CMP  I have independently reviewed the following labs/images and interpret them as follows.  DXA Jan 2025: FRAX should NOT have been calculated or reported, as patient meets osteoporosis criteria by T-score.

## 2025-04-04 NOTE — PATIENT INSTRUCTIONS
Please get lab work (PTH, SPEP, UPEP, vitamin D)    Please think about whether you'd like to pursue romosozumab followed by an antiresorptive such as alendronate or zoledronic acid, vs abaloparatide or teriparatide followed by an antiresorptive such as alendronate or zoledronic acid.    Romosozumab (Evenity)    Romosozumab is a biologic medicine used to treat osteoporosis, a condition where bones become weaker and are more likely to break (fracture). It is an antibody treatment that blocks a molecule called sclerostin. Sclerostin normally inhibits or decreases bone formation. By inhibiting sclerostin, romosozumab increases bone formation and improves bone strength.  It is approved for use in post-menopausal women with a high risk of fracture who can’t take other osteoporosis treatments.    How to Take It  Romosozumab is given as an injection under the skin, usually by your rheumatologist or nursing staff. The usual dose is 210 mg once a month, given as two separate 105 mg injections, one after the other. People on romosozumab should take adequate calcium (1,000-1,200 mg) and vitamin D (at least 400 IU) every day but should review these doses with their prescribing healthcare provider before starting treatment.  Romosozumab works quickly--within 2 weeks of an injection, the effects can be detected in the blood; this medicine also stays in the blood for several months after each dose.  Blood and bone density tests should be done before starting treatment, a dental exam is also recommended.    Side Effects  Romosozumab may increase the risk of heart attack, stroke, and death from heart disease and should not be used if people who have had a heart attack or stroke in the past year.  The most common side effects that patients experience include joint pain (reported in 8-13% of people or about 1 in 10 people in clinical trials for this medication), headache (5-7% or about 1 in 20 people taking this medication), and pain  or irritation at the injection site (about 1 in 20 people).  Romosozumab can cause hypocalcemia (low calcium levels in the blood).  Rare but serious side effects (happened in less than 1 in 100 people on this medication) can include unusual stress fractures of the thigh bone (atypical femur fractures), damage to the jaw bone (osteonecrosis of the jaw), and severe allergic reactions including skin rashes or hives, low blood pressure, throat and tongue swelling, and difficulty breathing.    Tell Your Rheumatology Provider  Before starting this medication tell your provider if you have had a recent heart attack or stroke, if you have low blood calcium levels; if you are planning to have dental surgery or teeth removed; if you are pregnant, considering pregnancy, or are breastfeeding.  Romosozumab has not been studied in people who could become pregnant, its use is not recommended in patients in this group.  Notify your rheumatology provider if you have the following symptoms while taking this medication: chest pain, shortness of breath, headaches, changes in vision, feeling lightheaded, difficulty talking, numbness or tingling of the hands or around the mouth, new pain in the jaw, or fevers or other signs of infection.    Updated March 2024 by Maren Cortes MD, and reviewed by the American College of Rhe[employer] Communications and Marketing Committee.    Abaloparatide (Tymlos)    Abaloparatide (Tymlos) is a liquid medicine that has been approved for treatment of osteoporosis in postmenopausal women and in men. It contains a parathyroid hormone protein. While continuously high parathyroid hormone levels cause bone loss, tiny daily doses of parathyroid hormone increase bone formation and density. In clinical trials, abaloparatide reduced the chance of breaking a bone in women post menopause with osteoporosis.  How To Take It  Abaloparatide comes in a pen containing four weeks of medication. Abaloparatide is injected  under the skin of the stomach every day with a tiny needle. The site of your daily injection should change every day to reduce skin irritation. You should inject the medicine about the same time daily. Once the pen is used, it can remain outside of the refrigerator for the remaining doses but storing in the refrigerator is also acceptable.  To get the most benefit from use of abaloparatde, be sure to consume calcium-rich foods and/or take a calcium supplement. Total calcium intake from food and supplements should be at least 1200 mg per day. Also take vitamin D as recommended by your provider.    Side Effects  The most common side effect (58%) is redness where the medicine is injected. Up to 20% of people can get high blood or urine calcium levels from taking the drug. Within four hours of injecting the medicine, some people have low blood pressure or feel dizzy. If this happens, your provider might ask you to inject the medicine when sitting or lying down. In rats, this medication causes bone cancer (osteosarcoma). To be cautious, the FDA has limited use of abaloparatide to two years in a person’s life. Use of abaloparatide is not recommended for people at high risk of bone cancer.    Tell Your Rheumatology Provider  Please contact your provider if you get pregnant while taking abaloparatide, which should not be used during pregnancy.    Updated February 2024 by Nidia Decker MD, and reviewed by the American College of Rhe[employer] Communications and Marketing Committee.    Teriparatide (Forteo)    Teriparatide is used in post-menopausal woman and men who have osteoporosis and are at high risk for bone fractures. It is often used when other treatments fail or in severe forms of osteoporosis. Teriparatide itself builds bone and is different from other forms of osteoporosis treatment, which prevents bone breakdown.    How to Take It  Teriparatide is given by self -injection daily for two years maximum. The  medication comes in a prefilled injector, which is like a pen. To inject the medicine, place the injector on the thigh or abdomen, push the button and the medication is automatically injected. The needle size is the same as an insulin needle. The injector has enough medicine for 28 days, and the medication should be kept refrigerated and be thawed to room temperature before giving yourself the injection.    Side Effects  Patients may get dizzy within four hours after taking the first few doses, but this goes away within a few hours. Occasionally, the injection site can become itchy, red, or swollen. Teriparatide rarely can cause increased levels of calcium in the blood, nausea, or achy joints.  Teriparatide has a black box warning, meaning the Food and Drug Administration is concerned with an increased risk of cancer. Osteosarcoma, a type of bone cancer, was found in rats that were given very large doses (3-60x human exposure of 20 mcg) of teriparatide. Repeat studies in monkeys did not reveal the risk of cancer. In a long-term cancer surveillance study involving over 75,000 patients from [id number], no cases of osteosarcoma were identified.    Tell Your Rheumatology Provider  You should notify your rheumatology provider if you have trouble injecting yourself; if the injection site becomes red, itchy, warm, or swollen; or if you have increasing achy joints or muscle spasms.  Make sure to notify your other physicians while you are taking this drug. If you are pregnant or considering pregnancy, let your doctor know before starting this medication. Women should discuss birth control with their healthcare team as this medicine is not proven to be safe in pregnancy. It is unknown if teriparatide is present in breast milk and therefore it should be avoided in patients who are breast feeding.  Be sure to talk with your rheumatology provider before undergoing any surgeries while taking this medication.    Updated February  2024 by Nidia Decker MD, and reviewed by the American College of Rhe[employer] Communications and Marketing Committee.    Highest risks for this complicationBisphosphonate Therapy    Bisphosphonates are a group of medicines used to treat osteopenia or osteoporosis, which are conditions associated with thin or fragile bones that are at increased risk for fracture. The medications help strengthen the bones and prevent future bone fractures. Patients with low bone density or a history of low-energy trauma fractures are recommended to take these medications. Disorders such as Paget’s disease and cancer that have spread (metastasized) to the bone are other indications for use. Patients on long-term oral steroid medications may also be recommended to take these medications.  Bisphosphonate medications include alendronate (Fosamax), risedronate (Actonel), and ibandronate (Boniva). Pamidronate, ibandronate, and zoledronic acid (Reclast/Zometa) are options to be administered through your vein. Bone cells in our bodies are constantly being slowly removed and replaced with new bone cells. As we age and in certain diseases, the bone is removed or damaged faster than your body can replace it, which leads to thin and weakened bones. Bisphosphonates work by reducing the turnover of bone which lowers the risk of fracture.    How To Take It  Alendronate, risedronate, and ibandronate are oral medications for osteoporosis treatment. Alendronate is given 70mg once a week. Risedronate is given 35 mg weekly or 150 mg monthly. Ibandronate is given 150 mg once a month. These medications must be taken first thing in the morning on an empty stomach with an 8 oz glass of water. Do not take it with other beverages. You must remain upright (sitting or standing--no lying down) for 30-60 minutes after taking the medication. Do not take any additional medications, beverages, or food for 30-60 minutes after taking the medication. Zoledronic acid  is administered at your doctor’s office or at an infusion center. The dose is 5 mg given once a year. The duration of treatment varies for each patient.    Side Effects  Side effects of oral bisphosphonates include muscle cramps/pain, difficulty swallowing, heartburn, abdominal pain, headache, and/or rash. Side effects of zoledronic acid include low blood pressure, dizziness, headaches, muscle pain, nausea, constipation, fever, and/or rash. These side effects may last 1-2 days and up to 10-12 days after your infusion.  There is a rare risk of developing damage to the cells within the bones of the jaw called osteonecrosis. The patients who are at highest risk for this complication are those who are receiving the medicine as part of cancer treatment, smokers, poorly controlled diabetics, and those with poor baseline dentition.    Tell Your Rheumatology Provider  If undergoing an invasive procedure of the jaw (tooth extraction) or a history of malignancy and/or dental infections while on bisphosphonate therapy. It is recommended that you have a good dental exam prior to starting these medications. Atypical fractures of the femur have been associated with long-term bisphosphonate therapy. Often presents as thigh pain. For this reason, any bisphosphonate medicine should not be given for more than 5 years (unless patient is directed otherwise by their doctor).   You should not take this medication if you have: kidney problems, low calcium levels, an inability to stand or sit upright for at least 30 minutes, or difficulty swallowing. Infusion with zoledronic acid may be preferred.  Do not take these medications if you are breastfeeding, pregnant, or may become pregnant.    Updated February 2024 by Gabriel Abarca MD, and reviewed by the American College of Rheumatology Committee on Communications and Marketing.

## 2025-04-04 NOTE — ASSESSMENT & PLAN NOTE
Discussed risks:benefits romosozumab --> BP vs PTHanalogue --> BP, including jaw osteonecrosis risk. She would prefer romosozumab. No known cardiovascular or cerebrovascular disease; did have history venous thrombosis but is on AC so even if romosozumab theoretically would increase clotting risk (not borne out in studies), therapeutic AC should nullify that risk.    Given severity with T-score < -3.5, will do 2° workup as below. If PTH elevated, will refer to endocrinology and defer osteoporosis treatment to them    Never had VitD checked  Orders:    Ambulatory Referral to Rheumatology    Protein electrophoresis, serum; Future    PTH, intact; Future    Protein electrophoresis, urine; Future    Vitamin D 25 hydroxy; Future

## 2025-04-04 NOTE — ASSESSMENT & PLAN NOTE
69 year old post-menopausal female referred to rhematology for osteoporosis of left femoral neck and osteopenia L-spine and hip; no fractures.   1/2025 DEXA scan T-score -3.8 of left femoral neck.    Plan  Check vitamin D; most recent calcium 9.2  Start bisphosphonate  Encourage weight bearing activity      no

## 2025-04-08 ENCOUNTER — HOSPITAL ENCOUNTER (OUTPATIENT)
Dept: NON INVASIVE DIAGNOSTICS | Facility: CLINIC | Age: 70
Discharge: HOME | End: 2025-04-08
Payer: MEDICARE

## 2025-04-08 ENCOUNTER — RESULTS FOLLOW-UP (OUTPATIENT)
Dept: CARDIOLOGY CLINIC | Facility: CLINIC | Age: 70
End: 2025-04-08

## 2025-04-08 VITALS
BODY MASS INDEX: 41.54 KG/M2 | HEART RATE: 70 BPM | HEIGHT: 61 IN | WEIGHT: 220 LBS | SYSTOLIC BLOOD PRESSURE: 124 MMHG | DIASTOLIC BLOOD PRESSURE: 70 MMHG

## 2025-04-08 DIAGNOSIS — R06.09 DYSPNEA ON EXERTION: ICD-10-CM

## 2025-04-08 DIAGNOSIS — I10 PRIMARY HYPERTENSION: ICD-10-CM

## 2025-04-08 LAB
AORTIC ROOT: 3 CM
AORTIC VALVE MEAN VELOCITY: 17.2 M/S
ASCENDING AORTA: 3.3 CM
AV AREA BY CONTINUOUS VTI: 1.2 CM2
AV AREA PEAK VELOCITY: 1 CM2
AV LVOT MEAN GRADIENT: 2 MMHG
AV LVOT PEAK GRADIENT: 3 MMHG
AV MEAN PRESS GRAD SYS DOP V1V2: 13 MMHG
AV ORIFICE AREA US: 1.15 CM2
AV PEAK GRADIENT: 19 MMHG
AV VELOCITY RATIO: 0.45
AV VMAX SYS DOP: 2.2 M/S
BSA FOR ECHO PROCEDURE: 1.97 M2
DOP CALC AO VTI: 51.47 CM
DOP CALC LVOT AREA: 2.54 CM2
DOP CALC LVOT CARDIAC INDEX: 2.02 L/MIN/M2
DOP CALC LVOT CARDIAC OUTPUT: 3.98 L/MIN
DOP CALC LVOT DIAMETER: 1.8 CM
DOP CALC LVOT PEAK VEL VTI: 23.32 CM
DOP CALC LVOT PEAK VEL: 0.87 M/S
DOP CALC LVOT STROKE INDEX: 29.9 ML/M2
DOP CALC LVOT STROKE VOLUME: 59.31
E WAVE DECELERATION TIME: 222 MS
E/A RATIO: 0.82
FRACTIONAL SHORTENING: 32 (ref 28–44)
INTERVENTRICULAR SEPTUM IN DIASTOLE (PARASTERNAL SHORT AXIS VIEW): 1.8 CM
INTERVENTRICULAR SEPTUM: 1.8 CM (ref 0.6–1.1)
LAAS-AP2: 11.4 CM2
LAAS-AP4: 16.4 CM2
LEFT ATRIUM SIZE: 3.9 CM
LEFT ATRIUM VOLUME (MOD BIPLANE): 36 ML
LEFT ATRIUM VOLUME INDEX (MOD BIPLANE): 18.3 ML/M2
LEFT INTERNAL DIMENSION IN SYSTOLE: 1.3 CM (ref 2.1–4)
LEFT VENTRICULAR INTERNAL DIMENSION IN DIASTOLE: 1.9 CM (ref 3.5–6)
LEFT VENTRICULAR POSTERIOR WALL IN END DIASTOLE: 1.8 CM
LEFT VENTRICULAR STROKE VOLUME: 7 ML
LV EF US.2D.A4C+ESTIMATED: 77 %
LVSV (TEICH): 7 ML
MV E'TISSUE VEL-LAT: 12 CM/S
MV E'TISSUE VEL-SEP: 6 CM/S
MV PEAK A VEL: 0.84 M/S
MV PEAK E VEL: 69 CM/S
MV STENOSIS PRESSURE HALF TIME: 64 MS
MV VALVE AREA P 1/2 METHOD: 3.44
RIGHT ATRIUM AREA SYSTOLE A4C: 16.2 CM2
RIGHT VENTRICLE ID DIMENSION: 5.3 CM
SL CV LEFT ATRIUM LENGTH A2C: 4.2 CM
SL CV LV EF: 65
SL CV PED ECHO LEFT VENTRICLE DIASTOLIC VOLUME (MOD BIPLANE) 2D: 11 ML
SL CV PED ECHO LEFT VENTRICLE SYSTOLIC VOLUME (MOD BIPLANE) 2D: 5 ML
TR MAX PG: 21 MMHG
TR PEAK VELOCITY: 2.3 M/S
TRICUSPID ANNULAR PLANE SYSTOLIC EXCURSION: 2.3 CM
TRICUSPID VALVE PEAK REGURGITATION VELOCITY: 2.28 M/S

## 2025-04-08 PROCEDURE — C8929 TTE W OR WO FOL WCON,DOPPLER: HCPCS

## 2025-04-08 PROCEDURE — 93306 TTE W/DOPPLER COMPLETE: CPT | Performed by: INTERNAL MEDICINE

## 2025-04-08 RX ADMIN — PERFLUTREN 0.4 ML/MIN: 6.52 INJECTION, SUSPENSION INTRAVENOUS at 08:59

## 2025-04-18 ENCOUNTER — OFFICE VISIT (OUTPATIENT)
Dept: OBGYN CLINIC | Facility: CLINIC | Age: 70
End: 2025-04-18
Payer: MEDICARE

## 2025-04-18 VITALS — BODY MASS INDEX: 41.72 KG/M2 | WEIGHT: 221 LBS | HEIGHT: 61 IN

## 2025-04-18 DIAGNOSIS — E66.01 CLASS 2 SEVERE OBESITY WITH SERIOUS COMORBIDITY IN ADULT, UNSPECIFIED BMI, UNSPECIFIED OBESITY TYPE (HCC): ICD-10-CM

## 2025-04-18 DIAGNOSIS — M17.12 PRIMARY OSTEOARTHRITIS OF LEFT KNEE: Primary | ICD-10-CM

## 2025-04-18 DIAGNOSIS — E66.812 CLASS 2 SEVERE OBESITY WITH SERIOUS COMORBIDITY IN ADULT, UNSPECIFIED BMI, UNSPECIFIED OBESITY TYPE (HCC): ICD-10-CM

## 2025-04-18 PROCEDURE — 99213 OFFICE O/P EST LOW 20 MIN: CPT | Performed by: ORTHOPAEDIC SURGERY

## 2025-04-18 PROCEDURE — 20610 DRAIN/INJ JOINT/BURSA W/O US: CPT | Performed by: ORTHOPAEDIC SURGERY

## 2025-04-18 RX ORDER — METHYLPREDNISOLONE ACETATE 40 MG/ML
2 INJECTION, SUSPENSION INTRA-ARTICULAR; INTRALESIONAL; INTRAMUSCULAR; SOFT TISSUE
Status: COMPLETED | OUTPATIENT
Start: 2025-04-18 | End: 2025-04-18

## 2025-04-18 RX ORDER — KETOROLAC TROMETHAMINE 30 MG/ML
30 INJECTION, SOLUTION INTRAMUSCULAR; INTRAVENOUS
Status: COMPLETED | OUTPATIENT
Start: 2025-04-18 | End: 2025-04-18

## 2025-04-18 RX ORDER — BUPIVACAINE HYDROCHLORIDE 2.5 MG/ML
1 INJECTION, SOLUTION INFILTRATION; PERINEURAL
Status: COMPLETED | OUTPATIENT
Start: 2025-04-18 | End: 2025-04-18

## 2025-04-18 RX ADMIN — BUPIVACAINE HYDROCHLORIDE 1 ML: 2.5 INJECTION, SOLUTION INFILTRATION; PERINEURAL at 09:45

## 2025-04-18 RX ADMIN — METHYLPREDNISOLONE ACETATE 2 ML: 40 INJECTION, SUSPENSION INTRA-ARTICULAR; INTRALESIONAL; INTRAMUSCULAR; SOFT TISSUE at 09:45

## 2025-04-18 RX ADMIN — KETOROLAC TROMETHAMINE 30 MG: 30 INJECTION, SOLUTION INTRAMUSCULAR; INTRAVENOUS at 09:45

## 2025-04-18 NOTE — PROGRESS NOTES
Name: Coretta Hines      : 1955      MRN: [id number]  Encounter Provider: Nafisa Duckworth MD  Encounter Date: 2025   Encounter department: [facility] [place]  :  Assessment & Plan  Primary osteoarthritis of left knee    Orders:    Large joint arthrocentesis: L knee  Weightbearing as tolerated   Patient received a left knee steroid injection in the office today   Provided patient with VMO strengthening exercises   Recommended to take Tylenol arthritis as needed for pain relief   Class 2 severe obesity with serious comorbidity in adult, unspecified BMI, unspecified obesity type (HCC)             Follow up visit :  3 months L knee         The above stated was discussed in layman's terms and the patient expressed understanding.  All questions were answered to the patient's satisfaction.       Subjective:   Coretta Hines is a 69 y.o. female who presents today follow-up for chronic left knee pain due to osteoarthritis.  Patient received a left knee Euflexxa 3 series injections, last injection on 1/10/2025.  Patient states in the last few weeks she has been doing increased activities and has flared off her knee.  She states she was standing on concrete floor for 10 hours at a time and also going from sitting to standing and standing sitting which flared up her left knee.  Patient states today she would like to repeat the left knee steroid injection.      Review of systems negative unless otherwise specified in HPI    Past Medical History:   Diagnosis Date    Allergic     Allergic rhinitis Decades    Arthritis     Asthma     Blindness of left eye     COPD (chronic obstructive pulmonary disease) ([facility])     Deep vein thrombosis ([facility]) March 3, 2023    cataract surgeries, detached retina surgeries leftveye    Diabetes mellitus ([facility])     GERD (gastroesophageal reflux disease)     Hypertension     Murmur, heart     Obesity     Visual impairment        Past Surgical History:    Procedure Laterality Date     SECTION  10/16/1990    Fibroids    COLONOSCOPY      EYE SURGERY      MULTIPLE    HYSTERECTOMY  2000    JOINT REPLACEMENT  2016    right  knee    REPLACEMENT TOTAL KNEE Right        Family History   Problem Relation Age of Onset    Dementia Mother     Arthritis Mother         Late in life    Hypertension Father         HBP    Heart disease Father         HBP    Diabetes Father         managed through diet    Hearing loss Father         Industrial/construction work with no hearing protection    Prostate cancer Father     No Known Problems Daughter     No Known Problems Maternal Grandmother     No Known Problems Maternal Grandfather     Glaucoma Paternal Grandmother     Asthma Paternal Grandfather         Inhaler used    No Known Problems Maternal Aunt        Social History     Occupational History    Not on file   Tobacco Use    Smoking status: Never    Smokeless tobacco: Never   Vaping Use    Vaping status: Never Used   Substance and Sexual Activity    Alcohol use: Yes     Alcohol/week: 1.0 standard drink of alcohol     Types: 1 Standard drinks or equivalent per week     Comment: on occasion, 1 drink with low sugar content    Drug use: Never    Sexual activity: Not on file         Current Outpatient Medications:     acetaminophen (Tylenol) 325 mg tablet, Take 650 mg by mouth every 6 (six) hours as needed for mild pain, Disp: , Rfl:     albuterol (PROVENTIL HFA,VENTOLIN HFA) 90 mcg/act inhaler, Inhale 2 puffs every 6 (six) hours as needed for wheezing, Disp: 8.5 g, Rfl: 2    Azelastine HCl 137 MCG/SPRAY SOLN, SPRAY 2 SPRAYS INTO EACH NOSTRIL 2 TIMES A DAY USE IN EACH NOSTRIL AS DIRECTED, Disp: 30 mL, Rfl: 3    bimatoprost (LUMIGAN) 0.01 % ophthalmic drops, , Disp: , Rfl:     cycloSPORINE (RESTASIS) 0.05 % ophthalmic emulsion, , Disp: , Rfl:     diltiazem (CARDIZEM CD) 240 mg 24 hr capsule, Take 1 capsule (240 mg total) by mouth daily, Disp: 90 capsule, Rfl: 1    Dulaglutide  (Trulicity) 4.5 MG/0.5ML SOAJ, Inject 4.5 mg under the skin once a week, Disp: 2 mL, Rfl: 5    Eliquis 2.5 MG, TAKE 1 TABLET(2.5 MG) BY MOUTH TWICE DAILY, Disp: 60 tablet, Rfl: 5    famotidine (PEPCID) 20 mg tablet, Take 20 mg by mouth, Disp: , Rfl:     fluticasone (FLONASE) 50 mcg/act nasal spray, 1 spray into each nostril daily, Disp: 9.9 mL, Rfl: 0    Fluticasone-Salmeterol (Advair Diskus) 250-50 mcg/dose inhaler, Inhale 1 puff 2 (two) times a day Rinse mouth after use., Disp: 180 blister, Rfl: 1    loratadine (CLARITIN) 10 mg tablet, Take 10 mg by mouth PRN, Disp: , Rfl:     metFORMIN (GLUCOPHAGE) 500 mg tablet, 1 tablet each morning and 2 tablets each evening, Disp: 270 tablet, Rfl: 3    rosuvastatin (CRESTOR) 20 MG tablet, Take 1 tablet (20 mg total) by mouth daily, Disp: 100 tablet, Rfl: 3    timolol (TIMOPTIC) 0.5 % ophthalmic solution, Administer 1 drop into the left eye 2 (two) times a day, Disp: , Rfl:     Allergies   Allergen Reactions    Other Nausea Only, Vomiting and Cough     LOBSTER    Penicillins Seizures    Sulfa Antibiotics Rash          There were no vitals filed for this visit.  Body mass index is 41.76 kg/m².  Wt Readings from Last 3 Encounters:   04/18/25 100 kg (221 lb)   04/08/25 99.8 kg (220 lb)   04/04/25 99.8 kg (220 lb)       Objective:            Physical Exam  Physical Exam:      General Appearance:    Alert, cooperative, no distress, appears stated age   Head:    Normocephalic, without obvious abnormality, atraumatic   Eyes:    conjunctiva/corneas clear, both eyes         Nose:   Nares normal, septum midline, no drainage    Throat:   Lips normal; teeth and gums normal   Neck:    symmetrical, trachea midline, ;     thyroid:  no enlargement/   Back:     Symmetric, no curvature, ROM normal   Lungs:   No audible wheezing or labored breathing   Chest Wall:    No tenderness or deformity    Heart:    Regular rate and rhythm                         Pulses:   2+ and symmetric all extremities  "  Skin:   Skin color, texture, turgor normal, no rashes or lesions   Neurologic:   normal strength, sensation and reflexes     throughout                       Ortho Exam  Left  Knee  Skin intact  No erythema or open wounds  Mild effusion  Varus alignment but correctable with valgus stress   Tenderness palpation of medial joint line   lateral joint line tenderness  Near full extension  Full flexion  Patellar grind test +   Stable to varus and valgus stress  Negative posterior drawer  Negative Lachman test  Neurovascular intact distally     Diagnostics, reviewed and taken today if performed as documented:    None performed       Procedures, if performed today:    Large joint arthrocentesis: L knee  Universal Protocol:  procedure performed by consultantConsent: Verbal consent obtained.  Risks and benefits: risks, benefits and alternatives were discussed  Consent given by: patient  Time out: Immediately prior to procedure a \"time out\" was called to verify the correct patient, procedure, equipment, support staff and site/side marked as required.  Timeout called at: 4/18/2025 10:40 AM.  Patient understanding: patient states understanding of the procedure being performed  Site marked: the operative site was marked  Supporting Documentation  Indications: pain   Procedure Details  Location: knee - L knee  Needle size: 22 G  Approach: lateral  Medications administered: 1 mL bupivacaine 0.25 %; 2 mL methylPREDNISolone acetate 40 mg/mL; 30 mg ketorolac 30 mg/mL    Patient tolerance: patient tolerated the procedure well with no immediate complications  Dressing:  Sterile dressing applied            Scribe Attestation      I,:  Zee Rodriguez MA am acting as a scribe while in the presence of the attending physician.:       I,:  Nafisa Duckworth MD personally performed the services described in this documentation    as scribed in my presence.:               Portions of the record may have been created with voice " "recognition software.  Occasional wrong word or \"sound a like\" substitutions may have occurred due to the inherent limitations of voice recognition software.  Read the chart carefully and recognize, using context, where substitutions have occurred.  "

## 2025-04-18 NOTE — ASSESSMENT & PLAN NOTE
Orders:    Large joint arthrocentesis: L knee  Weightbearing as tolerated   Patient received a left knee steroid injection in the office today   Provided patient with VMO strengthening exercises   Recommended to take Tylenol arthritis as needed for pain relief

## 2025-05-27 DIAGNOSIS — R05.3 CHRONIC COUGH: ICD-10-CM

## 2025-05-27 DIAGNOSIS — J45.40 MODERATE PERSISTENT ASTHMA WITHOUT COMPLICATION: ICD-10-CM

## 2025-05-28 RX ORDER — FLUTICASONE PROPIONATE AND SALMETEROL 250; 50 UG/1; UG/1
1 POWDER RESPIRATORY (INHALATION) 2 TIMES DAILY
Qty: 60 BLISTER | Refills: 3 | Status: SHIPPED | OUTPATIENT
Start: 2025-05-28

## 2025-06-03 ENCOUNTER — TELEPHONE (OUTPATIENT)
Dept: GASTROENTEROLOGY | Facility: CLINIC | Age: 70
End: 2025-06-03

## 2025-06-03 NOTE — TELEPHONE ENCOUNTER
Lm to cb to schedule a follow up (pt on Eliquis) so we can schedule 1y repeat EGD with Dr Chacko.  Letter sent

## 2025-06-16 ENCOUNTER — OFFICE VISIT (OUTPATIENT)
Dept: URGENT CARE | Facility: MEDICAL CENTER | Age: 70
End: 2025-06-16
Payer: MEDICARE

## 2025-06-16 VITALS
RESPIRATION RATE: 18 BRPM | SYSTOLIC BLOOD PRESSURE: 132 MMHG | HEART RATE: 80 BPM | OXYGEN SATURATION: 97 % | DIASTOLIC BLOOD PRESSURE: 82 MMHG | TEMPERATURE: 98 F

## 2025-06-16 DIAGNOSIS — R30.0 DYSURIA: Primary | ICD-10-CM

## 2025-06-16 LAB
SL AMB  POCT GLUCOSE, UA: ABNORMAL
SL AMB LEUKOCYTE ESTERASE,UA: ABNORMAL
SL AMB POCT BILIRUBIN,UA: ABNORMAL
SL AMB POCT BLOOD,UA: ABNORMAL
SL AMB POCT CLARITY,UA: CLEAR
SL AMB POCT COLOR,UA: YELLOW
SL AMB POCT KETONES,UA: ABNORMAL
SL AMB POCT NITRITE,UA: ABNORMAL
SL AMB POCT PH,UA: 6
SL AMB POCT SPECIFIC GRAVITY,UA: 1.02
SL AMB POCT URINE PROTEIN: ABNORMAL
SL AMB POCT UROBILINOGEN: 0.2

## 2025-06-16 PROCEDURE — 99213 OFFICE O/P EST LOW 20 MIN: CPT | Performed by: PHYSICIAN ASSISTANT

## 2025-06-16 PROCEDURE — 81002 URINALYSIS NONAUTO W/O SCOPE: CPT | Performed by: PHYSICIAN ASSISTANT

## 2025-06-16 PROCEDURE — 87086 URINE CULTURE/COLONY COUNT: CPT | Performed by: PHYSICIAN ASSISTANT

## 2025-06-16 PROCEDURE — G0463 HOSPITAL OUTPT CLINIC VISIT: HCPCS | Performed by: PHYSICIAN ASSISTANT

## 2025-06-16 RX ORDER — CIPROFLOXACIN 500 MG/1
500 TABLET, FILM COATED ORAL EVERY 12 HOURS SCHEDULED
Qty: 14 TABLET | Refills: 0 | Status: SHIPPED | OUTPATIENT
Start: 2025-06-16 | End: 2025-06-23

## 2025-06-16 NOTE — PATIENT INSTRUCTIONS
Dysuria  Cipro twice daily x 7 days  Follow up with PCP in 3-5 days.  Proceed to  ER if symptoms worsen.

## 2025-06-17 LAB — BACTERIA UR CULT: NORMAL

## 2025-06-24 ENCOUNTER — APPOINTMENT (OUTPATIENT)
Dept: LAB | Facility: CLINIC | Age: 70
End: 2025-06-24
Attending: INTERNAL MEDICINE
Payer: MEDICARE

## 2025-06-24 DIAGNOSIS — M81.0 AGE-RELATED OSTEOPOROSIS WITHOUT CURRENT PATHOLOGICAL FRACTURE: ICD-10-CM

## 2025-06-24 LAB
25(OH)D3 SERPL-MCNC: 15.1 NG/ML (ref 30–100)
ANION GAP SERPL CALCULATED.3IONS-SCNC: 4 MMOL/L (ref 4–13)
BUN SERPL-MCNC: 20 MG/DL (ref 5–25)
CALCIUM SERPL-MCNC: 8.9 MG/DL (ref 8.4–10.2)
CHLORIDE SERPL-SCNC: 104 MMOL/L (ref 96–108)
CHOLEST SERPL-MCNC: 227 MG/DL (ref ?–200)
CO2 SERPL-SCNC: 32 MMOL/L (ref 21–32)
CREAT SERPL-MCNC: 0.72 MG/DL (ref 0.6–1.3)
EST. AVERAGE GLUCOSE BLD GHB EST-MCNC: 171 MG/DL
GFR SERPL CREATININE-BSD FRML MDRD: 86 ML/MIN/1.73SQ M
GLUCOSE P FAST SERPL-MCNC: 135 MG/DL (ref 65–99)
HBA1C MFR BLD: 7.6 %
HDLC SERPL-MCNC: 39 MG/DL
LDLC SERPL CALC-MCNC: 148 MG/DL (ref 0–100)
NONHDLC SERPL-MCNC: 188 MG/DL
POTASSIUM SERPL-SCNC: 4 MMOL/L (ref 3.5–5.3)
PTH-INTACT SERPL-MCNC: 65.7 PG/ML (ref 12–88)
SODIUM SERPL-SCNC: 140 MMOL/L (ref 135–147)
TRIGL SERPL-MCNC: 200 MG/DL (ref ?–150)

## 2025-06-24 PROCEDURE — 36415 COLL VENOUS BLD VENIPUNCTURE: CPT

## 2025-06-24 PROCEDURE — 82306 VITAMIN D 25 HYDROXY: CPT

## 2025-06-24 PROCEDURE — 83970 ASSAY OF PARATHORMONE: CPT

## 2025-06-24 PROCEDURE — 84166 PROTEIN E-PHORESIS/URINE/CSF: CPT

## 2025-06-24 PROCEDURE — 84165 PROTEIN E-PHORESIS SERUM: CPT

## 2025-06-25 LAB
ALBUMIN SERPL ELPH-MCNC: 3.58 G/DL (ref 3.2–5.1)
ALBUMIN SERPL ELPH-MCNC: 59.7 % (ref 48–70)
ALBUMIN UR ELPH-MCNC: 100 %
ALPHA1 GLOB MFR UR ELPH: 0 %
ALPHA1 GLOB SERPL ELPH-MCNC: 0.28 G/DL (ref 0.15–0.47)
ALPHA1 GLOB SERPL ELPH-MCNC: 4.7 % (ref 1.8–7)
ALPHA2 GLOB MFR UR ELPH: 0 %
ALPHA2 GLOB SERPL ELPH-MCNC: 0.73 G/DL (ref 0.42–1.04)
ALPHA2 GLOB SERPL ELPH-MCNC: 12.2 % (ref 5.9–14.9)
B-GLOBULIN MFR UR ELPH: 0 %
BETA GLOB ABNORMAL SERPL ELPH-MCNC: 0.47 G/DL (ref 0.31–0.57)
BETA1 GLOB SERPL ELPH-MCNC: 7.8 % (ref 4.7–7.7)
BETA2 GLOB SERPL ELPH-MCNC: 6.6 % (ref 3.1–7.9)
BETA2+GAMMA GLOB SERPL ELPH-MCNC: 0.4 G/DL (ref 0.2–0.58)
GAMMA GLOB ABNORMAL SERPL ELPH-MCNC: 0.54 G/DL (ref 0.4–1.66)
GAMMA GLOB MFR UR ELPH: 0 %
GAMMA GLOB SERPL ELPH-MCNC: 9 % (ref 6.9–22.3)
IGG/ALB SER: 1.48 {RATIO} (ref 1.1–1.8)
PROT SERPL-MCNC: 6 G/DL (ref 6.4–8.4)
PROT UR-MCNC: 9.5 MG/DL

## 2025-06-25 PROCEDURE — 84165 PROTEIN E-PHORESIS SERUM: CPT | Performed by: STUDENT IN AN ORGANIZED HEALTH CARE EDUCATION/TRAINING PROGRAM

## 2025-06-25 PROCEDURE — 84166 PROTEIN E-PHORESIS/URINE/CSF: CPT | Performed by: STUDENT IN AN ORGANIZED HEALTH CARE EDUCATION/TRAINING PROGRAM

## 2025-06-26 ENCOUNTER — PROCEDURE VISIT (OUTPATIENT)
Dept: PODIATRY | Facility: CLINIC | Age: 70
End: 2025-06-26
Payer: MEDICARE

## 2025-06-26 VITALS — BODY MASS INDEX: 42.41 KG/M2 | HEIGHT: 61 IN | WEIGHT: 224.6 LBS

## 2025-06-26 DIAGNOSIS — E11.9 CONTROLLED TYPE 2 DIABETES MELLITUS WITHOUT COMPLICATION, WITHOUT LONG-TERM CURRENT USE OF INSULIN (HCC): Primary | ICD-10-CM

## 2025-06-26 DIAGNOSIS — L84 CALLUS: ICD-10-CM

## 2025-06-26 PROCEDURE — 11055 PARING/CUTG B9 HYPRKER LES 1: CPT | Performed by: PODIATRIST

## 2025-06-26 PROCEDURE — RECHECK: Performed by: PODIATRIST

## 2025-06-26 PROCEDURE — 11719 TRIM NAIL(S) ANY NUMBER: CPT | Performed by: PODIATRIST

## 2025-06-26 NOTE — PROGRESS NOTES
"Name: Coretta Hines      : 1955      MRN: [id number]  Encounter Provider: Antelmo Robin DPM  Encounter Date: 2025   Encounter department: [facility] [place]    Treatment consisted of trimming of elongated toenails.  Trimmed pinch callus right hallux.  :  Assessment & Plan  Controlled type 2 diabetes mellitus without complication, without long-term current use of insulin ([facility])    Lab Results   Component Value Date    HGBA1C 7.6 (H) 2025   Nail trimming         Callus  Lesion trimming           History of Present Illness   HPI  Coretta Hines is a 69 y.o. female who presents for palliative footcare.  Patient has long nails that she cannot cut and pinch callus right great toe      Review of Systems       Objective   Ht 5' 1\" (1.549 m)   Wt 102 kg (224 lb 9.6 oz)   BMI 42.44 kg/m²      Physical Exam    Cardiovascular:      Comments: No palpable pedal pulses    Skin:     Findings: Lesion present.      Comments: Elongated toenails x 10; pinch callus right hallux           Nail removal    Date/Time: 2025 8:15 AM    Performed by: Antelmo Robin DPM  Authorized by: Antelmo Robin DPM    Nails trimmed:     Number of nails trimmed:  10  Lesion Destruction    Date/Time: 2025 8:15 AM    Performed by: Antelmo Robin DPM  Authorized by: Antelmo Robin DPM    Procedure Details - Lesion Destruction:     Number of Lesions:  1  Lesion 1:     Body area:  Lower extremity    Lower extremity location:  R big toe    Malignancy: benign hyperkeratotic lesion      Destruction method: scissors used for extraction          "

## 2025-06-30 ENCOUNTER — OFFICE VISIT (OUTPATIENT)
Age: 70
End: 2025-06-30
Payer: MEDICARE

## 2025-06-30 VITALS
WEIGHT: 224 LBS | OXYGEN SATURATION: 99 % | HEART RATE: 77 BPM | RESPIRATION RATE: 18 BRPM | DIASTOLIC BLOOD PRESSURE: 70 MMHG | BODY MASS INDEX: 42.29 KG/M2 | HEIGHT: 61 IN | SYSTOLIC BLOOD PRESSURE: 126 MMHG

## 2025-06-30 DIAGNOSIS — E66.812 CLASS 2 SEVERE OBESITY WITH SERIOUS COMORBIDITY IN ADULT, UNSPECIFIED BMI, UNSPECIFIED OBESITY TYPE (HCC): ICD-10-CM

## 2025-06-30 DIAGNOSIS — I35.0 NONRHEUMATIC AORTIC VALVE STENOSIS: ICD-10-CM

## 2025-06-30 DIAGNOSIS — M17.12 PRIMARY OSTEOARTHRITIS OF LEFT KNEE: ICD-10-CM

## 2025-06-30 DIAGNOSIS — E66.01 CLASS 2 SEVERE OBESITY WITH SERIOUS COMORBIDITY IN ADULT, UNSPECIFIED BMI, UNSPECIFIED OBESITY TYPE (HCC): ICD-10-CM

## 2025-06-30 DIAGNOSIS — M81.0 AGE-RELATED OSTEOPOROSIS WITHOUT CURRENT PATHOLOGICAL FRACTURE: ICD-10-CM

## 2025-06-30 DIAGNOSIS — E78.2 MIXED HYPERLIPIDEMIA: ICD-10-CM

## 2025-06-30 DIAGNOSIS — Z86.718 HISTORY OF DVT (DEEP VEIN THROMBOSIS): ICD-10-CM

## 2025-06-30 DIAGNOSIS — I10 PRIMARY HYPERTENSION: ICD-10-CM

## 2025-06-30 DIAGNOSIS — E11.65 TYPE 2 DIABETES MELLITUS WITH HYPERGLYCEMIA, WITHOUT LONG-TERM CURRENT USE OF INSULIN (HCC): Primary | ICD-10-CM

## 2025-06-30 PROCEDURE — G2211 COMPLEX E/M VISIT ADD ON: HCPCS | Performed by: INTERNAL MEDICINE

## 2025-06-30 PROCEDURE — 99214 OFFICE O/P EST MOD 30 MIN: CPT | Performed by: INTERNAL MEDICINE

## 2025-06-30 RX ORDER — METFORMIN HYDROCHLORIDE 500 MG/1
TABLET, EXTENDED RELEASE ORAL
Start: 2025-06-30 | End: 2025-06-30

## 2025-06-30 RX ORDER — LOSARTAN POTASSIUM 25 MG/1
25 TABLET ORAL DAILY
Qty: 30 TABLET | Refills: 5 | Status: SHIPPED | OUTPATIENT
Start: 2025-06-30

## 2025-06-30 RX ORDER — TIRZEPATIDE 2.5 MG/.5ML
2.5 INJECTION, SOLUTION SUBCUTANEOUS WEEKLY
Qty: 2 ML | Refills: 0 | Status: SHIPPED | OUTPATIENT
Start: 2025-06-30 | End: 2025-07-28

## 2025-06-30 NOTE — PROGRESS NOTES
INTERNAL MEDICINE OFFICE VISIT       NAME: Coretta Hines  AGE: 69 y.o. SEX: female       : 1955        MRN: [id number]    DATE: 2025  TIME: 7:03 AM    Assessment and Plan   1. Type 2 diabetes mellitus with hyperglycemia, without long-term current use of insulin (HCC) (Primary)  We discussed hemoglobin A1c of 7.6 on , up from 7.3, 3 months ago with goal of hemoglobin A1c less than 7.0.    We discussed options as Coretta is on maximum dose Trulicity.  She is taking 500 mg of metformin daily in the morning and 1000 mg at bedtime.    Options include discontinuance of Trulicity with trial of Zepbound and increasing metformin to 1000 mg twice daily.    Coretta also agrees to contact me after her fourth dose of Zepbound regarding her response and possible dose increase.    Next visit for type 2 diabetes, obesity etc. will be in mid October with lab work ordered today to be done 1 week prior.    2. Class 2 severe obesity with serious comorbidity in adult, unspecified BMI, unspecified obesity type (HCC)  BMI remains over 40 on Trulicity therapy.  We discussed Zepbound therapy and transition to this medicine today.    3. Primary hypertension  Controlled and continue diltiazem  once daily.  Will start losartan 25 mg daily for diabetic vascular prevention.    4. Mixed hyperlipidemia  Lipids have trended upward, currently total cholesterol 227, up from 154 3 months ago.  Rosuvastatin is currently prescribed at 20 mg daily.    5. Age-related osteoporosis without current pathological fracture   rheumatology note was reviewed for osteoporosis treatment.  Prescription is for romosuzumab.  Recent labs ordered by rheumatology include  vitamin D level 50.1, with prescription of 50,000 units of vitamin D daily and recheck vitamin D level in 12 weeks.  This will delay starting treatment for osteoporosis.    6. Primary osteoarthritis of left knee  Coretta is status post right total knee arthroplasty  and last saw orthopedics on May 15 regarding end-stage osteoarthritis of left knee and plan is total knee arthroplasty on September 23, 2025..  We discussed need to improve glycemic control in order for medical clearance to occur.  See notes above.  During will make an appointment for preoperative clearance examination within 30 days of her surgery.    7. Nonrheumatic aortic valve stenosis  Reviewed recent cardiology follow-up including April 8 2D echo showing moderate aortic stenosis.  Evaluation included negative nuclear stress test on April 4    8. History of DVT (deep vein thrombosis)  History of left DVT, unprovoked.  No clinical bleeding on long-term therapy with Eliquis 2.5 mg twice daily.      Chief Complaint   4-month follow-up    History of Present Illness   Coretta Hines is a 69 y.o.-year-old female who returns to the office today with her  for 4-month follow-up visit.    I reviewed podiatry note of June 26, urgent care visit of June 16 for UTI with Cipro x 7 days, May 15 orthopedics note regarding upcoming left total knee arthroplasty, April 4 rheumatology follow-up for treatment of osteoporosis.    We reviewed today's labs as noted above and options for treatment.    Also follow-up lab work was ordered for prior to next visit.  We also discussed that medical clearance will need to be rendered within 30 days of planned surgery and to make an appointment once the date is known.    Review of Systems   Review of Systems   Constitutional:  Negative for activity change and unexpected weight change.   Respiratory: Negative.     Cardiovascular: Negative.    Endocrine: Negative for polydipsia, polyphagia and polyuria.   Genitourinary: Negative.    Musculoskeletal:  Positive for arthralgias and gait problem.   Psychiatric/Behavioral: Negative.         Active Problem List   Problem List[1]    The following portions of the patient's history were reviewed and updated as appropriate: allergies, current  medications, past family history, past medical history, past social history, past surgical history, and problem list.    Objective   There were no vitals filed for this visit.  Wt Readings from Last 3 Encounters:   06/26/25 102 kg (224 lb 9.6 oz)   04/18/25 100 kg (221 lb)   04/08/25 99.8 kg (220 lb)       Physical Exam    Vital signs stable, alert and oriented no distress.  No JVD.  Carotid upstroke consent normal no bruit.  Lungs clear.  Cardiac: Regular rate and rhythm, distant murmur, aortic in quality, no S3.  Abdomen: Normal bowel sounds nondistended and nontender.  Extremities: Circulation intact.  There is no change in chronic lymphedema.  Skin integrity intact.  Gait is unstable with a cane.        Pertinent Laboratory/Diagnostic Studies:  I have reviewed pertinent labs:  BMP:   Lab Results   Component Value Date    SODIUM 140 06/24/2025    K 4.0 06/24/2025     06/24/2025    CO2 32 06/24/2025    AGAP 4 06/24/2025    BUN 20 06/24/2025    CREATININE 0.72 06/24/2025    GLUC 128 05/03/2023    GLUF 135 (H) 06/24/2025    CALCIUM 8.9 06/24/2025    EGFR 86 06/24/2025     Lipid Profile:   Lab Results   Component Value Date    CHOLESTEROL 227 (H) 06/24/2025    HDL 39 (L) 06/24/2025    TRIG 200 (H) 06/24/2025    LDLCALC 148 (H) 06/24/2025    NONHDLC 188 06/24/2025     Hemoglobin A1C/EST AVG Glucose   Lab Results   Component Value Date    HGBA1C 7.6 (H) 06/24/2025     06/24/2025     Vitamin D Level   Lab Results   Component Value Date    [id number] 15.1 (L) 06/24/2025               ALLERGIES:  Allergies[2]    Current Medications   Current Medications[3]      Health Maintenance        Sunday Robbins MD           [1]   Patient Active Problem List  Diagnosis    Type 2 diabetes mellitus with hyperglycemia, without long-term current use of insulin (HCC)    Class 2 severe obesity with serious comorbidity in adult (HCC)    Left retinal disorder    Primary hypertension    GERD (gastroesophageal reflux disease)     Moderate persistent asthma without complication    Obesity hypoventilation syndrome (HCC)    DEN (obstructive sleep apnea)    Osteoarthritis    Seasonal allergic rhinitis due to fungal spores    Edema of both lower extremities due to peripheral venous insufficiency    History of colon polyps    Bilateral carpal tunnel syndrome    Mixed hyperlipidemia    Age-related osteoporosis without current pathological fracture    Callus    Primary osteoarthritis of left knee   [2]   Allergies  Allergen Reactions    Other Nausea Only, Vomiting and Cough     LOBSTER    Penicillins Seizures    Sulfa Antibiotics Rash   [3]   Current Outpatient Medications   Medication Sig Dispense Refill    acetaminophen (Tylenol) 325 mg tablet Take 650 mg by mouth every 6 (six) hours as needed for mild pain      albuterol (PROVENTIL HFA,VENTOLIN HFA) 90 mcg/act inhaler Inhale 2 puffs every 6 (six) hours as needed for wheezing 8.5 g 2    Azelastine HCl 137 MCG/SPRAY SOLN SPRAY 2 SPRAYS INTO EACH NOSTRIL 2 TIMES A DAY USE IN EACH NOSTRIL AS DIRECTED 30 mL 3    bimatoprost (LUMIGAN) 0.01 % ophthalmic drops       cycloSPORINE (RESTASIS) 0.05 % ophthalmic emulsion       diltiazem (CARDIZEM CD) 240 mg 24 hr capsule Take 1 capsule (240 mg total) by mouth daily 90 capsule 1    Dulaglutide (Trulicity) 4.5 MG/0.5ML SOAJ Inject 4.5 mg under the skin once a week 2 mL 5    Eliquis 2.5 MG TAKE 1 TABLET(2.5 MG) BY MOUTH TWICE DAILY 60 tablet 5    ergocalciferol (VITAMIN D2) 50,000 units Take 1 capsule (50,000 Units total) by mouth once a week 12 capsule 0    famotidine (PEPCID) 20 mg tablet Take 20 mg by mouth      fluticasone (FLONASE) 50 mcg/act nasal spray 1 spray into each nostril daily 9.9 mL 0    Fluticasone-Salmeterol (Wixela Inhub) 250-50 mcg/dose inhaler INHALE 1 PUFF BY MOUTH TWICE DAILY. RINSE MOUTH AFTER USE 60 blister 3    loratadine (CLARITIN) 10 mg tablet Take 10 mg by mouth PRN      metFORMIN (GLUCOPHAGE) 500 mg tablet 1 tablet each morning and 2  tablets each evening 270 tablet 3    rosuvastatin (CRESTOR) 20 MG tablet Take 1 tablet (20 mg total) by mouth daily 100 tablet 3    timolol (TIMOPTIC) 0.5 % ophthalmic solution Administer 1 drop into the left eye in the morning and 1 drop in the evening.       No current facility-administered medications for this visit.

## 2025-07-21 ENCOUNTER — TELEPHONE (OUTPATIENT)
Age: 70
End: 2025-07-21

## 2025-07-21 NOTE — TELEPHONE ENCOUNTER
Reason for call:   [x] Prior Auth  [] Other:     Caller:  [x] Patient  [] Pharmacy  Name:   Address:   Callback Number:     Medication:  tirzepatide (Zepbound) 2.5 mg/0.5 mL auto-injector    Dose/Frequency:   Inject 0.5 mL (2.5 mg total) under the skin once a week for 28 days        Quantity: 2ml    Ordering Provider:   [x] PCP/Provider -   [] Speciality/Provider -     Has the patient tried other medications and failed? If failed, which medications did they fail?    [] No   [] Yes -     Is the patient's insurance updated in EPIC?   [] Yes   [] No     Is a copy of the patient's insurance scanned in EPIC?   [] Yes   [] No

## 2025-07-21 NOTE — TELEPHONE ENCOUNTER
PA Zepbound 2.5 MG/0.5ML SUBMITTED    to [facility]     via    []CMM-KEY:    [x]Surescripts-Case ID # [id number]  []Availity-Auth ID #  NDC #    []Faxed to plan   []Other website    []Phone call Case ID #      []PA sent as URGENT    All office notes, labs and other pertaining documents and studies sent. Clinical questions answered. Awaiting determination from insurance company.     Turnaround time for your insurance to make a decision on your Prior Authorization can take 7-21 business days.

## 2025-07-29 ENCOUNTER — TELEPHONE (OUTPATIENT)
Age: 70
End: 2025-07-29

## 2025-08-13 ENCOUNTER — TELEPHONE (OUTPATIENT)
Dept: GASTROENTEROLOGY | Facility: CLINIC | Age: 70
End: 2025-08-13

## 2025-08-13 ENCOUNTER — OFFICE VISIT (OUTPATIENT)
Dept: GASTROENTEROLOGY | Facility: CLINIC | Age: 70
End: 2025-08-13
Payer: MEDICARE

## (undated) RX ORDER — BRIMONIDINE TARTRATE, TIMOLOL MALEATE 2; 5 MG/ML; MG/ML: 1 SOLUTION/ DROPS OPHTHALMIC TWICE A DAY

## (undated) RX ORDER — DUREZOL 0.5 MG/ML: 1 EMULSION OPHTHALMIC

## (undated) RX ORDER — PREDNISOLONE ACETATE 10 MG/ML: 1 SUSPENSION/ DROPS OPHTHALMIC

## (undated) RX ORDER — CIPROFLOXACIN 3 MG/ML: 1 SOLUTION OPHTHALMIC

## (undated) RX ORDER — CARVEDILOL 6.25 MG/1: 1 TABLET, FILM COATED ORAL

## (undated) RX ORDER — BROMFENAC SODIUM 0.7 MG/ML: 1 SOLUTION/ DROPS OPHTHALMIC TWICE A DAY

## (undated) RX ORDER — BIMATOPROST 0.1 MG/ML
1 SOLUTION/ DROPS OPHTHALMIC EVERY EVENING
Start: 2023-01-20

## (undated) RX ORDER — OLOPATADINE HYDROCHLORIDE 2 MG/ML: 1 SOLUTION OPHTHALMIC ONCE A DAY

## (undated) RX ORDER — KETOROLAC TROMETHAMINE 5 MG/ML: 1 SOLUTION OPHTHALMIC TWICE A DAY